# Patient Record
Sex: FEMALE | Race: WHITE | NOT HISPANIC OR LATINO | Employment: OTHER | RURAL
[De-identification: names, ages, dates, MRNs, and addresses within clinical notes are randomized per-mention and may not be internally consistent; named-entity substitution may affect disease eponyms.]

---

## 2017-01-11 ENCOUNTER — HISTORICAL (OUTPATIENT)
Dept: ADMINISTRATIVE | Facility: HOSPITAL | Age: 67
End: 2017-01-11

## 2017-01-12 LAB
LAB AP CLINICAL INFORMATION: NORMAL
LAB AP DIAGNOSIS - HISTORICAL: NORMAL
LAB AP GROSS PATHOLOGY - HISTORICAL: NORMAL
LAB AP SPECIMEN SUBMITTED - HISTORICAL: NORMAL

## 2019-06-06 LAB — HEP C VIRUS AB: NORMAL

## 2019-06-13 ENCOUNTER — HISTORICAL (OUTPATIENT)
Dept: ADMINISTRATIVE | Facility: HOSPITAL | Age: 69
End: 2019-06-13

## 2019-06-13 LAB — PAP SMEAR: NORMAL

## 2019-06-17 LAB
LAB AP CLINICAL INFORMATION: NORMAL
LAB AP GENERAL CAT - HISTORICAL: NORMAL
LAB AP INTERPRETATION/RESULT - HISTORICAL: NEGATIVE
LAB AP SPECIMEN ADEQUACY - HISTORICAL: NORMAL
LAB AP SPECIMEN SUBMITTED - HISTORICAL: NORMAL

## 2020-06-08 ENCOUNTER — HISTORICAL (OUTPATIENT)
Dept: ADMINISTRATIVE | Facility: HOSPITAL | Age: 70
End: 2020-06-08

## 2020-07-09 ENCOUNTER — HISTORICAL (OUTPATIENT)
Dept: ADMINISTRATIVE | Facility: HOSPITAL | Age: 70
End: 2020-07-09

## 2020-07-29 ENCOUNTER — HISTORICAL (OUTPATIENT)
Dept: ADMINISTRATIVE | Facility: HOSPITAL | Age: 70
End: 2020-07-29

## 2020-07-31 LAB
INSULIN SERPL-ACNC: 0.4 U[IU]/ML
INSULIN SERPL-ACNC: NO U[IU]/ML
INSULIN SERPL-ACNC: NORMAL U[IU]/ML
LAB AP CLINICAL INFORMATION: NORMAL
LAB AP COMMENTS: NORMAL
LAB AP DIAGNOSIS - HISTORICAL: NORMAL
LAB AP GROSS PATHOLOGY - HISTORICAL: NORMAL
LAB AP SPECIMEN SUBMITTED - HISTORICAL: NORMAL

## 2020-10-20 ENCOUNTER — HISTORICAL (OUTPATIENT)
Dept: ADMINISTRATIVE | Facility: HOSPITAL | Age: 70
End: 2020-10-20

## 2020-12-28 ENCOUNTER — HISTORICAL (OUTPATIENT)
Dept: ADMINISTRATIVE | Facility: HOSPITAL | Age: 70
End: 2020-12-28

## 2021-01-07 ENCOUNTER — HISTORICAL (OUTPATIENT)
Dept: ADMINISTRATIVE | Facility: HOSPITAL | Age: 71
End: 2021-01-07

## 2021-01-15 ENCOUNTER — HISTORICAL (OUTPATIENT)
Dept: ADMINISTRATIVE | Facility: HOSPITAL | Age: 71
End: 2021-01-15

## 2021-03-02 ENCOUNTER — HISTORICAL (OUTPATIENT)
Dept: ADMINISTRATIVE | Facility: HOSPITAL | Age: 71
End: 2021-03-02

## 2021-04-26 VITALS — WEIGHT: 198 LBS | HEIGHT: 64 IN | BODY MASS INDEX: 33.8 KG/M2

## 2021-04-26 RX ORDER — MECLIZINE HCL 12.5 MG 12.5 MG/1
12.5 TABLET ORAL 3 TIMES DAILY PRN
COMMUNITY
End: 2021-10-27

## 2021-04-26 RX ORDER — MONTELUKAST SODIUM 10 MG/1
10 TABLET ORAL NIGHTLY
COMMUNITY
End: 2021-10-27

## 2021-04-26 RX ORDER — ASPIRIN 81 MG/1
81 TABLET ORAL DAILY
COMMUNITY
End: 2021-10-20 | Stop reason: ALTCHOICE

## 2021-04-26 RX ORDER — GABAPENTIN 300 MG/1
300 CAPSULE ORAL 3 TIMES DAILY
COMMUNITY
End: 2021-10-27

## 2021-04-26 RX ORDER — ISOSORBIDE MONONITRATE 30 MG/1
30 TABLET, EXTENDED RELEASE ORAL DAILY
COMMUNITY
End: 2021-10-20 | Stop reason: ALTCHOICE

## 2021-04-26 RX ORDER — POTASSIUM CHLORIDE 600 MG/1
8 TABLET, FILM COATED, EXTENDED RELEASE ORAL 2 TIMES DAILY
COMMUNITY
End: 2021-10-21

## 2021-04-26 RX ORDER — PHENYTOIN SODIUM 100 MG/1
CAPSULE, EXTENDED RELEASE ORAL 3 TIMES DAILY
COMMUNITY
End: 2021-08-09

## 2021-04-26 RX ORDER — FLUTICASONE PROPIONATE 50 MCG
1 SPRAY, SUSPENSION (ML) NASAL DAILY
COMMUNITY
End: 2021-12-30 | Stop reason: SDUPTHER

## 2021-04-26 RX ORDER — PAROXETINE HYDROCHLORIDE 40 MG/1
40 TABLET, FILM COATED ORAL EVERY MORNING
COMMUNITY
End: 2021-11-03

## 2021-04-26 RX ORDER — BUDESONIDE AND FORMOTEROL FUMARATE DIHYDRATE 160; 4.5 UG/1; UG/1
2 AEROSOL RESPIRATORY (INHALATION) EVERY 12 HOURS
COMMUNITY
End: 2021-09-08 | Stop reason: ALTCHOICE

## 2021-04-26 RX ORDER — LORATADINE 10 MG/1
10 TABLET ORAL DAILY
COMMUNITY
End: 2022-08-23

## 2021-04-26 RX ORDER — IPRATROPIUM BROMIDE AND ALBUTEROL SULFATE 2.5; .5 MG/3ML; MG/3ML
3 SOLUTION RESPIRATORY (INHALATION) EVERY 6 HOURS PRN
COMMUNITY
End: 2021-10-18 | Stop reason: SDUPTHER

## 2021-04-26 RX ORDER — FUROSEMIDE 20 MG/1
20 TABLET ORAL DAILY
COMMUNITY
End: 2021-10-21

## 2021-04-26 RX ORDER — PRAVASTATIN SODIUM 40 MG/1
40 TABLET ORAL DAILY
COMMUNITY
End: 2021-10-27

## 2021-04-26 RX ORDER — HYDROCHLOROTHIAZIDE 25 MG/1
25 TABLET ORAL DAILY
COMMUNITY
End: 2021-12-02 | Stop reason: SDUPTHER

## 2021-04-26 RX ORDER — ALBUTEROL SULFATE 90 UG/1
2 AEROSOL, METERED RESPIRATORY (INHALATION) EVERY 6 HOURS PRN
COMMUNITY
End: 2021-06-16 | Stop reason: SDUPTHER

## 2021-04-27 ENCOUNTER — HISTORICAL (OUTPATIENT)
Dept: ADMINISTRATIVE | Facility: HOSPITAL | Age: 71
End: 2021-04-27

## 2021-05-21 ENCOUNTER — LAB REQUISITION (OUTPATIENT)
Dept: LAB | Facility: HOSPITAL | Age: 71
End: 2021-05-21
Payer: MEDICARE

## 2021-05-21 DIAGNOSIS — N39.0 URINARY TRACT INFECTION, SITE NOT SPECIFIED: ICD-10-CM

## 2021-05-21 LAB
BACTERIA #/AREA URNS HPF: ABNORMAL /HPF
BILIRUB UR QL STRIP: NEGATIVE
CLARITY UR: CLEAR
COLOR UR: YELLOW
GLUCOSE UR STRIP-MCNC: NEGATIVE MG/DL
HYALINE CASTS #/AREA URNS LPF: ABNORMAL /LPF
KETONES UR STRIP-SCNC: NEGATIVE MG/DL
LEUKOCYTE ESTERASE UR QL STRIP: ABNORMAL
NITRITE UR QL STRIP: NEGATIVE
PH UR STRIP: 7 PH UNITS
PROT UR QL STRIP: NEGATIVE
RBC # UR STRIP: NEGATIVE /UL
RBC #/AREA URNS HPF: ABNORMAL /HPF
SP GR UR STRIP: 1.01
SQUAMOUS #/AREA URNS LPF: ABNORMAL /LPF
UROBILINOGEN UR STRIP-ACNC: 0.2 MG/DL
WBC #/AREA URNS HPF: ABNORMAL /HPF

## 2021-05-21 PROCEDURE — 81001 URINALYSIS AUTO W/SCOPE: CPT | Performed by: NURSE PRACTITIONER

## 2021-05-21 PROCEDURE — 87086 URINE CULTURE/COLONY COUNT: CPT | Performed by: NURSE PRACTITIONER

## 2021-05-24 LAB — UA COMPLETE W REFLEX CULTURE PNL UR: ABNORMAL

## 2021-05-25 DIAGNOSIS — B95.2 UTI (URINARY TRACT INFECTION) DUE TO ENTEROCOCCUS: Primary | ICD-10-CM

## 2021-05-25 DIAGNOSIS — N39.0 UTI (URINARY TRACT INFECTION) DUE TO ENTEROCOCCUS: Primary | ICD-10-CM

## 2021-05-25 RX ORDER — CIPROFLOXACIN 500 MG/1
500 TABLET ORAL 2 TIMES DAILY
Qty: 20 TABLET | Refills: 0 | Status: SHIPPED | OUTPATIENT
Start: 2021-05-25 | End: 2021-07-20

## 2021-06-16 ENCOUNTER — OFFICE VISIT (OUTPATIENT)
Dept: FAMILY MEDICINE | Facility: CLINIC | Age: 71
End: 2021-06-16
Payer: MEDICARE

## 2021-06-16 ENCOUNTER — APPOINTMENT (OUTPATIENT)
Dept: RADIOLOGY | Facility: CLINIC | Age: 71
End: 2021-06-16
Attending: NURSE PRACTITIONER
Payer: MEDICARE

## 2021-06-16 VITALS
OXYGEN SATURATION: 98 % | HEIGHT: 64 IN | TEMPERATURE: 98 F | DIASTOLIC BLOOD PRESSURE: 60 MMHG | HEART RATE: 68 BPM | WEIGHT: 200 LBS | SYSTOLIC BLOOD PRESSURE: 102 MMHG | RESPIRATION RATE: 18 BRPM | BODY MASS INDEX: 34.15 KG/M2

## 2021-06-16 DIAGNOSIS — M54.41 CHRONIC LOW BACK PAIN WITH BILATERAL SCIATICA, UNSPECIFIED BACK PAIN LATERALITY: ICD-10-CM

## 2021-06-16 DIAGNOSIS — G89.29 CHRONIC LOW BACK PAIN WITH BILATERAL SCIATICA, UNSPECIFIED BACK PAIN LATERALITY: ICD-10-CM

## 2021-06-16 DIAGNOSIS — M54.9 DORSALGIA, UNSPECIFIED: Primary | ICD-10-CM

## 2021-06-16 DIAGNOSIS — M81.0 OSTEOPOROSIS, UNSPECIFIED OSTEOPOROSIS TYPE, UNSPECIFIED PATHOLOGICAL FRACTURE PRESENCE: ICD-10-CM

## 2021-06-16 DIAGNOSIS — J45.40 MODERATE PERSISTENT ASTHMA, UNSPECIFIED WHETHER COMPLICATED: ICD-10-CM

## 2021-06-16 DIAGNOSIS — M54.42 CHRONIC LOW BACK PAIN WITH BILATERAL SCIATICA, UNSPECIFIED BACK PAIN LATERALITY: ICD-10-CM

## 2021-06-16 DIAGNOSIS — K21.9 GASTROESOPHAGEAL REFLUX DISEASE WITHOUT ESOPHAGITIS: ICD-10-CM

## 2021-06-16 DIAGNOSIS — M54.9 DORSALGIA, UNSPECIFIED: ICD-10-CM

## 2021-06-16 LAB
BILIRUB UR QL STRIP: NEGATIVE
GLUCOSE UR QL STRIP: NEGATIVE
KETONES UR QL STRIP: NEGATIVE
LEUKOCYTE ESTERASE UR QL STRIP: NEGATIVE
PH, POC UA: 7
POC BLOOD, URINE: POSITIVE
POC NITRATES, URINE: NEGATIVE
PROT UR QL STRIP: NEGATIVE
SP GR UR STRIP: 1.02 (ref 1–1.03)
UROBILINOGEN UR STRIP-ACNC: 0.2 (ref 0.1–1.1)

## 2021-06-16 PROCEDURE — 72100 X-RAY EXAM L-S SPINE 2/3 VWS: CPT | Mod: TC,RHCUB | Performed by: NURSE PRACTITIONER

## 2021-06-16 PROCEDURE — 81003 URINALYSIS AUTO W/O SCOPE: CPT | Mod: RHCUB | Performed by: NURSE PRACTITIONER

## 2021-06-16 PROCEDURE — 1125F AMNT PAIN NOTED PAIN PRSNT: CPT | Mod: ,,, | Performed by: NURSE PRACTITIONER

## 2021-06-16 PROCEDURE — 72100 XR LUMBAR SPINE AP AND LATERAL: ICD-10-PCS | Mod: 26,,, | Performed by: RADIOLOGY

## 2021-06-16 PROCEDURE — 99213 OFFICE O/P EST LOW 20 MIN: CPT | Mod: ,,, | Performed by: NURSE PRACTITIONER

## 2021-06-16 PROCEDURE — 72100 X-RAY EXAM L-S SPINE 2/3 VWS: CPT | Mod: 26,,, | Performed by: RADIOLOGY

## 2021-06-16 PROCEDURE — 3008F BODY MASS INDEX DOCD: CPT | Mod: ,,, | Performed by: NURSE PRACTITIONER

## 2021-06-16 PROCEDURE — 1125F PR PAIN SEVERITY QUANTIFIED, PAIN PRESENT: ICD-10-PCS | Mod: ,,, | Performed by: NURSE PRACTITIONER

## 2021-06-16 PROCEDURE — 3008F PR BODY MASS INDEX (BMI) DOCUMENTED: ICD-10-PCS | Mod: ,,, | Performed by: NURSE PRACTITIONER

## 2021-06-16 PROCEDURE — 99213 PR OFFICE/OUTPT VISIT, EST, LEVL III, 20-29 MIN: ICD-10-PCS | Mod: ,,, | Performed by: NURSE PRACTITIONER

## 2021-06-16 RX ORDER — ASPIRIN 81 MG/1
1 TABLET ORAL DAILY
COMMUNITY
End: 2021-07-20 | Stop reason: SDUPTHER

## 2021-06-16 RX ORDER — ALBUTEROL SULFATE 1.25 MG/3ML
1 SOLUTION RESPIRATORY (INHALATION) EVERY 6 HOURS PRN
COMMUNITY
End: 2021-10-20

## 2021-06-16 RX ORDER — PREDNISONE 5 MG/1
TABLET ORAL
Qty: 30 TABLET | Refills: 0 | Status: SHIPPED | OUTPATIENT
Start: 2021-06-16 | End: 2021-07-20 | Stop reason: DRUGHIGH

## 2021-07-20 ENCOUNTER — LAB REQUISITION (OUTPATIENT)
Dept: LAB | Facility: HOSPITAL | Age: 71
End: 2021-07-20
Attending: NURSE PRACTITIONER
Payer: MEDICARE

## 2021-07-20 ENCOUNTER — OFFICE VISIT (OUTPATIENT)
Dept: FAMILY MEDICINE | Facility: CLINIC | Age: 71
End: 2021-07-20
Payer: MEDICARE

## 2021-07-20 VITALS
DIASTOLIC BLOOD PRESSURE: 62 MMHG | BODY MASS INDEX: 34.66 KG/M2 | HEART RATE: 83 BPM | WEIGHT: 203 LBS | RESPIRATION RATE: 18 BRPM | SYSTOLIC BLOOD PRESSURE: 128 MMHG | HEIGHT: 64 IN | OXYGEN SATURATION: 96 % | TEMPERATURE: 97 F

## 2021-07-20 DIAGNOSIS — M54.50 LOW BACK PAIN: ICD-10-CM

## 2021-07-20 DIAGNOSIS — J45.41 MODERATE PERSISTENT ASTHMA WITH ACUTE EXACERBATION: Primary | ICD-10-CM

## 2021-07-20 DIAGNOSIS — R32 UNSPECIFIED URINARY INCONTINENCE: ICD-10-CM

## 2021-07-20 LAB
BILIRUB UR QL STRIP: NEGATIVE
CLARITY UR: CLEAR
COLOR UR: YELLOW
GLUCOSE UR STRIP-MCNC: NEGATIVE MG/DL
KETONES UR STRIP-SCNC: NEGATIVE MG/DL
LEUKOCYTE ESTERASE UR QL STRIP: NEGATIVE
NITRITE UR QL STRIP: NEGATIVE
PH UR STRIP: 8 PH UNITS
PROT UR QL STRIP: NEGATIVE
RBC # UR STRIP: NEGATIVE /UL
SP GR UR STRIP: 1.01
UROBILINOGEN UR STRIP-ACNC: 0.2 MG/DL

## 2021-07-20 PROCEDURE — 96372 THER/PROPH/DIAG INJ SC/IM: CPT | Mod: ,,, | Performed by: NURSE PRACTITIONER

## 2021-07-20 PROCEDURE — 1126F AMNT PAIN NOTED NONE PRSNT: CPT | Mod: ,,, | Performed by: NURSE PRACTITIONER

## 2021-07-20 PROCEDURE — 81003 URINALYSIS AUTO W/O SCOPE: CPT | Performed by: NURSE PRACTITIONER

## 2021-07-20 PROCEDURE — 3008F BODY MASS INDEX DOCD: CPT | Mod: ,,, | Performed by: NURSE PRACTITIONER

## 2021-07-20 PROCEDURE — 1126F PR PAIN SEVERITY QUANTIFIED, NO PAIN PRESENT: ICD-10-PCS | Mod: ,,, | Performed by: NURSE PRACTITIONER

## 2021-07-20 PROCEDURE — 96372 PR INJECTION,THERAP/PROPH/DIAG2ST, IM OR SUBCUT: ICD-10-PCS | Mod: ,,, | Performed by: NURSE PRACTITIONER

## 2021-07-20 PROCEDURE — 99213 PR OFFICE/OUTPT VISIT, EST, LEVL III, 20-29 MIN: ICD-10-PCS | Mod: 25,,, | Performed by: NURSE PRACTITIONER

## 2021-07-20 PROCEDURE — 99213 OFFICE O/P EST LOW 20 MIN: CPT | Mod: 25,,, | Performed by: NURSE PRACTITIONER

## 2021-07-20 PROCEDURE — 3008F PR BODY MASS INDEX (BMI) DOCUMENTED: ICD-10-PCS | Mod: ,,, | Performed by: NURSE PRACTITIONER

## 2021-07-20 RX ORDER — PREDNISONE 5 MG/1
10 TABLET ORAL DAILY
Qty: 30 TABLET | Refills: 2 | Status: SHIPPED | OUTPATIENT
Start: 2021-07-20 | End: 2021-10-14

## 2021-07-20 RX ORDER — DEXAMETHASONE SODIUM PHOSPHATE 4 MG/ML
4 INJECTION, SOLUTION INTRA-ARTICULAR; INTRALESIONAL; INTRAMUSCULAR; INTRAVENOUS; SOFT TISSUE
Status: COMPLETED | OUTPATIENT
Start: 2021-07-20 | End: 2021-07-20

## 2021-07-20 RX ORDER — METHYLPREDNISOLONE ACETATE 40 MG/ML
40 INJECTION, SUSPENSION INTRA-ARTICULAR; INTRALESIONAL; INTRAMUSCULAR; SOFT TISSUE
Status: COMPLETED | OUTPATIENT
Start: 2021-07-20 | End: 2021-07-20

## 2021-07-20 RX ADMIN — METHYLPREDNISOLONE ACETATE 40 MG: 40 INJECTION, SUSPENSION INTRA-ARTICULAR; INTRALESIONAL; INTRAMUSCULAR; SOFT TISSUE at 10:07

## 2021-07-20 RX ADMIN — DEXAMETHASONE SODIUM PHOSPHATE 4 MG: 4 INJECTION, SOLUTION INTRA-ARTICULAR; INTRALESIONAL; INTRAMUSCULAR; INTRAVENOUS; SOFT TISSUE at 10:07

## 2021-09-08 ENCOUNTER — OFFICE VISIT (OUTPATIENT)
Dept: PULMONOLOGY | Facility: CLINIC | Age: 71
End: 2021-09-08
Payer: MEDICARE

## 2021-09-08 ENCOUNTER — OFFICE VISIT (OUTPATIENT)
Dept: CARDIOLOGY | Facility: CLINIC | Age: 71
End: 2021-09-08
Payer: MEDICARE

## 2021-09-08 VITALS
HEIGHT: 64 IN | DIASTOLIC BLOOD PRESSURE: 60 MMHG | OXYGEN SATURATION: 96 % | HEART RATE: 73 BPM | SYSTOLIC BLOOD PRESSURE: 108 MMHG | RESPIRATION RATE: 16 BRPM | BODY MASS INDEX: 34.66 KG/M2 | WEIGHT: 203 LBS

## 2021-09-08 VITALS
OXYGEN SATURATION: 97 % | SYSTOLIC BLOOD PRESSURE: 122 MMHG | DIASTOLIC BLOOD PRESSURE: 72 MMHG | BODY MASS INDEX: 33.97 KG/M2 | HEART RATE: 66 BPM | WEIGHT: 199 LBS | HEIGHT: 64 IN

## 2021-09-08 DIAGNOSIS — I50.9 CONGESTIVE HEART FAILURE, UNSPECIFIED HF CHRONICITY, UNSPECIFIED HEART FAILURE TYPE: Primary | ICD-10-CM

## 2021-09-08 DIAGNOSIS — J45.41 MODERATE PERSISTENT ASTHMA WITH ACUTE EXACERBATION: Primary | ICD-10-CM

## 2021-09-08 DIAGNOSIS — I50.32 CHRONIC DIASTOLIC HEART FAILURE: Chronic | ICD-10-CM

## 2021-09-08 PROCEDURE — 99215 OFFICE O/P EST HI 40 MIN: CPT | Mod: PBBFAC,25 | Performed by: INTERNAL MEDICINE

## 2021-09-08 PROCEDURE — 99215 OFFICE O/P EST HI 40 MIN: CPT | Mod: PBBFAC | Performed by: INTERNAL MEDICINE

## 2021-09-08 PROCEDURE — 93005 ELECTROCARDIOGRAM TRACING: CPT | Mod: PBBFAC | Performed by: INTERNAL MEDICINE

## 2021-09-08 PROCEDURE — 3288F PR FALLS RISK ASSESSMENT DOCUMENTED: ICD-10-PCS | Mod: CPTII,,, | Performed by: INTERNAL MEDICINE

## 2021-09-08 PROCEDURE — 1100F PTFALLS ASSESS-DOCD GE2>/YR: CPT | Mod: CPTII,,, | Performed by: INTERNAL MEDICINE

## 2021-09-08 PROCEDURE — 3008F BODY MASS INDEX DOCD: CPT | Mod: CPTII,,, | Performed by: INTERNAL MEDICINE

## 2021-09-08 PROCEDURE — 1159F MED LIST DOCD IN RCRD: CPT | Mod: CPTII,,, | Performed by: INTERNAL MEDICINE

## 2021-09-08 PROCEDURE — 3078F PR MOST RECENT DIASTOLIC BLOOD PRESSURE < 80 MM HG: ICD-10-PCS | Mod: CPTII,,, | Performed by: INTERNAL MEDICINE

## 2021-09-08 PROCEDURE — 3078F DIAST BP <80 MM HG: CPT | Mod: CPTII,,, | Performed by: INTERNAL MEDICINE

## 2021-09-08 PROCEDURE — 1159F PR MEDICATION LIST DOCUMENTED IN MEDICAL RECORD: ICD-10-PCS | Mod: CPTII,,, | Performed by: INTERNAL MEDICINE

## 2021-09-08 PROCEDURE — 1126F PR PAIN SEVERITY QUANTIFIED, NO PAIN PRESENT: ICD-10-PCS | Mod: CPTII,,, | Performed by: INTERNAL MEDICINE

## 2021-09-08 PROCEDURE — 99213 PR OFFICE/OUTPT VISIT, EST, LEVL III, 20-29 MIN: ICD-10-PCS | Mod: S$PBB,,, | Performed by: INTERNAL MEDICINE

## 2021-09-08 PROCEDURE — 3074F PR MOST RECENT SYSTOLIC BLOOD PRESSURE < 130 MM HG: ICD-10-PCS | Mod: CPTII,,, | Performed by: INTERNAL MEDICINE

## 2021-09-08 PROCEDURE — 1160F PR REVIEW ALL MEDS BY PRESCRIBER/CLIN PHARMACIST DOCUMENTED: ICD-10-PCS | Mod: CPTII,,, | Performed by: INTERNAL MEDICINE

## 2021-09-08 PROCEDURE — 99213 OFFICE O/P EST LOW 20 MIN: CPT | Mod: S$PBB,,, | Performed by: INTERNAL MEDICINE

## 2021-09-08 PROCEDURE — 1160F RVW MEDS BY RX/DR IN RCRD: CPT | Mod: CPTII,,, | Performed by: INTERNAL MEDICINE

## 2021-09-08 PROCEDURE — 3008F PR BODY MASS INDEX (BMI) DOCUMENTED: ICD-10-PCS | Mod: CPTII,,, | Performed by: INTERNAL MEDICINE

## 2021-09-08 PROCEDURE — 3074F SYST BP LT 130 MM HG: CPT | Mod: CPTII,,, | Performed by: INTERNAL MEDICINE

## 2021-09-08 PROCEDURE — 99214 OFFICE O/P EST MOD 30 MIN: CPT | Mod: S$PBB,,, | Performed by: INTERNAL MEDICINE

## 2021-09-08 PROCEDURE — 93010 ELECTROCARDIOGRAM REPORT: CPT | Mod: S$PBB,,, | Performed by: INTERNAL MEDICINE

## 2021-09-08 PROCEDURE — 93010 EKG 12-LEAD: ICD-10-PCS | Mod: S$PBB,,, | Performed by: INTERNAL MEDICINE

## 2021-09-08 PROCEDURE — 1126F AMNT PAIN NOTED NONE PRSNT: CPT | Mod: CPTII,,, | Performed by: INTERNAL MEDICINE

## 2021-09-08 PROCEDURE — 99214 PR OFFICE/OUTPT VISIT, EST, LEVL IV, 30-39 MIN: ICD-10-PCS | Mod: S$PBB,,, | Performed by: INTERNAL MEDICINE

## 2021-09-08 PROCEDURE — 1100F PR PT FALLS ASSESS DOC 2+ FALLS/FALL W/INJURY/YR: ICD-10-PCS | Mod: CPTII,,, | Performed by: INTERNAL MEDICINE

## 2021-09-08 PROCEDURE — 3288F FALL RISK ASSESSMENT DOCD: CPT | Mod: CPTII,,, | Performed by: INTERNAL MEDICINE

## 2021-09-08 RX ORDER — FLUTICASONE FUROATE, UMECLIDINIUM BROMIDE AND VILANTEROL TRIFENATATE 200; 62.5; 25 UG/1; UG/1; UG/1
1 POWDER RESPIRATORY (INHALATION) DAILY
Qty: 60 EACH | Refills: 4 | Status: SHIPPED | OUTPATIENT
Start: 2021-09-08 | End: 2021-12-19 | Stop reason: RX

## 2021-09-09 ENCOUNTER — OFFICE VISIT (OUTPATIENT)
Dept: FAMILY MEDICINE | Facility: CLINIC | Age: 71
End: 2021-09-09
Payer: MEDICARE

## 2021-09-09 VITALS
RESPIRATION RATE: 18 BRPM | OXYGEN SATURATION: 96 % | DIASTOLIC BLOOD PRESSURE: 70 MMHG | HEIGHT: 64 IN | HEART RATE: 69 BPM | BODY MASS INDEX: 36.02 KG/M2 | SYSTOLIC BLOOD PRESSURE: 138 MMHG | WEIGHT: 211 LBS | TEMPERATURE: 98 F

## 2021-09-09 DIAGNOSIS — R06.09 DOE (DYSPNEA ON EXERTION): Primary | ICD-10-CM

## 2021-09-09 DIAGNOSIS — J45.41 MODERATE PERSISTENT ASTHMA WITH ACUTE EXACERBATION: ICD-10-CM

## 2021-09-09 DIAGNOSIS — R29.6 RECURRENT FALLS WHILE WALKING: ICD-10-CM

## 2021-09-09 DIAGNOSIS — K21.9 GASTROESOPHAGEAL REFLUX DISEASE WITHOUT ESOPHAGITIS: ICD-10-CM

## 2021-09-09 DIAGNOSIS — I50.32 CHRONIC DIASTOLIC HEART FAILURE: ICD-10-CM

## 2021-09-09 LAB
BASOPHILS # BLD AUTO: 0.05 K/UL (ref 0–0.2)
BASOPHILS NFR BLD AUTO: 0.9 % (ref 0–1)
DIFFERENTIAL METHOD BLD: ABNORMAL
EOSINOPHIL # BLD AUTO: 0.18 K/UL (ref 0–0.5)
EOSINOPHIL NFR BLD AUTO: 3.2 % (ref 1–4)
ERYTHROCYTE [DISTWIDTH] IN BLOOD BY AUTOMATED COUNT: 12.5 % (ref 11.5–14.5)
HCT VFR BLD AUTO: 42.9 % (ref 38–47)
HGB BLD-MCNC: 14.3 G/DL (ref 12–16)
IMM GRANULOCYTES # BLD AUTO: 0.01 K/UL (ref 0–0.04)
IMM GRANULOCYTES NFR BLD: 0.2 % (ref 0–0.4)
LYMPHOCYTES # BLD AUTO: 1.3 K/UL (ref 1–4.8)
LYMPHOCYTES NFR BLD AUTO: 22.9 % (ref 27–41)
MCH RBC QN AUTO: 30.1 PG (ref 27–31)
MCHC RBC AUTO-ENTMCNC: 33.3 G/DL (ref 32–36)
MCV RBC AUTO: 90.3 FL (ref 80–96)
MONOCYTES # BLD AUTO: 0.5 K/UL (ref 0–0.8)
MONOCYTES NFR BLD AUTO: 8.8 % (ref 2–6)
MPC BLD CALC-MCNC: 11.3 FL (ref 9.4–12.4)
NEUTROPHILS # BLD AUTO: 3.63 K/UL (ref 1.8–7.7)
NEUTROPHILS NFR BLD AUTO: 64 % (ref 53–65)
NRBC # BLD AUTO: 0 X10E3/UL
NRBC, AUTO (.00): 0 %
PLATELET # BLD AUTO: 230 K/UL (ref 150–400)
RBC # BLD AUTO: 4.75 M/UL (ref 4.2–5.4)
WBC # BLD AUTO: 5.67 K/UL (ref 4.5–11)

## 2021-09-09 PROCEDURE — 1159F PR MEDICATION LIST DOCUMENTED IN MEDICAL RECORD: ICD-10-PCS | Mod: ,,, | Performed by: NURSE PRACTITIONER

## 2021-09-09 PROCEDURE — 99213 OFFICE O/P EST LOW 20 MIN: CPT | Mod: ,,, | Performed by: NURSE PRACTITIONER

## 2021-09-09 PROCEDURE — 85025 CBC WITH DIFFERENTIAL: ICD-10-PCS | Mod: ,,, | Performed by: CLINICAL MEDICAL LABORATORY

## 2021-09-09 PROCEDURE — 83880 NT-PRO NATRIURETIC PEPTIDE: ICD-10-PCS | Mod: ,,, | Performed by: CLINICAL MEDICAL LABORATORY

## 2021-09-09 PROCEDURE — 83880 ASSAY OF NATRIURETIC PEPTIDE: CPT | Mod: ,,, | Performed by: CLINICAL MEDICAL LABORATORY

## 2021-09-09 PROCEDURE — 3075F PR MOST RECENT SYSTOLIC BLOOD PRESS GE 130-139MM HG: ICD-10-PCS | Mod: ,,, | Performed by: NURSE PRACTITIONER

## 2021-09-09 PROCEDURE — 3008F PR BODY MASS INDEX (BMI) DOCUMENTED: ICD-10-PCS | Mod: ,,, | Performed by: NURSE PRACTITIONER

## 2021-09-09 PROCEDURE — 3008F BODY MASS INDEX DOCD: CPT | Mod: ,,, | Performed by: NURSE PRACTITIONER

## 2021-09-09 PROCEDURE — 3078F DIAST BP <80 MM HG: CPT | Mod: ,,, | Performed by: NURSE PRACTITIONER

## 2021-09-09 PROCEDURE — 1159F MED LIST DOCD IN RCRD: CPT | Mod: ,,, | Performed by: NURSE PRACTITIONER

## 2021-09-09 PROCEDURE — 80048 BASIC METABOLIC PANEL: ICD-10-PCS | Mod: ,,, | Performed by: CLINICAL MEDICAL LABORATORY

## 2021-09-09 PROCEDURE — 99213 PR OFFICE/OUTPT VISIT, EST, LEVL III, 20-29 MIN: ICD-10-PCS | Mod: ,,, | Performed by: NURSE PRACTITIONER

## 2021-09-09 PROCEDURE — 1126F PR PAIN SEVERITY QUANTIFIED, NO PAIN PRESENT: ICD-10-PCS | Mod: ,,, | Performed by: NURSE PRACTITIONER

## 2021-09-09 PROCEDURE — 80048 BASIC METABOLIC PNL TOTAL CA: CPT | Mod: ,,, | Performed by: CLINICAL MEDICAL LABORATORY

## 2021-09-09 PROCEDURE — 3078F PR MOST RECENT DIASTOLIC BLOOD PRESSURE < 80 MM HG: ICD-10-PCS | Mod: ,,, | Performed by: NURSE PRACTITIONER

## 2021-09-09 PROCEDURE — 1126F AMNT PAIN NOTED NONE PRSNT: CPT | Mod: ,,, | Performed by: NURSE PRACTITIONER

## 2021-09-09 PROCEDURE — 85025 COMPLETE CBC W/AUTO DIFF WBC: CPT | Mod: ,,, | Performed by: CLINICAL MEDICAL LABORATORY

## 2021-09-09 PROCEDURE — 82785 IGE: ICD-10-PCS | Mod: 90,,, | Performed by: CLINICAL MEDICAL LABORATORY

## 2021-09-09 PROCEDURE — 82785 ASSAY OF IGE: CPT | Mod: 90,,, | Performed by: CLINICAL MEDICAL LABORATORY

## 2021-09-09 PROCEDURE — 3075F SYST BP GE 130 - 139MM HG: CPT | Mod: ,,, | Performed by: NURSE PRACTITIONER

## 2021-09-10 LAB
ANION GAP SERPL CALCULATED.3IONS-SCNC: 11 MMOL/L (ref 7–16)
BUN SERPL-MCNC: 16 MG/DL (ref 7–18)
BUN/CREAT SERPL: 13 (ref 6–20)
CALCIUM SERPL-MCNC: 9.6 MG/DL (ref 8.5–10.1)
CHLORIDE SERPL-SCNC: 103 MMOL/L (ref 98–107)
CO2 SERPL-SCNC: 32 MMOL/L (ref 21–32)
CREAT SERPL-MCNC: 1.19 MG/DL (ref 0.55–1.02)
GLUCOSE SERPL-MCNC: 117 MG/DL (ref 74–106)
NT-PROBNP SERPL-MCNC: 380 PG/ML (ref 1–125)
POTASSIUM SERPL-SCNC: 4.7 MMOL/L (ref 3.5–5.1)
SODIUM SERPL-SCNC: 141 MMOL/L (ref 136–145)

## 2021-09-14 LAB — IGE SERPL-ACNC: 223 KU/L

## 2021-10-08 ENCOUNTER — CLINICAL SUPPORT (OUTPATIENT)
Dept: FAMILY MEDICINE | Facility: CLINIC | Age: 71
End: 2021-10-08
Payer: MEDICARE

## 2021-10-08 DIAGNOSIS — Z23 ENCOUNTER FOR IMMUNIZATION: Primary | ICD-10-CM

## 2021-10-08 PROCEDURE — G0008 ADMIN INFLUENZA VIRUS VAC: HCPCS | Mod: ,,, | Performed by: FAMILY MEDICINE

## 2021-10-08 PROCEDURE — 90662 IIV NO PRSV INCREASED AG IM: CPT | Mod: ,,, | Performed by: FAMILY MEDICINE

## 2021-10-08 PROCEDURE — 90662 FLU VACCINE - QUADRIVALENT - HIGH DOSE (65+) PRESERVATIVE FREE IM: ICD-10-PCS | Mod: ,,, | Performed by: FAMILY MEDICINE

## 2021-10-08 PROCEDURE — G0008 FLU VACCINE - QUADRIVALENT - HIGH DOSE (65+) PRESERVATIVE FREE IM: ICD-10-PCS | Mod: ,,, | Performed by: FAMILY MEDICINE

## 2021-10-14 ENCOUNTER — OFFICE VISIT (OUTPATIENT)
Dept: FAMILY MEDICINE | Facility: CLINIC | Age: 71
End: 2021-10-14
Payer: MEDICARE

## 2021-10-14 ENCOUNTER — APPOINTMENT (OUTPATIENT)
Dept: RADIOLOGY | Facility: CLINIC | Age: 71
End: 2021-10-14
Attending: NURSE PRACTITIONER
Payer: MEDICARE

## 2021-10-14 VITALS
OXYGEN SATURATION: 96 % | BODY MASS INDEX: 36.23 KG/M2 | HEART RATE: 93 BPM | SYSTOLIC BLOOD PRESSURE: 118 MMHG | WEIGHT: 212.19 LBS | TEMPERATURE: 99 F | DIASTOLIC BLOOD PRESSURE: 70 MMHG | RESPIRATION RATE: 24 BRPM | HEIGHT: 64 IN

## 2021-10-14 DIAGNOSIS — R06.2 WHEEZING: ICD-10-CM

## 2021-10-14 DIAGNOSIS — I50.32 CHRONIC DIASTOLIC HEART FAILURE: ICD-10-CM

## 2021-10-14 DIAGNOSIS — J45.41 MODERATE PERSISTENT ASTHMA WITH ACUTE EXACERBATION: Primary | ICD-10-CM

## 2021-10-14 PROCEDURE — 3078F PR MOST RECENT DIASTOLIC BLOOD PRESSURE < 80 MM HG: ICD-10-PCS | Mod: ,,, | Performed by: NURSE PRACTITIONER

## 2021-10-14 PROCEDURE — 3008F BODY MASS INDEX DOCD: CPT | Mod: ,,, | Performed by: NURSE PRACTITIONER

## 2021-10-14 PROCEDURE — 1125F AMNT PAIN NOTED PAIN PRSNT: CPT | Mod: ,,, | Performed by: NURSE PRACTITIONER

## 2021-10-14 PROCEDURE — 3074F PR MOST RECENT SYSTOLIC BLOOD PRESSURE < 130 MM HG: ICD-10-PCS | Mod: ,,, | Performed by: NURSE PRACTITIONER

## 2021-10-14 PROCEDURE — 1159F PR MEDICATION LIST DOCUMENTED IN MEDICAL RECORD: ICD-10-PCS | Mod: ,,, | Performed by: NURSE PRACTITIONER

## 2021-10-14 PROCEDURE — 1160F RVW MEDS BY RX/DR IN RCRD: CPT | Mod: ,,, | Performed by: NURSE PRACTITIONER

## 2021-10-14 PROCEDURE — 96372 THER/PROPH/DIAG INJ SC/IM: CPT | Mod: ,,, | Performed by: NURSE PRACTITIONER

## 2021-10-14 PROCEDURE — 1159F MED LIST DOCD IN RCRD: CPT | Mod: ,,, | Performed by: NURSE PRACTITIONER

## 2021-10-14 PROCEDURE — 3288F FALL RISK ASSESSMENT DOCD: CPT | Mod: ,,, | Performed by: NURSE PRACTITIONER

## 2021-10-14 PROCEDURE — 1125F PR PAIN SEVERITY QUANTIFIED, PAIN PRESENT: ICD-10-PCS | Mod: ,,, | Performed by: NURSE PRACTITIONER

## 2021-10-14 PROCEDURE — 3074F SYST BP LT 130 MM HG: CPT | Mod: ,,, | Performed by: NURSE PRACTITIONER

## 2021-10-14 PROCEDURE — 99213 PR OFFICE/OUTPT VISIT, EST, LEVL III, 20-29 MIN: ICD-10-PCS | Mod: 25,,, | Performed by: NURSE PRACTITIONER

## 2021-10-14 PROCEDURE — 71046 XR CHEST PA AND LATERAL: ICD-10-PCS | Mod: 26,,, | Performed by: RADIOLOGY

## 2021-10-14 PROCEDURE — 99213 OFFICE O/P EST LOW 20 MIN: CPT | Mod: 25,,, | Performed by: NURSE PRACTITIONER

## 2021-10-14 PROCEDURE — 71046 X-RAY EXAM CHEST 2 VIEWS: CPT | Mod: 26,,, | Performed by: RADIOLOGY

## 2021-10-14 PROCEDURE — 1101F PR PT FALLS ASSESS DOC 0-1 FALLS W/OUT INJ PAST YR: ICD-10-PCS | Mod: ,,, | Performed by: NURSE PRACTITIONER

## 2021-10-14 PROCEDURE — 71046 X-RAY EXAM CHEST 2 VIEWS: CPT | Mod: TC,RHCUB | Performed by: NURSE PRACTITIONER

## 2021-10-14 PROCEDURE — 3078F DIAST BP <80 MM HG: CPT | Mod: ,,, | Performed by: NURSE PRACTITIONER

## 2021-10-14 PROCEDURE — 1160F PR REVIEW ALL MEDS BY PRESCRIBER/CLIN PHARMACIST DOCUMENTED: ICD-10-PCS | Mod: ,,, | Performed by: NURSE PRACTITIONER

## 2021-10-14 PROCEDURE — 1101F PT FALLS ASSESS-DOCD LE1/YR: CPT | Mod: ,,, | Performed by: NURSE PRACTITIONER

## 2021-10-14 PROCEDURE — 3288F PR FALLS RISK ASSESSMENT DOCUMENTED: ICD-10-PCS | Mod: ,,, | Performed by: NURSE PRACTITIONER

## 2021-10-14 PROCEDURE — 3008F PR BODY MASS INDEX (BMI) DOCUMENTED: ICD-10-PCS | Mod: ,,, | Performed by: NURSE PRACTITIONER

## 2021-10-14 PROCEDURE — 96372 PR INJECTION,THERAP/PROPH/DIAG2ST, IM OR SUBCUT: ICD-10-PCS | Mod: ,,, | Performed by: NURSE PRACTITIONER

## 2021-10-14 RX ORDER — DEXAMETHASONE SODIUM PHOSPHATE 4 MG/ML
4 INJECTION, SOLUTION INTRA-ARTICULAR; INTRALESIONAL; INTRAMUSCULAR; INTRAVENOUS; SOFT TISSUE
Status: DISCONTINUED | OUTPATIENT
Start: 2021-10-14 | End: 2021-10-14

## 2021-10-14 RX ORDER — DEXAMETHASONE SODIUM PHOSPHATE 4 MG/ML
8 INJECTION, SOLUTION INTRA-ARTICULAR; INTRALESIONAL; INTRAMUSCULAR; INTRAVENOUS; SOFT TISSUE
Status: COMPLETED | OUTPATIENT
Start: 2021-10-14 | End: 2021-10-14

## 2021-10-14 RX ADMIN — DEXAMETHASONE SODIUM PHOSPHATE 8 MG: 4 INJECTION, SOLUTION INTRA-ARTICULAR; INTRALESIONAL; INTRAMUSCULAR; INTRAVENOUS; SOFT TISSUE at 10:10

## 2021-10-18 ENCOUNTER — OFFICE VISIT (OUTPATIENT)
Dept: FAMILY MEDICINE | Facility: CLINIC | Age: 71
End: 2021-10-18
Payer: MEDICARE

## 2021-10-18 ENCOUNTER — APPOINTMENT (OUTPATIENT)
Dept: RADIOLOGY | Facility: CLINIC | Age: 71
End: 2021-10-18
Attending: NURSE PRACTITIONER
Payer: MEDICARE

## 2021-10-18 VITALS
HEIGHT: 64 IN | BODY MASS INDEX: 36.19 KG/M2 | DIASTOLIC BLOOD PRESSURE: 54 MMHG | TEMPERATURE: 98 F | SYSTOLIC BLOOD PRESSURE: 104 MMHG | HEART RATE: 76 BPM | OXYGEN SATURATION: 94 % | WEIGHT: 212 LBS

## 2021-10-18 DIAGNOSIS — R06.02 SOB (SHORTNESS OF BREATH): ICD-10-CM

## 2021-10-18 DIAGNOSIS — J45.901 ASTHMA WITH ACUTE EXACERBATION, UNSPECIFIED ASTHMA SEVERITY, UNSPECIFIED WHETHER PERSISTENT: ICD-10-CM

## 2021-10-18 DIAGNOSIS — J40 BRONCHITIS: ICD-10-CM

## 2021-10-18 DIAGNOSIS — Z11.52 ENCOUNTER FOR SCREENING LABORATORY TESTING FOR COVID-19 VIRUS: Primary | ICD-10-CM

## 2021-10-18 LAB
ALBUMIN SERPL BCP-MCNC: 4.3 G/DL (ref 3.5–5)
ALBUMIN/GLOB SERPL: 0.9 {RATIO}
ALP SERPL-CCNC: 175 U/L (ref 55–142)
ALT SERPL W P-5'-P-CCNC: 34 U/L (ref 13–56)
ANION GAP SERPL CALCULATED.3IONS-SCNC: 8 MMOL/L (ref 7–16)
AST SERPL W P-5'-P-CCNC: 29 U/L (ref 15–37)
BASOPHILS # BLD AUTO: 0.04 K/UL (ref 0–0.2)
BASOPHILS NFR BLD AUTO: 0.5 % (ref 0–1)
BILIRUB SERPL-MCNC: 0.4 MG/DL (ref 0–1.2)
BUN SERPL-MCNC: 18 MG/DL (ref 7–18)
BUN/CREAT SERPL: 14 (ref 6–20)
CALCIUM SERPL-MCNC: 10.4 MG/DL (ref 8.5–10.1)
CHLORIDE SERPL-SCNC: 99 MMOL/L (ref 98–107)
CO2 SERPL-SCNC: 32 MMOL/L (ref 21–32)
CREAT SERPL-MCNC: 1.33 MG/DL (ref 0.55–1.02)
CTP QC/QA: YES
DIFFERENTIAL METHOD BLD: ABNORMAL
EOSINOPHIL # BLD AUTO: 0.23 K/UL (ref 0–0.5)
EOSINOPHIL NFR BLD AUTO: 3.1 % (ref 1–4)
ERYTHROCYTE [DISTWIDTH] IN BLOOD BY AUTOMATED COUNT: 12.2 % (ref 11.5–14.5)
FLUAV AG NPH QL: NEGATIVE
FLUBV AG NPH QL: NEGATIVE
GLOBULIN SER-MCNC: 4.6 G/DL (ref 2–4)
GLUCOSE SERPL-MCNC: 165 MG/DL (ref 74–106)
HCT VFR BLD AUTO: 46.3 % (ref 38–47)
HGB BLD-MCNC: 15.8 G/DL (ref 12–16)
IMM GRANULOCYTES # BLD AUTO: 0.02 K/UL (ref 0–0.04)
IMM GRANULOCYTES NFR BLD: 0.3 % (ref 0–0.4)
LYMPHOCYTES # BLD AUTO: 1.45 K/UL (ref 1–4.8)
LYMPHOCYTES NFR BLD AUTO: 19.6 % (ref 27–41)
MCH RBC QN AUTO: 30.5 PG (ref 27–31)
MCHC RBC AUTO-ENTMCNC: 34.1 G/DL (ref 32–36)
MCV RBC AUTO: 89.4 FL (ref 80–96)
MONOCYTES # BLD AUTO: 0.35 K/UL (ref 0–0.8)
MONOCYTES NFR BLD AUTO: 4.7 % (ref 2–6)
MPC BLD CALC-MCNC: 10.3 FL (ref 9.4–12.4)
NEUTROPHILS # BLD AUTO: 5.29 K/UL (ref 1.8–7.7)
NEUTROPHILS NFR BLD AUTO: 71.8 % (ref 53–65)
NRBC # BLD AUTO: 0 X10E3/UL
NRBC, AUTO (.00): 0 %
NT-PROBNP SERPL-MCNC: 87 PG/ML (ref 1–125)
PLATELET # BLD AUTO: 305 K/UL (ref 150–400)
POTASSIUM SERPL-SCNC: 4 MMOL/L (ref 3.5–5.1)
PROT SERPL-MCNC: 8.9 G/DL (ref 6.4–8.2)
RBC # BLD AUTO: 5.18 M/UL (ref 4.2–5.4)
SARS-COV-2 AG RESP QL IA.RAPID: NEGATIVE
SODIUM SERPL-SCNC: 135 MMOL/L (ref 136–145)
WBC # BLD AUTO: 7.38 K/UL (ref 4.5–11)

## 2021-10-18 PROCEDURE — 1159F MED LIST DOCD IN RCRD: CPT | Mod: ,,, | Performed by: NURSE PRACTITIONER

## 2021-10-18 PROCEDURE — 99214 PR OFFICE/OUTPT VISIT, EST, LEVL IV, 30-39 MIN: ICD-10-PCS | Mod: 25,,, | Performed by: NURSE PRACTITIONER

## 2021-10-18 PROCEDURE — 85025 CBC WITH DIFFERENTIAL: ICD-10-PCS | Mod: ,,, | Performed by: CLINICAL MEDICAL LABORATORY

## 2021-10-18 PROCEDURE — 71046 X-RAY EXAM CHEST 2 VIEWS: CPT | Mod: 26,,, | Performed by: RADIOLOGY

## 2021-10-18 PROCEDURE — 3008F BODY MASS INDEX DOCD: CPT | Mod: ,,, | Performed by: NURSE PRACTITIONER

## 2021-10-18 PROCEDURE — 3078F DIAST BP <80 MM HG: CPT | Mod: ,,, | Performed by: NURSE PRACTITIONER

## 2021-10-18 PROCEDURE — 80053 COMPREHENSIVE METABOLIC PANEL: ICD-10-PCS | Mod: ,,, | Performed by: CLINICAL MEDICAL LABORATORY

## 2021-10-18 PROCEDURE — 3074F PR MOST RECENT SYSTOLIC BLOOD PRESSURE < 130 MM HG: ICD-10-PCS | Mod: ,,, | Performed by: NURSE PRACTITIONER

## 2021-10-18 PROCEDURE — 99214 OFFICE O/P EST MOD 30 MIN: CPT | Mod: 25,,, | Performed by: NURSE PRACTITIONER

## 2021-10-18 PROCEDURE — 71046 X-RAY EXAM CHEST 2 VIEWS: CPT | Mod: TC,RHCUB | Performed by: NURSE PRACTITIONER

## 2021-10-18 PROCEDURE — 96372 PR INJECTION,THERAP/PROPH/DIAG2ST, IM OR SUBCUT: ICD-10-PCS | Mod: ,,, | Performed by: NURSE PRACTITIONER

## 2021-10-18 PROCEDURE — 83880 ASSAY OF NATRIURETIC PEPTIDE: CPT | Mod: ,,, | Performed by: CLINICAL MEDICAL LABORATORY

## 2021-10-18 PROCEDURE — 83880 NT-PRO NATRIURETIC PEPTIDE: ICD-10-PCS | Mod: ,,, | Performed by: CLINICAL MEDICAL LABORATORY

## 2021-10-18 PROCEDURE — 3078F PR MOST RECENT DIASTOLIC BLOOD PRESSURE < 80 MM HG: ICD-10-PCS | Mod: ,,, | Performed by: NURSE PRACTITIONER

## 2021-10-18 PROCEDURE — 85025 COMPLETE CBC W/AUTO DIFF WBC: CPT | Mod: ,,, | Performed by: CLINICAL MEDICAL LABORATORY

## 2021-10-18 PROCEDURE — 94640 AIRWAY INHALATION TREATMENT: CPT | Mod: 59,,, | Performed by: NURSE PRACTITIONER

## 2021-10-18 PROCEDURE — 3074F SYST BP LT 130 MM HG: CPT | Mod: ,,, | Performed by: NURSE PRACTITIONER

## 2021-10-18 PROCEDURE — 87428 SARSCOV & INF VIR A&B AG IA: CPT | Mod: RHCUB | Performed by: NURSE PRACTITIONER

## 2021-10-18 PROCEDURE — 1160F RVW MEDS BY RX/DR IN RCRD: CPT | Mod: ,,, | Performed by: NURSE PRACTITIONER

## 2021-10-18 PROCEDURE — 1159F PR MEDICATION LIST DOCUMENTED IN MEDICAL RECORD: ICD-10-PCS | Mod: ,,, | Performed by: NURSE PRACTITIONER

## 2021-10-18 PROCEDURE — 1160F PR REVIEW ALL MEDS BY PRESCRIBER/CLIN PHARMACIST DOCUMENTED: ICD-10-PCS | Mod: ,,, | Performed by: NURSE PRACTITIONER

## 2021-10-18 PROCEDURE — 96372 THER/PROPH/DIAG INJ SC/IM: CPT | Mod: ,,, | Performed by: NURSE PRACTITIONER

## 2021-10-18 PROCEDURE — 3008F PR BODY MASS INDEX (BMI) DOCUMENTED: ICD-10-PCS | Mod: ,,, | Performed by: NURSE PRACTITIONER

## 2021-10-18 PROCEDURE — 80053 COMPREHEN METABOLIC PANEL: CPT | Mod: ,,, | Performed by: CLINICAL MEDICAL LABORATORY

## 2021-10-18 PROCEDURE — 71046 XR CHEST PA AND LATERAL: ICD-10-PCS | Mod: 26,,, | Performed by: RADIOLOGY

## 2021-10-18 PROCEDURE — 94640 PR INHAL RX, AIRWAY OBST/DX SPUTUM INDUCT: ICD-10-PCS | Mod: 59,,, | Performed by: NURSE PRACTITIONER

## 2021-10-18 RX ORDER — PREDNISONE 10 MG/1
TABLET ORAL
Qty: 9 TABLET | Refills: 0 | Status: SHIPPED | OUTPATIENT
Start: 2021-10-18 | End: 2021-12-06

## 2021-10-18 RX ORDER — IPRATROPIUM BROMIDE AND ALBUTEROL SULFATE 2.5; .5 MG/3ML; MG/3ML
3 SOLUTION RESPIRATORY (INHALATION)
Status: COMPLETED | OUTPATIENT
Start: 2021-10-18 | End: 2021-10-18

## 2021-10-18 RX ORDER — IPRATROPIUM BROMIDE AND ALBUTEROL SULFATE 2.5; .5 MG/3ML; MG/3ML
3 SOLUTION RESPIRATORY (INHALATION) EVERY 6 HOURS PRN
Qty: 1 BOX | Refills: 11 | Status: SHIPPED | OUTPATIENT
Start: 2021-10-18 | End: 2021-12-15 | Stop reason: SDUPTHER

## 2021-10-18 RX ORDER — DEXAMETHASONE SODIUM PHOSPHATE 4 MG/ML
6 INJECTION, SOLUTION INTRA-ARTICULAR; INTRALESIONAL; INTRAMUSCULAR; INTRAVENOUS; SOFT TISSUE
Status: COMPLETED | OUTPATIENT
Start: 2021-10-18 | End: 2021-10-18

## 2021-10-18 RX ORDER — IPRATROPIUM BROMIDE AND ALBUTEROL SULFATE 2.5; .5 MG/3ML; MG/3ML
3 SOLUTION RESPIRATORY (INHALATION) EVERY 6 HOURS PRN
Qty: 1 BOX | Refills: 0 | Status: SHIPPED | OUTPATIENT
Start: 2021-10-18 | End: 2021-10-20

## 2021-10-18 RX ORDER — CEFDINIR 300 MG/1
300 CAPSULE ORAL 2 TIMES DAILY
Qty: 20 CAPSULE | Refills: 0 | Status: SHIPPED | OUTPATIENT
Start: 2021-10-18 | End: 2021-12-06

## 2021-10-18 RX ADMIN — IPRATROPIUM BROMIDE AND ALBUTEROL SULFATE 3 ML: 2.5; .5 SOLUTION RESPIRATORY (INHALATION) at 03:10

## 2021-10-18 RX ADMIN — DEXAMETHASONE SODIUM PHOSPHATE 6 MG: 4 INJECTION, SOLUTION INTRA-ARTICULAR; INTRALESIONAL; INTRAMUSCULAR; INTRAVENOUS; SOFT TISSUE at 03:10

## 2021-10-20 ENCOUNTER — OFFICE VISIT (OUTPATIENT)
Dept: FAMILY MEDICINE | Facility: CLINIC | Age: 71
End: 2021-10-20
Payer: MEDICARE

## 2021-10-20 VITALS
BODY MASS INDEX: 35.34 KG/M2 | DIASTOLIC BLOOD PRESSURE: 60 MMHG | RESPIRATION RATE: 26 BRPM | OXYGEN SATURATION: 93 % | HEIGHT: 64 IN | HEART RATE: 102 BPM | SYSTOLIC BLOOD PRESSURE: 110 MMHG | WEIGHT: 207 LBS | TEMPERATURE: 98 F

## 2021-10-20 DIAGNOSIS — I50.32 CHRONIC DIASTOLIC HEART FAILURE: ICD-10-CM

## 2021-10-20 DIAGNOSIS — R06.02 SOB (SHORTNESS OF BREATH): Primary | ICD-10-CM

## 2021-10-20 DIAGNOSIS — J45.901 ASTHMA WITH ACUTE EXACERBATION, UNSPECIFIED ASTHMA SEVERITY, UNSPECIFIED WHETHER PERSISTENT: ICD-10-CM

## 2021-10-20 LAB — NT-PROBNP SERPL-MCNC: 240 PG/ML (ref 1–125)

## 2021-10-20 PROCEDURE — A4620 VARIABLE CONCENTRATION MASK: HCPCS | Mod: ,,, | Performed by: NURSE PRACTITIONER

## 2021-10-20 PROCEDURE — 3008F PR BODY MASS INDEX (BMI) DOCUMENTED: ICD-10-PCS | Mod: ,,, | Performed by: NURSE PRACTITIONER

## 2021-10-20 PROCEDURE — 83880 NT-PRO NATRIURETIC PEPTIDE: ICD-10-PCS | Mod: ,,, | Performed by: CLINICAL MEDICAL LABORATORY

## 2021-10-20 PROCEDURE — A4620 VARIABLE CONCENTRATION MASK: ICD-10-PCS | Mod: ,,, | Performed by: NURSE PRACTITIONER

## 2021-10-20 PROCEDURE — 94640 PR INHAL RX, AIRWAY OBST/DX SPUTUM INDUCT: ICD-10-PCS | Mod: ,,, | Performed by: NURSE PRACTITIONER

## 2021-10-20 PROCEDURE — 3074F PR MOST RECENT SYSTOLIC BLOOD PRESSURE < 130 MM HG: ICD-10-PCS | Mod: ,,, | Performed by: NURSE PRACTITIONER

## 2021-10-20 PROCEDURE — 3008F BODY MASS INDEX DOCD: CPT | Mod: ,,, | Performed by: NURSE PRACTITIONER

## 2021-10-20 PROCEDURE — 3078F DIAST BP <80 MM HG: CPT | Mod: ,,, | Performed by: NURSE PRACTITIONER

## 2021-10-20 PROCEDURE — 1159F PR MEDICATION LIST DOCUMENTED IN MEDICAL RECORD: ICD-10-PCS | Mod: ,,, | Performed by: NURSE PRACTITIONER

## 2021-10-20 PROCEDURE — 3074F SYST BP LT 130 MM HG: CPT | Mod: ,,, | Performed by: NURSE PRACTITIONER

## 2021-10-20 PROCEDURE — 96372 PR INJECTION,THERAP/PROPH/DIAG2ST, IM OR SUBCUT: ICD-10-PCS | Mod: 59,,, | Performed by: NURSE PRACTITIONER

## 2021-10-20 PROCEDURE — 1159F MED LIST DOCD IN RCRD: CPT | Mod: ,,, | Performed by: NURSE PRACTITIONER

## 2021-10-20 PROCEDURE — 94640 AIRWAY INHALATION TREATMENT: CPT | Mod: ,,, | Performed by: NURSE PRACTITIONER

## 2021-10-20 PROCEDURE — 99213 OFFICE O/P EST LOW 20 MIN: CPT | Mod: 25,,, | Performed by: NURSE PRACTITIONER

## 2021-10-20 PROCEDURE — 83880 ASSAY OF NATRIURETIC PEPTIDE: CPT | Mod: ,,, | Performed by: CLINICAL MEDICAL LABORATORY

## 2021-10-20 PROCEDURE — 96372 THER/PROPH/DIAG INJ SC/IM: CPT | Mod: 59,,, | Performed by: NURSE PRACTITIONER

## 2021-10-20 PROCEDURE — 3078F PR MOST RECENT DIASTOLIC BLOOD PRESSURE < 80 MM HG: ICD-10-PCS | Mod: ,,, | Performed by: NURSE PRACTITIONER

## 2021-10-20 PROCEDURE — 99213 PR OFFICE/OUTPT VISIT, EST, LEVL III, 20-29 MIN: ICD-10-PCS | Mod: 25,,, | Performed by: NURSE PRACTITIONER

## 2021-10-20 RX ORDER — FUROSEMIDE 10 MG/ML
20 INJECTION INTRAMUSCULAR; INTRAVENOUS
Status: COMPLETED | OUTPATIENT
Start: 2021-10-20 | End: 2021-10-20

## 2021-10-20 RX ORDER — ALBUTEROL SULFATE 0.83 MG/ML
2.5 SOLUTION RESPIRATORY (INHALATION)
Status: COMPLETED | OUTPATIENT
Start: 2021-10-20 | End: 2021-10-20

## 2021-10-20 RX ADMIN — ALBUTEROL SULFATE 2.5 MG: 0.83 SOLUTION RESPIRATORY (INHALATION) at 02:10

## 2021-10-20 RX ADMIN — FUROSEMIDE 20 MG: 10 INJECTION INTRAMUSCULAR; INTRAVENOUS at 02:10

## 2021-10-25 ENCOUNTER — LAB REQUISITION (OUTPATIENT)
Dept: LAB | Facility: HOSPITAL | Age: 71
End: 2021-10-25
Attending: NURSE PRACTITIONER
Payer: MEDICARE

## 2021-10-25 DIAGNOSIS — I50.32 CHRONIC DIASTOLIC (CONGESTIVE) HEART FAILURE: ICD-10-CM

## 2021-10-25 LAB — NT-PROBNP SERPL-MCNC: 331 PG/ML (ref 1–125)

## 2021-10-25 PROCEDURE — 83880 ASSAY OF NATRIURETIC PEPTIDE: CPT | Performed by: NURSE PRACTITIONER

## 2021-10-29 ENCOUNTER — HOSPITAL ENCOUNTER (EMERGENCY)
Facility: HOSPITAL | Age: 71
Discharge: HOME | End: 2021-10-29
Payer: MEDICARE

## 2021-10-29 VITALS
HEIGHT: 64 IN | BODY MASS INDEX: 36.02 KG/M2 | TEMPERATURE: 98 F | HEART RATE: 66 BPM | OXYGEN SATURATION: 100 % | DIASTOLIC BLOOD PRESSURE: 53 MMHG | RESPIRATION RATE: 11 BRPM | WEIGHT: 211 LBS | SYSTOLIC BLOOD PRESSURE: 106 MMHG

## 2021-10-29 DIAGNOSIS — R06.02 SHORTNESS OF BREATH: ICD-10-CM

## 2021-10-29 DIAGNOSIS — K21.9 GERD (GASTROESOPHAGEAL REFLUX DISEASE): ICD-10-CM

## 2021-10-29 DIAGNOSIS — I50.32 CHRONIC DIASTOLIC HEART FAILURE: Chronic | ICD-10-CM

## 2021-10-29 DIAGNOSIS — J45.909 ASTHMA: ICD-10-CM

## 2021-10-29 DIAGNOSIS — M81.0 OSTEOPOROSIS: ICD-10-CM

## 2021-10-29 LAB
ANION GAP SERPL CALCULATED.3IONS-SCNC: 10 MMOL/L (ref 7–16)
APTT PPP: 20.1 SECONDS (ref 25.2–37.3)
BASOPHILS # BLD AUTO: 0.04 K/UL (ref 0–0.2)
BASOPHILS NFR BLD AUTO: 0.5 % (ref 0–1)
BUN SERPL-MCNC: 8 MG/DL (ref 7–18)
BUN/CREAT SERPL: 8 (ref 6–20)
CALCIUM SERPL-MCNC: 8.9 MG/DL (ref 8.5–10.1)
CHLORIDE SERPL-SCNC: 104 MMOL/L (ref 98–107)
CK MB SERPL-MCNC: 1.1 NG/ML (ref 1–3.6)
CO2 SERPL-SCNC: 31 MMOL/L (ref 21–32)
CREAT SERPL-MCNC: 1.01 MG/DL (ref 0.55–1.02)
DIFFERENTIAL METHOD BLD: ABNORMAL
EOSINOPHIL # BLD AUTO: 0.2 K/UL (ref 0–0.5)
EOSINOPHIL NFR BLD AUTO: 2.7 % (ref 1–4)
ERYTHROCYTE [DISTWIDTH] IN BLOOD BY AUTOMATED COUNT: 12.2 % (ref 11.5–14.5)
FLUAV AG UPPER RESP QL IA.RAPID: NEGATIVE
FLUBV AG UPPER RESP QL IA.RAPID: NEGATIVE
GLUCOSE SERPL-MCNC: 134 MG/DL (ref 74–106)
HCT VFR BLD AUTO: 44.2 % (ref 38–47)
HGB BLD-MCNC: 14.4 G/DL (ref 12–16)
IMM GRANULOCYTES # BLD AUTO: 0.02 K/UL (ref 0–0.04)
IMM GRANULOCYTES NFR BLD: 0.3 % (ref 0–0.4)
INR BLD: 0.88 (ref 0.9–1.1)
LYMPHOCYTES # BLD AUTO: 1.41 K/UL (ref 1–4.8)
LYMPHOCYTES NFR BLD AUTO: 19 % (ref 27–41)
MAGNESIUM SERPL-MCNC: 2.1 MG/DL (ref 1.7–2.3)
MCH RBC QN AUTO: 29.9 PG (ref 27–31)
MCHC RBC AUTO-ENTMCNC: 32.6 G/DL (ref 32–36)
MCV RBC AUTO: 91.7 FL (ref 80–96)
MONOCYTES # BLD AUTO: 0.43 K/UL (ref 0–0.8)
MONOCYTES NFR BLD AUTO: 5.8 % (ref 2–6)
MPC BLD CALC-MCNC: 10 FL (ref 9.4–12.4)
MYOGLOBIN SERPL-MCNC: 58 NG/ML (ref 13–71)
NEUTROPHILS # BLD AUTO: 5.32 K/UL (ref 1.8–7.7)
NEUTROPHILS NFR BLD AUTO: 71.7 % (ref 53–65)
NRBC # BLD AUTO: 0 X10E3/UL
NRBC, AUTO (.00): 0 %
NT-PROBNP SERPL-MCNC: 360 PG/ML (ref 1–125)
PLATELET # BLD AUTO: 217 K/UL (ref 150–400)
POTASSIUM SERPL-SCNC: 4.2 MMOL/L (ref 3.5–5.1)
PROTHROMBIN TIME: 12 SECONDS (ref 11.7–14.7)
RBC # BLD AUTO: 4.82 M/UL (ref 4.2–5.4)
SARS-COV+SARS-COV-2 AG RESP QL IA.RAPID: NEGATIVE
SODIUM SERPL-SCNC: 141 MMOL/L (ref 136–145)
TROPONIN I SERPL-MCNC: <0.017 NG/ML
WBC # BLD AUTO: 7.42 K/UL (ref 4.5–11)

## 2021-10-29 PROCEDURE — 93010 ELECTROCARDIOGRAM REPORT: CPT | Mod: ,,, | Performed by: INTERNAL MEDICINE

## 2021-10-29 PROCEDURE — 85610 PROTHROMBIN TIME: CPT | Performed by: NURSE PRACTITIONER

## 2021-10-29 PROCEDURE — 85025 COMPLETE CBC W/AUTO DIFF WBC: CPT | Performed by: NURSE PRACTITIONER

## 2021-10-29 PROCEDURE — 84484 ASSAY OF TROPONIN QUANT: CPT | Performed by: NURSE PRACTITIONER

## 2021-10-29 PROCEDURE — 83735 ASSAY OF MAGNESIUM: CPT | Performed by: NURSE PRACTITIONER

## 2021-10-29 PROCEDURE — 82553 CREATINE MB FRACTION: CPT | Performed by: NURSE PRACTITIONER

## 2021-10-29 PROCEDURE — 99285 EMERGENCY DEPT VISIT HI MDM: CPT | Mod: 25

## 2021-10-29 PROCEDURE — 36415 COLL VENOUS BLD VENIPUNCTURE: CPT | Performed by: NURSE PRACTITIONER

## 2021-10-29 PROCEDURE — 93010 EKG 12-LEAD: ICD-10-PCS | Mod: ,,, | Performed by: INTERNAL MEDICINE

## 2021-10-29 PROCEDURE — 96374 THER/PROPH/DIAG INJ IV PUSH: CPT

## 2021-10-29 PROCEDURE — 99284 PR EMERGENCY DEPT VISIT,LEVEL IV: ICD-10-PCS | Mod: ,,, | Performed by: NURSE PRACTITIONER

## 2021-10-29 PROCEDURE — 80048 BASIC METABOLIC PNL TOTAL CA: CPT | Performed by: NURSE PRACTITIONER

## 2021-10-29 PROCEDURE — 85730 THROMBOPLASTIN TIME PARTIAL: CPT | Performed by: NURSE PRACTITIONER

## 2021-10-29 PROCEDURE — 83874 ASSAY OF MYOGLOBIN: CPT | Performed by: NURSE PRACTITIONER

## 2021-10-29 PROCEDURE — 99284 EMERGENCY DEPT VISIT MOD MDM: CPT | Mod: ,,, | Performed by: NURSE PRACTITIONER

## 2021-10-29 PROCEDURE — 25500020 PHARM REV CODE 255: Performed by: NURSE PRACTITIONER

## 2021-10-29 PROCEDURE — 63600175 PHARM REV CODE 636 W HCPCS: Performed by: NURSE PRACTITIONER

## 2021-10-29 PROCEDURE — 87428 SARSCOV & INF VIR A&B AG IA: CPT | Performed by: NURSE PRACTITIONER

## 2021-10-29 PROCEDURE — 83880 ASSAY OF NATRIURETIC PEPTIDE: CPT | Performed by: NURSE PRACTITIONER

## 2021-10-29 PROCEDURE — 93005 ELECTROCARDIOGRAM TRACING: CPT

## 2021-10-29 RX ORDER — FUROSEMIDE 10 MG/ML
40 INJECTION INTRAMUSCULAR; INTRAVENOUS
Status: COMPLETED | OUTPATIENT
Start: 2021-10-29 | End: 2021-10-29

## 2021-10-29 RX ADMIN — FUROSEMIDE 40 MG: 10 INJECTION, SOLUTION INTRAVENOUS at 02:10

## 2021-10-29 RX ADMIN — IOPAMIDOL 100 ML: 755 INJECTION, SOLUTION INTRAVENOUS at 02:10

## 2021-11-01 ENCOUNTER — LAB REQUISITION (OUTPATIENT)
Dept: LAB | Facility: HOSPITAL | Age: 71
End: 2021-11-01
Attending: NURSE PRACTITIONER
Payer: MEDICARE

## 2021-11-01 DIAGNOSIS — I11.0 HYPERTENSIVE HEART DISEASE WITH HEART FAILURE: ICD-10-CM

## 2021-11-01 DIAGNOSIS — I50.32 CHRONIC DIASTOLIC (CONGESTIVE) HEART FAILURE: ICD-10-CM

## 2021-11-01 LAB — NT-PROBNP SERPL-MCNC: 513 PG/ML (ref 1–125)

## 2021-11-01 PROCEDURE — 83880 ASSAY OF NATRIURETIC PEPTIDE: CPT | Performed by: NURSE PRACTITIONER

## 2021-11-02 ENCOUNTER — TELEPHONE (OUTPATIENT)
Dept: FAMILY MEDICINE | Facility: CLINIC | Age: 71
End: 2021-11-02
Payer: MEDICARE

## 2021-11-03 RX ORDER — PAROXETINE HYDROCHLORIDE 40 MG/1
TABLET, FILM COATED ORAL
Qty: 90 TABLET | Refills: 1 | Status: SHIPPED | OUTPATIENT
Start: 2021-11-03 | End: 2021-12-30 | Stop reason: SDUPTHER

## 2021-11-12 ENCOUNTER — LAB REQUISITION (OUTPATIENT)
Dept: LAB | Facility: HOSPITAL | Age: 71
End: 2021-11-12
Attending: NURSE PRACTITIONER
Payer: MEDICARE

## 2021-11-12 DIAGNOSIS — I50.32 CHRONIC DIASTOLIC (CONGESTIVE) HEART FAILURE: ICD-10-CM

## 2021-11-12 LAB
ANION GAP SERPL CALCULATED.3IONS-SCNC: 13 MMOL/L (ref 7–16)
BUN SERPL-MCNC: 15 MG/DL (ref 7–18)
BUN/CREAT SERPL: 12 (ref 6–20)
CALCIUM SERPL-MCNC: 9.3 MG/DL (ref 8.5–10.1)
CHLORIDE SERPL-SCNC: 98 MMOL/L (ref 98–107)
CO2 SERPL-SCNC: 34 MMOL/L (ref 21–32)
CREAT SERPL-MCNC: 1.23 MG/DL (ref 0.55–1.02)
GLUCOSE SERPL-MCNC: 138 MG/DL (ref 74–106)
NT-PROBNP SERPL-MCNC: 330 PG/ML (ref 1–125)
POTASSIUM SERPL-SCNC: 3.7 MMOL/L (ref 3.5–5.1)
SODIUM SERPL-SCNC: 141 MMOL/L (ref 136–145)

## 2021-11-12 PROCEDURE — 80048 BASIC METABOLIC PNL TOTAL CA: CPT | Performed by: NURSE PRACTITIONER

## 2021-11-12 PROCEDURE — 83880 ASSAY OF NATRIURETIC PEPTIDE: CPT | Performed by: NURSE PRACTITIONER

## 2021-11-18 ENCOUNTER — TELEPHONE (OUTPATIENT)
Dept: FAMILY MEDICINE | Facility: CLINIC | Age: 71
End: 2021-11-18
Payer: MEDICARE

## 2021-11-18 DIAGNOSIS — I50.32 CHRONIC DIASTOLIC HEART FAILURE: Primary | ICD-10-CM

## 2021-11-18 RX ORDER — FUROSEMIDE 20 MG/1
TABLET ORAL
Qty: 120 TABLET | Refills: 1 | Status: SHIPPED | OUTPATIENT
Start: 2021-11-18 | End: 2021-12-02

## 2021-12-02 DIAGNOSIS — I50.32 CHRONIC DIASTOLIC HEART FAILURE: ICD-10-CM

## 2021-12-02 DIAGNOSIS — J45.41 MODERATE PERSISTENT ASTHMA WITH ACUTE EXACERBATION: Primary | ICD-10-CM

## 2021-12-02 RX ORDER — FUROSEMIDE 20 MG/1
TABLET ORAL
Qty: 40 TABLET | Refills: 0 | Status: SHIPPED | OUTPATIENT
Start: 2021-12-02 | End: 2021-12-06 | Stop reason: DRUGHIGH

## 2021-12-02 RX ORDER — HYDROCHLOROTHIAZIDE 25 MG/1
25 TABLET ORAL DAILY
Qty: 30 TABLET | Refills: 0 | Status: ON HOLD | OUTPATIENT
Start: 2021-12-02 | End: 2021-12-22 | Stop reason: HOSPADM

## 2021-12-02 RX ORDER — IPRATROPIUM BROMIDE 42 UG/1
2 SPRAY, METERED NASAL 4 TIMES DAILY
Qty: 15 ML | Refills: 0 | Status: SHIPPED | OUTPATIENT
Start: 2021-12-02 | End: 2021-12-30 | Stop reason: SDUPTHER

## 2021-12-06 ENCOUNTER — OFFICE VISIT (OUTPATIENT)
Dept: FAMILY MEDICINE | Facility: CLINIC | Age: 71
End: 2021-12-06
Payer: MEDICARE

## 2021-12-06 VITALS
HEART RATE: 77 BPM | RESPIRATION RATE: 22 BRPM | OXYGEN SATURATION: 99 % | DIASTOLIC BLOOD PRESSURE: 68 MMHG | TEMPERATURE: 99 F | SYSTOLIC BLOOD PRESSURE: 120 MMHG

## 2021-12-06 DIAGNOSIS — R06.02 SOB (SHORTNESS OF BREATH): ICD-10-CM

## 2021-12-06 DIAGNOSIS — J45.41 MODERATE PERSISTENT ASTHMA WITH ACUTE EXACERBATION: Primary | ICD-10-CM

## 2021-12-06 DIAGNOSIS — J02.9 SORE THROAT: ICD-10-CM

## 2021-12-06 DIAGNOSIS — I50.32 CHRONIC DIASTOLIC HEART FAILURE: ICD-10-CM

## 2021-12-06 DIAGNOSIS — R06.2 WHEEZING: ICD-10-CM

## 2021-12-06 LAB
CTP QC/QA: YES
CTP QC/QA: YES
FLUAV AG NPH QL: NEGATIVE
FLUBV AG NPH QL: NEGATIVE
S PYO RRNA THROAT QL PROBE: NEGATIVE
SARS-COV-2 AG RESP QL IA.RAPID: NEGATIVE

## 2021-12-06 PROCEDURE — 99214 PR OFFICE/OUTPT VISIT, EST, LEVL IV, 30-39 MIN: ICD-10-PCS | Mod: 25,,, | Performed by: NURSE PRACTITIONER

## 2021-12-06 PROCEDURE — 96372 THER/PROPH/DIAG INJ SC/IM: CPT | Mod: ,,, | Performed by: NURSE PRACTITIONER

## 2021-12-06 PROCEDURE — 87880 STREP A ASSAY W/OPTIC: CPT | Mod: RHCUB | Performed by: NURSE PRACTITIONER

## 2021-12-06 PROCEDURE — 99214 OFFICE O/P EST MOD 30 MIN: CPT | Mod: 25,,, | Performed by: NURSE PRACTITIONER

## 2021-12-06 PROCEDURE — 87428 SARSCOV & INF VIR A&B AG IA: CPT | Mod: RHCUB | Performed by: NURSE PRACTITIONER

## 2021-12-06 PROCEDURE — 96372 PR INJECTION,THERAP/PROPH/DIAG2ST, IM OR SUBCUT: ICD-10-PCS | Mod: ,,, | Performed by: NURSE PRACTITIONER

## 2021-12-06 RX ORDER — METHYLPREDNISOLONE ACETATE 40 MG/ML
40 INJECTION, SUSPENSION INTRA-ARTICULAR; INTRALESIONAL; INTRAMUSCULAR; SOFT TISSUE
Status: COMPLETED | OUTPATIENT
Start: 2021-12-06 | End: 2021-12-06

## 2021-12-06 RX ORDER — FUROSEMIDE 40 MG/1
TABLET ORAL
Qty: 40 TABLET | Refills: 0 | Status: ON HOLD | OUTPATIENT
Start: 2021-12-06 | End: 2021-12-22 | Stop reason: SDUPTHER

## 2021-12-06 RX ORDER — DEXAMETHASONE SODIUM PHOSPHATE 4 MG/ML
4 INJECTION, SOLUTION INTRA-ARTICULAR; INTRALESIONAL; INTRAMUSCULAR; INTRAVENOUS; SOFT TISSUE
Status: COMPLETED | OUTPATIENT
Start: 2021-12-06 | End: 2021-12-06

## 2021-12-06 RX ADMIN — METHYLPREDNISOLONE ACETATE 40 MG: 40 INJECTION, SUSPENSION INTRA-ARTICULAR; INTRALESIONAL; INTRAMUSCULAR; SOFT TISSUE at 03:12

## 2021-12-06 RX ADMIN — DEXAMETHASONE SODIUM PHOSPHATE 4 MG: 4 INJECTION, SOLUTION INTRA-ARTICULAR; INTRALESIONAL; INTRAMUSCULAR; INTRAVENOUS; SOFT TISSUE at 03:12

## 2021-12-08 ENCOUNTER — LAB REQUISITION (OUTPATIENT)
Dept: LAB | Facility: HOSPITAL | Age: 71
End: 2021-12-08
Attending: NURSE PRACTITIONER
Payer: MEDICARE

## 2021-12-08 DIAGNOSIS — I11.0 HYPERTENSIVE HEART DISEASE WITH HEART FAILURE: ICD-10-CM

## 2021-12-08 LAB
ANION GAP SERPL CALCULATED.3IONS-SCNC: 13 MMOL/L (ref 7–16)
BUN SERPL-MCNC: 16 MG/DL (ref 7–18)
BUN/CREAT SERPL: 13 (ref 6–20)
CALCIUM SERPL-MCNC: 9.3 MG/DL (ref 8.5–10.1)
CHLORIDE SERPL-SCNC: 98 MMOL/L (ref 98–107)
CO2 SERPL-SCNC: 35 MMOL/L (ref 21–32)
CREAT SERPL-MCNC: 1.21 MG/DL (ref 0.55–1.02)
GLUCOSE SERPL-MCNC: 125 MG/DL (ref 74–106)
NT-PROBNP SERPL-MCNC: 416 PG/ML (ref 1–125)
POTASSIUM SERPL-SCNC: 4 MMOL/L (ref 3.5–5.1)
SODIUM SERPL-SCNC: 142 MMOL/L (ref 136–145)

## 2021-12-08 PROCEDURE — 80048 BASIC METABOLIC PNL TOTAL CA: CPT | Performed by: NURSE PRACTITIONER

## 2021-12-08 PROCEDURE — 83880 ASSAY OF NATRIURETIC PEPTIDE: CPT | Performed by: NURSE PRACTITIONER

## 2021-12-09 ENCOUNTER — DOCUMENTATION ONLY (OUTPATIENT)
Dept: FAMILY MEDICINE | Facility: CLINIC | Age: 71
End: 2021-12-09
Payer: MEDICARE

## 2021-12-15 ENCOUNTER — APPOINTMENT (OUTPATIENT)
Dept: RADIOLOGY | Facility: CLINIC | Age: 71
End: 2021-12-15
Attending: NURSE PRACTITIONER
Payer: MEDICARE

## 2021-12-15 ENCOUNTER — OFFICE VISIT (OUTPATIENT)
Dept: FAMILY MEDICINE | Facility: CLINIC | Age: 71
End: 2021-12-15
Payer: MEDICARE

## 2021-12-15 VITALS
HEART RATE: 88 BPM | RESPIRATION RATE: 22 BRPM | OXYGEN SATURATION: 99 % | DIASTOLIC BLOOD PRESSURE: 70 MMHG | TEMPERATURE: 98 F | BODY MASS INDEX: 36.19 KG/M2 | WEIGHT: 212 LBS | HEIGHT: 64 IN | SYSTOLIC BLOOD PRESSURE: 110 MMHG

## 2021-12-15 DIAGNOSIS — I50.32 CHRONIC DIASTOLIC HEART FAILURE: Primary | ICD-10-CM

## 2021-12-15 DIAGNOSIS — R06.02 SOB (SHORTNESS OF BREATH): ICD-10-CM

## 2021-12-15 DIAGNOSIS — J40 BRONCHITIS: ICD-10-CM

## 2021-12-15 DIAGNOSIS — J45.901 ASTHMA WITH ACUTE EXACERBATION, UNSPECIFIED ASTHMA SEVERITY, UNSPECIFIED WHETHER PERSISTENT: ICD-10-CM

## 2021-12-15 DIAGNOSIS — E87.6 HYPOKALEMIA: ICD-10-CM

## 2021-12-15 PROCEDURE — 99213 OFFICE O/P EST LOW 20 MIN: CPT | Mod: 25,,, | Performed by: NURSE PRACTITIONER

## 2021-12-15 PROCEDURE — 99213 PR OFFICE/OUTPT VISIT, EST, LEVL III, 20-29 MIN: ICD-10-PCS | Mod: 25,,, | Performed by: NURSE PRACTITIONER

## 2021-12-15 PROCEDURE — 71046 X-RAY EXAM CHEST 2 VIEWS: CPT | Mod: 26,,, | Performed by: RADIOLOGY

## 2021-12-15 PROCEDURE — 71046 X-RAY EXAM CHEST 2 VIEWS: CPT | Mod: TC,RHCUB | Performed by: NURSE PRACTITIONER

## 2021-12-15 PROCEDURE — 71046 XR CHEST PA AND LATERAL: ICD-10-PCS | Mod: 26,,, | Performed by: RADIOLOGY

## 2021-12-15 PROCEDURE — 96372 THER/PROPH/DIAG INJ SC/IM: CPT | Mod: ,,, | Performed by: NURSE PRACTITIONER

## 2021-12-15 PROCEDURE — 96372 PR INJECTION,THERAP/PROPH/DIAG2ST, IM OR SUBCUT: ICD-10-PCS | Mod: ,,, | Performed by: NURSE PRACTITIONER

## 2021-12-15 RX ORDER — POTASSIUM CHLORIDE 750 MG/1
10 TABLET, EXTENDED RELEASE ORAL 2 TIMES DAILY
Qty: 60 TABLET | Refills: 0 | Status: ON HOLD | OUTPATIENT
Start: 2021-12-15 | End: 2021-12-22 | Stop reason: SDUPTHER

## 2021-12-15 RX ORDER — FUROSEMIDE 10 MG/ML
20 INJECTION INTRAMUSCULAR; INTRAVENOUS
Status: COMPLETED | OUTPATIENT
Start: 2021-12-15 | End: 2021-12-15

## 2021-12-15 RX ORDER — SPIRONOLACTONE 25 MG/1
25 TABLET ORAL DAILY
Qty: 30 TABLET | Refills: 2 | Status: ON HOLD | OUTPATIENT
Start: 2021-12-15 | End: 2021-12-22 | Stop reason: HOSPADM

## 2021-12-15 RX ORDER — IPRATROPIUM BROMIDE AND ALBUTEROL SULFATE 2.5; .5 MG/3ML; MG/3ML
3 SOLUTION RESPIRATORY (INHALATION) EVERY 6 HOURS PRN
Qty: 100 ML | Refills: 5 | Status: SHIPPED | OUTPATIENT
Start: 2021-12-15 | End: 2021-12-30 | Stop reason: SDUPTHER

## 2021-12-15 RX ADMIN — FUROSEMIDE 20 MG: 10 INJECTION INTRAMUSCULAR; INTRAVENOUS at 04:12

## 2021-12-17 ENCOUNTER — DOCUMENTATION ONLY (OUTPATIENT)
Dept: FAMILY MEDICINE | Facility: CLINIC | Age: 71
End: 2021-12-17
Payer: MEDICARE

## 2021-12-17 ENCOUNTER — LAB REQUISITION (OUTPATIENT)
Dept: LAB | Facility: HOSPITAL | Age: 71
End: 2021-12-17
Attending: NURSE PRACTITIONER
Payer: MEDICARE

## 2021-12-17 DIAGNOSIS — I50.32 CHRONIC DIASTOLIC (CONGESTIVE) HEART FAILURE: ICD-10-CM

## 2021-12-17 LAB
ANION GAP SERPL CALCULATED.3IONS-SCNC: 13 MMOL/L (ref 7–16)
BUN SERPL-MCNC: 22 MG/DL (ref 7–18)
BUN/CREAT SERPL: 18 (ref 6–20)
CALCIUM SERPL-MCNC: 9.6 MG/DL (ref 8.5–10.1)
CHLORIDE SERPL-SCNC: 95 MMOL/L (ref 98–107)
CO2 SERPL-SCNC: 38 MMOL/L (ref 21–32)
CREAT SERPL-MCNC: 1.21 MG/DL (ref 0.55–1.02)
GLUCOSE SERPL-MCNC: 96 MG/DL (ref 74–106)
NT-PROBNP SERPL-MCNC: 601 PG/ML (ref 1–125)
POTASSIUM SERPL-SCNC: 3.4 MMOL/L (ref 3.5–5.1)
SODIUM SERPL-SCNC: 143 MMOL/L (ref 136–145)

## 2021-12-17 PROCEDURE — 83880 ASSAY OF NATRIURETIC PEPTIDE: CPT | Performed by: NURSE PRACTITIONER

## 2021-12-17 PROCEDURE — 80048 BASIC METABOLIC PNL TOTAL CA: CPT | Performed by: NURSE PRACTITIONER

## 2021-12-19 ENCOUNTER — HOSPITAL ENCOUNTER (INPATIENT)
Facility: HOSPITAL | Age: 71
LOS: 3 days | Discharge: HOME HEALTH | DRG: 190 | End: 2021-12-22
Attending: FAMILY MEDICINE | Admitting: FAMILY MEDICINE
Payer: MEDICARE

## 2021-12-19 DIAGNOSIS — I50.32 CHRONIC DIASTOLIC HEART FAILURE: Chronic | ICD-10-CM

## 2021-12-19 DIAGNOSIS — J40 BRONCHITIS: ICD-10-CM

## 2021-12-19 DIAGNOSIS — J45.41 MODERATE PERSISTENT ASTHMA WITH ACUTE EXACERBATION: ICD-10-CM

## 2021-12-19 DIAGNOSIS — R05.9 COUGH: ICD-10-CM

## 2021-12-19 DIAGNOSIS — J44.1 COPD EXACERBATION: ICD-10-CM

## 2021-12-19 DIAGNOSIS — I50.9 CONGESTIVE HEART FAILURE, UNSPECIFIED HF CHRONICITY, UNSPECIFIED HEART FAILURE TYPE: Primary | ICD-10-CM

## 2021-12-19 DIAGNOSIS — E87.6 HYPOKALEMIA: ICD-10-CM

## 2021-12-19 DIAGNOSIS — J18.9 PNEUMONIA DUE TO INFECTIOUS ORGANISM: ICD-10-CM

## 2021-12-19 DIAGNOSIS — K21.9 GERD (GASTROESOPHAGEAL REFLUX DISEASE): ICD-10-CM

## 2021-12-19 DIAGNOSIS — J45.909 ASTHMA: ICD-10-CM

## 2021-12-19 DIAGNOSIS — M81.0 OSTEOPOROSIS: ICD-10-CM

## 2021-12-19 DIAGNOSIS — J18.9 COMMUNITY ACQUIRED PNEUMONIA, UNSPECIFIED LATERALITY: ICD-10-CM

## 2021-12-19 DIAGNOSIS — R07.9 CHEST PAIN: ICD-10-CM

## 2021-12-19 DIAGNOSIS — I50.9 CHF (CONGESTIVE HEART FAILURE): ICD-10-CM

## 2021-12-19 LAB
ALBUMIN SERPL BCP-MCNC: 3.4 G/DL (ref 3.5–5)
ALBUMIN/GLOB SERPL: 0.8 {RATIO}
ALP SERPL-CCNC: 153 U/L (ref 55–142)
ALT SERPL W P-5'-P-CCNC: 28 U/L (ref 13–56)
ANION GAP SERPL CALCULATED.3IONS-SCNC: 9 MMOL/L (ref 7–16)
AST SERPL W P-5'-P-CCNC: 21 U/L (ref 15–37)
BASOPHILS # BLD AUTO: 0.06 K/UL (ref 0–0.2)
BASOPHILS NFR BLD AUTO: 0.8 % (ref 0–1)
BILIRUB SERPL-MCNC: 0.3 MG/DL (ref 0–1.2)
BUN SERPL-MCNC: 21 MG/DL (ref 7–18)
BUN/CREAT SERPL: 19 (ref 6–20)
CALCIUM SERPL-MCNC: 9 MG/DL (ref 8.5–10.1)
CHLORIDE SERPL-SCNC: 101 MMOL/L (ref 98–107)
CO2 SERPL-SCNC: 33 MMOL/L (ref 21–32)
CREAT SERPL-MCNC: 1.11 MG/DL (ref 0.55–1.02)
DIFFERENTIAL METHOD BLD: ABNORMAL
EOSINOPHIL # BLD AUTO: 0.92 K/UL (ref 0–0.5)
EOSINOPHIL NFR BLD AUTO: 11.7 % (ref 1–4)
ERYTHROCYTE [DISTWIDTH] IN BLOOD BY AUTOMATED COUNT: 12.6 % (ref 11.5–14.5)
FLUAV AG UPPER RESP QL IA.RAPID: NEGATIVE
FLUBV AG UPPER RESP QL IA.RAPID: NEGATIVE
GLOBULIN SER-MCNC: 4.5 G/DL (ref 2–4)
GLUCOSE SERPL-MCNC: 121 MG/DL (ref 74–106)
HCT VFR BLD AUTO: 42.1 % (ref 38–47)
HGB BLD-MCNC: 14.1 G/DL (ref 12–16)
IMM GRANULOCYTES # BLD AUTO: 0.01 K/UL (ref 0–0.04)
IMM GRANULOCYTES NFR BLD: 0.1 % (ref 0–0.4)
LYMPHOCYTES # BLD AUTO: 1.97 K/UL (ref 1–4.8)
LYMPHOCYTES NFR BLD AUTO: 25.2 % (ref 27–41)
MCH RBC QN AUTO: 30.2 PG (ref 27–31)
MCHC RBC AUTO-ENTMCNC: 33.5 G/DL (ref 32–36)
MCV RBC AUTO: 90.1 FL (ref 80–96)
MONOCYTES # BLD AUTO: 0.55 K/UL (ref 0–0.8)
MONOCYTES NFR BLD AUTO: 7 % (ref 2–6)
MPC BLD CALC-MCNC: 10.2 FL (ref 9.4–12.4)
NEUTROPHILS # BLD AUTO: 4.32 K/UL (ref 1.8–7.7)
NEUTROPHILS NFR BLD AUTO: 55.2 % (ref 53–65)
NRBC # BLD AUTO: 0 X10E3/UL
NRBC, AUTO (.00): 0 %
NT-PROBNP SERPL-MCNC: 328 PG/ML (ref 1–125)
PLATELET # BLD AUTO: 269 K/UL (ref 150–400)
POTASSIUM SERPL-SCNC: 3.6 MMOL/L (ref 3.5–5.1)
PROT SERPL-MCNC: 7.9 G/DL (ref 6.4–8.2)
RBC # BLD AUTO: 4.67 M/UL (ref 4.2–5.4)
SARS-COV+SARS-COV-2 AG RESP QL IA.RAPID: NEGATIVE
SODIUM SERPL-SCNC: 139 MMOL/L (ref 136–145)
WBC # BLD AUTO: 7.83 K/UL (ref 4.5–11)

## 2021-12-19 PROCEDURE — 25000003 PHARM REV CODE 250: Performed by: FAMILY MEDICINE

## 2021-12-19 PROCEDURE — 83880 ASSAY OF NATRIURETIC PEPTIDE: CPT | Performed by: FAMILY MEDICINE

## 2021-12-19 PROCEDURE — 63600175 PHARM REV CODE 636 W HCPCS: Performed by: FAMILY MEDICINE

## 2021-12-19 PROCEDURE — 96372 THER/PROPH/DIAG INJ SC/IM: CPT | Mod: 59

## 2021-12-19 PROCEDURE — 36415 COLL VENOUS BLD VENIPUNCTURE: CPT | Performed by: FAMILY MEDICINE

## 2021-12-19 PROCEDURE — 99223 PR INITIAL HOSPITAL CARE,LEVL III: ICD-10-PCS | Mod: AI,,, | Performed by: FAMILY MEDICINE

## 2021-12-19 PROCEDURE — 99284 EMERGENCY DEPT VISIT MOD MDM: CPT | Mod: ,,, | Performed by: FAMILY MEDICINE

## 2021-12-19 PROCEDURE — 99284 PR EMERGENCY DEPT VISIT,LEVEL IV: ICD-10-PCS | Mod: ,,, | Performed by: FAMILY MEDICINE

## 2021-12-19 PROCEDURE — 99285 EMERGENCY DEPT VISIT HI MDM: CPT | Mod: 25

## 2021-12-19 PROCEDURE — 11000001 HC ACUTE MED/SURG PRIVATE ROOM

## 2021-12-19 PROCEDURE — 96375 TX/PRO/DX INJ NEW DRUG ADDON: CPT

## 2021-12-19 PROCEDURE — 85025 COMPLETE CBC W/AUTO DIFF WBC: CPT | Performed by: FAMILY MEDICINE

## 2021-12-19 PROCEDURE — 25000242 PHARM REV CODE 250 ALT 637 W/ HCPCS: Performed by: FAMILY MEDICINE

## 2021-12-19 PROCEDURE — 80053 COMPREHEN METABOLIC PANEL: CPT | Performed by: FAMILY MEDICINE

## 2021-12-19 PROCEDURE — 87428 SARSCOV & INF VIR A&B AG IA: CPT | Performed by: FAMILY MEDICINE

## 2021-12-19 PROCEDURE — 99223 1ST HOSP IP/OBS HIGH 75: CPT | Mod: AI,,, | Performed by: FAMILY MEDICINE

## 2021-12-19 PROCEDURE — 96365 THER/PROPH/DIAG IV INF INIT: CPT

## 2021-12-19 RX ORDER — POTASSIUM CHLORIDE 750 MG/1
10 TABLET, EXTENDED RELEASE ORAL 2 TIMES DAILY
Status: DISCONTINUED | OUTPATIENT
Start: 2021-12-19 | End: 2021-12-20

## 2021-12-19 RX ORDER — PROMETHAZINE HYDROCHLORIDE 25 MG/1
25 TABLET ORAL EVERY 6 HOURS PRN
Status: DISCONTINUED | OUTPATIENT
Start: 2021-12-19 | End: 2021-12-22 | Stop reason: HOSPADM

## 2021-12-19 RX ORDER — ALBUTEROL SULFATE 0.83 MG/ML
2.5 SOLUTION RESPIRATORY (INHALATION)
Status: COMPLETED | OUTPATIENT
Start: 2021-12-19 | End: 2021-12-19

## 2021-12-19 RX ORDER — ONDANSETRON 2 MG/ML
4 INJECTION INTRAMUSCULAR; INTRAVENOUS EVERY 8 HOURS PRN
Status: DISCONTINUED | OUTPATIENT
Start: 2021-12-19 | End: 2021-12-22 | Stop reason: HOSPADM

## 2021-12-19 RX ORDER — HYDROCODONE BITARTRATE AND ACETAMINOPHEN 5; 325 MG/1; MG/1
1 TABLET ORAL EVERY 6 HOURS PRN
Status: DISCONTINUED | OUTPATIENT
Start: 2021-12-19 | End: 2021-12-22 | Stop reason: HOSPADM

## 2021-12-19 RX ORDER — TALC
6 POWDER (GRAM) TOPICAL NIGHTLY PRN
Status: DISCONTINUED | OUTPATIENT
Start: 2021-12-19 | End: 2021-12-22 | Stop reason: HOSPADM

## 2021-12-19 RX ORDER — ALBUTEROL SULFATE 90 UG/1
2 AEROSOL, METERED RESPIRATORY (INHALATION) EVERY 6 HOURS PRN
Status: DISCONTINUED | OUTPATIENT
Start: 2021-12-19 | End: 2021-12-19 | Stop reason: SDUPTHER

## 2021-12-19 RX ORDER — PRAVASTATIN SODIUM 40 MG/1
40 TABLET ORAL NIGHTLY
Status: DISCONTINUED | OUTPATIENT
Start: 2021-12-19 | End: 2021-12-22 | Stop reason: HOSPADM

## 2021-12-19 RX ORDER — SIMETHICONE 80 MG
1 TABLET,CHEWABLE ORAL 4 TIMES DAILY PRN
Status: DISCONTINUED | OUTPATIENT
Start: 2021-12-19 | End: 2021-12-22 | Stop reason: HOSPADM

## 2021-12-19 RX ORDER — FUROSEMIDE 10 MG/ML
40 INJECTION INTRAMUSCULAR; INTRAVENOUS
Status: DISCONTINUED | OUTPATIENT
Start: 2021-12-20 | End: 2021-12-20

## 2021-12-19 RX ORDER — MECLIZINE HCL 12.5 MG 12.5 MG/1
12.5 TABLET ORAL 2 TIMES DAILY PRN
Status: DISCONTINUED | OUTPATIENT
Start: 2021-12-19 | End: 2021-12-22 | Stop reason: HOSPADM

## 2021-12-19 RX ORDER — PAROXETINE HYDROCHLORIDE 20 MG/1
40 TABLET, FILM COATED ORAL DAILY
Status: DISCONTINUED | OUTPATIENT
Start: 2021-12-20 | End: 2021-12-22 | Stop reason: HOSPADM

## 2021-12-19 RX ORDER — FLUTICASONE PROPIONATE 50 MCG
1 SPRAY, SUSPENSION (ML) NASAL DAILY
Status: DISCONTINUED | OUTPATIENT
Start: 2021-12-20 | End: 2021-12-22 | Stop reason: HOSPADM

## 2021-12-19 RX ORDER — NALOXONE HCL 0.4 MG/ML
0.02 VIAL (ML) INJECTION
Status: DISCONTINUED | OUTPATIENT
Start: 2021-12-19 | End: 2021-12-22 | Stop reason: HOSPADM

## 2021-12-19 RX ORDER — AMOXICILLIN 250 MG
1 CAPSULE ORAL 2 TIMES DAILY
Status: DISCONTINUED | OUTPATIENT
Start: 2021-12-19 | End: 2021-12-22 | Stop reason: HOSPADM

## 2021-12-19 RX ORDER — ACETAMINOPHEN 325 MG/1
650 TABLET ORAL EVERY 4 HOURS PRN
Status: DISCONTINUED | OUTPATIENT
Start: 2021-12-19 | End: 2021-12-22 | Stop reason: HOSPADM

## 2021-12-19 RX ORDER — BISACODYL 10 MG
10 SUPPOSITORY, RECTAL RECTAL DAILY PRN
Status: DISCONTINUED | OUTPATIENT
Start: 2021-12-19 | End: 2021-12-22 | Stop reason: HOSPADM

## 2021-12-19 RX ORDER — SPIRONOLACTONE 25 MG/1
25 TABLET ORAL DAILY
Status: DISCONTINUED | OUTPATIENT
Start: 2021-12-20 | End: 2021-12-19

## 2021-12-19 RX ORDER — ISOSORBIDE MONONITRATE 30 MG/1
30 TABLET, EXTENDED RELEASE ORAL DAILY
Status: DISCONTINUED | OUTPATIENT
Start: 2021-12-20 | End: 2021-12-22 | Stop reason: HOSPADM

## 2021-12-19 RX ORDER — IPRATROPIUM BROMIDE AND ALBUTEROL SULFATE 2.5; .5 MG/3ML; MG/3ML
9 SOLUTION RESPIRATORY (INHALATION)
Status: COMPLETED | OUTPATIENT
Start: 2021-12-19 | End: 2021-12-19

## 2021-12-19 RX ORDER — METHYLPREDNISOLONE SOD SUCC 125 MG
125 VIAL (EA) INJECTION
Status: COMPLETED | OUTPATIENT
Start: 2021-12-19 | End: 2021-12-19

## 2021-12-19 RX ORDER — ENOXAPARIN SODIUM 100 MG/ML
40 INJECTION SUBCUTANEOUS EVERY 24 HOURS
Status: DISCONTINUED | OUTPATIENT
Start: 2021-12-19 | End: 2021-12-22 | Stop reason: HOSPADM

## 2021-12-19 RX ORDER — FUROSEMIDE 10 MG/ML
80 INJECTION INTRAMUSCULAR; INTRAVENOUS
Status: COMPLETED | OUTPATIENT
Start: 2021-12-19 | End: 2021-12-19

## 2021-12-19 RX ORDER — PHENYTOIN SODIUM 100 MG/1
100 CAPSULE, EXTENDED RELEASE ORAL 2 TIMES DAILY
Status: DISCONTINUED | OUTPATIENT
Start: 2021-12-19 | End: 2021-12-22 | Stop reason: HOSPADM

## 2021-12-19 RX ORDER — POLYETHYLENE GLYCOL 3350 17 G/17G
17 POWDER, FOR SOLUTION ORAL DAILY
Status: DISCONTINUED | OUTPATIENT
Start: 2021-12-20 | End: 2021-12-22 | Stop reason: HOSPADM

## 2021-12-19 RX ORDER — HYDROCHLOROTHIAZIDE 25 MG/1
25 TABLET ORAL DAILY
Status: DISCONTINUED | OUTPATIENT
Start: 2021-12-20 | End: 2021-12-19

## 2021-12-19 RX ORDER — MAG HYDROX/ALUMINUM HYD/SIMETH 200-200-20
30 SUSPENSION, ORAL (FINAL DOSE FORM) ORAL 4 TIMES DAILY PRN
Status: DISCONTINUED | OUTPATIENT
Start: 2021-12-19 | End: 2021-12-22 | Stop reason: HOSPADM

## 2021-12-19 RX ORDER — GABAPENTIN 100 MG/1
100 CAPSULE ORAL 3 TIMES DAILY
Status: DISCONTINUED | OUTPATIENT
Start: 2021-12-19 | End: 2021-12-22 | Stop reason: HOSPADM

## 2021-12-19 RX ORDER — MONTELUKAST SODIUM 10 MG/1
10 TABLET ORAL DAILY
Status: DISCONTINUED | OUTPATIENT
Start: 2021-12-20 | End: 2021-12-22 | Stop reason: HOSPADM

## 2021-12-19 RX ORDER — ACETAMINOPHEN 325 MG/1
650 TABLET ORAL EVERY 8 HOURS PRN
Status: DISCONTINUED | OUTPATIENT
Start: 2021-12-19 | End: 2021-12-22 | Stop reason: HOSPADM

## 2021-12-19 RX ORDER — IPRATROPIUM BROMIDE AND ALBUTEROL SULFATE 2.5; .5 MG/3ML; MG/3ML
3 SOLUTION RESPIRATORY (INHALATION) EVERY 6 HOURS PRN
Status: DISCONTINUED | OUTPATIENT
Start: 2021-12-19 | End: 2021-12-20

## 2021-12-19 RX ORDER — IPRATROPIUM BROMIDE 42 UG/1
2 SPRAY, METERED NASAL 4 TIMES DAILY
Status: DISCONTINUED | OUTPATIENT
Start: 2021-12-19 | End: 2021-12-22 | Stop reason: HOSPADM

## 2021-12-19 RX ADMIN — ALBUTEROL SULFATE 2.5 MG: 2.5 SOLUTION RESPIRATORY (INHALATION) at 01:12

## 2021-12-19 RX ADMIN — SENNOSIDES AND DOCUSATE SODIUM 1 TABLET: 50; 8.6 TABLET ORAL at 08:12

## 2021-12-19 RX ADMIN — PRAVASTATIN SODIUM 40 MG: 40 TABLET ORAL at 08:12

## 2021-12-19 RX ADMIN — IPRATROPIUM BROMIDE 2 SPRAY: 42 SPRAY, METERED NASAL at 08:12

## 2021-12-19 RX ADMIN — PHENYTOIN SODIUM 100 MG: 100 CAPSULE, EXTENDED RELEASE ORAL at 08:12

## 2021-12-19 RX ADMIN — GABAPENTIN 100 MG: 100 CAPSULE ORAL at 08:12

## 2021-12-19 RX ADMIN — POTASSIUM CHLORIDE 10 MEQ: 750 TABLET, FILM COATED, EXTENDED RELEASE ORAL at 08:12

## 2021-12-19 RX ADMIN — FUROSEMIDE 80 MG: 10 INJECTION INTRAMUSCULAR; INTRAVENOUS at 01:12

## 2021-12-19 RX ADMIN — VANCOMYCIN HYDROCHLORIDE 2000 MG: 1 INJECTION, POWDER, LYOPHILIZED, FOR SOLUTION INTRAVENOUS at 08:12

## 2021-12-19 RX ADMIN — CEFTRIAXONE SODIUM 1 G: 1 INJECTION, POWDER, FOR SOLUTION INTRAMUSCULAR; INTRAVENOUS at 01:12

## 2021-12-19 RX ADMIN — ENOXAPARIN SODIUM 40 MG: 40 INJECTION SUBCUTANEOUS at 05:12

## 2021-12-19 RX ADMIN — METHYLPREDNISOLONE SODIUM SUCCINATE 125 MG: 125 INJECTION, POWDER, FOR SOLUTION INTRAMUSCULAR; INTRAVENOUS at 11:12

## 2021-12-19 RX ADMIN — IPRATROPIUM BROMIDE AND ALBUTEROL SULFATE 9 ML: .5; 3 SOLUTION RESPIRATORY (INHALATION) at 11:12

## 2021-12-19 RX ADMIN — PIPERACILLIN AND TAZOBACTAM 4.5 G: 4; .5 INJECTION, POWDER, LYOPHILIZED, FOR SOLUTION INTRAVENOUS at 08:12

## 2021-12-19 RX ADMIN — METHYLPREDNISOLONE SODIUM SUCCINATE 80 MG: 40 INJECTION, POWDER, FOR SOLUTION INTRAMUSCULAR; INTRAVENOUS at 08:12

## 2021-12-19 NOTE — SUBJECTIVE & OBJECTIVE
Past Medical History:   Diagnosis Date    Asthma     CHF (congestive heart failure)     COPD (chronic obstructive pulmonary disease)     WILLSON (dyspnea on exertion)     GERD (gastroesophageal reflux disease)     HTN (hypertension)     Hyperlipidemia     Osteoporosis     Oxygen dependent     02 2L    Seizure disorder        Past Surgical History:   Procedure Laterality Date    COLONOSCOPY      REVISION OF TOTAL KNEE REPLACEMENT       TUBAL LIGATION         Review of patient's allergies indicates:  No Known Allergies    No current facility-administered medications on file prior to encounter.     Current Outpatient Medications on File Prior to Encounter   Medication Sig    albuterol-ipratropium (DUO-NEB) 2.5 mg-0.5 mg/3 mL nebulizer solution Take 3 mLs by nebulization every 6 (six) hours as needed for Wheezing. Rescue    calcium carbonate/vitamin D3 (CALCARB 600 WITH VITAMIN D ORAL) Take by mouth.    fluticasone propionate (FLONASE) 50 mcg/actuation nasal spray 1 spray by Each Nostril route once daily.    fluticasone-umeclidin-vilanter (TRELEGY ELLIPTA) 200-62.5-25 mcg inhaler Inhale 1 puff into the lungs once daily.    furosemide (LASIX) 40 MG tablet Take 2 tablets on  Mon Wed Fri , take 1 tablet  Sun, Tues, Thurs, Sat (Patient taking differently: 40 mg. Take 2 tablets on  Mon Wed Fri Sat, take 1 tablet  Sun, Tues, Thurs,)    gabapentin (NEURONTIN) 300 MG capsule TAKE 1 CAPSULE THREE TIMES DAILY    hydroCHLOROthiazide (HYDRODIURIL) 25 MG tablet Take 1 tablet (25 mg total) by mouth once daily.    ipratropium (ATROVENT) 42 mcg (0.06 %) nasal spray 2 sprays by Nasal route 4 (four) times daily.    isosorbide mononitrate (IMDUR) 30 MG 24 hr tablet TAKE 1 TABLET EVERY DAY    loratadine (CLARITIN) 10 mg tablet Take 10 mg by mouth once daily.    meclizine (ANTIVERT) 12.5 mg tablet TAKE 1 TABLET TWICE DAILY AS NEEDED    montelukast (SINGULAIR) 10 mg tablet TAKE 1 TABLET EVERY DAY    paroxetine (PAXIL)  40 MG tablet TAKE 1 TABLET EVERY DAY    phenytoin (DILANTIN) 100 MG ER capsule TAKE 1 CAPSULE TWICE DAILY    potassium chloride SA (K-DUR,KLOR-CON) 10 MEQ tablet Take 1 tablet (10 mEq total) by mouth 2 (two) times daily.    pravastatin (PRAVACHOL) 40 MG tablet TAKE 1 TABLET AT BEDTIME    spironolactone (ALDACTONE) 25 MG tablet Take 1 tablet (25 mg total) by mouth once daily.     Family History     Problem Relation (Age of Onset)    Cancer Father    Diabetes Mother, Sister, Brother    Heart disease Mother, Sister    Hypertension Mother, Sister        Tobacco Use    Smoking status: Never Smoker    Smokeless tobacco: Never Used   Substance and Sexual Activity    Alcohol use: Never    Drug use: Never    Sexual activity: Not Currently     Review of Systems   Constitutional: Negative for fatigue and fever.   Respiratory: Positive for cough, shortness of breath and wheezing.    Cardiovascular: Negative for chest pain and leg swelling.   Gastrointestinal: Negative for abdominal distention and abdominal pain.     Objective:     Vital Signs (Most Recent):  Temp: 98.1 °F (36.7 °C) (12/19/21 1126)  Pulse: 97 (12/19/21 1507)  Resp: (!) 23 (12/19/21 1507)  BP: 122/69 (12/19/21 1507)  SpO2: (!) 92 % (12/19/21 1507) Vital Signs (24h Range):  Temp:  [98.1 °F (36.7 °C)] 98.1 °F (36.7 °C)  Pulse:  [] 97  Resp:  [11-24] 23  SpO2:  [91 %-98 %] 92 %  BP: (103-142)/(61-87) 122/69     Weight: 94.3 kg (208 lb)  Body mass index is 35.7 kg/m².    Physical Exam  Vitals and nursing note reviewed.   Constitutional:       General: She is not in acute distress.     Appearance: Normal appearance. She is obese. She is ill-appearing.   Cardiovascular:      Rate and Rhythm: Regular rhythm.      Heart sounds: Normal heart sounds.   Pulmonary:      Breath sounds: Wheezing and rhonchi present.   Abdominal:      General: There is distension.      Palpations: Abdomen is soft.      Tenderness: There is no abdominal tenderness.   Neurological:       Mental Status: She is alert and oriented to person, place, and time.             Significant Labs:   All pertinent labs within the past 24 hours have been reviewed.  BMP:   Recent Labs   Lab 12/19/21  1140   *      K 3.6      CO2 33*   BUN 21*   CREATININE 1.11*   CALCIUM 9.0     CBC:   Recent Labs   Lab 12/19/21  1140   WBC 7.83   HGB 14.1   HCT 42.1          Significant Imaging: I have reviewed all pertinent imaging results/findings within the past 24 hours.  CXRay with opacities

## 2021-12-19 NOTE — HPI
71 year old  female presents with shortness of breath. Patient has home oxygen. She said she went to the bathroom this morning without her oxygen and became acutely short of breath. Patient has a history of asthma and COPD. History of CHF. Family member at bedside.

## 2021-12-19 NOTE — ED PROVIDER NOTES
Encounter Date: 12/19/2021    SCRIBE #1 NOTE: I, Emilie Flores, am scribing for, and in the presence of,  Stephanie Adams. I have scribed the entire note.       History     Chief Complaint   Patient presents with    Shortness of Breath    Cough     Patient is a 71 year old female reporting shortness of breath and coughing that started this morning at 5. Patient denies fever, chest pain, fatigue, and chest tightness. Past medical history includes asthma, hypertension, and seizure disorder.  Patient has been using nebulizer more than normal recently.  She has also been using her oxygen continuously, which she previously was only using it at night.  Patient also reports excessive coughing with sputum production.      The history is provided by the patient and medical records. No  was used.     Review of patient's allergies indicates:  No Known Allergies  Past Medical History:   Diagnosis Date    Asthma     WILLSON (dyspnea on exertion)     GERD (gastroesophageal reflux disease)     HTN (hypertension)     Hyperlipidemia     Osteoporosis     Oxygen dependent     02 2L    Seizure disorder      Past Surgical History:   Procedure Laterality Date    COLONOSCOPY      REVISION OF TOTAL KNEE REPLACEMENT       TUBAL LIGATION       Family History   Problem Relation Age of Onset    Diabetes Mother     Heart disease Mother     Hypertension Mother     Cancer Father     Diabetes Sister     Heart disease Sister     Hypertension Sister     Diabetes Brother      Social History     Tobacco Use    Smoking status: Never Smoker    Smokeless tobacco: Never Used   Substance Use Topics    Alcohol use: Never    Drug use: Never     Review of Systems   Constitutional: Negative.  Negative for fatigue and fever.   HENT: Negative.    Eyes: Negative.    Respiratory: Positive for cough (Since 5 this morning ) and shortness of breath (Since 5 this morning ). Negative for chest tightness.    Cardiovascular:  Negative.  Negative for chest pain.   Gastrointestinal: Negative for diarrhea, nausea and vomiting.   Endocrine: Negative.    Genitourinary: Negative.    Musculoskeletal: Negative.  Negative for back pain.   Skin: Negative.    Allergic/Immunologic: Negative.    Neurological: Negative.  Negative for weakness and light-headedness.   Hematological: Negative.    Psychiatric/Behavioral: Negative.    All other systems reviewed and are negative.      Physical Exam     Initial Vitals [12/19/21 1126]   BP Pulse Resp Temp SpO2   125/87 82 (!) 24 98.1 °F (36.7 °C) 98 %      MAP       --         Physical Exam    Nursing note and vitals reviewed.  Constitutional: She appears well-developed and well-nourished. She is not diaphoretic. No distress.   HENT:   Head: Normocephalic and atraumatic.   Right Ear: External ear normal.   Left Ear: External ear normal.   Nose: Nose normal.   Eyes: Conjunctivae and EOM are normal. Pupils are equal, round, and reactive to light. Right eye exhibits no discharge. Left eye exhibits no discharge. No scleral icterus.   Neck:   Normal range of motion.  Cardiovascular: Normal rate.   Peripheral perfusion appropriate   Pulmonary/Chest: No stridor. No respiratory distress. She has wheezes.   Musculoskeletal:         General: Normal range of motion.      Cervical back: Normal range of motion.      Comments: Gait normal     Neurological: She is alert. She has normal strength. No cranial nerve deficit.   Skin: Skin is warm and dry. No rash noted. No pallor.   Psychiatric: She has a normal mood and affect.         ED Course   Procedures  Labs Reviewed   COMPREHENSIVE METABOLIC PANEL - Abnormal; Notable for the following components:       Result Value    CO2 33 (*)     Glucose 121 (*)     BUN 21 (*)     Creatinine 1.11 (*)     Albumin 3.4 (*)     Globulin 4.5 (*)     Alk Phos 153 (*)     eGFR 52 (*)     All other components within normal limits   NT-PRO NATRIURETIC PEPTIDE - Abnormal; Notable for the  following components:    ProBNP 328 (*)     All other components within normal limits   CBC WITH DIFFERENTIAL - Abnormal; Notable for the following components:    Lymphocytes % 25.2 (*)     Monocytes % 7.0 (*)     Eosinophils % 11.7 (*)     Eosinophils, Absolute 0.92 (*)     All other components within normal limits   CBC W/ AUTO DIFFERENTIAL    Narrative:     The following orders were created for panel order CBC auto differential.  Procedure                               Abnormality         Status                     ---------                               -----------         ------                     CBC with Differential[[id number]]        Abnormal            Final result                 Please view results for these tests on the individual orders.   SARS-COV2 (COVID) W/ FLU ANTIGEN          Imaging Results          X-Ray Chest AP Portable (Final result)  Result time 12/19/21 11:49:58    Final result by Ramiro ePrez DO (12/19/21 11:49:58)                 Impression:      As above.      Electronically signed by: Ramiro Perez  Date:    12/19/2021  Time:    11:49             Narrative:    EXAMINATION:  XR CHEST AP PORTABLE    CLINICAL HISTORY:  Cough, unspecified    TECHNIQUE:  XR CHEST AP PORTABLE    COMPARISON:  Comparisons were reviewed, if available.    FINDINGS:  No lines or tubes.    Interstitial opacities with more focal opacities right lower lobe, infection versus pulmonary edema    Normal pleura.    Cardiac silhouette is similar to comparison exam.    No new acute bone findings.                                 Medications   albuterol-ipratropium 2.5 mg-0.5 mg/3 mL nebulizer solution 9 mL (9 mLs Nebulization Given 12/19/21 1148)   methylPREDNISolone sodium succinate injection 125 mg (125 mg Intravenous Given 12/19/21 1148)   albuterol nebulizer solution 2.5 mg (2.5 mg Nebulization Given 12/19/21 1348)   furosemide injection 80 mg (80 mg Intravenous Given 12/19/21 1349)   cefTRIAXone (ROCEPHIN) 1 g  in dextrose 5 % in water (D5W) 5 % 50 mL IVPB (MB+) (1 g Intravenous New Bag 12/19/21 1342)     Medical Decision Making:   ED Management:  Despite multiple nebulizer treatments, patient continues to be severely short of breath with wheezing.  She is able to ambulate to a bedside commode but became very dyspneic with exertion.  Issue to has remained in the 92-93% range.  Patient requests admission.  She states if she goes home she will be coming back shortly because she is so short of breath.     1448:  spoke to               Attending Attestation:           Physician Attestation for Scribe:  Physician Attestation Statement for Scribe #1: I, Stephanie Arredondo M.D., reviewed documentation, as scribed by Emilie Flores in my presence, and it is both accurate and complete.                      Clinical Impression:   Final diagnoses:  [R05.9] Cough  [I50.9] Congestive heart failure, unspecified HF chronicity, unspecified heart failure type (Primary)  [J40] Bronchitis  [J18.9] Community acquired pneumonia, unspecified laterality          ED Disposition Condition    Admit               Stephanie Adams MD  12/19/21 3812

## 2021-12-19 NOTE — LETTER
December 22, 2021         [address] [place] [address]  [place] MIRANDA [address]  Phone: [phone]  Fax: [phone]       Date of Visit: 12/22/2021    To Whom It May Concern:    Please be advised that under state and federal laws as it relates to patient privacy and Health Insurance Accountability Act (HIPAA), we can not release our patient(s) name without authorization. Although, we can confirm that the individual listed below did accompany a person to our facility for healthcare services to be provided.    This document confirms that  Jigna Martínez accompanied a patient in our facility From 12/19-12/22/21    Sincerely,     Min Acuna LPN

## 2021-12-19 NOTE — H&P
[facility] Emergency Department  Hospital Medicine  History & Physical    Patient Name: Lawanda Martínez  MRN: [id number]  Patient Class: IP- Inpatient  Admission Date: 12/19/2021  Attending Physician: Joselito Alvarez M.D.  Primary Care Provider: HEAVENLY Pulido         Patient information was obtained from patient and ER records.     Subjective:     Principal Problem:Asthma    Chief Complaint:   Chief Complaint   Patient presents with    Shortness of Breath    Cough        HPI: 71 year old  female presents with shortness of breath. Patient has home oxygen. She said she went to the bathroom this morning without her oxygen and became acutely short of breath. Patient has a history of asthma and COPD. History of CHF. Family member at bedside.      Past Medical History:   Diagnosis Date    Asthma     CHF (congestive heart failure)     COPD (chronic obstructive pulmonary disease)     WILLSON (dyspnea on exertion)     GERD (gastroesophageal reflux disease)     HTN (hypertension)     Hyperlipidemia     Osteoporosis     Oxygen dependent     02 2L    Seizure disorder        Past Surgical History:   Procedure Laterality Date    COLONOSCOPY      REVISION OF TOTAL KNEE REPLACEMENT       TUBAL LIGATION         Review of patient's allergies indicates:  No Known Allergies    No current facility-administered medications on file prior to encounter.     Current Outpatient Medications on File Prior to Encounter   Medication Sig    albuterol-ipratropium (DUO-NEB) 2.5 mg-0.5 mg/3 mL nebulizer solution Take 3 mLs by nebulization every 6 (six) hours as needed for Wheezing. Rescue    calcium carbonate/vitamin D3 (CALCARB 600 WITH VITAMIN D ORAL) Take by mouth.    fluticasone propionate (FLONASE) 50 mcg/actuation nasal spray 1 spray by Each Nostril route once daily.    fluticasone-umeclidin-vilanter (TRELEGY ELLIPTA) 200-62.5-25 mcg inhaler Inhale 1 puff into the lungs once daily.    furosemide (LASIX) 40  MG tablet Take 2 tablets on  Mon Wed Fri , take 1 tablet  Sun, Tues, Thurs, Sat (Patient taking differently: 40 mg. Take 2 tablets on  Mon Wed Fri Sat, take 1 tablet  Sun, Tues, Thurs,)    gabapentin (NEURONTIN) 300 MG capsule TAKE 1 CAPSULE THREE TIMES DAILY    hydroCHLOROthiazide (HYDRODIURIL) 25 MG tablet Take 1 tablet (25 mg total) by mouth once daily.    ipratropium (ATROVENT) 42 mcg (0.06 %) nasal spray 2 sprays by Nasal route 4 (four) times daily.    isosorbide mononitrate (IMDUR) 30 MG 24 hr tablet TAKE 1 TABLET EVERY DAY    loratadine (CLARITIN) 10 mg tablet Take 10 mg by mouth once daily.    meclizine (ANTIVERT) 12.5 mg tablet TAKE 1 TABLET TWICE DAILY AS NEEDED    montelukast (SINGULAIR) 10 mg tablet TAKE 1 TABLET EVERY DAY    paroxetine (PAXIL) 40 MG tablet TAKE 1 TABLET EVERY DAY    phenytoin (DILANTIN) 100 MG ER capsule TAKE 1 CAPSULE TWICE DAILY    potassium chloride SA (K-DUR,KLOR-CON) 10 MEQ tablet Take 1 tablet (10 mEq total) by mouth 2 (two) times daily.    pravastatin (PRAVACHOL) 40 MG tablet TAKE 1 TABLET AT BEDTIME    spironolactone (ALDACTONE) 25 MG tablet Take 1 tablet (25 mg total) by mouth once daily.     Family History     Problem Relation (Age of Onset)    Cancer Father    Diabetes Mother, Sister, Brother    Heart disease Mother, Sister    Hypertension Mother, Sister        Tobacco Use    Smoking status: Never Smoker    Smokeless tobacco: Never Used   Substance and Sexual Activity    Alcohol use: Never    Drug use: Never    Sexual activity: Not Currently     Review of Systems   Constitutional: Negative for fatigue and fever.   Respiratory: Positive for cough, shortness of breath and wheezing.    Cardiovascular: Negative for chest pain and leg swelling.   Gastrointestinal: Negative for abdominal distention and abdominal pain.     Objective:     Vital Signs (Most Recent):  Temp: 98.1 °F (36.7 °C) (12/19/21 1126)  Pulse: 97 (12/19/21 1507)  Resp: (!) 23 (12/19/21 1507)  BP:  122/69 (12/19/21 1507)  SpO2: (!) 92 % (12/19/21 1507) Vital Signs (24h Range):  Temp:  [98.1 °F (36.7 °C)] 98.1 °F (36.7 °C)  Pulse:  [] 97  Resp:  [11-24] 23  SpO2:  [91 %-98 %] 92 %  BP: (103-142)/(61-87) 122/69     Weight: 94.3 kg (208 lb)  Body mass index is 35.7 kg/m².    Physical Exam  Vitals and nursing note reviewed.   Constitutional:       General: She is not in acute distress.     Appearance: Normal appearance. She is obese. She is ill-appearing.   Cardiovascular:      Rate and Rhythm: Regular rhythm.      Heart sounds: Normal heart sounds.   Pulmonary:      Breath sounds: Wheezing and rhonchi present.   Abdominal:      General: There is distension.      Palpations: Abdomen is soft.      Tenderness: There is no abdominal tenderness.   Neurological:      Mental Status: She is alert and oriented to person, place, and time.             Significant Labs:   All pertinent labs within the past 24 hours have been reviewed.  BMP:   Recent Labs   Lab 12/19/21  1140   *      K 3.6      CO2 33*   BUN 21*   CREATININE 1.11*   CALCIUM 9.0     CBC:   Recent Labs   Lab 12/19/21  1140   WBC 7.83   HGB 14.1   HCT 42.1          Significant Imaging: I have reviewed all pertinent imaging results/findings within the past 24 hours.  CXRay with opacities    Assessment/Plan:     * Asthma  IV Solumedrol  Respiratory treatment      Pneumonia due to infectious organism  IV Zosyn  IV Vancomycin    COPD exacerbation  Solumedrol  Respiratory treatment    Chronic diastolic heart failure  IV Lasix  ECHO      VTE Risk Mitigation (From admission, onward)    None             Joselito Alvarez MD  Department of Hospital Medicine   [facility] - Emergency Department

## 2021-12-19 NOTE — ED TRIAGE NOTES
Presents to ED c/o SOB and chest pressure that worsened this morning, hx of CHF and wears 2L home O2.

## 2021-12-20 PROBLEM — J44.1 COPD EXACERBATION: Status: RESOLVED | Noted: 2021-12-19 | Resolved: 2021-12-20

## 2021-12-20 LAB
ANION GAP SERPL CALCULATED.3IONS-SCNC: 16 MMOL/L (ref 7–16)
AORTIC ROOT ANNULUS: 2.1 CM
AORTIC VALVE CUSP SEPERATION: 16.9 CM
AV INDEX (PROSTH): 0.7
AV MEAN GRADIENT: 8 MMHG
AV PEAK GRADIENT: 5 MMHG
AV VALVE AREA: 1.98 CM2
AV VELOCITY RATIO: 1.45
BASOPHILS # BLD AUTO: 0.01 K/UL (ref 0–0.2)
BASOPHILS NFR BLD AUTO: 0.1 % (ref 0–1)
BILIRUB UR QL STRIP: NEGATIVE
BSA FOR ECHO PROCEDURE: 2.08 M2
BUN SERPL-MCNC: 27 MG/DL (ref 7–18)
BUN/CREAT SERPL: 19 (ref 6–20)
CALCIUM SERPL-MCNC: 9.6 MG/DL (ref 8.5–10.1)
CHLORIDE SERPL-SCNC: 94 MMOL/L (ref 98–107)
CLARITY UR: CLEAR
CO2 SERPL-SCNC: 31 MMOL/L (ref 21–32)
COLOR UR: NORMAL
CREAT SERPL-MCNC: 1.4 MG/DL (ref 0.55–1.02)
CV ECHO LV RWT: 0.39 CM
DIFFERENTIAL METHOD BLD: ABNORMAL
DOP CALC AO PEAK VEL: 1.1 M/S
DOP CALC AO VTI: 40 CM
DOP CALC LVOT AREA: 2.8 CM2
DOP CALC LVOT DIAMETER: 1.9 CM
DOP CALC LVOT PEAK VEL: 1.6 M/S
DOP CALC LVOT STROKE VOLUME: 79.35 CM3
DOP CALCLVOT PEAK VEL VTI: 28 CM
E WAVE DECELERATION TIME: 183 MSEC
ECHO EF ESTIMATED: 70 %
ECHO LV POSTERIOR WALL: 0.93 CM (ref 0.6–1.1)
EJECTION FRACTION: 70 %
EOSINOPHIL # BLD AUTO: 0 K/UL (ref 0–0.5)
EOSINOPHIL NFR BLD AUTO: 0 % (ref 1–4)
ERYTHROCYTE [DISTWIDTH] IN BLOOD BY AUTOMATED COUNT: 12.4 % (ref 11.5–14.5)
FRACTIONAL SHORTENING: 35 % (ref 28–44)
GLUCOSE SERPL-MCNC: 194 MG/DL (ref 74–106)
GLUCOSE UR STRIP-MCNC: NEGATIVE MG/DL
HCT VFR BLD AUTO: 42.5 % (ref 38–47)
HGB BLD-MCNC: 14.3 G/DL (ref 12–16)
IMM GRANULOCYTES # BLD AUTO: 0.02 K/UL (ref 0–0.04)
IMM GRANULOCYTES NFR BLD: 0.2 % (ref 0–0.4)
INTERVENTRICULAR SEPTUM: 0.98 CM (ref 0.6–1.1)
IVC OSTIUM: 0.9 CM
KETONES UR STRIP-SCNC: NEGATIVE MG/DL
LEFT ATRIUM SIZE: 2.7 CM
LEFT INTERNAL DIMENSION IN SYSTOLE: 3.08 CM (ref 2.1–4)
LEFT VENTRICLE DIASTOLIC VOLUME INDEX: 51.69 ML/M2
LEFT VENTRICLE DIASTOLIC VOLUME: 103.9 ML
LEFT VENTRICLE MASS INDEX: 78 G/M2
LEFT VENTRICLE SYSTOLIC VOLUME INDEX: 18.6 ML/M2
LEFT VENTRICLE SYSTOLIC VOLUME: 37.3 ML
LEFT VENTRICULAR INTERNAL DIMENSION IN DIASTOLE: 4.73 CM (ref 3.5–6)
LEFT VENTRICULAR MASS: 156.13 G
LEUKOCYTE ESTERASE UR QL STRIP: NEGATIVE
LVOT MG: 5 MMHG
LYMPHOCYTES # BLD AUTO: 0.7 K/UL (ref 1–4.8)
LYMPHOCYTES NFR BLD AUTO: 8.4 % (ref 27–41)
LYMPHOCYTES NFR BLD MANUAL: 4 % (ref 27–41)
MCH RBC QN AUTO: 29.8 PG (ref 27–31)
MCHC RBC AUTO-ENTMCNC: 33.6 G/DL (ref 32–36)
MCV RBC AUTO: 88.5 FL (ref 80–96)
MONOCYTES # BLD AUTO: 0.09 K/UL (ref 0–0.8)
MONOCYTES NFR BLD AUTO: 1.1 % (ref 2–6)
MONOCYTES NFR BLD MANUAL: 3 % (ref 2–6)
MPC BLD CALC-MCNC: 11.3 FL (ref 9.4–12.4)
MV PEAK E VEL: 0.82 M/S
NEUTROPHILS # BLD AUTO: 7.53 K/UL (ref 1.8–7.7)
NEUTROPHILS NFR BLD AUTO: 90.2 % (ref 53–65)
NEUTS SEG NFR BLD MANUAL: 93 % (ref 50–62)
NITRITE UR QL STRIP: NEGATIVE
NRBC # BLD AUTO: 0 X10E3/UL
NRBC, AUTO (.00): 0 %
PH UR STRIP: 5.5 PH UNITS
PLATELET # BLD AUTO: 265 K/UL (ref 150–400)
PLATELET MORPHOLOGY: ABNORMAL
POTASSIUM SERPL-SCNC: 3.1 MMOL/L (ref 3.5–5.1)
PROT UR QL STRIP: NEGATIVE
RA MAJOR: 2.9 CM
RA PRESSURE: 3 MMHG
RBC # BLD AUTO: 4.8 M/UL (ref 4.2–5.4)
RBC # UR STRIP: NEGATIVE /UL
RBC MORPH BLD: NORMAL
RIGHT VENTRICULAR END-DIASTOLIC DIMENSION: 2.9 CM
SODIUM SERPL-SCNC: 138 MMOL/L (ref 136–145)
SP GR UR STRIP: 1.01
TRICUSPID ANNULAR PLANE SYSTOLIC EXCURSION: 1.8 CM
UROBILINOGEN UR STRIP-ACNC: 0.2 MG/DL
WBC # BLD AUTO: 8.35 K/UL (ref 4.5–11)

## 2021-12-20 PROCEDURE — 25000242 PHARM REV CODE 250 ALT 637 W/ HCPCS: Performed by: FAMILY MEDICINE

## 2021-12-20 PROCEDURE — 99232 SBSQ HOSP IP/OBS MODERATE 35: CPT | Mod: ,,, | Performed by: INTERNAL MEDICINE

## 2021-12-20 PROCEDURE — 63600175 PHARM REV CODE 636 W HCPCS: Performed by: FAMILY MEDICINE

## 2021-12-20 PROCEDURE — 85025 COMPLETE CBC W/AUTO DIFF WBC: CPT | Performed by: FAMILY MEDICINE

## 2021-12-20 PROCEDURE — 25000242 PHARM REV CODE 250 ALT 637 W/ HCPCS: Performed by: INTERNAL MEDICINE

## 2021-12-20 PROCEDURE — 27000221 HC OXYGEN, UP TO 24 HOURS

## 2021-12-20 PROCEDURE — 25000003 PHARM REV CODE 250: Performed by: FAMILY MEDICINE

## 2021-12-20 PROCEDURE — 25000003 PHARM REV CODE 250: Performed by: INTERNAL MEDICINE

## 2021-12-20 PROCEDURE — 80048 BASIC METABOLIC PNL TOTAL CA: CPT | Performed by: FAMILY MEDICINE

## 2021-12-20 PROCEDURE — 94640 AIRWAY INHALATION TREATMENT: CPT

## 2021-12-20 PROCEDURE — 99232 PR SUBSEQUENT HOSPITAL CARE,LEVL II: ICD-10-PCS | Mod: ,,, | Performed by: INTERNAL MEDICINE

## 2021-12-20 PROCEDURE — 94761 N-INVAS EAR/PLS OXIMETRY MLT: CPT

## 2021-12-20 PROCEDURE — 81003 URINALYSIS AUTO W/O SCOPE: CPT | Performed by: FAMILY MEDICINE

## 2021-12-20 PROCEDURE — 36415 COLL VENOUS BLD VENIPUNCTURE: CPT | Performed by: FAMILY MEDICINE

## 2021-12-20 PROCEDURE — 25500020 PHARM REV CODE 255: Performed by: INTERNAL MEDICINE

## 2021-12-20 PROCEDURE — 11000001 HC ACUTE MED/SURG PRIVATE ROOM

## 2021-12-20 PROCEDURE — 99900035 HC TECH TIME PER 15 MIN (STAT)

## 2021-12-20 RX ORDER — BUDESONIDE 0.5 MG/2ML
0.5 INHALANT ORAL EVERY 12 HOURS
Status: DISCONTINUED | OUTPATIENT
Start: 2021-12-20 | End: 2021-12-22 | Stop reason: HOSPADM

## 2021-12-20 RX ORDER — IPRATROPIUM BROMIDE AND ALBUTEROL SULFATE 2.5; .5 MG/3ML; MG/3ML
3 SOLUTION RESPIRATORY (INHALATION) EVERY 4 HOURS
Status: DISCONTINUED | OUTPATIENT
Start: 2021-12-20 | End: 2021-12-22 | Stop reason: HOSPADM

## 2021-12-20 RX ORDER — POTASSIUM CHLORIDE 20 MEQ/1
40 TABLET, EXTENDED RELEASE ORAL ONCE
Status: COMPLETED | OUTPATIENT
Start: 2021-12-20 | End: 2021-12-20

## 2021-12-20 RX ORDER — IPRATROPIUM BROMIDE AND ALBUTEROL SULFATE 2.5; .5 MG/3ML; MG/3ML
SOLUTION RESPIRATORY (INHALATION)
Status: DISPENSED
Start: 2021-12-20 | End: 2021-12-20

## 2021-12-20 RX ADMIN — METHYLPREDNISOLONE SODIUM SUCCINATE 80 MG: 40 INJECTION, POWDER, FOR SOLUTION INTRAMUSCULAR; INTRAVENOUS at 05:12

## 2021-12-20 RX ADMIN — ACETAMINOPHEN 650 MG: 325 TABLET ORAL at 09:12

## 2021-12-20 RX ADMIN — PRAVASTATIN SODIUM 40 MG: 40 TABLET ORAL at 09:12

## 2021-12-20 RX ADMIN — GABAPENTIN 100 MG: 100 CAPSULE ORAL at 09:12

## 2021-12-20 RX ADMIN — IPRATROPIUM BROMIDE 2 SPRAY: 42 SPRAY, METERED NASAL at 05:12

## 2021-12-20 RX ADMIN — POLYETHYLENE GLYCOL 3350 17 G: 17 POWDER, FOR SOLUTION ORAL at 09:12

## 2021-12-20 RX ADMIN — IPRATROPIUM BROMIDE AND ALBUTEROL SULFATE 3 ML: .5; 3 SOLUTION RESPIRATORY (INHALATION) at 03:12

## 2021-12-20 RX ADMIN — MONTELUKAST 10 MG: 10 TABLET, FILM COATED ORAL at 09:12

## 2021-12-20 RX ADMIN — POTASSIUM CHLORIDE 40 MEQ: 1500 TABLET, EXTENDED RELEASE ORAL at 09:12

## 2021-12-20 RX ADMIN — FLUTICASONE PROPIONATE 50 MCG: 50 SPRAY, METERED NASAL at 09:12

## 2021-12-20 RX ADMIN — IPRATROPIUM BROMIDE 2 SPRAY: 42 SPRAY, METERED NASAL at 09:12

## 2021-12-20 RX ADMIN — PIPERACILLIN AND TAZOBACTAM 4.5 G: 4; .5 INJECTION, POWDER, LYOPHILIZED, FOR SOLUTION INTRAVENOUS at 04:12

## 2021-12-20 RX ADMIN — PIPERACILLIN AND TAZOBACTAM 4.5 G: 4; .5 INJECTION, POWDER, LYOPHILIZED, FOR SOLUTION INTRAVENOUS at 02:12

## 2021-12-20 RX ADMIN — ISOSORBIDE MONONITRATE 30 MG: 30 TABLET, EXTENDED RELEASE ORAL at 09:12

## 2021-12-20 RX ADMIN — PHENYTOIN SODIUM 100 MG: 100 CAPSULE, EXTENDED RELEASE ORAL at 09:12

## 2021-12-20 RX ADMIN — METHYLPREDNISOLONE SODIUM SUCCINATE 80 MG: 40 INJECTION, POWDER, FOR SOLUTION INTRAMUSCULAR; INTRAVENOUS at 11:12

## 2021-12-20 RX ADMIN — IPRATROPIUM BROMIDE 2 SPRAY: 42 SPRAY, METERED NASAL at 02:12

## 2021-12-20 RX ADMIN — SENNOSIDES AND DOCUSATE SODIUM 1 TABLET: 50; 8.6 TABLET ORAL at 09:12

## 2021-12-20 RX ADMIN — VANCOMYCIN HYDROCHLORIDE 2000 MG: 1 INJECTION, POWDER, LYOPHILIZED, FOR SOLUTION INTRAVENOUS at 05:12

## 2021-12-20 RX ADMIN — PERFLUTREN 1.5 ML: 6.52 INJECTION, SUSPENSION INTRAVENOUS at 09:12

## 2021-12-20 RX ADMIN — PAROXETINE HYDROCHLORIDE 40 MG: 20 TABLET, FILM COATED ORAL at 09:12

## 2021-12-20 RX ADMIN — GABAPENTIN 100 MG: 100 CAPSULE ORAL at 02:12

## 2021-12-20 RX ADMIN — IPRATROPIUM BROMIDE AND ALBUTEROL SULFATE 3 ML: .5; 3 SOLUTION RESPIRATORY (INHALATION) at 12:12

## 2021-12-20 RX ADMIN — BUDESONIDE 0.5 MG: 0.5 INHALANT RESPIRATORY (INHALATION) at 08:12

## 2021-12-20 RX ADMIN — ENOXAPARIN SODIUM 40 MG: 40 INJECTION SUBCUTANEOUS at 05:12

## 2021-12-20 RX ADMIN — FUROSEMIDE 40 MG: 10 INJECTION INTRAMUSCULAR; INTRAVENOUS at 12:12

## 2021-12-20 RX ADMIN — METHYLPREDNISOLONE SODIUM SUCCINATE 80 MG: 40 INJECTION, POWDER, FOR SOLUTION INTRAMUSCULAR; INTRAVENOUS at 12:12

## 2021-12-20 RX ADMIN — PIPERACILLIN AND TAZOBACTAM 4.5 G: 4; .5 INJECTION, POWDER, LYOPHILIZED, FOR SOLUTION INTRAVENOUS at 09:12

## 2021-12-20 RX ADMIN — IPRATROPIUM BROMIDE AND ALBUTEROL SULFATE 3 ML: .5; 3 SOLUTION RESPIRATORY (INHALATION) at 08:12

## 2021-12-20 RX ADMIN — IPRATROPIUM BROMIDE AND ALBUTEROL SULFATE 3 ML: .5; 3 SOLUTION RESPIRATORY (INHALATION) at 09:12

## 2021-12-20 RX ADMIN — ACETAMINOPHEN 650 MG: 325 TABLET ORAL at 05:12

## 2021-12-20 NOTE — NURSING
Asleep in the bed. Vs stable. Afebrile. Denies any CP Or sob. Grand daaughter at bedside to assist.

## 2021-12-20 NOTE — NURSING
Dr. patel is currently at bedside talking to the patient. Patient had coughing spell and dr. patel gave me a verbal order to change her breathing treatments from prn to every four hours. resp therapist has been notified and is on his way to give her a breathing treatment.

## 2021-12-20 NOTE — PLAN OF CARE
[place] [facility] - [facility] Telemetry  Initial Discharge Assessment       Primary Care Provider: HEAVENLY Pulido    Admission Diagnosis: Cough [R05.9]  CHF (congestive heart failure) [I50.9]  Bronchitis [J40]  Chest pain [R07.9]  Moderate persistent asthma with acute exacerbation [J45.41]  Community acquired pneumonia, unspecified laterality [J18.9]  Congestive heart failure, unspecified HF chronicity, unspecified heart failure type [I50.9]    Admission Date: 12/19/2021  Expected Discharge Date:     Discharge Barriers Identified: None    Payor: [employer] MANAGED MEDICARE / Plan: [employer] MEDICARE HMO / Product Type: Capitation /     Extended Emergency Contact Information  Primary Emergency Contact: Shubham Martínez  Address: [address] [place] [address] [facility]  Home Phone: [phone]  Work Phone: [phone]  Mobile Phone: [phone]  Relation: Spouse  Preferred language: English   needed? No    Discharge Plan A: Home Health  Discharge Plan B: Home Health      [facility] [address] [address]  Phone: [phone] Fax: [phone]    [facility] - [place] [id number] [facility]  [address] [facility] [address]  Phone: [phone] Fax: [phone]      Initial Assessment (most recent)     Adult Discharge Assessment - 12/20/21 1041        Discharge Assessment    Assessment Type Discharge Planning Assessment     Source of Information patient     Lives With spouse     Current cognitive status: Alert/Oriented     Equipment Currently Used at Home walker, standard;oxygen     Do you currently have service(s) that help you manage your care at home? Yes     Name and Contact number of agency [facility]-home HH     Discharge Plan A Home Health     Discharge Plan B Home Health     DME Needed Upon Discharge  none     Discharge Plan discussed with: Patient     Discharge Barriers  Identified None        Relationship/Environment    Name(s) of Who Lives With Patient  Joao, [phone]                    SS spoke with pt, states she lives at home with  Joao. Current with [facility]-home HH, choice obtained to stay with them. Uses walker and oxygen. Plan upon d/c is to return home with HH. IM obtained. SS following.

## 2021-12-20 NOTE — PROGRESS NOTES
[facility], [place] - [address] [place] [address] Medicine  Progress Note    Patient Name: Lawanda Martínez  MRN: [id number]  Patient Class: IP- Inpatient   Admission Date: 12/19/2021  Length of Stay: 1 days  Attending Physician: Woo Morley MD  Primary Care Provider: HEAVENLY Pulido        Subjective:     Principal Problem:Asthma        HPI:  71 year old  female presents with shortness of breath. Patient has home oxygen. She said she went to the bathroom this morning without her oxygen and became acutely short of breath. Patient has a history of asthma and COPD. History of CHF. Family member at bedside.      Overview/Hospital Course:  No notes on file    Interval History:     Pt very wheezy this am, having lots of cough, says she didn't receive any neb treatment since last night, will therefore change her nebs to scheduled now    Dc lasix d/t bump in cr.       Review of Systems   Constitutional: Negative for fatigue and fever.   Respiratory: Positive for cough, shortness of breath and wheezing.    Cardiovascular: Negative for chest pain and leg swelling.   Gastrointestinal: Negative for abdominal distention and abdominal pain.     Objective:     Vital Signs (Most Recent):  Temp: 98.3 °F (36.8 °C) (12/20/21 1005)  Pulse: 94 (12/20/21 1005)  Resp: (!) 22 (12/20/21 1005)  BP: (!) 106/50 (12/20/21 1005)  SpO2: (!) 92 % (12/20/21 1005) Vital Signs (24h Range):  Temp:  [97.2 °F (36.2 °C)-98.3 °F (36.8 °C)] 98.3 °F (36.8 °C)  Pulse:  [] 94  Resp:  [11-24] 22  SpO2:  [88 %-98 %] 92 %  BP: ()/(50-87) 106/50     Weight: 96.2 kg (212 lb 1.3 oz)  Body mass index is 36.4 kg/m².    Intake/Output Summary (Last 24 hours) at 12/20/2021 1115  Last data filed at 12/19/2021 2300  Gross per 24 hour   Intake 530 ml   Output 200 ml   Net 330 ml      Physical Exam  Vitals and nursing note reviewed.   Constitutional:       General: She is not in acute distress.     Appearance: Normal appearance. She is obese. She  is ill-appearing.   Cardiovascular:      Rate and Rhythm: Regular rhythm.      Heart sounds: Normal heart sounds.   Pulmonary:      Breath sounds: Wheezing and rhonchi present.   Abdominal:      General: There is distension.      Palpations: Abdomen is soft.      Tenderness: There is no abdominal tenderness.   Musculoskeletal:      Right lower leg: No edema.      Left lower leg: No edema.   Skin:     General: Skin is warm.   Neurological:      Mental Status: She is alert and oriented to person, place, and time.         Significant Labs: All pertinent labs within the past 24 hours have been reviewed.    Significant Imaging: I have reviewed all pertinent imaging results/findings within the past 24 hours.      Assessment/Plan:      * Asthma  IV Solumedrol  Respiratory treatment  abx      Pneumonia due to infectious organism  Cont IV vanc zosyn       Chronic diastolic heart failure  Hold lasix for now, d/t bump in cr  ECHO      VTE Risk Mitigation (From admission, onward)         Ordered     enoxaparin injection 40 mg  Daily         12/19/21 1529     IP VTE HIGH RISK PATIENT  Once         12/19/21 1529     Place sequential compression device  Until discontinued         12/19/21 1529                Discharge Planning   ALEX:      Code Status: Full Code   Is the patient medically ready for discharge?:     Reason for patient still in hospital (select all that apply): Treatment  Discharge Plan A: Home Health                  Rehmat ERIKA Morley MD  Department of Hospital Medicine   [address]

## 2021-12-20 NOTE — SUBJECTIVE & OBJECTIVE
Interval History:     Pt very wheezy this am, having lots of cough, says she didn't receive any neb treatment since last night, will therefore change her nebs to scheduled now    Dc lasix d/t bump in cr.       Review of Systems   Constitutional: Negative for fatigue and fever.   Respiratory: Positive for cough, shortness of breath and wheezing.    Cardiovascular: Negative for chest pain and leg swelling.   Gastrointestinal: Negative for abdominal distention and abdominal pain.     Objective:     Vital Signs (Most Recent):  Temp: 98.3 °F (36.8 °C) (12/20/21 1005)  Pulse: 94 (12/20/21 1005)  Resp: (!) 22 (12/20/21 1005)  BP: (!) 106/50 (12/20/21 1005)  SpO2: (!) 92 % (12/20/21 1005) Vital Signs (24h Range):  Temp:  [97.2 °F (36.2 °C)-98.3 °F (36.8 °C)] 98.3 °F (36.8 °C)  Pulse:  [] 94  Resp:  [11-24] 22  SpO2:  [88 %-98 %] 92 %  BP: ()/(50-87) 106/50     Weight: 96.2 kg (212 lb 1.3 oz)  Body mass index is 36.4 kg/m².    Intake/Output Summary (Last 24 hours) at 12/20/2021 1115  Last data filed at 12/19/2021 2300  Gross per 24 hour   Intake 530 ml   Output 200 ml   Net 330 ml      Physical Exam  Vitals and nursing note reviewed.   Constitutional:       General: She is not in acute distress.     Appearance: Normal appearance. She is obese. She is ill-appearing.   Cardiovascular:      Rate and Rhythm: Regular rhythm.      Heart sounds: Normal heart sounds.   Pulmonary:      Breath sounds: Wheezing and rhonchi present.   Abdominal:      General: There is distension.      Palpations: Abdomen is soft.      Tenderness: There is no abdominal tenderness.   Musculoskeletal:      Right lower leg: No edema.      Left lower leg: No edema.   Skin:     General: Skin is warm.   Neurological:      Mental Status: She is alert and oriented to person, place, and time.         Significant Labs: All pertinent labs within the past 24 hours have been reviewed.    Significant Imaging: I have reviewed all pertinent imaging  results/findings within the past 24 hours.

## 2021-12-20 NOTE — NURSING
Rec'd awake in the bed. O2 in use. Resp even nonlabored. Denies any CP or SOB. VS stable. Afebrile. BSC and walker at bedside. Granddaughter to stay the night. No requests at this time.

## 2021-12-21 LAB
ANION GAP SERPL CALCULATED.3IONS-SCNC: 13 MMOL/L (ref 7–16)
BASOPHILS # BLD AUTO: 0.01 K/UL (ref 0–0.2)
BASOPHILS NFR BLD AUTO: 0.1 % (ref 0–1)
BUN SERPL-MCNC: 32 MG/DL (ref 7–18)
BUN/CREAT SERPL: 24 (ref 6–20)
CALCIUM SERPL-MCNC: 9.2 MG/DL (ref 8.5–10.1)
CHLORIDE SERPL-SCNC: 97 MMOL/L (ref 98–107)
CO2 SERPL-SCNC: 30 MMOL/L (ref 21–32)
CREAT SERPL-MCNC: 1.32 MG/DL (ref 0.55–1.02)
DIFFERENTIAL METHOD BLD: ABNORMAL
EOSINOPHIL # BLD AUTO: 0 K/UL (ref 0–0.5)
EOSINOPHIL NFR BLD AUTO: 0 % (ref 1–4)
ERYTHROCYTE [DISTWIDTH] IN BLOOD BY AUTOMATED COUNT: 12.4 % (ref 11.5–14.5)
GLUCOSE SERPL-MCNC: 195 MG/DL (ref 74–106)
HCT VFR BLD AUTO: 36 % (ref 38–47)
HGB BLD-MCNC: 12 G/DL (ref 12–16)
IMM GRANULOCYTES # BLD AUTO: 0.04 K/UL (ref 0–0.04)
IMM GRANULOCYTES NFR BLD: 0.4 % (ref 0–0.4)
LYMPHOCYTES # BLD AUTO: 0.62 K/UL (ref 1–4.8)
LYMPHOCYTES NFR BLD AUTO: 6.7 % (ref 27–41)
LYMPHOCYTES NFR BLD MANUAL: 7 % (ref 27–41)
MCH RBC QN AUTO: 30.2 PG (ref 27–31)
MCHC RBC AUTO-ENTMCNC: 33.3 G/DL (ref 32–36)
MCV RBC AUTO: 90.5 FL (ref 80–96)
MONOCYTES # BLD AUTO: 0.38 K/UL (ref 0–0.8)
MONOCYTES NFR BLD AUTO: 4.1 % (ref 2–6)
MONOCYTES NFR BLD MANUAL: 2 % (ref 2–6)
MPC BLD CALC-MCNC: 11.3 FL (ref 9.4–12.4)
NEUTROPHILS # BLD AUTO: 8.21 K/UL (ref 1.8–7.7)
NEUTROPHILS NFR BLD AUTO: 88.7 % (ref 53–65)
NEUTS BAND NFR BLD MANUAL: 1 % (ref 1–5)
NEUTS SEG NFR BLD MANUAL: 90 % (ref 50–62)
NRBC # BLD AUTO: 0 X10E3/UL
NRBC, AUTO (.00): 0 %
PLATELET # BLD AUTO: 213 K/UL (ref 150–400)
PLATELET MORPHOLOGY: ABNORMAL
POTASSIUM SERPL-SCNC: 3.9 MMOL/L (ref 3.5–5.1)
RBC # BLD AUTO: 3.98 M/UL (ref 4.2–5.4)
RBC MORPH BLD: NORMAL
SODIUM SERPL-SCNC: 136 MMOL/L (ref 136–145)
VANCOMYCIN TROUGH SERPL-MCNC: 17 ΜG/ML (ref 10–20)
WBC # BLD AUTO: 9.26 K/UL (ref 4.5–11)

## 2021-12-21 PROCEDURE — 27000221 HC OXYGEN, UP TO 24 HOURS

## 2021-12-21 PROCEDURE — 25000003 PHARM REV CODE 250: Performed by: INTERNAL MEDICINE

## 2021-12-21 PROCEDURE — 85025 COMPLETE CBC W/AUTO DIFF WBC: CPT | Performed by: FAMILY MEDICINE

## 2021-12-21 PROCEDURE — 99232 PR SUBSEQUENT HOSPITAL CARE,LEVL II: ICD-10-PCS | Mod: ,,, | Performed by: INTERNAL MEDICINE

## 2021-12-21 PROCEDURE — 25000242 PHARM REV CODE 250 ALT 637 W/ HCPCS: Performed by: INTERNAL MEDICINE

## 2021-12-21 PROCEDURE — 25000003 PHARM REV CODE 250: Performed by: FAMILY MEDICINE

## 2021-12-21 PROCEDURE — 99232 SBSQ HOSP IP/OBS MODERATE 35: CPT | Mod: ,,, | Performed by: INTERNAL MEDICINE

## 2021-12-21 PROCEDURE — 94640 AIRWAY INHALATION TREATMENT: CPT

## 2021-12-21 PROCEDURE — 80202 ASSAY OF VANCOMYCIN: CPT | Performed by: FAMILY MEDICINE

## 2021-12-21 PROCEDURE — 63600175 PHARM REV CODE 636 W HCPCS: Performed by: FAMILY MEDICINE

## 2021-12-21 PROCEDURE — 80048 BASIC METABOLIC PNL TOTAL CA: CPT | Performed by: FAMILY MEDICINE

## 2021-12-21 PROCEDURE — 11000001 HC ACUTE MED/SURG PRIVATE ROOM

## 2021-12-21 PROCEDURE — 94761 N-INVAS EAR/PLS OXIMETRY MLT: CPT

## 2021-12-21 PROCEDURE — 25000242 PHARM REV CODE 250 ALT 637 W/ HCPCS: Performed by: FAMILY MEDICINE

## 2021-12-21 PROCEDURE — 36415 COLL VENOUS BLD VENIPUNCTURE: CPT | Performed by: FAMILY MEDICINE

## 2021-12-21 RX ADMIN — IPRATROPIUM BROMIDE 2 SPRAY: 42 SPRAY, METERED NASAL at 01:12

## 2021-12-21 RX ADMIN — PAROXETINE HYDROCHLORIDE 40 MG: 20 TABLET, FILM COATED ORAL at 08:12

## 2021-12-21 RX ADMIN — PIPERACILLIN AND TAZOBACTAM 4.5 G: 4; .5 INJECTION, POWDER, LYOPHILIZED, FOR SOLUTION INTRAVENOUS at 09:12

## 2021-12-21 RX ADMIN — PRAVASTATIN SODIUM 40 MG: 40 TABLET ORAL at 09:12

## 2021-12-21 RX ADMIN — GABAPENTIN 100 MG: 100 CAPSULE ORAL at 04:12

## 2021-12-21 RX ADMIN — BUDESONIDE 0.5 MG: 0.5 INHALANT RESPIRATORY (INHALATION) at 07:12

## 2021-12-21 RX ADMIN — FLUTICASONE PROPIONATE 50 MCG: 50 SPRAY, METERED NASAL at 08:12

## 2021-12-21 RX ADMIN — POLYETHYLENE GLYCOL 3350 17 G: 17 POWDER, FOR SOLUTION ORAL at 08:12

## 2021-12-21 RX ADMIN — GABAPENTIN 100 MG: 100 CAPSULE ORAL at 08:12

## 2021-12-21 RX ADMIN — IPRATROPIUM BROMIDE 2 SPRAY: 42 SPRAY, METERED NASAL at 04:12

## 2021-12-21 RX ADMIN — SENNOSIDES AND DOCUSATE SODIUM 1 TABLET: 50; 8.6 TABLET ORAL at 09:12

## 2021-12-21 RX ADMIN — GABAPENTIN 100 MG: 100 CAPSULE ORAL at 09:12

## 2021-12-21 RX ADMIN — VANCOMYCIN HYDROCHLORIDE 2000 MG: 1 INJECTION, POWDER, LYOPHILIZED, FOR SOLUTION INTRAVENOUS at 05:12

## 2021-12-21 RX ADMIN — ISOSORBIDE MONONITRATE 30 MG: 30 TABLET, EXTENDED RELEASE ORAL at 08:12

## 2021-12-21 RX ADMIN — ACETAMINOPHEN 650 MG: 325 TABLET ORAL at 12:12

## 2021-12-21 RX ADMIN — MONTELUKAST 10 MG: 10 TABLET, FILM COATED ORAL at 08:12

## 2021-12-21 RX ADMIN — IPRATROPIUM BROMIDE AND ALBUTEROL SULFATE 3 ML: .5; 3 SOLUTION RESPIRATORY (INHALATION) at 04:12

## 2021-12-21 RX ADMIN — ENOXAPARIN SODIUM 40 MG: 40 INJECTION SUBCUTANEOUS at 04:12

## 2021-12-21 RX ADMIN — IPRATROPIUM BROMIDE AND ALBUTEROL SULFATE 3 ML: .5; 3 SOLUTION RESPIRATORY (INHALATION) at 07:12

## 2021-12-21 RX ADMIN — METHYLPREDNISOLONE SODIUM SUCCINATE 80 MG: 40 INJECTION, POWDER, FOR SOLUTION INTRAMUSCULAR; INTRAVENOUS at 12:12

## 2021-12-21 RX ADMIN — IPRATROPIUM BROMIDE AND ALBUTEROL SULFATE 3 ML: .5; 3 SOLUTION RESPIRATORY (INHALATION) at 03:12

## 2021-12-21 RX ADMIN — PIPERACILLIN AND TAZOBACTAM 4.5 G: 4; .5 INJECTION, POWDER, LYOPHILIZED, FOR SOLUTION INTRAVENOUS at 01:12

## 2021-12-21 RX ADMIN — METHYLPREDNISOLONE SODIUM SUCCINATE 80 MG: 40 INJECTION, POWDER, FOR SOLUTION INTRAMUSCULAR; INTRAVENOUS at 05:12

## 2021-12-21 RX ADMIN — IPRATROPIUM BROMIDE 2 SPRAY: 42 SPRAY, METERED NASAL at 08:12

## 2021-12-21 RX ADMIN — PHENYTOIN SODIUM 100 MG: 100 CAPSULE, EXTENDED RELEASE ORAL at 08:12

## 2021-12-21 RX ADMIN — DEXTROMETHORPHAN HYDROBROMIDE, GUAIFENESIN 1 TABLET: 30; 600 TABLET, EXTENDED RELEASE ORAL at 09:12

## 2021-12-21 RX ADMIN — IPRATROPIUM BROMIDE AND ALBUTEROL SULFATE 3 ML: .5; 3 SOLUTION RESPIRATORY (INHALATION) at 12:12

## 2021-12-21 RX ADMIN — PHENYTOIN SODIUM 100 MG: 100 CAPSULE, EXTENDED RELEASE ORAL at 09:12

## 2021-12-21 RX ADMIN — IPRATROPIUM BROMIDE AND ALBUTEROL SULFATE 3 ML: .5; 3 SOLUTION RESPIRATORY (INHALATION) at 11:12

## 2021-12-21 RX ADMIN — METHYLPREDNISOLONE SODIUM SUCCINATE 80 MG: 40 INJECTION, POWDER, FOR SOLUTION INTRAMUSCULAR; INTRAVENOUS at 01:12

## 2021-12-21 RX ADMIN — SENNOSIDES AND DOCUSATE SODIUM 1 TABLET: 50; 8.6 TABLET ORAL at 08:12

## 2021-12-21 RX ADMIN — PIPERACILLIN AND TAZOBACTAM 4.5 G: 4; .5 INJECTION, POWDER, LYOPHILIZED, FOR SOLUTION INTRAVENOUS at 05:12

## 2021-12-21 NOTE — PLAN OF CARE
Problem: Adult Inpatient Plan of Care  Goal: Plan of Care Review  Outcome: Ongoing, Progressing  Goal: Patient-Specific Goal (Individualized)  Outcome: Ongoing, Progressing  Goal: Absence of Hospital-Acquired Illness or Injury  Outcome: Ongoing, Progressing  Goal: Optimal Comfort and Wellbeing  Outcome: Ongoing, Progressing  Goal: Readiness for Transition of Care  Outcome: Ongoing, Progressing     Problem: Fluid Imbalance (Pneumonia)  Goal: Fluid Balance  Outcome: Ongoing, Progressing     Problem: Infection (Pneumonia)  Goal: Resolution of Infection Signs and Symptoms  Outcome: Ongoing, Progressing     Problem: Respiratory Compromise (Pneumonia)  Goal: Effective Oxygenation and Ventilation  Outcome: Ongoing, Progressing     Problem: Fall Injury Risk  Goal: Absence of Fall and Fall-Related Injury  Outcome: Ongoing, Progressing       Care Plan Reviewed and Patient Progress Ongoing.

## 2021-12-21 NOTE — PROGRESS NOTES
[address] Medicine  Progress Note    Patient Name: Lawanda Martínez  MRN: [id number]  Patient Class: IP- Inpatient   Admission Date: 12/19/2021  Length of Stay: 2 days  Attending Physician: Woo Morley MD  Primary Care Provider: HEAVENLY Pulido        Subjective:     Principal Problem:Asthma        HPI:  71 year old  female presents with shortness of breath. Patient has home oxygen. She said she went to the bathroom this morning without her oxygen and became acutely short of breath. Patient has a history of asthma and COPD. History of CHF. Family member at bedside.      Overview/Hospital Course:  No notes on file    Interval History:     Pt continues to have coughing fits, however states she is breathing a lot better today.       Review of Systems   Constitutional: Negative for fatigue and fever.   Respiratory: Positive for cough, shortness of breath and wheezing.    Cardiovascular: Negative for chest pain and leg swelling.   Gastrointestinal: Negative for abdominal distention and abdominal pain.     Objective:     Vital Signs (Most Recent):  Temp: 98.2 °F (36.8 °C) (12/21/21 0400)  Pulse: 84 (12/21/21 0749)  Resp: 20 (12/21/21 0749)  BP: (!) 100/49 (12/21/21 0400)  SpO2: (!) 93 % (12/21/21 0749) Vital Signs (24h Range):  Temp:  [98 °F (36.7 °C)-98.7 °F (37.1 °C)] 98.2 °F (36.8 °C)  Pulse:  [76-94] 84  Resp:  [18-22] 20  SpO2:  [91 %-96 %] 93 %  BP: ()/(49-61) 100/49     Weight: 97.5 kg (214 lb 15.2 oz)  Body mass index is 36.9 kg/m².    Intake/Output Summary (Last 24 hours) at 12/21/2021 0753  Last data filed at 12/21/2021 0600  Gross per 24 hour   Intake 240 ml   Output --   Net 240 ml      Physical Exam  Vitals and nursing note reviewed.   Constitutional:       General: She is not in acute distress.     Appearance: Normal appearance. She is obese. She is ill-appearing.   Cardiovascular:      Rate and Rhythm: Regular rhythm.      Heart sounds: Normal  heart sounds.   Pulmonary:      Breath sounds: Wheezing and rhonchi present.   Abdominal:      General: There is distension.      Palpations: Abdomen is soft.      Tenderness: There is no abdominal tenderness.   Musculoskeletal:      Right lower leg: No edema.      Left lower leg: No edema.   Skin:     General: Skin is warm.   Neurological:      Mental Status: She is alert and oriented to person, place, and time.         Significant Labs: All pertinent labs within the past 24 hours have been reviewed.    Significant Imaging: I have reviewed all pertinent imaging results/findings within the past 24 hours.      Assessment/Plan:      * Asthma  IV Solumedrol  Respiratory treatment  abx      Pneumonia due to infectious organism  Cont IV vanc zosyn       Chronic diastolic heart failure  Hold lasix for now, d/t bump in cr  ECHO-normal EF      VTE Risk Mitigation (From admission, onward)         Ordered     enoxaparin injection 40 mg  Daily         12/19/21 1529     IP VTE HIGH RISK PATIENT  Once         12/19/21 1529     Place sequential compression device  Until discontinued         12/19/21 1529                Discharge Planning   ALEX:      Code Status: Full Code   Is the patient medically ready for discharge?:     Reason for patient still in hospital (select all that apply): Treatment  Discharge Plan A: Home Health                  Rehmat ERIKA Morley MD  Department of Hospital Medicine   [address]

## 2021-12-21 NOTE — PLAN OF CARE
Problem: Infection (Pneumonia)  Goal: Resolution of Infection Signs and Symptoms  Outcome: Ongoing, Progressing     Problem: Respiratory Compromise (Pneumonia)  Goal: Effective Oxygenation and Ventilation  Outcome: Ongoing, Progressing

## 2021-12-21 NOTE — SUBJECTIVE & OBJECTIVE
Interval History:     Pt continues to have coughing fits, however states she is breathing a lot better today.       Review of Systems   Constitutional: Negative for fatigue and fever.   Respiratory: Positive for cough, shortness of breath and wheezing.    Cardiovascular: Negative for chest pain and leg swelling.   Gastrointestinal: Negative for abdominal distention and abdominal pain.     Objective:     Vital Signs (Most Recent):  Temp: 98.2 °F (36.8 °C) (12/21/21 0400)  Pulse: 84 (12/21/21 0749)  Resp: 20 (12/21/21 0749)  BP: (!) 100/49 (12/21/21 0400)  SpO2: (!) 93 % (12/21/21 0749) Vital Signs (24h Range):  Temp:  [98 °F (36.7 °C)-98.7 °F (37.1 °C)] 98.2 °F (36.8 °C)  Pulse:  [76-94] 84  Resp:  [18-22] 20  SpO2:  [91 %-96 %] 93 %  BP: ()/(49-61) 100/49     Weight: 97.5 kg (214 lb 15.2 oz)  Body mass index is 36.9 kg/m².    Intake/Output Summary (Last 24 hours) at 12/21/2021 0753  Last data filed at 12/21/2021 0600  Gross per 24 hour   Intake 240 ml   Output --   Net 240 ml      Physical Exam  Vitals and nursing note reviewed.   Constitutional:       General: She is not in acute distress.     Appearance: Normal appearance. She is obese. She is ill-appearing.   Cardiovascular:      Rate and Rhythm: Regular rhythm.      Heart sounds: Normal heart sounds.   Pulmonary:      Breath sounds: Wheezing and rhonchi present.   Abdominal:      General: There is distension.      Palpations: Abdomen is soft.      Tenderness: There is no abdominal tenderness.   Musculoskeletal:      Right lower leg: No edema.      Left lower leg: No edema.   Skin:     General: Skin is warm.   Neurological:      Mental Status: She is alert and oriented to person, place, and time.         Significant Labs: All pertinent labs within the past 24 hours have been reviewed.    Significant Imaging: I have reviewed all pertinent imaging results/findings within the past 24 hours.

## 2021-12-22 VITALS
HEIGHT: 64 IN | HEART RATE: 85 BPM | WEIGHT: 215.19 LBS | RESPIRATION RATE: 20 BRPM | BODY MASS INDEX: 36.74 KG/M2 | SYSTOLIC BLOOD PRESSURE: 134 MMHG | DIASTOLIC BLOOD PRESSURE: 68 MMHG | TEMPERATURE: 98 F | OXYGEN SATURATION: 97 %

## 2021-12-22 LAB
ANION GAP SERPL CALCULATED.3IONS-SCNC: 13 MMOL/L (ref 7–16)
BASOPHILS # BLD AUTO: 0.01 K/UL (ref 0–0.2)
BASOPHILS NFR BLD AUTO: 0.1 % (ref 0–1)
BUN SERPL-MCNC: 23 MG/DL (ref 7–18)
BUN/CREAT SERPL: 19 (ref 6–20)
CALCIUM SERPL-MCNC: 9.4 MG/DL (ref 8.5–10.1)
CHLORIDE SERPL-SCNC: 99 MMOL/L (ref 98–107)
CO2 SERPL-SCNC: 31 MMOL/L (ref 21–32)
CREAT SERPL-MCNC: 1.18 MG/DL (ref 0.55–1.02)
DIFFERENTIAL METHOD BLD: ABNORMAL
EOSINOPHIL # BLD AUTO: 0 K/UL (ref 0–0.5)
EOSINOPHIL NFR BLD AUTO: 0 % (ref 1–4)
ERYTHROCYTE [DISTWIDTH] IN BLOOD BY AUTOMATED COUNT: 12.4 % (ref 11.5–14.5)
GLUCOSE SERPL-MCNC: 190 MG/DL (ref 74–106)
HCT VFR BLD AUTO: 37 % (ref 38–47)
HGB BLD-MCNC: 12.8 G/DL (ref 12–16)
IMM GRANULOCYTES # BLD AUTO: 0.03 K/UL (ref 0–0.04)
IMM GRANULOCYTES NFR BLD: 0.4 % (ref 0–0.4)
LYMPHOCYTES # BLD AUTO: 0.87 K/UL (ref 1–4.8)
LYMPHOCYTES NFR BLD AUTO: 12.3 % (ref 27–41)
MCH RBC QN AUTO: 30.3 PG (ref 27–31)
MCHC RBC AUTO-ENTMCNC: 34.6 G/DL (ref 32–36)
MCV RBC AUTO: 87.5 FL (ref 80–96)
MONOCYTES # BLD AUTO: 0.47 K/UL (ref 0–0.8)
MONOCYTES NFR BLD AUTO: 6.6 % (ref 2–6)
MPC BLD CALC-MCNC: 10.2 FL (ref 9.4–12.4)
NEUTROPHILS # BLD AUTO: 5.71 K/UL (ref 1.8–7.7)
NEUTROPHILS NFR BLD AUTO: 80.6 % (ref 53–65)
NRBC # BLD AUTO: 0 X10E3/UL
NRBC, AUTO (.00): 0 %
PLATELET # BLD AUTO: 225 K/UL (ref 150–400)
POTASSIUM SERPL-SCNC: 3.6 MMOL/L (ref 3.5–5.1)
RBC # BLD AUTO: 4.23 M/UL (ref 4.2–5.4)
SODIUM SERPL-SCNC: 139 MMOL/L (ref 136–145)
WBC # BLD AUTO: 7.09 K/UL (ref 4.5–11)

## 2021-12-22 PROCEDURE — 94640 AIRWAY INHALATION TREATMENT: CPT

## 2021-12-22 PROCEDURE — 25000003 PHARM REV CODE 250: Performed by: FAMILY MEDICINE

## 2021-12-22 PROCEDURE — 63600175 PHARM REV CODE 636 W HCPCS: Performed by: FAMILY MEDICINE

## 2021-12-22 PROCEDURE — 99238 PR HOSPITAL DISCHARGE DAY,<30 MIN: ICD-10-PCS | Mod: ,,, | Performed by: INTERNAL MEDICINE

## 2021-12-22 PROCEDURE — 63700000 PHARM REV CODE 250 ALT 637 W/O HCPCS: Performed by: STUDENT IN AN ORGANIZED HEALTH CARE EDUCATION/TRAINING PROGRAM

## 2021-12-22 PROCEDURE — 94761 N-INVAS EAR/PLS OXIMETRY MLT: CPT

## 2021-12-22 PROCEDURE — 25000242 PHARM REV CODE 250 ALT 637 W/ HCPCS: Performed by: INTERNAL MEDICINE

## 2021-12-22 PROCEDURE — 99238 HOSP IP/OBS DSCHRG MGMT 30/<: CPT | Mod: ,,, | Performed by: INTERNAL MEDICINE

## 2021-12-22 PROCEDURE — 27000221 HC OXYGEN, UP TO 24 HOURS

## 2021-12-22 PROCEDURE — 85025 COMPLETE CBC W/AUTO DIFF WBC: CPT | Performed by: FAMILY MEDICINE

## 2021-12-22 PROCEDURE — 80048 BASIC METABOLIC PNL TOTAL CA: CPT | Performed by: FAMILY MEDICINE

## 2021-12-22 PROCEDURE — 36415 COLL VENOUS BLD VENIPUNCTURE: CPT | Performed by: FAMILY MEDICINE

## 2021-12-22 PROCEDURE — 25000003 PHARM REV CODE 250: Performed by: INTERNAL MEDICINE

## 2021-12-22 RX ORDER — PREDNISONE 20 MG/1
TABLET ORAL
Qty: 12 TABLET | Refills: 0 | Status: SHIPPED | OUTPATIENT
Start: 2021-12-22 | End: 2021-12-28

## 2021-12-22 RX ORDER — AZITHROMYCIN 250 MG/1
500 TABLET, FILM COATED ORAL DAILY
Status: DISCONTINUED | OUTPATIENT
Start: 2021-12-22 | End: 2021-12-22 | Stop reason: HOSPADM

## 2021-12-22 RX ORDER — FUROSEMIDE 40 MG/1
TABLET ORAL
Qty: 14 TABLET | Refills: 0 | Status: SHIPPED | OUTPATIENT
Start: 2021-12-22 | End: 2022-03-29 | Stop reason: SDUPTHER

## 2021-12-22 RX ORDER — AZITHROMYCIN 500 MG/1
500 TABLET, FILM COATED ORAL DAILY
Qty: 3 TABLET | Refills: 0 | Status: SHIPPED | OUTPATIENT
Start: 2021-12-22 | End: 2021-12-25

## 2021-12-22 RX ORDER — ALBUTEROL SULFATE 90 UG/1
2 AEROSOL, METERED RESPIRATORY (INHALATION) EVERY 6 HOURS PRN
Qty: 6.7 G | Refills: 0 | Status: SHIPPED | OUTPATIENT
Start: 2021-12-22 | End: 2021-12-30 | Stop reason: SDUPTHER

## 2021-12-22 RX ORDER — POTASSIUM CHLORIDE 750 MG/1
10 TABLET, EXTENDED RELEASE ORAL DAILY
Qty: 7 TABLET | Refills: 0 | Status: SHIPPED | OUTPATIENT
Start: 2021-12-22 | End: 2021-12-30

## 2021-12-22 RX ORDER — BUDESONIDE AND FORMOTEROL FUMARATE DIHYDRATE 160; 4.5 UG/1; UG/1
2 AEROSOL RESPIRATORY (INHALATION) EVERY 12 HOURS
Qty: 10.2 G | Refills: 0 | Status: SHIPPED | OUTPATIENT
Start: 2021-12-22 | End: 2021-12-30 | Stop reason: SDUPTHER

## 2021-12-22 RX ADMIN — AZITHROMYCIN MONOHYDRATE 500 MG: 250 TABLET ORAL at 12:12

## 2021-12-22 RX ADMIN — MONTELUKAST 10 MG: 10 TABLET, FILM COATED ORAL at 08:12

## 2021-12-22 RX ADMIN — IPRATROPIUM BROMIDE AND ALBUTEROL SULFATE 3 ML: .5; 3 SOLUTION RESPIRATORY (INHALATION) at 12:12

## 2021-12-22 RX ADMIN — FLUTICASONE PROPIONATE 50 MCG: 50 SPRAY, METERED NASAL at 08:12

## 2021-12-22 RX ADMIN — PHENYTOIN SODIUM 100 MG: 100 CAPSULE, EXTENDED RELEASE ORAL at 08:12

## 2021-12-22 RX ADMIN — IPRATROPIUM BROMIDE AND ALBUTEROL SULFATE 3 ML: .5; 3 SOLUTION RESPIRATORY (INHALATION) at 08:12

## 2021-12-22 RX ADMIN — METHYLPREDNISOLONE SODIUM SUCCINATE 80 MG: 40 INJECTION, POWDER, FOR SOLUTION INTRAMUSCULAR; INTRAVENOUS at 05:12

## 2021-12-22 RX ADMIN — IPRATROPIUM BROMIDE 2 SPRAY: 42 SPRAY, METERED NASAL at 12:12

## 2021-12-22 RX ADMIN — DEXTROMETHORPHAN HYDROBROMIDE, GUAIFENESIN 1 TABLET: 30; 600 TABLET, EXTENDED RELEASE ORAL at 08:12

## 2021-12-22 RX ADMIN — BUDESONIDE 0.5 MG: 0.5 INHALANT RESPIRATORY (INHALATION) at 08:12

## 2021-12-22 RX ADMIN — GABAPENTIN 100 MG: 100 CAPSULE ORAL at 08:12

## 2021-12-22 RX ADMIN — IPRATROPIUM BROMIDE AND ALBUTEROL SULFATE 3 ML: .5; 3 SOLUTION RESPIRATORY (INHALATION) at 04:12

## 2021-12-22 RX ADMIN — SENNOSIDES AND DOCUSATE SODIUM 1 TABLET: 50; 8.6 TABLET ORAL at 08:12

## 2021-12-22 RX ADMIN — ISOSORBIDE MONONITRATE 30 MG: 30 TABLET, EXTENDED RELEASE ORAL at 08:12

## 2021-12-22 RX ADMIN — PIPERACILLIN AND TAZOBACTAM 4.5 G: 4; .5 INJECTION, POWDER, LYOPHILIZED, FOR SOLUTION INTRAVENOUS at 12:12

## 2021-12-22 RX ADMIN — PIPERACILLIN AND TAZOBACTAM 4.5 G: 4; .5 INJECTION, POWDER, LYOPHILIZED, FOR SOLUTION INTRAVENOUS at 05:12

## 2021-12-22 RX ADMIN — PAROXETINE HYDROCHLORIDE 40 MG: 20 TABLET, FILM COATED ORAL at 08:12

## 2021-12-22 RX ADMIN — METHYLPREDNISOLONE SODIUM SUCCINATE 80 MG: 40 INJECTION, POWDER, FOR SOLUTION INTRAMUSCULAR; INTRAVENOUS at 01:12

## 2021-12-22 RX ADMIN — IPRATROPIUM BROMIDE 2 SPRAY: 42 SPRAY, METERED NASAL at 08:12

## 2021-12-22 NOTE — ASSESSMENT & PLAN NOTE
Hold lasix for now, d/t bump in cr  ECHO-normal EF    12/22--Creat 1.18, will restart Lasix at 40mg daily, follow up with pcp in one week with labs.

## 2021-12-22 NOTE — ASSESSMENT & PLAN NOTE
Cont IV vanc zosyn     12/22--Received 3.5 days of IV ABT and IV steroids.  Discharge on Azithromycin x's 3 days and Tapered dose of steroids, mucinex, Symbicort, and Ventolin.

## 2021-12-22 NOTE — PLAN OF CARE
Spoke with FELICIA Morris who states pt already gets her home oxygen through [facility]. Called [facility] to confirm and get a tank brought to room for pt to d/c home. Also rec'd consult for BSC, order faxed to [facility] as well. NP informed.

## 2021-12-22 NOTE — HOSPITAL COURSE
12/22--Pt originally admitted for increased SOB, history of Asthma and COPD, treated with IV ABT and Steroids during this hospitalization; Pt with no new complaints or concerns, Is requesting to be discharged home today.  Still has wheezing and cough, o2 sats are 93% on 2L/min via nasal cannula, has home O2 and uses [facility], [facility] will bring portable o2 for pt to be discharged home with.  Pt is requesting BSC, consult to SW.  Pt will be discharged on a Tapered dose of Prednisone and 3 days of Azithromycin.  Will follow-up with Pulm-Dr. Torres in 2 weeks and pcp in one week.  Pt has met maximum benefit from this hospitalization and is stable for discharge.

## 2021-12-22 NOTE — ASSESSMENT & PLAN NOTE
IV Solumedrol  Respiratory treatment  abx    12/22--Stable, discharge on tapered dose of prednisone, 3 days of Azithromycin, Ventolin and Symbicort, follow-up with pcp in one week and Pulm in 2 weeks.  Pt has home O2 with [facility].

## 2021-12-22 NOTE — PLAN OF CARE
Rec'd consult for home oxygen. Spoke with pt and [facility]. Referral faxed. SS following.

## 2021-12-22 NOTE — DISCHARGE SUMMARY
"[address] Medicine  Discharge Summary      Patient Name: Lawanda Martínez  MRN: [id number]  Patient Class: IP- Inpatient  Admission Date: 12/19/2021  Hospital Length of Stay: 3 days  Discharge Date and Time:  12/22/2021 12:17 PM  Attending Physician: Maurice Escalona MD   Discharging Provider: HEAVENLY Mehta  Primary Care Provider: HEAVENLY Pulido      HPI:   71 year old  female presents with shortness of breath. Patient has home oxygen. She said she went to the bathroom this morning without her oxygen and became acutely short of breath. Patient has a history of asthma and COPD. History of CHF. Family member at bedside.      * No surgery found *      Hospital Course:   12/22--Pt originally admitted for increased SOB, history of Asthma and COPD, treated with IV ABT and Steroids during this hospitalization; Pt with no new complaints or concerns, Is requesting to be discharged home today.  Still has wheezing and cough, o2 sats are 93% on 2L/min via nasal cannula, has home O2 and uses [facility], [facility] will bring portable o2 for pt to be discharged home with.  Pt is requesting BSC, consult to SW.  Pt will be discharged on a Tapered dose of Prednisone and 3 days of Azithromycin.  Will follow-up with Pulm-Dr. Torres in 2 weeks and pcp in one week.  Pt has met maximum benefit from this hospitalization and is stable for discharge.         Goals of Care Treatment Preferences:  Code Status: Full Code      Consults:   Consults (From admission, onward)        Status Ordering Provider     Inpatient consult to Social Work  Once        Provider:  (Not yet assigned)    Completed ZENAIDA CARR     Inpatient consult to Social Work  Once        Provider:  (Not yet assigned)    Completed MAURICE ESCALONA     Pharmacy to dose Vancomycin consult  Once        Provider:  (Not yet assigned)   "And" Linked Group Details    Acknowledged MARLEN TREJO          * Asthma  IV " Solumedrol  Respiratory treatment  abx    12/22--Stable, discharge on tapered dose of prednisone, 3 days of Azithromycin, Ventolin and Symbicort, follow-up with pcp in one week and Pulm in 2 weeks.  Pt has home O2 with [facility].      Pneumonia due to infectious organism  Cont IV vanc zosyn     12/22--Received 3.5 days of IV ABT and IV steroids.  Discharge on Azithromycin x's 3 days and Tapered dose of steroids, mucinex, Symbicort, and Ventolin.      Chronic diastolic heart failure  Hold lasix for now, d/t bump in cr  ECHO-normal EF    12/22--Creat 1.18, will restart Lasix at 40mg daily, follow up with pcp in one week with labs.      Final Active Diagnoses:    Diagnosis Date Noted POA    PRINCIPAL PROBLEM:  Asthma [J45.909]  Yes    Pneumonia due to infectious organism [J18.9] 12/19/2021 Yes    Chronic diastolic heart failure [I50.32] 09/08/2021 Yes     Chronic      Problems Resolved During this Admission:    Diagnosis Date Noted Date Resolved POA    COPD exacerbation [J44.1] 12/19/2021 12/20/2021 Yes       Discharged Condition: stable    Disposition: Home or Self Care    Follow Up:   Follow-up Information     HEAVENLY Pulido In 1 week.    Specialties: Family Medicine, Emergency Medicine  Why: Hospital follow-up, [facility]  Contact information:  [address] [place] [address] [place] [address]  [phone]             Romie Torres MD In 2 weeks.    Specialty: Pulmonary Disease  Why: Hospital follow-up  Contact information:  [address] [place] [address]  [phone]                       Patient Instructions:      Notify your health care provider if you experience any of the following:  temperature >100.4     Notify your health care provider if you experience any of the following:  persistent nausea and vomiting or diarrhea     Notify your health care provider if you experience any of the following:  severe uncontrolled pain     Notify your  health care provider if you experience any of the following:  redness, tenderness, or signs of infection (pain, swelling, redness, odor or green/yellow discharge around incision site)     Notify your health care provider if you experience any of the following:  difficulty breathing or increased cough     Notify your health care provider if you experience any of the following:  severe persistent headache     Notify your health care provider if you experience any of the following:  worsening rash     Notify your health care provider if you experience any of the following:  persistent dizziness, light-headedness, or visual disturbances     Notify your health care provider if you experience any of the following:  increased confusion or weakness     Activity as tolerated       Significant Diagnostic Studies: Labs:   BMP:   Recent Labs   Lab 12/21/21 0304 12/22/21  0258   * 190*    139   K 3.9 3.6   CL 97* 99   CO2 30 31   BUN 32* 23*   CREATININE 1.32* 1.18*   CALCIUM 9.2 9.4    and CBC   Recent Labs   Lab 12/21/21 0304 12/21/21 0304 12/22/21 0258   WBC 9.26  --  7.09   HGB 12.0  --  12.8   HCT 36.0*   < > 37.0*     --  225    < > = values in this interval not displayed.       Pending Diagnostic Studies:     None         Medications:  Reconciled Home Medications:      Medication List      START taking these medications    albuterol 90 mcg/actuation inhaler  Commonly known as: VENTOLIN HFA  Inhale 2 puffs into the lungs every 6 (six) hours as needed for Wheezing. Rescue     azithromycin 500 MG tablet  Commonly known as: ZITHROMAX  Take 1 tablet (500 mg total) by mouth once daily. for 3 days     budesonide-formoterol 160-4.5 mcg 160-4.5 mcg/actuation Hfaa  Commonly known as: SYMBICORT  Inhale 2 puffs into the lungs every 12 (twelve) hours. Controller     dextromethorphan-guaiFENesin  mg  mg per 12 hr tablet  Commonly known as: MUCINEX DM  Take 1 tablet by mouth 2 (two) times daily. for  10 days     predniSONE 20 MG tablet  Commonly known as: DELTASONE  Take 3 tablets (60 mg total) by mouth once daily for 2 days, THEN 2 tablets (40 mg total) once daily for 2 days, THEN 1 tablet (20 mg total) once daily for 1 day, THEN 0.5 tablets (10 mg total) once daily for 1 day.  Start taking on: December 22, 2021        CHANGE how you take these medications    furosemide 40 MG tablet  Commonly known as: LASIX  Take one tablet by mouth daily  What changed: additional instructions     potassium chloride SA 10 MEQ tablet  Commonly known as: K-DUR,KLOR-CON  Take 1 tablet (10 mEq total) by mouth once daily.  What changed: when to take this        CONTINUE taking these medications    albuterol-ipratropium 2.5 mg-0.5 mg/3 mL nebulizer solution  Commonly known as: DUO-NEB  Take 3 mLs by nebulization every 6 (six) hours as needed for Wheezing. Rescue     CALCARB 600 WITH VITAMIN D ORAL  Take by mouth.     fluticasone propionate 50 mcg/actuation nasal spray  Commonly known as: FLONASE  1 spray by Each Nostril route once daily.     gabapentin 300 MG capsule  Commonly known as: NEURONTIN  TAKE 1 CAPSULE THREE TIMES DAILY     ipratropium 42 mcg (0.06 %) nasal spray  Commonly known as: ATROVENT  2 sprays by Nasal route 4 (four) times daily.     isosorbide mononitrate 30 MG 24 hr tablet  Commonly known as: IMDUR  TAKE 1 TABLET EVERY DAY     loratadine 10 mg tablet  Commonly known as: CLARITIN  Take 10 mg by mouth once daily.     meclizine 12.5 mg tablet  Commonly known as: ANTIVERT  TAKE 1 TABLET TWICE DAILY AS NEEDED     montelukast 10 mg tablet  Commonly known as: SINGULAIR  TAKE 1 TABLET EVERY DAY     paroxetine 40 MG tablet  Commonly known as: PAXIL  TAKE 1 TABLET EVERY DAY     phenytoin 100 MG ER capsule  Commonly known as: DILANTIN  TAKE 1 CAPSULE TWICE DAILY     pravastatin 40 MG tablet  Commonly known as: PRAVACHOL  TAKE 1 TABLET AT BEDTIME        STOP taking these medications    hydroCHLOROthiazide 25 MG  tablet  Commonly known as: HYDRODIURIL     spironolactone 25 MG tablet  Commonly known as: ALDACTONE            Indwelling Lines/Drains at time of discharge:   Lines/Drains/Airways     None                 Time spent on the discharge of patient: >30 minutes         HEAVENLY Mehta  Department of Hospital Medicine  [address]

## 2021-12-22 NOTE — PLAN OF CARE
[facility]'d [facility] stating pt does not qualify for home oxygen because her o2 sats do not drop below 88%. Dr. Morley informed and order canceled. SS following.

## 2021-12-22 NOTE — PLAN OF CARE
Problem: Adult Inpatient Plan of Care  Goal: Plan of Care Review  Outcome: Ongoing, Progressing  Goal: Patient-Specific Goal (Individualized)  Outcome: Ongoing, Progressing  Goal: Absence of Hospital-Acquired Illness or Injury  Outcome: Ongoing, Progressing  Goal: Optimal Comfort and Wellbeing  Outcome: Ongoing, Progressing  Goal: Readiness for Transition of Care  Outcome: Ongoing, Progressing     Problem: Fluid Imbalance (Pneumonia)  Goal: Fluid Balance  Outcome: Ongoing, Progressing     Problem: Infection (Pneumonia)  Goal: Resolution of Infection Signs and Symptoms  Outcome: Ongoing, Progressing     Problem: Respiratory Compromise (Pneumonia)  Goal: Effective Oxygenation and Ventilation  Outcome: Ongoing, Progressing     Problem: Fall Injury Risk  Goal: Absence of Fall and Fall-Related Injury  Outcome: Ongoing, Progressing

## 2021-12-23 ENCOUNTER — TELEPHONE (OUTPATIENT)
Dept: FAMILY MEDICINE | Facility: CLINIC | Age: 71
End: 2021-12-23
Payer: MEDICARE

## 2021-12-23 ENCOUNTER — TELEPHONE (OUTPATIENT)
Dept: CARDIOLOGY | Facility: HOSPITAL | Age: 71
End: 2021-12-23
Payer: MEDICARE

## 2021-12-23 NOTE — PLAN OF CARE
[facility] - [facility]  Discharge Final Note    Primary Care Provider: HEAVENLY Pulido    Expected Discharge Date: 12/22/2021    Final Discharge Note (most recent)     Final Note - 12/23/21 0821        Final Note    Assessment Type Final Discharge Note     Anticipated Discharge Disposition Home or Self Care        Post-Acute Status    Post-Acute Authorization HME     HME Status Set-up Complete/Auth obtained     Patient choice form signed by patient/caregiver List with quality metrics by geographic area provided;List from CMS Compare;List from System Post-Acute Care     Discharge Delays None known at this time                 Important Message from Medicare  Important Message from Medicare regarding Discharge Appeal Rights: Given to patient/caregiver,Explained to patient/caregiver,Signed/date by patient/caregiver     Date IMM was signed: 12/20/21  Time IMM was signed: 1045    Contact Info     HEAVENLY Pulido   Specialty: Family Medicine, Emergency Medicine   Relationship: PCP - General    [address]  [facility] [place] [address]   Phone: [phone]       Next Steps: Follow up in 1 week(s)    Instructions: Hospital follow-up, cmp    Romie Torres MD   Specialty: Pulmonary Disease    [address]  [facility]  [place] [address]   Phone: [phone]       Next Steps: Follow up in 2 week(s)    Instructions: Hospital follow-up      pt d/c home 12/22/21 with home o2 from [facility].

## 2021-12-23 NOTE — TELEPHONE ENCOUNTER
12/23/21@845-spoke with pt spouse, Shubham. He reports she slept good last night. Reports she still has a prod cough.states she wears her O2 @2l as needed. Reports she has shortness of breath with activity but resolves with rest. Denies she complains of chest pain. States he will get her go call me back to go over her medications. Informed him of her f/u appt. Instructed bring all meds to follow up visit and to call office for questons/concerns. Also to seek medical attention for any new or worsening sx. Emeli Rose    918-pt returned call. She reports she is doing good. States she is wearing her O2 @2 all the time right now til she is not so short of breath with activity. Denies chest pain. Discussed daily weights and blood pressures and to bring log to follow up appt. Josh. States last bp was 115/70 and has not weighed herself since coming home. Discussed s/s fluid overload. States she has home health but has not heard from them this morning. Will call to check.she uses [facility] and walker. Reviewed and discussed all discharge medications. States her  is about to go  her new rx. Discussed the med changes and discontinued meds. Vu. Informed of f/u appt. Instructed bring all meds to follow up visit and to call office for questons/concerns. Also to seek medical attention for any new or worsening sx. Emeli Rose

## 2021-12-30 ENCOUNTER — OFFICE VISIT (OUTPATIENT)
Dept: FAMILY MEDICINE | Facility: CLINIC | Age: 71
End: 2021-12-30
Payer: MEDICARE

## 2021-12-30 VITALS
TEMPERATURE: 99 F | HEART RATE: 84 BPM | BODY MASS INDEX: 36.54 KG/M2 | HEIGHT: 64 IN | OXYGEN SATURATION: 96 % | RESPIRATION RATE: 18 BRPM | WEIGHT: 214 LBS | SYSTOLIC BLOOD PRESSURE: 120 MMHG | DIASTOLIC BLOOD PRESSURE: 64 MMHG

## 2021-12-30 DIAGNOSIS — M54.42 CHRONIC LOW BACK PAIN WITH BILATERAL SCIATICA, UNSPECIFIED BACK PAIN LATERALITY: ICD-10-CM

## 2021-12-30 DIAGNOSIS — J40 BRONCHITIS: ICD-10-CM

## 2021-12-30 DIAGNOSIS — I50.32 CHRONIC DIASTOLIC HEART FAILURE: Primary | ICD-10-CM

## 2021-12-30 DIAGNOSIS — E78.5 HYPERLIPIDEMIA, UNSPECIFIED HYPERLIPIDEMIA TYPE: ICD-10-CM

## 2021-12-30 DIAGNOSIS — T78.40XS ALLERGY, SEQUELA: ICD-10-CM

## 2021-12-30 DIAGNOSIS — G89.29 CHRONIC LOW BACK PAIN WITH BILATERAL SCIATICA, UNSPECIFIED BACK PAIN LATERALITY: ICD-10-CM

## 2021-12-30 DIAGNOSIS — R56.9 SEIZURES: ICD-10-CM

## 2021-12-30 DIAGNOSIS — J18.9 COMMUNITY ACQUIRED PNEUMONIA, UNSPECIFIED LATERALITY: ICD-10-CM

## 2021-12-30 DIAGNOSIS — J45.41 MODERATE PERSISTENT ASTHMA WITH ACUTE EXACERBATION: ICD-10-CM

## 2021-12-30 DIAGNOSIS — E87.6 HYPOKALEMIA: ICD-10-CM

## 2021-12-30 DIAGNOSIS — J45.901 ASTHMA WITH ACUTE EXACERBATION, UNSPECIFIED ASTHMA SEVERITY, UNSPECIFIED WHETHER PERSISTENT: ICD-10-CM

## 2021-12-30 DIAGNOSIS — F32.A DEPRESSION, UNSPECIFIED DEPRESSION TYPE: ICD-10-CM

## 2021-12-30 DIAGNOSIS — I20.9 ANGINA PECTORIS: ICD-10-CM

## 2021-12-30 DIAGNOSIS — M54.41 CHRONIC LOW BACK PAIN WITH BILATERAL SCIATICA, UNSPECIFIED BACK PAIN LATERALITY: ICD-10-CM

## 2021-12-30 LAB
ALBUMIN SERPL BCP-MCNC: 3.2 G/DL (ref 3.5–5)
ALBUMIN/GLOB SERPL: 0.8 {RATIO}
ALP SERPL-CCNC: 134 U/L (ref 55–142)
ALT SERPL W P-5'-P-CCNC: 68 U/L (ref 13–56)
ANION GAP SERPL CALCULATED.3IONS-SCNC: 10 MMOL/L (ref 7–16)
AST SERPL W P-5'-P-CCNC: 34 U/L (ref 15–37)
BASOPHILS # BLD AUTO: 0.04 K/UL (ref 0–0.2)
BASOPHILS NFR BLD AUTO: 0.5 % (ref 0–1)
BILIRUB SERPL-MCNC: 0.2 MG/DL (ref 0–1.2)
BUN SERPL-MCNC: 18 MG/DL (ref 7–18)
BUN/CREAT SERPL: 14 (ref 6–20)
CALCIUM SERPL-MCNC: 8.7 MG/DL (ref 8.5–10.1)
CHLORIDE SERPL-SCNC: 104 MMOL/L (ref 98–107)
CHOLEST SERPL-MCNC: 233 MG/DL (ref 0–200)
CHOLEST/HDLC SERPL: 2.3 {RATIO}
CK SERPL-CCNC: 26 U/L (ref 26–192)
CO2 SERPL-SCNC: 28 MMOL/L (ref 21–32)
CREAT SERPL-MCNC: 1.31 MG/DL (ref 0.55–1.02)
DIFFERENTIAL METHOD BLD: ABNORMAL
EOSINOPHIL # BLD AUTO: 0.05 K/UL (ref 0–0.5)
EOSINOPHIL NFR BLD AUTO: 0.7 % (ref 1–4)
ERYTHROCYTE [DISTWIDTH] IN BLOOD BY AUTOMATED COUNT: 13.1 % (ref 11.5–14.5)
GLOBULIN SER-MCNC: 4.2 G/DL (ref 2–4)
GLUCOSE SERPL-MCNC: 148 MG/DL (ref 74–106)
HCT VFR BLD AUTO: 42.8 % (ref 38–47)
HDLC SERPL-MCNC: 103 MG/DL (ref 40–60)
HGB BLD-MCNC: 13.9 G/DL (ref 12–16)
IMM GRANULOCYTES # BLD AUTO: 0.07 K/UL (ref 0–0.04)
IMM GRANULOCYTES NFR BLD: 0.9 % (ref 0–0.4)
LDLC SERPL CALC-MCNC: 101 MG/DL
LDLC/HDLC SERPL: 1 {RATIO}
LYMPHOCYTES # BLD AUTO: 1.4 K/UL (ref 1–4.8)
LYMPHOCYTES NFR BLD AUTO: 18.5 % (ref 27–41)
MCH RBC QN AUTO: 30.1 PG (ref 27–31)
MCHC RBC AUTO-ENTMCNC: 32.5 G/DL (ref 32–36)
MCV RBC AUTO: 92.6 FL (ref 80–96)
MONOCYTES # BLD AUTO: 0.46 K/UL (ref 0–0.8)
MONOCYTES NFR BLD AUTO: 6.1 % (ref 2–6)
MPC BLD CALC-MCNC: 10.5 FL (ref 9.4–12.4)
NEUTROPHILS # BLD AUTO: 5.55 K/UL (ref 1.8–7.7)
NEUTROPHILS NFR BLD AUTO: 73.3 % (ref 53–65)
NONHDLC SERPL-MCNC: 130 MG/DL
NRBC # BLD AUTO: 0 X10E3/UL
NRBC, AUTO (.00): 0 %
NT-PROBNP SERPL-MCNC: 295 PG/ML (ref 1–125)
PLATELET # BLD AUTO: 265 K/UL (ref 150–400)
POTASSIUM SERPL-SCNC: 4.2 MMOL/L (ref 3.5–5.1)
PROT SERPL-MCNC: 7.4 G/DL (ref 6.4–8.2)
RBC # BLD AUTO: 4.62 M/UL (ref 4.2–5.4)
SODIUM SERPL-SCNC: 138 MMOL/L (ref 136–145)
TRIGL SERPL-MCNC: 147 MG/DL (ref 35–150)
VLDLC SERPL-MCNC: 29 MG/DL
WBC # BLD AUTO: 7.57 K/UL (ref 4.5–11)

## 2021-12-30 PROCEDURE — 1159F PR MEDICATION LIST DOCUMENTED IN MEDICAL RECORD: ICD-10-PCS | Mod: ,,, | Performed by: NURSE PRACTITIONER

## 2021-12-30 PROCEDURE — 85025 CBC WITH DIFFERENTIAL: ICD-10-PCS | Mod: ,,, | Performed by: CLINICAL MEDICAL LABORATORY

## 2021-12-30 PROCEDURE — 1160F PR REVIEW ALL MEDS BY PRESCRIBER/CLIN PHARMACIST DOCUMENTED: ICD-10-PCS | Mod: ,,, | Performed by: NURSE PRACTITIONER

## 2021-12-30 PROCEDURE — 99495 TCM SERVICES (MODERATE COMPLEXITY): ICD-10-PCS | Mod: ,,, | Performed by: NURSE PRACTITIONER

## 2021-12-30 PROCEDURE — 80061 LIPID PANEL: ICD-10-PCS | Mod: ,,, | Performed by: CLINICAL MEDICAL LABORATORY

## 2021-12-30 PROCEDURE — 3078F DIAST BP <80 MM HG: CPT | Mod: ,,, | Performed by: NURSE PRACTITIONER

## 2021-12-30 PROCEDURE — 80061 LIPID PANEL: CPT | Mod: ,,, | Performed by: CLINICAL MEDICAL LABORATORY

## 2021-12-30 PROCEDURE — 3078F PR MOST RECENT DIASTOLIC BLOOD PRESSURE < 80 MM HG: ICD-10-PCS | Mod: ,,, | Performed by: NURSE PRACTITIONER

## 2021-12-30 PROCEDURE — 3008F PR BODY MASS INDEX (BMI) DOCUMENTED: ICD-10-PCS | Mod: ,,, | Performed by: NURSE PRACTITIONER

## 2021-12-30 PROCEDURE — 3074F SYST BP LT 130 MM HG: CPT | Mod: ,,, | Performed by: NURSE PRACTITIONER

## 2021-12-30 PROCEDURE — 85025 COMPLETE CBC W/AUTO DIFF WBC: CPT | Mod: ,,, | Performed by: CLINICAL MEDICAL LABORATORY

## 2021-12-30 PROCEDURE — 83880 ASSAY OF NATRIURETIC PEPTIDE: CPT | Mod: ,,, | Performed by: CLINICAL MEDICAL LABORATORY

## 2021-12-30 PROCEDURE — 80053 COMPREHEN METABOLIC PANEL: CPT | Mod: ,,, | Performed by: CLINICAL MEDICAL LABORATORY

## 2021-12-30 PROCEDURE — 80053 COMPREHENSIVE METABOLIC PANEL: ICD-10-PCS | Mod: ,,, | Performed by: CLINICAL MEDICAL LABORATORY

## 2021-12-30 PROCEDURE — 3074F PR MOST RECENT SYSTOLIC BLOOD PRESSURE < 130 MM HG: ICD-10-PCS | Mod: ,,, | Performed by: NURSE PRACTITIONER

## 2021-12-30 PROCEDURE — 1160F RVW MEDS BY RX/DR IN RCRD: CPT | Mod: ,,, | Performed by: NURSE PRACTITIONER

## 2021-12-30 PROCEDURE — 99495 TRANSJ CARE MGMT MOD F2F 14D: CPT | Mod: ,,, | Performed by: NURSE PRACTITIONER

## 2021-12-30 PROCEDURE — 3008F BODY MASS INDEX DOCD: CPT | Mod: ,,, | Performed by: NURSE PRACTITIONER

## 2021-12-30 PROCEDURE — 82550 CK: ICD-10-PCS | Mod: ,,, | Performed by: CLINICAL MEDICAL LABORATORY

## 2021-12-30 PROCEDURE — 82550 ASSAY OF CK (CPK): CPT | Mod: ,,, | Performed by: CLINICAL MEDICAL LABORATORY

## 2021-12-30 PROCEDURE — 1159F MED LIST DOCD IN RCRD: CPT | Mod: ,,, | Performed by: NURSE PRACTITIONER

## 2021-12-30 PROCEDURE — 83880 NT-PRO NATRIURETIC PEPTIDE: ICD-10-PCS | Mod: ,,, | Performed by: CLINICAL MEDICAL LABORATORY

## 2021-12-30 RX ORDER — IPRATROPIUM BROMIDE 42 UG/1
2 SPRAY, METERED NASAL 4 TIMES DAILY
Qty: 15 ML | Refills: 0 | Status: SHIPPED | OUTPATIENT
Start: 2021-12-30 | End: 2022-09-15 | Stop reason: SDUPTHER

## 2021-12-30 RX ORDER — GABAPENTIN 300 MG/1
300 CAPSULE ORAL 3 TIMES DAILY
Qty: 270 CAPSULE | Refills: 1 | Status: SHIPPED | OUTPATIENT
Start: 2021-12-30 | End: 2022-06-07 | Stop reason: SDUPTHER

## 2021-12-30 RX ORDER — POTASSIUM CHLORIDE 750 MG/1
10 TABLET, EXTENDED RELEASE ORAL 2 TIMES DAILY
Qty: 180 TABLET | Refills: 1 | Status: ON HOLD | OUTPATIENT
Start: 2021-12-30 | End: 2022-04-08 | Stop reason: SDUPTHER

## 2021-12-30 RX ORDER — IPRATROPIUM BROMIDE AND ALBUTEROL SULFATE 2.5; .5 MG/3ML; MG/3ML
3 SOLUTION RESPIRATORY (INHALATION) EVERY 6 HOURS PRN
Qty: 100 ML | Refills: 5 | Status: SHIPPED | OUTPATIENT
Start: 2021-12-30 | End: 2022-04-14 | Stop reason: SDUPTHER

## 2021-12-30 RX ORDER — ALBUTEROL SULFATE 90 UG/1
2 AEROSOL, METERED RESPIRATORY (INHALATION) EVERY 6 HOURS PRN
Qty: 6.7 G | Refills: 0 | Status: SHIPPED | OUTPATIENT
Start: 2021-12-30 | End: 2022-03-29 | Stop reason: SDUPTHER

## 2021-12-30 RX ORDER — ISOSORBIDE MONONITRATE 30 MG/1
30 TABLET, EXTENDED RELEASE ORAL DAILY
Qty: 90 TABLET | Refills: 1 | Status: SHIPPED | OUTPATIENT
Start: 2021-12-30 | End: 2022-05-19 | Stop reason: ALTCHOICE

## 2021-12-30 RX ORDER — BUDESONIDE AND FORMOTEROL FUMARATE DIHYDRATE 160; 4.5 UG/1; UG/1
2 AEROSOL RESPIRATORY (INHALATION) EVERY 12 HOURS
Qty: 10.2 G | Refills: 0 | Status: SHIPPED | OUTPATIENT
Start: 2021-12-30 | End: 2022-01-18 | Stop reason: ALTCHOICE

## 2021-12-30 RX ORDER — FLUTICASONE PROPIONATE 50 MCG
1 SPRAY, SUSPENSION (ML) NASAL DAILY
Qty: 16 G | Refills: 1 | Status: SHIPPED | OUTPATIENT
Start: 2021-12-30 | End: 2023-04-11

## 2021-12-30 RX ORDER — MONTELUKAST SODIUM 10 MG/1
10 TABLET ORAL DAILY
Qty: 90 TABLET | Refills: 1 | Status: SHIPPED | OUTPATIENT
Start: 2021-12-30 | End: 2022-06-07 | Stop reason: SDUPTHER

## 2021-12-30 RX ORDER — PHENYTOIN SODIUM 100 MG/1
100 CAPSULE, EXTENDED RELEASE ORAL 2 TIMES DAILY
Qty: 180 CAPSULE | Refills: 2 | Status: SHIPPED | OUTPATIENT
Start: 2021-12-30 | End: 2022-08-04 | Stop reason: SDUPTHER

## 2021-12-30 RX ORDER — PAROXETINE HYDROCHLORIDE 40 MG/1
40 TABLET, FILM COATED ORAL DAILY
Qty: 90 TABLET | Refills: 1 | Status: SHIPPED | OUTPATIENT
Start: 2021-12-30 | End: 2022-06-07 | Stop reason: SDUPTHER

## 2021-12-30 RX ORDER — POTASSIUM CHLORIDE 750 MG/1
10 TABLET, EXTENDED RELEASE ORAL 2 TIMES DAILY
COMMUNITY
Start: 2021-12-15 | End: 2021-12-30 | Stop reason: SDUPTHER

## 2021-12-30 RX ORDER — PRAVASTATIN SODIUM 40 MG/1
40 TABLET ORAL NIGHTLY
Qty: 90 TABLET | Refills: 1 | Status: SHIPPED | OUTPATIENT
Start: 2021-12-30 | End: 2022-08-04 | Stop reason: SDUPTHER

## 2022-01-12 ENCOUNTER — PATIENT MESSAGE (OUTPATIENT)
Dept: FAMILY MEDICINE | Facility: CLINIC | Age: 72
End: 2022-01-12
Payer: MEDICARE

## 2022-01-18 ENCOUNTER — OFFICE VISIT (OUTPATIENT)
Dept: PULMONOLOGY | Facility: CLINIC | Age: 72
End: 2022-01-18
Payer: MEDICARE

## 2022-01-18 VITALS
SYSTOLIC BLOOD PRESSURE: 120 MMHG | WEIGHT: 214 LBS | HEART RATE: 72 BPM | DIASTOLIC BLOOD PRESSURE: 68 MMHG | OXYGEN SATURATION: 99 % | BODY MASS INDEX: 36.54 KG/M2 | HEIGHT: 64 IN | RESPIRATION RATE: 16 BRPM

## 2022-01-18 DIAGNOSIS — J45.40 MODERATE PERSISTENT ASTHMA WITHOUT COMPLICATION: Primary | ICD-10-CM

## 2022-01-18 DIAGNOSIS — I50.32 CHRONIC DIASTOLIC HEART FAILURE: Chronic | ICD-10-CM

## 2022-01-18 PROCEDURE — 3008F BODY MASS INDEX DOCD: CPT | Mod: CPTII,,, | Performed by: INTERNAL MEDICINE

## 2022-01-18 PROCEDURE — 3008F PR BODY MASS INDEX (BMI) DOCUMENTED: ICD-10-PCS | Mod: CPTII,,, | Performed by: INTERNAL MEDICINE

## 2022-01-18 PROCEDURE — 3288F PR FALLS RISK ASSESSMENT DOCUMENTED: ICD-10-PCS | Mod: CPTII,,, | Performed by: INTERNAL MEDICINE

## 2022-01-18 PROCEDURE — 3074F SYST BP LT 130 MM HG: CPT | Mod: CPTII,,, | Performed by: INTERNAL MEDICINE

## 2022-01-18 PROCEDURE — 1111F PR DISCHARGE MEDS RECONCILED W/ CURRENT OUTPATIENT MED LIST: ICD-10-PCS | Mod: CPTII,,, | Performed by: INTERNAL MEDICINE

## 2022-01-18 PROCEDURE — 99214 PR OFFICE/OUTPT VISIT, EST, LEVL IV, 30-39 MIN: ICD-10-PCS | Mod: S$PBB,,, | Performed by: INTERNAL MEDICINE

## 2022-01-18 PROCEDURE — 1100F PR PT FALLS ASSESS DOC 2+ FALLS/FALL W/INJURY/YR: ICD-10-PCS | Mod: CPTII,,, | Performed by: INTERNAL MEDICINE

## 2022-01-18 PROCEDURE — 3074F PR MOST RECENT SYSTOLIC BLOOD PRESSURE < 130 MM HG: ICD-10-PCS | Mod: CPTII,,, | Performed by: INTERNAL MEDICINE

## 2022-01-18 PROCEDURE — 1159F PR MEDICATION LIST DOCUMENTED IN MEDICAL RECORD: ICD-10-PCS | Mod: CPTII,,, | Performed by: INTERNAL MEDICINE

## 2022-01-18 PROCEDURE — 3078F DIAST BP <80 MM HG: CPT | Mod: CPTII,,, | Performed by: INTERNAL MEDICINE

## 2022-01-18 PROCEDURE — 99215 OFFICE O/P EST HI 40 MIN: CPT | Mod: PBBFAC | Performed by: INTERNAL MEDICINE

## 2022-01-18 PROCEDURE — 3288F FALL RISK ASSESSMENT DOCD: CPT | Mod: CPTII,,, | Performed by: INTERNAL MEDICINE

## 2022-01-18 PROCEDURE — 1111F DSCHRG MED/CURRENT MED MERGE: CPT | Mod: CPTII,,, | Performed by: INTERNAL MEDICINE

## 2022-01-18 PROCEDURE — 99214 OFFICE O/P EST MOD 30 MIN: CPT | Mod: S$PBB,,, | Performed by: INTERNAL MEDICINE

## 2022-01-18 PROCEDURE — 3078F PR MOST RECENT DIASTOLIC BLOOD PRESSURE < 80 MM HG: ICD-10-PCS | Mod: CPTII,,, | Performed by: INTERNAL MEDICINE

## 2022-01-18 PROCEDURE — 1100F PTFALLS ASSESS-DOCD GE2>/YR: CPT | Mod: CPTII,,, | Performed by: INTERNAL MEDICINE

## 2022-01-18 PROCEDURE — 1126F PR PAIN SEVERITY QUANTIFIED, NO PAIN PRESENT: ICD-10-PCS | Mod: CPTII,,, | Performed by: INTERNAL MEDICINE

## 2022-01-18 PROCEDURE — 1159F MED LIST DOCD IN RCRD: CPT | Mod: CPTII,,, | Performed by: INTERNAL MEDICINE

## 2022-01-18 PROCEDURE — 1126F AMNT PAIN NOTED NONE PRSNT: CPT | Mod: CPTII,,, | Performed by: INTERNAL MEDICINE

## 2022-01-18 RX ORDER — FLUTICASONE FUROATE, UMECLIDINIUM BROMIDE AND VILANTEROL TRIFENATATE 200; 62.5; 25 UG/1; UG/1; UG/1
1 POWDER RESPIRATORY (INHALATION) DAILY
Qty: 60 EACH | Refills: 6 | Status: SHIPPED | OUTPATIENT
Start: 2022-01-18 | End: 2022-04-26 | Stop reason: SDUPTHER

## 2022-01-18 NOTE — PROGRESS NOTES
Subjective:       Patient ID: Lawanda Martínez is a 71 y.o. female.    Chief Complaint: Asthma (HD 2 weeks) and Shortness of Breath    71-year-old female initially referred for shortness of breath and cough.  She missed multiple follow-up visits with me.  She continues to get periodic antibiotics and steroids.  She had been initiated on prednisone 5 mg daily indefinitely, we were able to wean this off.  Short of breath with small chores around the house unable to do much without dyspnea.  She has been initiated on nocturnal oxygen.  She has significant lower extremity edema requiring the use of compression stockings daily.  She has also missed her follow-up visit with Cardiology.  Underwent left heart catheterization 02/22/2019 that revealed diastolic dysfunction.  She was hospitalized 12/2021, after discharge her medications were changed back to symbicort BID and PRN nebulizer. She was doing well on Trelegy 200.      Asthma  She complains of shortness of breath. There is no cough, hemoptysis, sputum production or wheezing. Pertinent negatives include no appetite change, chest pain, fever, headaches, myalgias, postnasal drip, rhinorrhea or sore throat. Her past medical history is significant for asthma.   Shortness of Breath  Associated symptoms include leg swelling. Pertinent negatives include no abdominal pain, chest pain, fever, headaches, hemoptysis, rash, rhinorrhea, sore throat, sputum production, vomiting or wheezing. Her past medical history is significant for asthma.     Social History     Tobacco Use    Smoking status: Never Smoker    Smokeless tobacco: Never Used   Substance Use Topics    Alcohol use: Never    Drug use: Never      Review of Systems   Constitutional: Positive for weight gain. Negative for fever, chills, activity change and appetite change.   HENT: Negative for postnasal drip, rhinorrhea, sore throat and voice change.    Respiratory: Positive for shortness of breath, asthma nighttime  symptoms, dyspnea on extertion and use of rescue inhaler. Negative for cough, hemoptysis, sputum production, chest tightness and wheezing.    Cardiovascular: Positive for leg swelling. Negative for chest pain and palpitations.   Musculoskeletal: Negative for arthralgias, back pain, joint swelling and myalgias.   Skin: Negative for rash.   Gastrointestinal: Negative for nausea, vomiting, abdominal pain and acid reflux.   Neurological: Negative for syncope, weakness, light-headedness and headaches.   Hematological: Negative for adenopathy. Does not bruise/bleed easily and no excessive bruising.   Psychiatric/Behavioral: Negative for confusion and sleep disturbance. The patient is not nervous/anxious.        Objective:      Physical Exam   Constitutional: She is oriented to person, place, and time. She appears well-developed and well-nourished. No distress. She is obese.   HENT:   Head: Normocephalic.   Nose: Nose normal.   Neck: No tracheal deviation present. No thyromegaly present.   Difficult to assess JVD   Cardiovascular: Normal rate, regular rhythm and normal heart sounds.   Pulmonary/Chest: Effort normal and breath sounds normal. No respiratory distress. She has no wheezes. She has no rhonchi. She has no rales.   Abdominal: Soft. Bowel sounds are normal. There is no abdominal tenderness.   Musculoskeletal:         General: Edema (compression stockings in place) present. No deformity.      Cervical back: Neck supple.   Lymphadenopathy: No supraclavicular adenopathy is present.     She has no cervical adenopathy.   Neurological: She is alert and oriented to person, place, and time. No cranial nerve deficit.   Skin: Skin is warm and dry. No rash noted. No cyanosis. Nails show no clubbing.   Psychiatric: She has a normal mood and affect. Her behavior is normal.   Vitals reviewed.    Personal Diagnostic Review  Pulmonary function tests: obesity related changes. Significant bronchodilator response    No flowsheet data  found.      Assessment:       1. Moderate persistent asthma without complication    2. Chronic diastolic heart failure        Outpatient Encounter Medications as of 1/18/2022   Medication Sig Dispense Refill    albuterol (VENTOLIN HFA) 90 mcg/actuation inhaler Inhale 2 puffs into the lungs every 6 (six) hours as needed for Wheezing. Rescue 6.7 g 0    albuterol-ipratropium (DUO-NEB) 2.5 mg-0.5 mg/3 mL nebulizer solution Take 3 mLs by nebulization every 6 (six) hours as needed for Wheezing. Rescue 100 mL 5    calcium carbonate/vitamin D3 (CALCARB 600 WITH VITAMIN D ORAL) Take by mouth.      fluticasone propionate (FLONASE) 50 mcg/actuation nasal spray 1 spray (50 mcg total) by Each Nostril route once daily. 16 g 1    furosemide (LASIX) 40 MG tablet Take one tablet by mouth daily 14 tablet 0    gabapentin (NEURONTIN) 300 MG capsule Take 1 capsule (300 mg total) by mouth 3 (three) times daily. 270 capsule 1    ipratropium (ATROVENT) 42 mcg (0.06 %) nasal spray 2 sprays by Nasal route 4 (four) times daily. 15 mL 0    isosorbide mononitrate (IMDUR) 30 MG 24 hr tablet Take 1 tablet (30 mg total) by mouth once daily. 90 tablet 1    loratadine (CLARITIN) 10 mg tablet Take 10 mg by mouth once daily.      meclizine (ANTIVERT) 12.5 mg tablet TAKE 1 TABLET TWICE DAILY AS NEEDED 90 tablet 1    montelukast (SINGULAIR) 10 mg tablet Take 1 tablet (10 mg total) by mouth once daily. 90 tablet 1    paroxetine (PAXIL) 40 MG tablet Take 1 tablet (40 mg total) by mouth once daily. 90 tablet 1    phenytoin (DILANTIN) 100 MG ER capsule Take 1 capsule (100 mg total) by mouth 2 (two) times daily. 180 capsule 2    potassium chloride (KLOR-CON) 10 MEQ TbSR Take 1 tablet (10 mEq total) by mouth 2 (two) times daily. 180 tablet 1    pravastatin (PRAVACHOL) 40 MG tablet Take 1 tablet (40 mg total) by mouth nightly. 90 tablet 1    [DISCONTINUED] budesonide-formoterol 160-4.5 mcg (SYMBICORT) 160-4.5 mcg/actuation HFAA Inhale 2 puffs  into the lungs every 12 (twelve) hours. Controller 10.2 g 0    fluticasone-umeclidin-vilanter (TRELEGY ELLIPTA) 200-62.5-25 mcg inhaler Inhale 1 puff into the lungs once daily. 60 each 6     No facility-administered encounter medications on file as of 1/18/2022.     No orders of the defined types were placed in this encounter.      Plan:       Shortness of breath  - does have a possible asthma component  - now off of chronic prednisone  - change symbicort back to Trelegy 200, continue other medication  - IgE- mildly elevated  - 6MWT- this was ordered but she missed the visit at which it was scheduled to be completed  - HFpEF continues to be an issue for her, continue cardiology follow-up    RTC in 4 months

## 2022-02-21 ENCOUNTER — DOCUMENTATION ONLY (OUTPATIENT)
Dept: FAMILY MEDICINE | Facility: CLINIC | Age: 72
End: 2022-02-21
Payer: MEDICARE

## 2022-02-21 NOTE — PROGRESS NOTES
Per Home Health: Reports mid chest pain that radiates into left arm x2 weeks, nearly daily. Reports assuming it was indigestion due to seems to be worse when eating and has started taking Omeprazole BID x1 week ago with little improvement. Describes as brief sharp pain but pain is severe enough she has to stop current activity. Occasional N/V, denies any increase of change in shortness of breath, SN encouraged patient to go to ER if pain developed again. Has follow up with cardiology 3/8/22. VS today /91, Resp 20, O2Sats 99% on room air, Temp 98.4, Weight 217.8 lbs.    Nathalia noted, recommended eval by cardiology or ER if symptoms continued. Home health notified.

## 2022-03-21 RX ORDER — HYDROCHLOROTHIAZIDE 25 MG/1
TABLET ORAL
Qty: 90 TABLET | Refills: 0 | OUTPATIENT
Start: 2022-03-21

## 2022-03-29 ENCOUNTER — APPOINTMENT (OUTPATIENT)
Dept: RADIOLOGY | Facility: CLINIC | Age: 72
End: 2022-03-29
Attending: NURSE PRACTITIONER
Payer: MEDICARE

## 2022-03-29 ENCOUNTER — OFFICE VISIT (OUTPATIENT)
Dept: FAMILY MEDICINE | Facility: CLINIC | Age: 72
End: 2022-03-29
Payer: MEDICARE

## 2022-03-29 VITALS
HEIGHT: 64 IN | HEART RATE: 74 BPM | SYSTOLIC BLOOD PRESSURE: 130 MMHG | WEIGHT: 215 LBS | DIASTOLIC BLOOD PRESSURE: 70 MMHG | RESPIRATION RATE: 20 BRPM | TEMPERATURE: 99 F | OXYGEN SATURATION: 97 % | BODY MASS INDEX: 36.7 KG/M2

## 2022-03-29 DIAGNOSIS — R06.02 INCREASING SHORTNESS OF BREATH: Primary | ICD-10-CM

## 2022-03-29 DIAGNOSIS — I50.32 CHRONIC DIASTOLIC HEART FAILURE: ICD-10-CM

## 2022-03-29 DIAGNOSIS — J45.901 ASTHMA WITH ACUTE EXACERBATION, UNSPECIFIED ASTHMA SEVERITY, UNSPECIFIED WHETHER PERSISTENT: ICD-10-CM

## 2022-03-29 DIAGNOSIS — R07.9 CHEST PAIN, UNSPECIFIED TYPE: ICD-10-CM

## 2022-03-29 LAB
ANION GAP SERPL CALCULATED.3IONS-SCNC: 7 MMOL/L (ref 7–16)
BASOPHILS # BLD AUTO: 0.05 K/UL (ref 0–0.2)
BASOPHILS NFR BLD AUTO: 0.7 % (ref 0–1)
BUN SERPL-MCNC: 9 MG/DL (ref 7–18)
BUN/CREAT SERPL: 8 (ref 6–20)
CALCIUM SERPL-MCNC: 9.6 MG/DL (ref 8.5–10.1)
CHLORIDE SERPL-SCNC: 103 MMOL/L (ref 98–107)
CO2 SERPL-SCNC: 31 MMOL/L (ref 21–32)
CREAT SERPL-MCNC: 1.1 MG/DL (ref 0.55–1.02)
DIFFERENTIAL METHOD BLD: ABNORMAL
EOSINOPHIL # BLD AUTO: 0.78 K/UL (ref 0–0.5)
EOSINOPHIL NFR BLD AUTO: 10.6 % (ref 1–4)
ERYTHROCYTE [DISTWIDTH] IN BLOOD BY AUTOMATED COUNT: 12.1 % (ref 11.5–14.5)
GLUCOSE SERPL-MCNC: 112 MG/DL (ref 74–106)
HCT VFR BLD AUTO: 43 % (ref 38–47)
HGB BLD-MCNC: 13.6 G/DL (ref 12–16)
IMM GRANULOCYTES # BLD AUTO: 0.01 K/UL (ref 0–0.04)
IMM GRANULOCYTES NFR BLD: 0.1 % (ref 0–0.4)
LYMPHOCYTES # BLD AUTO: 1.89 K/UL (ref 1–4.8)
LYMPHOCYTES NFR BLD AUTO: 25.6 % (ref 27–41)
MCH RBC QN AUTO: 30 PG (ref 27–31)
MCHC RBC AUTO-ENTMCNC: 31.6 G/DL (ref 32–36)
MCV RBC AUTO: 94.7 FL (ref 80–96)
MONOCYTES # BLD AUTO: 0.6 K/UL (ref 0–0.8)
MONOCYTES NFR BLD AUTO: 8.1 % (ref 2–6)
MPC BLD CALC-MCNC: 12.4 FL (ref 9.4–12.4)
NEUTROPHILS # BLD AUTO: 4.04 K/UL (ref 1.8–7.7)
NEUTROPHILS NFR BLD AUTO: 54.9 % (ref 53–65)
NRBC # BLD AUTO: 0 X10E3/UL
NRBC, AUTO (.00): 0 %
NT-PROBNP SERPL-MCNC: 313 PG/ML (ref 1–125)
PLATELET # BLD AUTO: 231 K/UL (ref 150–400)
POTASSIUM SERPL-SCNC: 4.2 MMOL/L (ref 3.5–5.1)
RBC # BLD AUTO: 4.54 M/UL (ref 4.2–5.4)
SODIUM SERPL-SCNC: 137 MMOL/L (ref 136–145)
WBC # BLD AUTO: 7.37 K/UL (ref 4.5–11)

## 2022-03-29 PROCEDURE — 3288F FALL RISK ASSESSMENT DOCD: CPT | Mod: ,,, | Performed by: NURSE PRACTITIONER

## 2022-03-29 PROCEDURE — 99214 OFFICE O/P EST MOD 30 MIN: CPT | Mod: 25,,, | Performed by: NURSE PRACTITIONER

## 2022-03-29 PROCEDURE — 3008F PR BODY MASS INDEX (BMI) DOCUMENTED: ICD-10-PCS | Mod: ,,, | Performed by: NURSE PRACTITIONER

## 2022-03-29 PROCEDURE — 99214 PR OFFICE/OUTPT VISIT, EST, LEVL IV, 30-39 MIN: ICD-10-PCS | Mod: 25,,, | Performed by: NURSE PRACTITIONER

## 2022-03-29 PROCEDURE — 3075F PR MOST RECENT SYSTOLIC BLOOD PRESS GE 130-139MM HG: ICD-10-PCS | Mod: ,,, | Performed by: NURSE PRACTITIONER

## 2022-03-29 PROCEDURE — 1101F PR PT FALLS ASSESS DOC 0-1 FALLS W/OUT INJ PAST YR: ICD-10-PCS | Mod: ,,, | Performed by: NURSE PRACTITIONER

## 2022-03-29 PROCEDURE — 1126F AMNT PAIN NOTED NONE PRSNT: CPT | Mod: ,,, | Performed by: NURSE PRACTITIONER

## 2022-03-29 PROCEDURE — 3075F SYST BP GE 130 - 139MM HG: CPT | Mod: ,,, | Performed by: NURSE PRACTITIONER

## 2022-03-29 PROCEDURE — 85025 CBC WITH DIFFERENTIAL: ICD-10-PCS | Mod: ,,, | Performed by: CLINICAL MEDICAL LABORATORY

## 2022-03-29 PROCEDURE — 3008F BODY MASS INDEX DOCD: CPT | Mod: ,,, | Performed by: NURSE PRACTITIONER

## 2022-03-29 PROCEDURE — 93000 PR ELECTROCARDIOGRAM, COMPLETE: ICD-10-PCS | Mod: ,,, | Performed by: NURSE PRACTITIONER

## 2022-03-29 PROCEDURE — 3078F PR MOST RECENT DIASTOLIC BLOOD PRESSURE < 80 MM HG: ICD-10-PCS | Mod: ,,, | Performed by: NURSE PRACTITIONER

## 2022-03-29 PROCEDURE — 71046 X-RAY EXAM CHEST 2 VIEWS: CPT | Mod: 26,,, | Performed by: RADIOLOGY

## 2022-03-29 PROCEDURE — 80048 BASIC METABOLIC PNL TOTAL CA: CPT | Mod: ,,, | Performed by: CLINICAL MEDICAL LABORATORY

## 2022-03-29 PROCEDURE — 83880 NT-PRO NATRIURETIC PEPTIDE: ICD-10-PCS | Mod: ,,, | Performed by: CLINICAL MEDICAL LABORATORY

## 2022-03-29 PROCEDURE — 71046 XR CHEST PA AND LATERAL: ICD-10-PCS | Mod: 26,,, | Performed by: RADIOLOGY

## 2022-03-29 PROCEDURE — 1160F RVW MEDS BY RX/DR IN RCRD: CPT | Mod: ,,, | Performed by: NURSE PRACTITIONER

## 2022-03-29 PROCEDURE — 80048 BASIC METABOLIC PANEL: ICD-10-PCS | Mod: ,,, | Performed by: CLINICAL MEDICAL LABORATORY

## 2022-03-29 PROCEDURE — 1159F MED LIST DOCD IN RCRD: CPT | Mod: ,,, | Performed by: NURSE PRACTITIONER

## 2022-03-29 PROCEDURE — 93000 ELECTROCARDIOGRAM COMPLETE: CPT | Mod: ,,, | Performed by: NURSE PRACTITIONER

## 2022-03-29 PROCEDURE — 83880 ASSAY OF NATRIURETIC PEPTIDE: CPT | Mod: ,,, | Performed by: CLINICAL MEDICAL LABORATORY

## 2022-03-29 PROCEDURE — 3078F DIAST BP <80 MM HG: CPT | Mod: ,,, | Performed by: NURSE PRACTITIONER

## 2022-03-29 PROCEDURE — 3288F PR FALLS RISK ASSESSMENT DOCUMENTED: ICD-10-PCS | Mod: ,,, | Performed by: NURSE PRACTITIONER

## 2022-03-29 PROCEDURE — 96372 PR INJECTION,THERAP/PROPH/DIAG2ST, IM OR SUBCUT: ICD-10-PCS | Mod: ,,, | Performed by: NURSE PRACTITIONER

## 2022-03-29 PROCEDURE — 1126F PR PAIN SEVERITY QUANTIFIED, NO PAIN PRESENT: ICD-10-PCS | Mod: ,,, | Performed by: NURSE PRACTITIONER

## 2022-03-29 PROCEDURE — 1159F PR MEDICATION LIST DOCUMENTED IN MEDICAL RECORD: ICD-10-PCS | Mod: ,,, | Performed by: NURSE PRACTITIONER

## 2022-03-29 PROCEDURE — 85025 COMPLETE CBC W/AUTO DIFF WBC: CPT | Mod: ,,, | Performed by: CLINICAL MEDICAL LABORATORY

## 2022-03-29 PROCEDURE — 1101F PT FALLS ASSESS-DOCD LE1/YR: CPT | Mod: ,,, | Performed by: NURSE PRACTITIONER

## 2022-03-29 PROCEDURE — 1160F PR REVIEW ALL MEDS BY PRESCRIBER/CLIN PHARMACIST DOCUMENTED: ICD-10-PCS | Mod: ,,, | Performed by: NURSE PRACTITIONER

## 2022-03-29 PROCEDURE — 96372 THER/PROPH/DIAG INJ SC/IM: CPT | Mod: ,,, | Performed by: NURSE PRACTITIONER

## 2022-03-29 PROCEDURE — 71046 X-RAY EXAM CHEST 2 VIEWS: CPT | Mod: TC,RHCUB | Performed by: NURSE PRACTITIONER

## 2022-03-29 RX ORDER — FUROSEMIDE 40 MG/1
40 TABLET ORAL DAILY
Qty: 90 TABLET | Refills: 1 | Status: ON HOLD | OUTPATIENT
Start: 2022-03-29 | End: 2022-04-08 | Stop reason: SDUPTHER

## 2022-03-29 RX ORDER — ALBUTEROL SULFATE 90 UG/1
2 AEROSOL, METERED RESPIRATORY (INHALATION) EVERY 6 HOURS PRN
Qty: 18 G | Refills: 0 | Status: SHIPPED | OUTPATIENT
Start: 2022-03-29 | End: 2022-04-26 | Stop reason: SDUPTHER

## 2022-03-29 RX ORDER — DEXAMETHASONE SODIUM PHOSPHATE 4 MG/ML
8 INJECTION, SOLUTION INTRA-ARTICULAR; INTRALESIONAL; INTRAMUSCULAR; INTRAVENOUS; SOFT TISSUE
Status: COMPLETED | OUTPATIENT
Start: 2022-03-29 | End: 2022-03-29

## 2022-03-29 RX ADMIN — DEXAMETHASONE SODIUM PHOSPHATE 8 MG: 4 INJECTION, SOLUTION INTRA-ARTICULAR; INTRALESIONAL; INTRAMUSCULAR; INTRAVENOUS; SOFT TISSUE at 10:03

## 2022-03-29 NOTE — PROGRESS NOTES
3/29/2022     HEAVENLY Pulido   [facility]  [address]  [place] [address]     PATIENT NAME: Lawanda Martínez  : 1950  DATE: 3/29/22  MRN: [id number]      Billing Provider: HEAVENLY Pulido  Level of Service:   Patient PCP Information     Provider PCP Type    HEAVENLY Pulido General          Reason for Visit / Chief Complaint: Shortness of Breath, Cough, and Chest Congestion       Update PCP  Update Chief Complaint         History of Present Illness / Problem Focused Workflow     Lawanda Martínez presents to the clinic presents today with increased cough shortness of breath and some episodes of chest pain with radiation into neck about 4-5 time in pat few weeks. She denies any increased edema but is unable to lay down and sleep again. Her son with her today and reports she worked in garden with her spouse last weekend and breathing symptoms have worsened this week. Discussed she has missed appointments with cardiology- rescheduled today.         Review of Systems     Review of Systems   Constitutional: Positive for activity change and fatigue.   HENT: Positive for nasal congestion, rhinorrhea and sore throat. Negative for trouble swallowing.    Eyes: Negative for visual disturbance.   Respiratory: Positive for cough, chest tightness, shortness of breath and wheezing.    Cardiovascular: Positive for chest pain. Negative for leg swelling.   Gastrointestinal: Negative for abdominal pain, change in bowel habit and change in bowel habit.   Genitourinary: Negative for difficulty urinating.   Integumentary:  Negative for pallor and rash.   Neurological: Positive for light-headedness. Negative for headaches.        Medical / Social / Family History     Past Medical History:   Diagnosis Date    Asthma     CHF (congestive heart failure)     COPD (chronic obstructive pulmonary disease)     WILLSON (dyspnea on exertion)     GERD (gastroesophageal reflux disease)     HTN  (hypertension)     Hyperlipidemia     Osteoporosis     Oxygen dependent     02 2L    Seizure disorder        Past Surgical History:   Procedure Laterality Date    COLONOSCOPY      REVISION OF TOTAL KNEE REPLACEMENT       TUBAL LIGATION         Social History  Ms.  reports that she has never smoked. She has never used smokeless tobacco. She reports that she does not drink alcohol and does not use drugs.    Family History  Ms.'s family history includes Cancer in her father; Diabetes in her brother, mother, and sister; Heart disease in her mother and sister; Hypertension in her mother and sister.    Medications and Allergies     Medications  Outpatient Medications Marked as Taking for the 3/29/22 encounter (Office Visit) with HEAVENLY Pulido   Medication Sig Dispense Refill    albuterol-ipratropium (DUO-NEB) 2.5 mg-0.5 mg/3 mL nebulizer solution Take 3 mLs by nebulization every 6 (six) hours as needed for Wheezing. Rescue 100 mL 5    calcium carbonate/vitamin D3 (CALCARB 600 WITH VITAMIN D ORAL) Take by mouth.      fluticasone propionate (FLONASE) 50 mcg/actuation nasal spray 1 spray (50 mcg total) by Each Nostril route once daily. 16 g 1    fluticasone-umeclidin-vilanter (TRELEGY ELLIPTA) 200-62.5-25 mcg inhaler Inhale 1 puff into the lungs once daily. 60 each 6    gabapentin (NEURONTIN) 300 MG capsule Take 1 capsule (300 mg total) by mouth 3 (three) times daily. 270 capsule 1    ipratropium (ATROVENT) 42 mcg (0.06 %) nasal spray 2 sprays by Nasal route 4 (four) times daily. 15 mL 0    isosorbide mononitrate (IMDUR) 30 MG 24 hr tablet Take 1 tablet (30 mg total) by mouth once daily. 90 tablet 1    loratadine (CLARITIN) 10 mg tablet Take 10 mg by mouth once daily.      meclizine (ANTIVERT) 12.5 mg tablet TAKE 1 TABLET TWICE DAILY AS NEEDED 90 tablet 1    montelukast (SINGULAIR) 10 mg tablet Take 1 tablet (10 mg total) by mouth once daily. 90 tablet 1    paroxetine (PAXIL) 40 MG tablet  "Take 1 tablet (40 mg total) by mouth once daily. 90 tablet 1    phenytoin (DILANTIN) 100 MG ER capsule Take 1 capsule (100 mg total) by mouth 2 (two) times daily. 180 capsule 2    potassium chloride (KLOR-CON) 10 MEQ TbSR Take 1 tablet (10 mEq total) by mouth 2 (two) times daily. 180 tablet 1    pravastatin (PRAVACHOL) 40 MG tablet Take 1 tablet (40 mg total) by mouth nightly. 90 tablet 1    [DISCONTINUED] albuterol (VENTOLIN HFA) 90 mcg/actuation inhaler Inhale 2 puffs into the lungs every 6 (six) hours as needed for Wheezing. Rescue 6.7 g 0    [DISCONTINUED] furosemide (LASIX) 40 MG tablet Take one tablet by mouth daily 14 tablet 0     Current Facility-Administered Medications for the 3/29/22 encounter (Office Visit) with HEAVENLY Pulido   Medication Dose Route Frequency Provider Last Rate Last Admin    [COMPLETED] dexamethasone injection 8 mg  8 mg Intramuscular 1 time in Clinic/HOD HEAVENLY Pulido   8 mg at 03/29/22 1004       Allergies  Review of patient's allergies indicates:  No Known Allergies    Physical Examination     Vitals:    03/29/22 0905   BP: 130/70   BP Location: Left arm   Patient Position: Sitting   Pulse: 74   Resp: 20   Temp: 98.8 °F (37.1 °C)   TempSrc: Oral   SpO2: 97%   Weight: 97.5 kg (215 lb)   Height: 5' 4" (1.626 m)      Physical Exam  Vitals and nursing note reviewed.   Constitutional:       General: She is not in acute distress.     Appearance: She is obese. She is not ill-appearing, toxic-appearing or diaphoretic.   HENT:      Right Ear: External ear normal.      Left Ear: External ear normal.      Nose: Nose normal.      Mouth/Throat:      Mouth: Mucous membranes are moist.   Eyes:      Conjunctiva/sclera: Conjunctivae normal.      Pupils: Pupils are equal, round, and reactive to light.   Cardiovascular:      Rate and Rhythm: Normal rate and regular rhythm.   Pulmonary:      Breath sounds: Wheezing present.   Musculoskeletal:      Cervical back: " Normal range of motion and neck supple. No rigidity or tenderness.   Lymphadenopathy:      Cervical: No cervical adenopathy.   Skin:     General: Skin is warm.      Capillary Refill: Capillary refill takes less than 2 seconds.   Neurological:      Mental Status: She is alert.      X-Ray Chest PA And Lateral  Narrative: EXAMINATION:  XR CHEST PA AND LATERAL    CLINICAL HISTORY:  Chronic diastolic (congestive) heart failure    COMPARISON:  02/02/2016 through 12/19/2021    FINDINGS:  The cardiac size is upper limits of normal but there is no suggestion of failure.  There is hyperinflation but no infiltrates or effusions are seen at this time.  There are degenerative changes in the thoracic spine.  Old rib fractures are seen on the right.  Impression: No evidence of acute disease.    [facility]    Electronically signed by: Helene Brown  Date:    03/29/2022  Time:    12:19      Assessment and Plan (including Health Maintenance)      Problem List  Smart Sets  Document Outside HM   :    Plan:   Discussed cardiology appointment made, however if any more chest pain with radiation she is to call 911 or go directly to ER- he son present and will make sure she does this. Also reminded her appointment with pulmonology in April and cardiology next week  Will give 8 mg decadron today pending all labs and radiology report  Avoid allergic triggers  Daily weights low sodium diet      Health Maintenance Due   Topic Date Due    TETANUS VACCINE  Never done    Shingles Vaccine (1 of 2) Never done    Mammogram  10/07/2020    Colorectal Cancer Screening  01/11/2022       Problem List Items Addressed This Visit        Pulmonary    Asthma    Relevant Medications    albuterol (VENTOLIN HFA) 90 mcg/actuation inhaler    dexamethasone injection 8 mg (Completed)       Cardiac/Vascular    Chronic diastolic heart failure (Chronic)    Relevant Medications    furosemide (LASIX) 40 MG tablet    Other Relevant Orders     CBC Auto Differential    NT-Pro Natriuretic Peptide    Basic Metabolic Panel    X-Ray Chest PA And Lateral (Completed)      Other Visit Diagnoses     Increasing shortness of breath    -  Primary    Chest pain, unspecified type        Relevant Orders    POCT EKG 12-LEAD (NOT FOR [facility] USE)        Increasing shortness of breath    Asthma with acute exacerbation, unspecified asthma severity, unspecified whether persistent  -     albuterol (VENTOLIN HFA) 90 mcg/actuation inhaler; Inhale 2 puffs into the lungs every 6 (six) hours as needed for Wheezing. Rescue  Dispense: 18 g; Refill: 0  -     dexamethasone injection 8 mg    Chronic diastolic heart failure  -     furosemide (LASIX) 40 MG tablet; Take 1 tablet (40 mg total) by mouth once daily. Take one tablet by mouth daily  Dispense: 90 tablet; Refill: 1  -     CBC Auto Differential; Future; Expected date: 03/29/2022  -     NT-Pro Natriuretic Peptide; Future; Expected date: 03/29/2022  -     Basic Metabolic Panel; Future; Expected date: 03/29/2022  -     X-Ray Chest PA And Lateral; Future; Expected date: 03/29/2022    Chest pain, unspecified type  -     POCT EKG 12-LEAD (NOT FOR [facility] USE)       Health Maintenance Topics with due status: Not Due       Topic Last Completion Date    DEXA Scan 03/05/2020    Lipid Panel 12/30/2021       Procedures     Future Appointments   Date Time Provider Department Center   4/8/2022 10:15 AM David Nieves MD [facility]   4/26/2022  9:00 AM [facility]   4/28/2022 10:00 AM HEAVENLY Pulido [facility]        Follow up in about 4 weeks (around 4/26/2022) for sooner if results indicate, continue home health.     Signature:  HEAVENLY Pulido    Date of encounter: 3/29/22

## 2022-03-29 NOTE — PATIENT INSTRUCTIONS
Appointment with Cardiology next week if any more chest pain with radiation to call 911 or go to ER    Will give 8 mg decadron injection today pending labs, will notify of results tomorrow    Avoid allergic triggers like pollen    Daily weights low sodium diet

## 2022-04-04 ENCOUNTER — LAB REQUISITION (OUTPATIENT)
Dept: LAB | Facility: HOSPITAL | Age: 72
End: 2022-04-04
Attending: NURSE PRACTITIONER
Payer: MEDICARE

## 2022-04-04 ENCOUNTER — OFFICE VISIT (OUTPATIENT)
Dept: FAMILY MEDICINE | Facility: CLINIC | Age: 72
End: 2022-04-04
Payer: MEDICARE

## 2022-04-04 VITALS
HEART RATE: 81 BPM | DIASTOLIC BLOOD PRESSURE: 60 MMHG | BODY MASS INDEX: 36.02 KG/M2 | SYSTOLIC BLOOD PRESSURE: 120 MMHG | HEIGHT: 64 IN | OXYGEN SATURATION: 94 % | RESPIRATION RATE: 24 BRPM | WEIGHT: 211 LBS

## 2022-04-04 DIAGNOSIS — I50.32 CHRONIC DIASTOLIC HEART FAILURE: Primary | ICD-10-CM

## 2022-04-04 DIAGNOSIS — J45.40 MODERATE PERSISTENT ASTHMA, UNSPECIFIED WHETHER COMPLICATED: ICD-10-CM

## 2022-04-04 DIAGNOSIS — R06.02 INCREASING SHORTNESS OF BREATH: ICD-10-CM

## 2022-04-04 DIAGNOSIS — I11.0 HYPERTENSIVE HEART DISEASE WITH HEART FAILURE: ICD-10-CM

## 2022-04-04 LAB
ANION GAP SERPL CALCULATED.3IONS-SCNC: 12 MMOL/L (ref 7–16)
BUN SERPL-MCNC: 13 MG/DL (ref 7–18)
BUN/CREAT SERPL: 12 (ref 6–20)
CALCIUM SERPL-MCNC: 9.1 MG/DL (ref 8.5–10.1)
CHLORIDE SERPL-SCNC: 102 MMOL/L (ref 98–107)
CO2 SERPL-SCNC: 30 MMOL/L (ref 21–32)
CREAT SERPL-MCNC: 1.08 MG/DL (ref 0.55–1.02)
GLUCOSE SERPL-MCNC: 157 MG/DL (ref 74–106)
NT-PROBNP SERPL-MCNC: 826 PG/ML (ref 1–125)
POTASSIUM SERPL-SCNC: 4.4 MMOL/L (ref 3.5–5.1)
SODIUM SERPL-SCNC: 140 MMOL/L (ref 136–145)

## 2022-04-04 PROCEDURE — 3078F DIAST BP <80 MM HG: CPT | Mod: ,,, | Performed by: NURSE PRACTITIONER

## 2022-04-04 PROCEDURE — 99213 PR OFFICE/OUTPT VISIT, EST, LEVL III, 20-29 MIN: ICD-10-PCS | Mod: 25,,, | Performed by: NURSE PRACTITIONER

## 2022-04-04 PROCEDURE — 1159F MED LIST DOCD IN RCRD: CPT | Mod: ,,, | Performed by: NURSE PRACTITIONER

## 2022-04-04 PROCEDURE — 96372 PR INJECTION,THERAP/PROPH/DIAG2ST, IM OR SUBCUT: ICD-10-PCS | Mod: ,,, | Performed by: NURSE PRACTITIONER

## 2022-04-04 PROCEDURE — 3074F PR MOST RECENT SYSTOLIC BLOOD PRESSURE < 130 MM HG: ICD-10-PCS | Mod: ,,, | Performed by: NURSE PRACTITIONER

## 2022-04-04 PROCEDURE — 99213 OFFICE O/P EST LOW 20 MIN: CPT | Mod: 25,,, | Performed by: NURSE PRACTITIONER

## 2022-04-04 PROCEDURE — 3074F SYST BP LT 130 MM HG: CPT | Mod: ,,, | Performed by: NURSE PRACTITIONER

## 2022-04-04 PROCEDURE — 1160F RVW MEDS BY RX/DR IN RCRD: CPT | Mod: ,,, | Performed by: NURSE PRACTITIONER

## 2022-04-04 PROCEDURE — 3008F PR BODY MASS INDEX (BMI) DOCUMENTED: ICD-10-PCS | Mod: ,,, | Performed by: NURSE PRACTITIONER

## 2022-04-04 PROCEDURE — 3078F PR MOST RECENT DIASTOLIC BLOOD PRESSURE < 80 MM HG: ICD-10-PCS | Mod: ,,, | Performed by: NURSE PRACTITIONER

## 2022-04-04 PROCEDURE — 80048 BASIC METABOLIC PNL TOTAL CA: CPT | Performed by: NURSE PRACTITIONER

## 2022-04-04 PROCEDURE — 83880 ASSAY OF NATRIURETIC PEPTIDE: CPT | Performed by: NURSE PRACTITIONER

## 2022-04-04 PROCEDURE — 3008F BODY MASS INDEX DOCD: CPT | Mod: ,,, | Performed by: NURSE PRACTITIONER

## 2022-04-04 PROCEDURE — 1159F PR MEDICATION LIST DOCUMENTED IN MEDICAL RECORD: ICD-10-PCS | Mod: ,,, | Performed by: NURSE PRACTITIONER

## 2022-04-04 PROCEDURE — 96372 THER/PROPH/DIAG INJ SC/IM: CPT | Mod: ,,, | Performed by: NURSE PRACTITIONER

## 2022-04-04 PROCEDURE — 1160F PR REVIEW ALL MEDS BY PRESCRIBER/CLIN PHARMACIST DOCUMENTED: ICD-10-PCS | Mod: ,,, | Performed by: NURSE PRACTITIONER

## 2022-04-04 RX ORDER — FUROSEMIDE 10 MG/ML
40 INJECTION INTRAMUSCULAR; INTRAVENOUS
Status: COMPLETED | OUTPATIENT
Start: 2022-04-04 | End: 2022-04-04

## 2022-04-04 RX ADMIN — FUROSEMIDE 40 MG: 10 INJECTION INTRAMUSCULAR; INTRAVENOUS at 03:04

## 2022-04-04 NOTE — PROGRESS NOTES
2022     HEAVENLY Pulido   [facility]  [address]  [place] [address]     PATIENT NAME: Lawanda Martínez  : 1950  DATE: 22  MRN: [id number]      Billing Provider: HEAVENLY Pulido  Level of Service:   Patient PCP Information     Provider PCP Type    HEAVENLY Pulido General          Reason for Visit / Chief Complaint: Follow-up (Labs drawn by  today), Shortness of Breath, and Wheezing       Update PCP  Update Chief Complaint         History of Present Illness / Problem Focused Workflow     Lawanda Martínez presents to the clinic follow up dyspnea cough from last week labs from  today BNP higher, she states she took additional lasix as directed, still without fever but has a wet cough and short of breath, she reports she has been staying indoors.       Review of Systems     Review of Systems     Medical / Social / Family History     Past Medical History:   Diagnosis Date    Asthma     CHF (congestive heart failure)     COPD (chronic obstructive pulmonary disease)     WILLSON (dyspnea on exertion)     GERD (gastroesophageal reflux disease)     HTN (hypertension)     Hyperlipidemia     Osteoporosis     Oxygen dependent     02 2L    Seizure disorder        Past Surgical History:   Procedure Laterality Date    COLONOSCOPY      REVISION OF TOTAL KNEE REPLACEMENT       TUBAL LIGATION         Social History  Ms.  reports that she has never smoked. She has never used smokeless tobacco. She reports that she does not drink alcohol and does not use drugs.    Family History  Ms.'s family history includes Cancer in her father; Diabetes in her brother, mother, and sister; Heart disease in her mother and sister; Hypertension in her mother and sister.    Medications and Allergies     Medications  Outpatient Medications Marked as Taking for the 22 encounter (Office Visit) with HEAVENLY Pulido   Medication Sig Dispense Refill    albuterol  (VENTOLIN HFA) 90 mcg/actuation inhaler Inhale 2 puffs into the lungs every 6 (six) hours as needed for Wheezing. Rescue 18 g 0    calcium carbonate/vitamin D3 (CALCARB 600 WITH VITAMIN D ORAL) Take by mouth.      fluticasone propionate (FLONASE) 50 mcg/actuation nasal spray 1 spray (50 mcg total) by Each Nostril route once daily. 16 g 1    fluticasone-umeclidin-vilanter (TRELEGY ELLIPTA) 200-62.5-25 mcg inhaler Inhale 1 puff into the lungs once daily. 60 each 6    furosemide (LASIX) 40 MG tablet Take 1 tablet (40 mg total) by mouth once daily. Take one tablet by mouth daily 90 tablet 1    gabapentin (NEURONTIN) 300 MG capsule Take 1 capsule (300 mg total) by mouth 3 (three) times daily. 270 capsule 1    ipratropium (ATROVENT) 42 mcg (0.06 %) nasal spray 2 sprays by Nasal route 4 (four) times daily. 15 mL 0    isosorbide mononitrate (IMDUR) 30 MG 24 hr tablet Take 1 tablet (30 mg total) by mouth once daily. 90 tablet 1    loratadine (CLARITIN) 10 mg tablet Take 10 mg by mouth once daily.      meclizine (ANTIVERT) 12.5 mg tablet TAKE 1 TABLET TWICE DAILY AS NEEDED 90 tablet 1    montelukast (SINGULAIR) 10 mg tablet Take 1 tablet (10 mg total) by mouth once daily. 90 tablet 1    paroxetine (PAXIL) 40 MG tablet Take 1 tablet (40 mg total) by mouth once daily. 90 tablet 1    phenytoin (DILANTIN) 100 MG ER capsule Take 1 capsule (100 mg total) by mouth 2 (two) times daily. 180 capsule 2    potassium chloride (KLOR-CON) 10 MEQ TbSR Take 1 tablet (10 mEq total) by mouth 2 (two) times daily. 180 tablet 1    pravastatin (PRAVACHOL) 40 MG tablet Take 1 tablet (40 mg total) by mouth nightly. 90 tablet 1     Current Facility-Administered Medications for the 4/4/22 encounter (Office Visit) with HEAVENLY Pulido   Medication Dose Route Frequency Provider Last Rate Last Admin    [COMPLETED] furosemide injection 40 mg  40 mg Intramuscular 1 time in Clinic/HOD Donna Spreafico, FNP   40 mg at  "04/04/22 1512       Allergies  Review of patient's allergies indicates:  No Known Allergies    Physical Examination     Vitals:    04/04/22 1416   BP: 120/60   BP Location: Right arm   Patient Position: Sitting   Pulse: 81   Resp: (!) 24   SpO2: (!) 94%  Comment: O2 @2L via NC   Weight: 95.7 kg (211 lb)   Height: 5' 4" (1.626 m)      Physical Exam  Vitals and nursing note reviewed.   Constitutional:       General: She is not in acute distress.     Appearance: She is obese. She is not ill-appearing, toxic-appearing or diaphoretic.   HENT:      Right Ear: External ear normal.      Left Ear: External ear normal.      Nose: Nose normal.      Mouth/Throat:      Mouth: Mucous membranes are moist.   Eyes:      Conjunctiva/sclera: Conjunctivae normal.      Pupils: Pupils are equal, round, and reactive to light.   Cardiovascular:      Rate and Rhythm: Normal rate and regular rhythm.   Pulmonary:      Breath sounds: No wheezing.   Musculoskeletal:      Cervical back: Normal range of motion and neck supple. No rigidity or tenderness.   Lymphadenopathy:      Cervical: No cervical adenopathy.   Skin:     General: Skin is warm.      Capillary Refill: Capillary refill takes less than 2 seconds.   Neurological:      Mental Status: She is alert.      BNP 4/4/22   826        BMP  Lab Results   Component Value Date     04/04/2022    K 4.4 04/04/2022     04/04/2022    CO2 30 04/04/2022    BUN 13 04/04/2022    CREATININE 1.08 (H) 04/04/2022    CALCIUM 9.1 04/04/2022    ANIONGAP 12 04/04/2022    EGFRNONAA 53 (L) 04/04/2022       Assessment and Plan (including Health Maintenance)      Problem List  Smart Sets  Document Outside HM   :    Plan:   BNP elevated today will give lasix 40 mg IM today, continue lasix oral daily, daily weights low sodium diet      Health Maintenance Due   Topic Date Due    TETANUS VACCINE  Never done    Shingles Vaccine (1 of 2) Never done    Mammogram  10/07/2020    Colorectal Cancer Screening  " 01/11/2022       Problem List Items Addressed This Visit        Pulmonary    Asthma       Cardiac/Vascular    Chronic diastolic heart failure - Primary (Chronic)    Relevant Medications    furosemide injection 40 mg (Completed)      Other Visit Diagnoses     Increasing shortness of breath            Chronic diastolic heart failure  -     furosemide injection 40 mg    Increasing shortness of breath    Moderate persistent asthma, unspecified whether complicated       Health Maintenance Topics with due status: Not Due       Topic Last Completion Date    DEXA Scan 03/05/2020    Lipid Panel 12/30/2021       Procedures     Future Appointments   Date Time Provider Department Center   4/8/2022 10:15 AM David Nieves MD [facility]   4/26/2022  9:00 AM [facility] Torres [facility]   4/28/2022 10:00 AM [facility] [place]        Follow up if symptoms worsen or fail to improve.     Signature:  HEAVENLY Pulido    Date of encounter: 4/4/22

## 2022-04-06 ENCOUNTER — DOCUMENTATION ONLY (OUTPATIENT)
Dept: FAMILY MEDICINE | Facility: CLINIC | Age: 72
End: 2022-04-06
Payer: MEDICARE

## 2022-04-06 NOTE — PROGRESS NOTES
Rec'd call from Alejandra Sears RN with [facility]. Currently at patient's house and states patient is short of breath, wheezing, constantly coughing up secreations. O2 Sats on 2L O2 via NC 97%, rapid respirations, /70. Patient's weight on Monday was 217lbs at home, today 214lbs. Patient is scheduled to see cardiology on Friday at 1015. New orders per FREEMAN. Rahul FNP to give another dose of Lasix 40mg PO now and Lasix 40mg BID tomorrow. Requested a prn visit be done tomorrow to assess patient. Advised patient to go to ER if shortness of breath does not get better.    Paula called back later after nurse had left home and stated when she went to give patient another dose of Lasix 40mg she noted it was only 20mg in the bottle. Patient stated she started those 20mg this week and did not realize they were only 20mg. New orders to take Lasix 20mg 2 tabs daily Thursday and Friday. Nurse to go out tomorrow and assess patient.

## 2022-04-07 ENCOUNTER — HOSPITAL ENCOUNTER (INPATIENT)
Facility: HOSPITAL | Age: 72
LOS: 1 days | Discharge: HOME HEALTH | DRG: 308 | End: 2022-04-08
Attending: FAMILY MEDICINE | Admitting: FAMILY MEDICINE
Payer: MEDICARE

## 2022-04-07 DIAGNOSIS — I48.91 ATRIAL FIBRILLATION, UNSPECIFIED TYPE: ICD-10-CM

## 2022-04-07 DIAGNOSIS — R56.9 SEIZURE: ICD-10-CM

## 2022-04-07 DIAGNOSIS — K21.9 GERD (GASTROESOPHAGEAL REFLUX DISEASE): ICD-10-CM

## 2022-04-07 DIAGNOSIS — R06.02 SHORTNESS OF BREATH: ICD-10-CM

## 2022-04-07 DIAGNOSIS — I48.0 PAROXYSMAL ATRIAL FIBRILLATION: ICD-10-CM

## 2022-04-07 DIAGNOSIS — R07.9 CHEST PAIN: ICD-10-CM

## 2022-04-07 DIAGNOSIS — I48.91 AFIB: ICD-10-CM

## 2022-04-07 DIAGNOSIS — J18.9 PNEUMONIA DUE TO INFECTIOUS ORGANISM: ICD-10-CM

## 2022-04-07 DIAGNOSIS — E87.6 HYPOKALEMIA: ICD-10-CM

## 2022-04-07 DIAGNOSIS — J45.909 ASTHMA: ICD-10-CM

## 2022-04-07 DIAGNOSIS — E78.5 HLD (HYPERLIPIDEMIA): ICD-10-CM

## 2022-04-07 DIAGNOSIS — J44.1 COPD EXACERBATION: Primary | ICD-10-CM

## 2022-04-07 DIAGNOSIS — M81.0 OSTEOPOROSIS: ICD-10-CM

## 2022-04-07 DIAGNOSIS — I48.91 ATRIAL FIBRILLATION WITH RAPID VENTRICULAR RESPONSE: ICD-10-CM

## 2022-04-07 DIAGNOSIS — I50.32 CHRONIC DIASTOLIC HEART FAILURE: Chronic | ICD-10-CM

## 2022-04-07 LAB
ALBUMIN SERPL BCP-MCNC: 3.9 G/DL (ref 3.5–5)
ALBUMIN/GLOB SERPL: 1 {RATIO}
ALP SERPL-CCNC: 222 U/L (ref 55–142)
ALT SERPL W P-5'-P-CCNC: 43 U/L (ref 13–56)
ANION GAP SERPL CALCULATED.3IONS-SCNC: 14 MMOL/L (ref 7–16)
APTT PPP: 26.6 SECONDS (ref 25.2–37.3)
AST SERPL W P-5'-P-CCNC: 37 U/L (ref 15–37)
BASOPHILS # BLD AUTO: 0.04 K/UL (ref 0–0.2)
BASOPHILS NFR BLD AUTO: 0.6 % (ref 0–1)
BILIRUB SERPL-MCNC: 0.3 MG/DL (ref 0–1.2)
BUN SERPL-MCNC: 18 MG/DL (ref 7–18)
BUN/CREAT SERPL: 16 (ref 6–20)
CALCIUM SERPL-MCNC: 9.3 MG/DL (ref 8.5–10.1)
CHLORIDE SERPL-SCNC: 100 MMOL/L (ref 98–107)
CO2 SERPL-SCNC: 28 MMOL/L (ref 21–32)
CREAT SERPL-MCNC: 1.14 MG/DL (ref 0.55–1.02)
DIFFERENTIAL METHOD BLD: ABNORMAL
EOSINOPHIL # BLD AUTO: 0.73 K/UL (ref 0–0.5)
EOSINOPHIL NFR BLD AUTO: 10.9 % (ref 1–4)
ERYTHROCYTE [DISTWIDTH] IN BLOOD BY AUTOMATED COUNT: 11.9 % (ref 11.5–14.5)
GLOBULIN SER-MCNC: 3.8 G/DL (ref 2–4)
GLUCOSE SERPL-MCNC: 130 MG/DL (ref 74–106)
HCT VFR BLD AUTO: 44 % (ref 38–47)
HGB BLD-MCNC: 14.4 G/DL (ref 12–16)
IMM GRANULOCYTES # BLD AUTO: 0.02 K/UL (ref 0–0.04)
IMM GRANULOCYTES NFR BLD: 0.3 % (ref 0–0.4)
INR BLD: 0.93 (ref 0.9–1.1)
LYMPHOCYTES # BLD AUTO: 2.14 K/UL (ref 1–4.8)
LYMPHOCYTES NFR BLD AUTO: 32 % (ref 27–41)
MCH RBC QN AUTO: 29.8 PG (ref 27–31)
MCHC RBC AUTO-ENTMCNC: 32.7 G/DL (ref 32–36)
MCV RBC AUTO: 90.9 FL (ref 80–96)
MONOCYTES # BLD AUTO: 0.61 K/UL (ref 0–0.8)
MONOCYTES NFR BLD AUTO: 9.1 % (ref 2–6)
MPC BLD CALC-MCNC: 10.9 FL (ref 9.4–12.4)
NEUTROPHILS # BLD AUTO: 3.14 K/UL (ref 1.8–7.7)
NEUTROPHILS NFR BLD AUTO: 47.1 % (ref 53–65)
NRBC # BLD AUTO: 0 X10E3/UL
NRBC, AUTO (.00): 0 %
NT-PROBNP SERPL-MCNC: 389 PG/ML (ref 1–125)
PLATELET # BLD AUTO: 274 K/UL (ref 150–400)
POTASSIUM SERPL-SCNC: 3.9 MMOL/L (ref 3.5–5.1)
PROT SERPL-MCNC: 7.7 G/DL (ref 6.4–8.2)
PROTHROMBIN TIME: 12.5 SECONDS (ref 11.7–14.7)
RBC # BLD AUTO: 4.84 M/UL (ref 4.2–5.4)
SARS-COV-2 RDRP RESP QL NAA+PROBE: NEGATIVE
SODIUM SERPL-SCNC: 138 MMOL/L (ref 136–145)
TROPONIN I SERPL HS-MCNC: 7.2 PG/ML
TSH SERPL DL<=0.005 MIU/L-ACNC: 2.12 UIU/ML (ref 0.36–3.74)
WBC # BLD AUTO: 6.68 K/UL (ref 4.5–11)

## 2022-04-07 PROCEDURE — 94640 AIRWAY INHALATION TREATMENT: CPT

## 2022-04-07 PROCEDURE — 25000003 PHARM REV CODE 250: Performed by: NURSE PRACTITIONER

## 2022-04-07 PROCEDURE — 84484 ASSAY OF TROPONIN QUANT: CPT | Performed by: FAMILY MEDICINE

## 2022-04-07 PROCEDURE — 99223 PR INITIAL HOSPITAL CARE,LEVL III: ICD-10-PCS | Mod: ,,, | Performed by: INTERNAL MEDICINE

## 2022-04-07 PROCEDURE — 85610 PROTHROMBIN TIME: CPT | Performed by: FAMILY MEDICINE

## 2022-04-07 PROCEDURE — 93005 ELECTROCARDIOGRAM TRACING: CPT

## 2022-04-07 PROCEDURE — 36415 COLL VENOUS BLD VENIPUNCTURE: CPT | Performed by: FAMILY MEDICINE

## 2022-04-07 PROCEDURE — 80053 COMPREHEN METABOLIC PANEL: CPT | Performed by: FAMILY MEDICINE

## 2022-04-07 PROCEDURE — 93010 ELECTROCARDIOGRAM REPORT: CPT | Mod: ,,, | Performed by: INTERNAL MEDICINE

## 2022-04-07 PROCEDURE — 83880 ASSAY OF NATRIURETIC PEPTIDE: CPT | Performed by: FAMILY MEDICINE

## 2022-04-07 PROCEDURE — 96365 THER/PROPH/DIAG IV INF INIT: CPT

## 2022-04-07 PROCEDURE — 63600175 PHARM REV CODE 636 W HCPCS: Performed by: FAMILY MEDICINE

## 2022-04-07 PROCEDURE — 11000001 HC ACUTE MED/SURG PRIVATE ROOM

## 2022-04-07 PROCEDURE — 25000003 PHARM REV CODE 250: Performed by: FAMILY MEDICINE

## 2022-04-07 PROCEDURE — 99223 PR INITIAL HOSPITAL CARE,LEVL III: ICD-10-PCS | Mod: ,,, | Performed by: STUDENT IN AN ORGANIZED HEALTH CARE EDUCATION/TRAINING PROGRAM

## 2022-04-07 PROCEDURE — 93010 EKG 12-LEAD: ICD-10-PCS | Mod: ,,, | Performed by: INTERNAL MEDICINE

## 2022-04-07 PROCEDURE — 87635 SARS-COV-2 COVID-19 AMP PRB: CPT | Performed by: FAMILY MEDICINE

## 2022-04-07 PROCEDURE — 82310 ASSAY OF CALCIUM: CPT | Performed by: FAMILY MEDICINE

## 2022-04-07 PROCEDURE — 99285 PR EMERGENCY DEPT VISIT,LEVEL V: ICD-10-PCS | Mod: ,,, | Performed by: FAMILY MEDICINE

## 2022-04-07 PROCEDURE — 25000242 PHARM REV CODE 250 ALT 637 W/ HCPCS: Performed by: FAMILY MEDICINE

## 2022-04-07 PROCEDURE — 84443 ASSAY THYROID STIM HORMONE: CPT | Performed by: NURSE PRACTITIONER

## 2022-04-07 PROCEDURE — 96375 TX/PRO/DX INJ NEW DRUG ADDON: CPT

## 2022-04-07 PROCEDURE — 63600175 PHARM REV CODE 636 W HCPCS: Performed by: NURSE PRACTITIONER

## 2022-04-07 PROCEDURE — 27000221 HC OXYGEN, UP TO 24 HOURS

## 2022-04-07 PROCEDURE — 85730 THROMBOPLASTIN TIME PARTIAL: CPT | Performed by: FAMILY MEDICINE

## 2022-04-07 PROCEDURE — 25000003 PHARM REV CODE 250: Performed by: INTERNAL MEDICINE

## 2022-04-07 PROCEDURE — 99285 EMERGENCY DEPT VISIT HI MDM: CPT | Mod: ,,, | Performed by: FAMILY MEDICINE

## 2022-04-07 PROCEDURE — 99285 EMERGENCY DEPT VISIT HI MDM: CPT | Mod: 25

## 2022-04-07 PROCEDURE — 85025 COMPLETE CBC W/AUTO DIFF WBC: CPT | Performed by: FAMILY MEDICINE

## 2022-04-07 PROCEDURE — 94761 N-INVAS EAR/PLS OXIMETRY MLT: CPT

## 2022-04-07 PROCEDURE — 99223 1ST HOSP IP/OBS HIGH 75: CPT | Mod: ,,, | Performed by: INTERNAL MEDICINE

## 2022-04-07 PROCEDURE — 99223 1ST HOSP IP/OBS HIGH 75: CPT | Mod: ,,, | Performed by: STUDENT IN AN ORGANIZED HEALTH CARE EDUCATION/TRAINING PROGRAM

## 2022-04-07 PROCEDURE — 25000242 PHARM REV CODE 250 ALT 637 W/ HCPCS: Performed by: NURSE PRACTITIONER

## 2022-04-07 RX ORDER — HEPARIN SODIUM,PORCINE/D5W 25000/250
0-40 INTRAVENOUS SOLUTION INTRAVENOUS CONTINUOUS
Status: DISCONTINUED | OUTPATIENT
Start: 2022-04-07 | End: 2022-04-07

## 2022-04-07 RX ORDER — ISOSORBIDE MONONITRATE 30 MG/1
30 TABLET, EXTENDED RELEASE ORAL DAILY
Status: DISCONTINUED | OUTPATIENT
Start: 2022-04-07 | End: 2022-04-07

## 2022-04-07 RX ORDER — IPRATROPIUM BROMIDE AND ALBUTEROL SULFATE 2.5; .5 MG/3ML; MG/3ML
3 SOLUTION RESPIRATORY (INHALATION) EVERY 8 HOURS
Status: DISCONTINUED | OUTPATIENT
Start: 2022-04-07 | End: 2022-04-08 | Stop reason: HOSPADM

## 2022-04-07 RX ORDER — IPRATROPIUM BROMIDE AND ALBUTEROL SULFATE 2.5; .5 MG/3ML; MG/3ML
6 SOLUTION RESPIRATORY (INHALATION)
Status: COMPLETED | OUTPATIENT
Start: 2022-04-07 | End: 2022-04-07

## 2022-04-07 RX ORDER — LEVOFLOXACIN 5 MG/ML
750 INJECTION, SOLUTION INTRAVENOUS
Status: DISCONTINUED | OUTPATIENT
Start: 2022-04-07 | End: 2022-04-07

## 2022-04-07 RX ORDER — METOPROLOL TARTRATE 25 MG/1
25 TABLET, FILM COATED ORAL 4 TIMES DAILY
Status: DISCONTINUED | OUTPATIENT
Start: 2022-04-07 | End: 2022-04-08

## 2022-04-07 RX ORDER — SODIUM CHLORIDE 0.9 % (FLUSH) 0.9 %
10 SYRINGE (ML) INJECTION EVERY 12 HOURS PRN
Status: DISCONTINUED | OUTPATIENT
Start: 2022-04-07 | End: 2022-04-08 | Stop reason: HOSPADM

## 2022-04-07 RX ORDER — DILTIAZEM HYDROCHLORIDE 30 MG/1
30 TABLET, FILM COATED ORAL EVERY 6 HOURS
Status: DISCONTINUED | OUTPATIENT
Start: 2022-04-07 | End: 2022-04-07

## 2022-04-07 RX ORDER — METHYLPREDNISOLONE SOD SUCC 125 MG
125 VIAL (EA) INJECTION
Status: COMPLETED | OUTPATIENT
Start: 2022-04-07 | End: 2022-04-07

## 2022-04-07 RX ORDER — FUROSEMIDE 10 MG/ML
40 INJECTION INTRAMUSCULAR; INTRAVENOUS DAILY
Status: DISCONTINUED | OUTPATIENT
Start: 2022-04-07 | End: 2022-04-08

## 2022-04-07 RX ORDER — PRAVASTATIN SODIUM 40 MG/1
40 TABLET ORAL NIGHTLY
Status: DISCONTINUED | OUTPATIENT
Start: 2022-04-07 | End: 2022-04-08 | Stop reason: HOSPADM

## 2022-04-07 RX ORDER — NALOXONE HCL 0.4 MG/ML
0.02 VIAL (ML) INJECTION
Status: DISCONTINUED | OUTPATIENT
Start: 2022-04-07 | End: 2022-04-08 | Stop reason: HOSPADM

## 2022-04-07 RX ORDER — GABAPENTIN 300 MG/1
300 CAPSULE ORAL 3 TIMES DAILY
Status: DISCONTINUED | OUTPATIENT
Start: 2022-04-07 | End: 2022-04-08 | Stop reason: HOSPADM

## 2022-04-07 RX ORDER — PREDNISONE 20 MG/1
40 TABLET ORAL DAILY
Status: DISCONTINUED | OUTPATIENT
Start: 2022-04-08 | End: 2022-04-08 | Stop reason: HOSPADM

## 2022-04-07 RX ORDER — DILTIAZEM HYDROCHLORIDE 5 MG/ML
20 INJECTION INTRAVENOUS
Status: COMPLETED | OUTPATIENT
Start: 2022-04-07 | End: 2022-04-07

## 2022-04-07 RX ORDER — BUDESONIDE 0.25 MG/2ML
0.25 INHALANT ORAL DAILY
Status: DISCONTINUED | OUTPATIENT
Start: 2022-04-08 | End: 2022-04-08 | Stop reason: HOSPADM

## 2022-04-07 RX ORDER — MONTELUKAST SODIUM 10 MG/1
10 TABLET ORAL DAILY
Status: DISCONTINUED | OUTPATIENT
Start: 2022-04-07 | End: 2022-04-08 | Stop reason: HOSPADM

## 2022-04-07 RX ORDER — PAROXETINE HYDROCHLORIDE 20 MG/1
40 TABLET, FILM COATED ORAL DAILY
Status: DISCONTINUED | OUTPATIENT
Start: 2022-04-07 | End: 2022-04-08 | Stop reason: HOSPADM

## 2022-04-07 RX ORDER — DILTIAZEM HYDROCHLORIDE 5 MG/ML
10 INJECTION INTRAVENOUS
Status: DISCONTINUED | OUTPATIENT
Start: 2022-04-07 | End: 2022-04-07

## 2022-04-07 RX ORDER — GLUCAGON 1 MG
1 KIT INJECTION
Status: DISCONTINUED | OUTPATIENT
Start: 2022-04-07 | End: 2022-04-08 | Stop reason: HOSPADM

## 2022-04-07 RX ORDER — DEXTROSE MONOHYDRATE 100 MG/ML
INJECTION, SOLUTION INTRAVENOUS CONTINUOUS
Status: DISCONTINUED | OUTPATIENT
Start: 2022-04-07 | End: 2022-04-07

## 2022-04-07 RX ORDER — AZITHROMYCIN 250 MG/1
250 TABLET, FILM COATED ORAL DAILY
Status: DISCONTINUED | OUTPATIENT
Start: 2022-04-08 | End: 2022-04-08 | Stop reason: HOSPADM

## 2022-04-07 RX ORDER — DIGOXIN 0.25 MG/ML
500 INJECTION INTRAMUSCULAR; INTRAVENOUS ONCE
Status: COMPLETED | OUTPATIENT
Start: 2022-04-07 | End: 2022-04-07

## 2022-04-07 RX ORDER — PHENYTOIN SODIUM 100 MG/1
100 CAPSULE, EXTENDED RELEASE ORAL 2 TIMES DAILY
Status: DISCONTINUED | OUTPATIENT
Start: 2022-04-07 | End: 2022-04-08 | Stop reason: HOSPADM

## 2022-04-07 RX ADMIN — PHENYTOIN SODIUM 100 MG: 100 CAPSULE, EXTENDED RELEASE ORAL at 08:04

## 2022-04-07 RX ADMIN — METOPROLOL TARTRATE 25 MG: 25 TABLET, FILM COATED ORAL at 05:04

## 2022-04-07 RX ADMIN — DILTIAZEM HYDROCHLORIDE 10 MG: 5 INJECTION INTRAVENOUS at 02:04

## 2022-04-07 RX ADMIN — PAROXETINE HYDROCHLORIDE 40 MG: 20 TABLET, FILM COATED ORAL at 04:04

## 2022-04-07 RX ADMIN — IPRATROPIUM BROMIDE AND ALBUTEROL SULFATE 3 ML: .5; 3 SOLUTION RESPIRATORY (INHALATION) at 03:04

## 2022-04-07 RX ADMIN — METHYLPREDNISOLONE SODIUM SUCCINATE 125 MG: 125 INJECTION, POWDER, FOR SOLUTION INTRAMUSCULAR; INTRAVENOUS at 11:04

## 2022-04-07 RX ADMIN — FUROSEMIDE 40 MG: 10 INJECTION INTRAMUSCULAR; INTRAVENOUS at 02:04

## 2022-04-07 RX ADMIN — IPRATROPIUM BROMIDE AND ALBUTEROL SULFATE 6 ML: .5; 3 SOLUTION RESPIRATORY (INHALATION) at 11:04

## 2022-04-07 RX ADMIN — GABAPENTIN 300 MG: 300 CAPSULE ORAL at 04:04

## 2022-04-07 RX ADMIN — APIXABAN 5 MG: 5 TABLET, FILM COATED ORAL at 08:04

## 2022-04-07 RX ADMIN — LEVOFLOXACIN 750 MG: 5 INJECTION, SOLUTION INTRAVENOUS at 11:04

## 2022-04-07 RX ADMIN — MONTELUKAST 10 MG: 10 TABLET, FILM COATED ORAL at 04:04

## 2022-04-07 RX ADMIN — METOPROLOL TARTRATE 25 MG: 25 TABLET, FILM COATED ORAL at 04:04

## 2022-04-07 RX ADMIN — METOPROLOL TARTRATE 25 MG: 25 TABLET, FILM COATED ORAL at 08:04

## 2022-04-07 RX ADMIN — DIGOXIN 500 MCG: 0.25 INJECTION INTRAMUSCULAR; INTRAVENOUS at 04:04

## 2022-04-07 RX ADMIN — GABAPENTIN 300 MG: 300 CAPSULE ORAL at 08:04

## 2022-04-07 RX ADMIN — PRAVASTATIN SODIUM 40 MG: 40 TABLET ORAL at 08:04

## 2022-04-07 NOTE — SUBJECTIVE & OBJECTIVE
Past Medical History:   Diagnosis Date    Asthma     CHF (congestive heart failure)     COPD (chronic obstructive pulmonary disease)     WILLSON (dyspnea on exertion)     GERD (gastroesophageal reflux disease)     HTN (hypertension)     Hyperlipidemia     Osteoporosis     Oxygen dependent     02 2L    Seizure disorder        Past Surgical History:   Procedure Laterality Date    COLONOSCOPY      REVISION OF TOTAL KNEE REPLACEMENT       TUBAL LIGATION         Review of patient's allergies indicates:  No Known Allergies    No current facility-administered medications on file prior to encounter.     Current Outpatient Medications on File Prior to Encounter   Medication Sig    albuterol (VENTOLIN HFA) 90 mcg/actuation inhaler Inhale 2 puffs into the lungs every 6 (six) hours as needed for Wheezing. Rescue    albuterol-ipratropium (DUO-NEB) 2.5 mg-0.5 mg/3 mL nebulizer solution Take 3 mLs by nebulization every 6 (six) hours as needed for Wheezing. Rescue    calcium carbonate/vitamin D3 (CALCARB 600 WITH VITAMIN D ORAL) Take by mouth.    fluticasone propionate (FLONASE) 50 mcg/actuation nasal spray 1 spray (50 mcg total) by Each Nostril route once daily.    fluticasone-umeclidin-vilanter (TRELEGY ELLIPTA) 200-62.5-25 mcg inhaler Inhale 1 puff into the lungs once daily.    furosemide (LASIX) 40 MG tablet Take 1 tablet (40 mg total) by mouth once daily. Take one tablet by mouth daily    gabapentin (NEURONTIN) 300 MG capsule Take 1 capsule (300 mg total) by mouth 3 (three) times daily.    ipratropium (ATROVENT) 42 mcg (0.06 %) nasal spray 2 sprays by Nasal route 4 (four) times daily.    isosorbide mononitrate (IMDUR) 30 MG 24 hr tablet Take 1 tablet (30 mg total) by mouth once daily.    loratadine (CLARITIN) 10 mg tablet Take 10 mg by mouth once daily.    meclizine (ANTIVERT) 12.5 mg tablet TAKE 1 TABLET TWICE DAILY AS NEEDED    montelukast (SINGULAIR) 10 mg tablet Take 1 tablet (10 mg total) by mouth once daily.    paroxetine  (PAXIL) 40 MG tablet Take 1 tablet (40 mg total) by mouth once daily.    phenytoin (DILANTIN) 100 MG ER capsule Take 1 capsule (100 mg total) by mouth 2 (two) times daily.    potassium chloride (KLOR-CON) 10 MEQ TbSR Take 1 tablet (10 mEq total) by mouth 2 (two) times daily.    pravastatin (PRAVACHOL) 40 MG tablet Take 1 tablet (40 mg total) by mouth nightly.     Family History       Problem Relation (Age of Onset)    Cancer Father    Diabetes Mother, Sister, Brother    Heart disease Mother, Sister    Hypertension Mother, Sister          Tobacco Use    Smoking status: Never Smoker    Smokeless tobacco: Never Used   Substance and Sexual Activity    Alcohol use: Never    Drug use: Never    Sexual activity: Not Currently     Review of Systems   Constitutional:  Positive for fatigue.   HENT: Negative.     Eyes: Negative.    Respiratory:  Positive for cough, shortness of breath and wheezing.    Cardiovascular: Negative.    Gastrointestinal: Negative.    Endocrine: Negative.    Genitourinary: Negative.    Musculoskeletal: Negative.    Skin: Negative.    Allergic/Immunologic: Negative.    Neurological:  Positive for weakness.   Hematological: Negative.    Psychiatric/Behavioral: Negative.     Objective:     Vital Signs (Most Recent):  Temp: 97.9 °F (36.6 °C) (04/07/22 1028)  Pulse: 72 (04/07/22 1114)  Resp: 16 (04/07/22 1114)  BP: 131/77 (04/07/22 1028)  SpO2: (!) 91 % (04/07/22 1028)   Vital Signs (24h Range):  Temp:  [97.9 °F (36.6 °C)] 97.9 °F (36.6 °C)  Pulse:  [] 72  Resp:  [16-24] 16  SpO2:  [91 %] 91 %  BP: (131)/(77) 131/77     Weight: 95.7 kg (211 lb)  Body mass index is 36.22 kg/m².    Physical Exam  Vitals and nursing note reviewed.   Constitutional:       General: She is in acute distress (mild).      Appearance: She is obese. She is ill-appearing. She is not toxic-appearing or diaphoretic.      Interventions: Nasal cannula in place.   HENT:      Head: Normocephalic and atraumatic.      Right Ear:  External ear normal.      Left Ear: External ear normal.      Nose: Nose normal.      Mouth/Throat:      Mouth: Mucous membranes are moist.   Eyes:      Pupils: Pupils are equal, round, and reactive to light.   Cardiovascular:      Rate and Rhythm: Normal rate and regular rhythm.      Pulses: Normal pulses.      Heart sounds: Normal heart sounds.   Abdominal:      General: Abdomen is protuberant. Bowel sounds are normal.      Palpations: Abdomen is soft.   Musculoskeletal:         General: Deformity present. Normal range of motion.      Cervical back: Normal range of motion and neck supple.      Comments: Left great toe amputation, right 3rd partial toe amputation   Skin:     Capillary Refill: Capillary refill takes less than 2 seconds.   Neurological:      Mental Status: She is alert and oriented to person, place, and time.   Psychiatric:         Mood and Affect: Mood is anxious (mild).         Behavior: Behavior normal.         CRANIAL NERVES     CN III, IV, VI   Pupils are equal, round, and reactive to light.     Significant Labs: All pertinent labs within the past 24 hours have been reviewed.  Recent Lab Results         04/07/22  1402   04/07/22  1344   04/07/22  1040        Albumin/Globulin Ratio     1.0       Albumin     3.9       Alkaline Phosphatase     222       ALT     43       Anion Gap     14       aPTT   26.6         AST     37       Baso #     0.04       Basophil %     0.6       BILIRUBIN TOTAL     0.3       BUN     18       BUN/CREAT RATIO     16       Calcium     9.3       Chloride     100       CO2     28       ID NOW COVID-19, (JOSIE) Negative           Creatinine     1.14       Differential Type     Auto       eGFR if non      50       Eos #     0.73       Eosinophil %     10.9       Globulin, Total     3.8       Glucose     130       Hematocrit     44.0       Hemoglobin     14.4       Immature Grans (Abs)     0.02       Immature Granulocytes     0.3       INR   0.93         Lymph #      2.14       Lymph %     32.0       MCH     29.8       MCHC     32.7       MCV     90.9       Mono #     0.61       Mono %     9.1       MPV     10.9       Neutrophils, Abs     3.14       Neutrophils Relative     47.1       nRBC     0.0       NT-proBNP     389       NUCLEATED RBC ABSOLUTE     0.00       Platelets     274       Potassium     3.9       PROTEIN TOTAL     7.7       Protime   12.5         RBC     4.84       RDW     11.9       Sodium     138       Troponin I High Sensitivity     7.2       TSH     2.120       WBC     6.68               Significant Imaging: I have reviewed all pertinent imaging results/findings within the past 24 hours.

## 2022-04-07 NOTE — H&P
[address] Medicine  History & Physical    Patient Name: Lawanda Martínez  MRN: [id number]  Patient Class: IP- Inpatient  Admission Date: 4/7/2022  Attending Physician: Woo Morley MD   Primary Care Provider: HEAVENLY Pulido         Patient information was obtained from patient and ER records.     Subjective:     Principal Problem:Atrial fibrillation with rapid ventricular response    Chief Complaint:   Chief Complaint   Patient presents with    Shortness of Breath        HPI: Patient is 71 WF with a history of astma, copd, diastolic chf, gerd, htn, osteoporosis, seizure disorder and oxygen dependent o2 dependent 2L. The patient states that she has been short of breath for 14 days. She has HH that came and saw her Monday and sent her to her pcp. She states that her pcp did a cxr and ekg and told her it was her heart and gave her some lasix and steroid shot. Pt states that she became really sob yesterday after walking 10 steps. HH came out today and noticed that she was cough and short of breath more and recommended her to come to the ED for treatment. Patient states that she has an appointment with her cardiologist tomorrow at 10:15. She denies cp,nausea or vomiting. Echo in 12/20/21 showed a ef of 70%.       Past Medical History:   Diagnosis Date    Asthma     CHF (congestive heart failure)     COPD (chronic obstructive pulmonary disease)     WILLSON (dyspnea on exertion)     GERD (gastroesophageal reflux disease)     HTN (hypertension)     Hyperlipidemia     Osteoporosis     Oxygen dependent     02 2L    Seizure disorder        Past Surgical History:   Procedure Laterality Date    COLONOSCOPY      REVISION OF TOTAL KNEE REPLACEMENT       TUBAL LIGATION         Review of patient's allergies indicates:  No Known Allergies    No current facility-administered medications on file prior to encounter.     Current Outpatient Medications on File Prior to  Encounter   Medication Sig    albuterol (VENTOLIN HFA) 90 mcg/actuation inhaler Inhale 2 puffs into the lungs every 6 (six) hours as needed for Wheezing. Rescue    albuterol-ipratropium (DUO-NEB) 2.5 mg-0.5 mg/3 mL nebulizer solution Take 3 mLs by nebulization every 6 (six) hours as needed for Wheezing. Rescue    calcium carbonate/vitamin D3 (CALCARB 600 WITH VITAMIN D ORAL) Take by mouth.    fluticasone propionate (FLONASE) 50 mcg/actuation nasal spray 1 spray (50 mcg total) by Each Nostril route once daily.    fluticasone-umeclidin-vilanter (TRELEGY ELLIPTA) 200-62.5-25 mcg inhaler Inhale 1 puff into the lungs once daily.    furosemide (LASIX) 40 MG tablet Take 1 tablet (40 mg total) by mouth once daily. Take one tablet by mouth daily    gabapentin (NEURONTIN) 300 MG capsule Take 1 capsule (300 mg total) by mouth 3 (three) times daily.    ipratropium (ATROVENT) 42 mcg (0.06 %) nasal spray 2 sprays by Nasal route 4 (four) times daily.    isosorbide mononitrate (IMDUR) 30 MG 24 hr tablet Take 1 tablet (30 mg total) by mouth once daily.    loratadine (CLARITIN) 10 mg tablet Take 10 mg by mouth once daily.    meclizine (ANTIVERT) 12.5 mg tablet TAKE 1 TABLET TWICE DAILY AS NEEDED    montelukast (SINGULAIR) 10 mg tablet Take 1 tablet (10 mg total) by mouth once daily.    paroxetine (PAXIL) 40 MG tablet Take 1 tablet (40 mg total) by mouth once daily.    phenytoin (DILANTIN) 100 MG ER capsule Take 1 capsule (100 mg total) by mouth 2 (two) times daily.    potassium chloride (KLOR-CON) 10 MEQ TbSR Take 1 tablet (10 mEq total) by mouth 2 (two) times daily.    pravastatin (PRAVACHOL) 40 MG tablet Take 1 tablet (40 mg total) by mouth nightly.     Family History       Problem Relation (Age of Onset)    Cancer Father    Diabetes Mother, Sister, Brother    Heart disease Mother, Sister    Hypertension Mother, Sister          Tobacco Use    Smoking status: Never Smoker    Smokeless tobacco: Never Used    Substance and Sexual Activity    Alcohol use: Never    Drug use: Never    Sexual activity: Not Currently     Review of Systems   Constitutional:  Positive for fatigue.   HENT: Negative.     Eyes: Negative.    Respiratory:  Positive for cough, shortness of breath and wheezing.    Cardiovascular: Negative.    Gastrointestinal: Negative.    Endocrine: Negative.    Genitourinary: Negative.    Musculoskeletal: Negative.    Skin: Negative.    Allergic/Immunologic: Negative.    Neurological:  Positive for weakness.   Hematological: Negative.    Psychiatric/Behavioral: Negative.     Objective:     Vital Signs (Most Recent):  Temp: 97.9 °F (36.6 °C) (04/07/22 1028)  Pulse: 72 (04/07/22 1114)  Resp: 16 (04/07/22 1114)  BP: 131/77 (04/07/22 1028)  SpO2: (!) 91 % (04/07/22 1028)   Vital Signs (24h Range):  Temp:  [97.9 °F (36.6 °C)] 97.9 °F (36.6 °C)  Pulse:  [] 72  Resp:  [16-24] 16  SpO2:  [91 %] 91 %  BP: (131)/(77) 131/77     Weight: 95.7 kg (211 lb)  Body mass index is 36.22 kg/m².    Physical Exam  Vitals and nursing note reviewed.   Constitutional:       General: She is in acute distress (mild).      Appearance: She is obese. She is ill-appearing. She is not toxic-appearing or diaphoretic.      Interventions: Nasal cannula in place.   HENT:      Head: Normocephalic and atraumatic.      Right Ear: External ear normal.      Left Ear: External ear normal.      Nose: Nose normal.      Mouth/Throat:      Mouth: Mucous membranes are moist.   Eyes:      Pupils: Pupils are equal, round, and reactive to light.   Cardiovascular:      Rate and Rhythm: Normal rate and regular rhythm.      Pulses: Normal pulses.      Heart sounds: Normal heart sounds.   Abdominal:      General: Abdomen is protuberant. Bowel sounds are normal.      Palpations: Abdomen is soft.   Musculoskeletal:         General: Deformity present. Normal range of motion.      Cervical back: Normal range of motion and neck supple.      Comments: Left great  toe amputation, right 3rd partial toe amputation   Skin:     Capillary Refill: Capillary refill takes less than 2 seconds.   Neurological:      Mental Status: She is alert and oriented to person, place, and time.   Psychiatric:         Mood and Affect: Mood is anxious (mild).         Behavior: Behavior normal.         CRANIAL NERVES     CN III, IV, VI   Pupils are equal, round, and reactive to light.     Significant Labs: All pertinent labs within the past 24 hours have been reviewed.  Recent Lab Results         04/07/22  1402   04/07/22  1344   04/07/22  1040        Albumin/Globulin Ratio     1.0       Albumin     3.9       Alkaline Phosphatase     222       ALT     43       Anion Gap     14       aPTT   26.6         AST     37       Baso #     0.04       Basophil %     0.6       BILIRUBIN TOTAL     0.3       BUN     18       BUN/CREAT RATIO     16       Calcium     9.3       Chloride     100       CO2     28       ID NOW COVID-19, (JOSIE) Negative           Creatinine     1.14       Differential Type     Auto       eGFR if non      50       Eos #     0.73       Eosinophil %     10.9       Globulin, Total     3.8       Glucose     130       Hematocrit     44.0       Hemoglobin     14.4       Immature Grans (Abs)     0.02       Immature Granulocytes     0.3       INR   0.93         Lymph #     2.14       Lymph %     32.0       MCH     29.8       MCHC     32.7       MCV     90.9       Mono #     0.61       Mono %     9.1       MPV     10.9       Neutrophils, Abs     3.14       Neutrophils Relative     47.1       nRBC     0.0       NT-proBNP     389       NUCLEATED RBC ABSOLUTE     0.00       Platelets     274       Potassium     3.9       PROTEIN TOTAL     7.7       Protime   12.5         RBC     4.84       RDW     11.9       Sodium     138       Troponin I High Sensitivity     7.2       TSH     2.120       WBC     6.68               Significant Imaging: I have reviewed all pertinent imaging  results/findings within the past 24 hours.    Assessment/Plan:     * Atrial fibrillation with rapid ventricular response  Patient with Paroxysmal (<7 days) atrial fibrillation which is controlled currently with Beta Blocker. Patient is currently in sinus rhythm .[id number] Score: 3. Anticoagulation indicated. Anticoagulation done with heparin. Echo today, dc heparin and start apixaban 5mg bid        Seizure  Continue oral dilantin 100mg daily      HLD (hyperlipidemia)    Continue oral statin    COPD exacerbation  Duo nebs tid, iv rocephin and azithromycin for pneumonia      Chronic diastolic heart failure  IV lasix 40mg daily, monitor bun/creat and bnp      VTE Risk Mitigation (From admission, onward)         Ordered     apixaban tablet 5 mg  2 times daily         04/07/22 1545     IP VTE HIGH RISK PATIENT  Once         04/07/22 1453     Place sequential compression device  Until discontinued         04/07/22 1453                   HEAVENLY Jennings  Department of Hospital Medicine   [address]

## 2022-04-07 NOTE — ASSESSMENT & PLAN NOTE
Patient with Paroxysmal (<7 days) atrial fibrillation which is controlled currently with Beta Blocker. Patient is currently in sinus rhythm .[id number] Score: 3. Anticoagulation indicated. Anticoagulation done with heparin. Echo today, dc heparin and start apixaban 5mg bid

## 2022-04-07 NOTE — ASSESSMENT & PLAN NOTE
- new onset  - has received IV cardizem bolus  - BB has been started  - remains afib RVR  - Dr. Chung has seen and evaluated this pt  - start PO cardizem  - give IV digoxin 0.5mg once  - start Eliquis 5mg po bid  - continue BB  - AKBAR with cardioversion tomorrow if she does not convert

## 2022-04-07 NOTE — CONSULTS
[address]  Cardiology  Consult Note    Patient Name: Lawanda Martínez  MRN: [id number]  Admission Date: 2022  Hospital Length of Stay: 0 days  Code Status: Full Code   Attending Provider: Woo Morley MD   Consulting Provider: HEAVENLY Tamayo  Primary Care Physician: HEAVENLY Pulido  Principal Problem:Atrial fibrillation with rapid ventricular response    Patient information was obtained from patient and ER records.     Inpatient consult to Cardiology  Consult performed by: HEAVENLY Tamayo  Consult ordered by: HEAVENLY Jennings        Subjective:     Chief Complaint:  SOB     HPI:   Lawanda Martínez is a 71 y.o. female, nonsmoker, with PMHx of HFpEF, asthma on home O2, HLD, arthritis. She follows with Dr. Nieves for cardiology and Dr. Torres for pulmonology. Pt states her SOB has worsened over the last 2 weeks. She normally uses her O2 at night PRN but has had to use it constantly over the last 2 weeks. She has a productive cough with white phlegm but denies fever or chills. Pt was seen by her PCP on 22 and was given IV lasix. She continued to have SOB and cough so she came to the ED. She states she has intermittent chest pain with coughing.     EKG reveals afib with RVR which is new onset. She has been given a IV cardizem bolus. She remains afib RVR with -120s. She has also received IV lasix in the ER.        Family history of CAD in mother who  in her 70s, sister has mechanical valve     [facility] 2020: Nonobstructive coronary artery disease. LVEDP is 19 mmHg  Echo 2021: LVEF 70%. Diastolic dysfunction.      Past Medical History:   Diagnosis Date    Asthma     CHF (congestive heart failure)     COPD (chronic obstructive pulmonary disease)     WILLSON (dyspnea on exertion)     GERD (gastroesophageal reflux disease)     HTN (hypertension)     Hyperlipidemia     Osteoporosis     Oxygen dependent     02 2L    Seizure disorder        Past  Surgical History:   Procedure Laterality Date    COLONOSCOPY      REVISION OF TOTAL KNEE REPLACEMENT       TUBAL LIGATION         Review of patient's allergies indicates:  No Known Allergies    No current facility-administered medications on file prior to encounter.     Current Outpatient Medications on File Prior to Encounter   Medication Sig    albuterol (VENTOLIN HFA) 90 mcg/actuation inhaler Inhale 2 puffs into the lungs every 6 (six) hours as needed for Wheezing. Rescue    albuterol-ipratropium (DUO-NEB) 2.5 mg-0.5 mg/3 mL nebulizer solution Take 3 mLs by nebulization every 6 (six) hours as needed for Wheezing. Rescue    calcium carbonate/vitamin D3 (CALCARB 600 WITH VITAMIN D ORAL) Take by mouth.    fluticasone propionate (FLONASE) 50 mcg/actuation nasal spray 1 spray (50 mcg total) by Each Nostril route once daily.    fluticasone-umeclidin-vilanter (TRELEGY ELLIPTA) 200-62.5-25 mcg inhaler Inhale 1 puff into the lungs once daily.    furosemide (LASIX) 40 MG tablet Take 1 tablet (40 mg total) by mouth once daily. Take one tablet by mouth daily    gabapentin (NEURONTIN) 300 MG capsule Take 1 capsule (300 mg total) by mouth 3 (three) times daily.    ipratropium (ATROVENT) 42 mcg (0.06 %) nasal spray 2 sprays by Nasal route 4 (four) times daily.    isosorbide mononitrate (IMDUR) 30 MG 24 hr tablet Take 1 tablet (30 mg total) by mouth once daily.    loratadine (CLARITIN) 10 mg tablet Take 10 mg by mouth once daily.    meclizine (ANTIVERT) 12.5 mg tablet TAKE 1 TABLET TWICE DAILY AS NEEDED    montelukast (SINGULAIR) 10 mg tablet Take 1 tablet (10 mg total) by mouth once daily.    paroxetine (PAXIL) 40 MG tablet Take 1 tablet (40 mg total) by mouth once daily.    phenytoin (DILANTIN) 100 MG ER capsule Take 1 capsule (100 mg total) by mouth 2 (two) times daily.    potassium chloride (KLOR-CON) 10 MEQ TbSR Take 1 tablet (10 mEq total) by mouth 2 (two) times daily.    pravastatin (PRAVACHOL) 40 MG  tablet Take 1 tablet (40 mg total) by mouth nightly.     Family History       Problem Relation (Age of Onset)    Cancer Father    Diabetes Mother, Sister, Brother    Heart disease Mother, Sister    Hypertension Mother, Sister          Tobacco Use    Smoking status: Never Smoker    Smokeless tobacco: Never Used   Substance and Sexual Activity    Alcohol use: Never    Drug use: Never    Sexual activity: Not Currently     Review of Systems   Constitutional: Negative for chills, diaphoresis and fever.   Cardiovascular:  Positive for chest pain and dyspnea on exertion. Negative for leg swelling, orthopnea, palpitations and syncope.   Respiratory:  Positive for cough and shortness of breath.    Gastrointestinal:  Negative for nausea and vomiting.   All other systems reviewed and are negative.  Objective:     Vital Signs (Most Recent):  Temp: 98 °F (36.7 °C) (04/07/22 1511)  Pulse: 75 (04/07/22 1529)  Resp: 19 (04/07/22 1529)  BP: 113/75 (04/07/22 1511)  SpO2: (!) 93 % (04/07/22 1529)   Vital Signs (24h Range):  Temp:  [97.9 °F (36.6 °C)-98 °F (36.7 °C)] 98 °F (36.7 °C)  Pulse:  [] 75  Resp:  [16-28] 19  SpO2:  [91 %-98 %] 93 %  BP: (108-131)/(63-77) 113/75     Weight: 95.7 kg (211 lb)  Body mass index is 36.22 kg/m².    SpO2: (!) 93 %  O2 Device (Oxygen Therapy): nasal cannula    No intake or output data in the 24 hours ending 04/07/22 1559    Lines/Drains/Airways       Peripheral Intravenous Line  Duration                  Peripheral IV - Single Lumen 04/07/22 1035 20 G Left Wrist <1 day                    Physical Exam  Vitals reviewed.   Constitutional:       General: She is not in acute distress.     Appearance: Normal appearance.   HENT:      Head: Atraumatic.      Mouth/Throat:      Mouth: Mucous membranes are moist.   Eyes:      Pupils: Pupils are equal, round, and reactive to light.   Cardiovascular:      Rate and Rhythm: Tachycardia present. Rhythm irregularly irregular.      Pulses: Normal pulses.       Heart sounds: Normal heart sounds.   Pulmonary:      Effort: Pulmonary effort is normal. No respiratory distress.      Breath sounds: Rales present.   Abdominal:      Palpations: Abdomen is soft.   Musculoskeletal:      Cervical back: Neck supple. No rigidity.      Right lower leg: No edema.      Left lower leg: No edema.   Skin:     General: Skin is warm and dry.   Neurological:      Mental Status: She is alert and oriented to person, place, and time.   Psychiatric:         Mood and Affect: Mood normal.         Behavior: Behavior normal.       Significant Labs: All pertinent lab results from the last 24 hours have been reviewed. and   Recent Lab Results         04/07/22  1402   04/07/22  1344   04/07/22  1040        Albumin/Globulin Ratio     1.0       Albumin     3.9       Alkaline Phosphatase     222       ALT     43       Anion Gap     14       aPTT   26.6         AST     37       Baso #     0.04       Basophil %     0.6       BILIRUBIN TOTAL     0.3       BUN     18       BUN/CREAT RATIO     16       Calcium     9.3       Chloride     100       CO2     28       ID NOW COVID-19, (JOSIE) Negative           Creatinine     1.14       Differential Type     Auto       eGFR if non      50       Eos #     0.73       Eosinophil %     10.9       Globulin, Total     3.8       Glucose     130       Hematocrit     44.0       Hemoglobin     14.4       Immature Grans (Abs)     0.02       Immature Granulocytes     0.3       INR   0.93         Lymph #     2.14       Lymph %     32.0       MCH     29.8       MCHC     32.7       MCV     90.9       Mono #     0.61       Mono %     9.1       MPV     10.9       Neutrophils, Abs     3.14       Neutrophils Relative     47.1       nRBC     0.0       NT-proBNP     389       NUCLEATED RBC ABSOLUTE     0.00       Platelets     274       Potassium     3.9       PROTEIN TOTAL     7.7       Protime   12.5         RBC     4.84       RDW     11.9       Sodium     138        Troponin I High Sensitivity     7.2       TSH     2.120       WBC     6.68               Significant Imaging: Echocardiogram: Transthoracic echo (TTE) complete (Cupid Only):   Results for orders placed or performed during the hospital encounter of 12/19/21   Echo   Result Value Ref Range    BSA 2.08 m2    Right Atrial Pressure (from IVC) 3 mmHg    EF 70 %    Left Ventricular Outflow Tract Mean Gradient 5.00 mmHg    AORTIC VALVE CUSP SEPERATION [id number] cm    LVIDd 4.73 3.5 - 6.0 cm    IVS 0.98 0.6 - 1.1 cm    Posterior Wall 0.93 0.6 - 1.1 cm    Ao root annulus 2.10 cm    LVIDs 3.08 2.1 - 4.0 cm    FS 35 28 - 44 %    IVC ostium 0.9 cm    LV mass 156.13 g    LA size 2.70 cm    RVDD 2.90 cm    TAPSE 1.80 cm    Left Ventricle Relative Wall Thickness 0.39 cm    AV mean gradient 8 mmHg    AV valve area 1.98 cm2    AV Velocity Ratio 1.45     AV index (prosthetic) 0.70     E wave deceleration time 183 msec    LVOT diameter 1.90 cm    LVOT area 2.8 cm2    LVOT peak nav 1.6 m/s    LVOT peak VTI 28.00 cm    Ao peak nav 1.1 m/s    Ao VTI 40.00 cm    LVOT stroke volume 79.35 cm3    AV peak gradient 5 mmHg    MV Peak E Nav 0.82 m/s    LV Systolic Volume 37.30 mL    LV Systolic Volume Index 18.6 mL/m2    LV Diastolic Volume 103.90 mL    LV Diastolic Volume Index 51.69 mL/m2    LV Mass Index 78 g/m2    Echo EF Estimated 70 %    RA Major Axis 2.90 cm    Narrative    · The left ventricle is normal in size with normal systolic function.  · The estimated ejection fraction is 70%.  · Indeterminate left ventricular diastolic function.  · Normal right ventricular size with normal right ventricular systolic   function.  · Normal central venous pressure (3 mmHg).  · Trivial pericardial effusion.        Assessment and Plan:     * Atrial fibrillation with rapid ventricular response  - new onset  - has received IV cardizem bolus  - BB has been started  - remains afib RVR  - Dr. Chung has seen and evaluated this pt  - start PO  cardizem  - give IV digoxin 0.5mg once  - start Eliquis 5mg po bid  - continue BB  - AKBAR with cardioversion tomorrow if she does not convert          VTE Risk Mitigation (From admission, onward)         Ordered     apixaban tablet 5 mg  2 times daily         04/07/22 1545     IP VTE HIGH RISK PATIENT  Once         04/07/22 1453     Place sequential compression device  Until discontinued         04/07/22 1453                Thank you for your consult. I will follow-up with patient. Please contact us if you have any additional questions.    Kathie Ngo, FNP  Cardiology   [facility] - [facility]

## 2022-04-07 NOTE — HPI
Patient is 71 WF with a history of astma, copd, diastolic chf, gerd, htn, osteoporosis, seizure disorder and oxygen dependent o2 dependent 2L. The patient states that she has been short of breath for 14 days. She has HH that came and saw her Monday and sent her to her pcp. She states that her pcp did a cxr and ekg and told her it was her heart and gave her some lasix and steroid shot. Pt states that she became really sob yesterday after walking 10 steps. HH came out today and noticed that she was cough and short of breath more and recommended her to come to the ED for treatment. Patient states that she has an appointment with her cardiologist tomorrow at 10:15. She denies cp,nausea or vomiting. Echo in 12/20/21 showed a ef of 70%.

## 2022-04-07 NOTE — HPI
Lawanda Martínez is a 71 y.o. female, nonsmoker, with PMHx of HFpEF, asthma on home O2, HLD, arthritis. She follows with Dr. Nieves for cardiology and Dr. Torres for pulmonology. Pt states her SOB has worsened over the last 2 weeks. She normally uses her O2 at night PRN but has had to use it constantly over the last 2 weeks. She has a productive cough with white phlegm but denies fever or chills. Pt was seen by her PCP on 22 and was given IV lasix. She continued to have SOB and cough so she came to the ED. She states she has intermittent chest pain with coughing.     EKG reveals afib with RVR which is new onset. She has been given a IV cardizem bolus. She remains afib RVR with -120s. She has also received IV lasix in the ER.        Family history of CAD in mother who  in her 70s, sister has mechanical valve     [facility] 2020: Nonobstructive coronary artery disease. LVEDP is 19 mmHg  Echo 2021: LVEF 70%. Diastolic dysfunction.

## 2022-04-07 NOTE — ED TRIAGE NOTES
Presents to the ED c/o worsening SOB over the past week and a half. Reports that she went and saw her PCP a few days ago and was given a shot of Lasix, had an EKG and a CXR. Patient has O2 @ 2L/min per [place] PRN, but has had it on consistently for the past week and a half.

## 2022-04-07 NOTE — NURSING
1514 patient arrived to room 671. VS stable. NADN. Denies any shortness of breath at this time.  Report given by DEBBIE Wesley. Bed in low position, call choudhury in reach. ROSMERY Ortez to resume care.

## 2022-04-07 NOTE — HOSPITAL COURSE
04/07 Admit to impatient for diuresing, HR control for new afib, iv abx and nebs   4/08 AM:BP hypotensive, asymptomatic. Pt feels better this am. Tolerating abx. Harsh cough non productive add mucinex and incentive spirometer. Slight bump in BUN and Creat 23/1.30. HR irregular no distress noted. Currently being followed by cardiology.     Patient is no s/p AKBAR with successful cardioversion to sinus rhythm today. She has been released from cardiology standpoint and will be discharged home on Toprol XL 50 mg PO daily and Eliquis 5 mg PO BID, she will follow up with Dr. Nieves in 3 weeks. She had a slight elevation in BUN/Cr so will hold Lasix for now and follow up within 1 week with PCP for recheck. She will be discharged with home health services as well.

## 2022-04-07 NOTE — SUBJECTIVE & OBJECTIVE
Past Medical History:   Diagnosis Date    Asthma     CHF (congestive heart failure)     COPD (chronic obstructive pulmonary disease)     WILLSON (dyspnea on exertion)     GERD (gastroesophageal reflux disease)     HTN (hypertension)     Hyperlipidemia     Osteoporosis     Oxygen dependent     02 2L    Seizure disorder        Past Surgical History:   Procedure Laterality Date    COLONOSCOPY      REVISION OF TOTAL KNEE REPLACEMENT       TUBAL LIGATION         Review of patient's allergies indicates:  No Known Allergies    No current facility-administered medications on file prior to encounter.     Current Outpatient Medications on File Prior to Encounter   Medication Sig    albuterol (VENTOLIN HFA) 90 mcg/actuation inhaler Inhale 2 puffs into the lungs every 6 (six) hours as needed for Wheezing. Rescue    albuterol-ipratropium (DUO-NEB) 2.5 mg-0.5 mg/3 mL nebulizer solution Take 3 mLs by nebulization every 6 (six) hours as needed for Wheezing. Rescue    calcium carbonate/vitamin D3 (CALCARB 600 WITH VITAMIN D ORAL) Take by mouth.    fluticasone propionate (FLONASE) 50 mcg/actuation nasal spray 1 spray (50 mcg total) by Each Nostril route once daily.    fluticasone-umeclidin-vilanter (TRELEGY ELLIPTA) 200-62.5-25 mcg inhaler Inhale 1 puff into the lungs once daily.    furosemide (LASIX) 40 MG tablet Take 1 tablet (40 mg total) by mouth once daily. Take one tablet by mouth daily    gabapentin (NEURONTIN) 300 MG capsule Take 1 capsule (300 mg total) by mouth 3 (three) times daily.    ipratropium (ATROVENT) 42 mcg (0.06 %) nasal spray 2 sprays by Nasal route 4 (four) times daily.    isosorbide mononitrate (IMDUR) 30 MG 24 hr tablet Take 1 tablet (30 mg total) by mouth once daily.    loratadine (CLARITIN) 10 mg tablet Take 10 mg by mouth once daily.    meclizine (ANTIVERT) 12.5 mg tablet TAKE 1 TABLET TWICE DAILY AS NEEDED    montelukast (SINGULAIR) 10 mg tablet Take 1 tablet (10 mg total) by mouth once daily.    paroxetine  (PAXIL) 40 MG tablet Take 1 tablet (40 mg total) by mouth once daily.    phenytoin (DILANTIN) 100 MG ER capsule Take 1 capsule (100 mg total) by mouth 2 (two) times daily.    potassium chloride (KLOR-CON) 10 MEQ TbSR Take 1 tablet (10 mEq total) by mouth 2 (two) times daily.    pravastatin (PRAVACHOL) 40 MG tablet Take 1 tablet (40 mg total) by mouth nightly.     Family History       Problem Relation (Age of Onset)    Cancer Father    Diabetes Mother, Sister, Brother    Heart disease Mother, Sister    Hypertension Mother, Sister          Tobacco Use    Smoking status: Never Smoker    Smokeless tobacco: Never Used   Substance and Sexual Activity    Alcohol use: Never    Drug use: Never    Sexual activity: Not Currently     Review of Systems   Constitutional: Negative for chills, diaphoresis and fever.   Cardiovascular:  Positive for chest pain and dyspnea on exertion. Negative for leg swelling, orthopnea, palpitations and syncope.   Respiratory:  Positive for cough and shortness of breath.    Gastrointestinal:  Negative for nausea and vomiting.   All other systems reviewed and are negative.  Objective:     Vital Signs (Most Recent):  Temp: 98 °F (36.7 °C) (04/07/22 1511)  Pulse: 75 (04/07/22 1529)  Resp: 19 (04/07/22 1529)  BP: 113/75 (04/07/22 1511)  SpO2: (!) 93 % (04/07/22 1529)   Vital Signs (24h Range):  Temp:  [97.9 °F (36.6 °C)-98 °F (36.7 °C)] 98 °F (36.7 °C)  Pulse:  [] 75  Resp:  [16-28] 19  SpO2:  [91 %-98 %] 93 %  BP: (108-131)/(63-77) 113/75     Weight: 95.7 kg (211 lb)  Body mass index is 36.22 kg/m².    SpO2: (!) 93 %  O2 Device (Oxygen Therapy): nasal cannula    No intake or output data in the 24 hours ending 04/07/22 1559    Lines/Drains/Airways       Peripheral Intravenous Line  Duration                  Peripheral IV - Single Lumen 04/07/22 1035 20 G Left Wrist <1 day                    Physical Exam  Vitals reviewed.   Constitutional:       General: She is not in acute distress.      Appearance: Normal appearance.   HENT:      Head: Atraumatic.      Mouth/Throat:      Mouth: Mucous membranes are moist.   Eyes:      Pupils: Pupils are equal, round, and reactive to light.   Cardiovascular:      Rate and Rhythm: Tachycardia present. Rhythm irregularly irregular.      Pulses: Normal pulses.      Heart sounds: Normal heart sounds.   Pulmonary:      Effort: Pulmonary effort is normal. No respiratory distress.      Breath sounds: Rales present.   Abdominal:      Palpations: Abdomen is soft.   Musculoskeletal:      Cervical back: Neck supple. No rigidity.      Right lower leg: No edema.      Left lower leg: No edema.   Skin:     General: Skin is warm and dry.   Neurological:      Mental Status: She is alert and oriented to person, place, and time.   Psychiatric:         Mood and Affect: Mood normal.         Behavior: Behavior normal.       Significant Labs: All pertinent lab results from the last 24 hours have been reviewed. and   Recent Lab Results         04/07/22  1402   04/07/22  1344   04/07/22  1040        Albumin/Globulin Ratio     1.0       Albumin     3.9       Alkaline Phosphatase     222       ALT     43       Anion Gap     14       aPTT   26.6         AST     37       Baso #     0.04       Basophil %     0.6       BILIRUBIN TOTAL     0.3       BUN     18       BUN/CREAT RATIO     16       Calcium     9.3       Chloride     100       CO2     28       ID NOW COVID-19, (JOSIE) Negative           Creatinine     1.14       Differential Type     Auto       eGFR if non      50       Eos #     0.73       Eosinophil %     10.9       Globulin, Total     3.8       Glucose     130       Hematocrit     44.0       Hemoglobin     14.4       Immature Grans (Abs)     0.02       Immature Granulocytes     0.3       INR   0.93         Lymph #     2.14       Lymph %     32.0       MCH     29.8       MCHC     32.7       MCV     90.9       Mono #     0.61       Mono %     9.1       MPV     10.9        Neutrophils, Abs     3.14       Neutrophils Relative     47.1       nRBC     0.0       NT-proBNP     389       NUCLEATED RBC ABSOLUTE     0.00       Platelets     274       Potassium     3.9       PROTEIN TOTAL     7.7       Protime   12.5         RBC     4.84       RDW     11.9       Sodium     138       Troponin I High Sensitivity     7.2       TSH     2.120       WBC     6.68               Significant Imaging: Echocardiogram: Transthoracic echo (TTE) complete (Cupid Only):   Results for orders placed or performed during the hospital encounter of 12/19/21   Echo   Result Value Ref Range    BSA 2.08 m2    Right Atrial Pressure (from IVC) 3 mmHg    EF 70 %    Left Ventricular Outflow Tract Mean Gradient 5.00 mmHg    AORTIC VALVE CUSP SEPERATION [id number] cm    LVIDd 4.73 3.5 - 6.0 cm    IVS 0.98 0.6 - 1.1 cm    Posterior Wall 0.93 0.6 - 1.1 cm    Ao root annulus 2.10 cm    LVIDs 3.08 2.1 - 4.0 cm    FS 35 28 - 44 %    IVC ostium 0.9 cm    LV mass 156.13 g    LA size 2.70 cm    RVDD 2.90 cm    TAPSE 1.80 cm    Left Ventricle Relative Wall Thickness 0.39 cm    AV mean gradient 8 mmHg    AV valve area 1.98 cm2    AV Velocity Ratio 1.45     AV index (prosthetic) 0.70     E wave deceleration time 183 msec    LVOT diameter 1.90 cm    LVOT area 2.8 cm2    LVOT peak nav 1.6 m/s    LVOT peak VTI 28.00 cm    Ao peak nav 1.1 m/s    Ao VTI 40.00 cm    LVOT stroke volume 79.35 cm3    AV peak gradient 5 mmHg    MV Peak E Nav 0.82 m/s    LV Systolic Volume 37.30 mL    LV Systolic Volume Index 18.6 mL/m2    LV Diastolic Volume 103.90 mL    LV Diastolic Volume Index 51.69 mL/m2    LV Mass Index 78 g/m2    Echo EF Estimated 70 %    RA Major Axis 2.90 cm    Narrative    · The left ventricle is normal in size with normal systolic function.  · The estimated ejection fraction is 70%.  · Indeterminate left ventricular diastolic function.  · Normal right ventricular size with normal right ventricular systolic   function.  · Normal  central venous pressure (3 mmHg).  · Trivial pericardial effusion.

## 2022-04-07 NOTE — ED PROVIDER NOTES
Encounter Date: 4/7/2022       History     Chief Complaint   Patient presents with    Shortness of Breath     Patient is a 71-year-old  female, with a past medical history of CHF, COPD, and is oxygen dependent, who presents emergency department for shortness of breath.  She states this been going on for the past few days and has gotten steadily worse.        Review of patient's allergies indicates:  No Known Allergies  Past Medical History:   Diagnosis Date    Asthma     CHF (congestive heart failure)     COPD (chronic obstructive pulmonary disease)     WILLSON (dyspnea on exertion)     GERD (gastroesophageal reflux disease)     HTN (hypertension)     Hyperlipidemia     Osteoporosis     Oxygen dependent     02 2L    Seizure disorder      Past Surgical History:   Procedure Laterality Date    COLONOSCOPY      REVISION OF TOTAL KNEE REPLACEMENT       TUBAL LIGATION       Family History   Problem Relation Age of Onset    Diabetes Mother     Heart disease Mother     Hypertension Mother     Cancer Father     Diabetes Sister     Heart disease Sister     Hypertension Sister     Diabetes Brother      Social History     Tobacco Use    Smoking status: Never Smoker    Smokeless tobacco: Never Used   Substance Use Topics    Alcohol use: Never    Drug use: Never     Review of Systems   Constitutional: Negative for fever.   Respiratory: Positive for cough, shortness of breath and wheezing.    Cardiovascular: Negative for chest pain.   Gastrointestinal:        Abdominal swelling   All other systems reviewed and are negative.      Physical Exam     Initial Vitals [04/07/22 1028]   BP Pulse Resp Temp SpO2   131/77 (!) 120 (!) 24 97.9 °F (36.6 °C) (!) 91 %      MAP       --         Physical Exam    Vitals reviewed.  Constitutional: She appears well-developed and well-nourished.   HENT:   Head: Normocephalic.   Eyes: Pupils are equal, round, and reactive to light.   Cardiovascular: Normal heart sounds.    Tachycardic  Irregularly irregular heart rhythm   Pulmonary/Chest: She is in respiratory distress. She has wheezes ( mild).   Abdominal: Abdomen is soft. Bowel sounds are normal. She exhibits distension ( slight distension).   Musculoskeletal:         General: No edema.     Neurological: She is alert and oriented to person, place, and time.   Psychiatric: She has a normal mood and affect.         Medical Screening Exam   See Full Note    ED Course   Procedures  Labs Reviewed   COMPREHENSIVE METABOLIC PANEL - Abnormal; Notable for the following components:       Result Value    Glucose 130 (*)     Creatinine 1.14 (*)     Alk Phos 222 (*)     eGFR 50 (*)     All other components within normal limits   CBC WITH DIFFERENTIAL - Abnormal; Notable for the following components:    Neutrophils % 47.1 (*)     Monocytes % 9.1 (*)     Eosinophils % 10.9 (*)     Eosinophils, Absolute 0.73 (*)     All other components within normal limits   NT-PRO NATRIURETIC PEPTIDE - Abnormal; Notable for the following components:    ProBNP 389 (*)     All other components within normal limits   TROPONIN I - Normal   APTT - Normal   PROTIME-INR - Normal   CBC W/ AUTO DIFFERENTIAL    Narrative:     The following orders were created for panel order CBC Auto Differential.  Procedure                               Abnormality         Status                     ---------                               -----------         ------                     CBC with Differential[[id number]]        Abnormal            Final result                 Please view results for these tests on the individual orders.   EXTRA TUBES    Narrative:     The following orders were created for panel order EXTRA TUBES.  Procedure                               Abnormality         Status                     ---------                               -----------         ------                     Light Blue Top Hold[[id number]]                              In process                    Please view results for these tests on the individual orders.   LIGHT BLUE TOP HOLD   SARS-COV-2 RNA AMPLIFICATION, QUAL        ECG Results          EKG 12-lead (In process)  Result time 04/07/22 11:06:26    In process by Interface, Lab In [facility] (04/07/22 11:06:26)                 Narrative:    Test Reason : R06.02,    Vent. Rate : 127 BPM     Atrial Rate : 000 BPM     P-R Int : 000 ms          QRS Dur : 084 ms      QT Int : 324 ms       P-R-T Axes : 000 073 074 degrees     QTc Int : 441 ms    Atrial flutter  with rapid ventricular response  Inferior and septal ST-T abnormality  is nonspecific  Abnormal ECG      Referred By: [email or web address]   SELF           Confirmed By:                             Imaging Results          X-Ray Chest 1 View (Final result)  Result time 04/07/22 11:07:01    Final result by Kailash Holman II, MD (04/07/22 11:07:01)                 Impression:      Increased right lower lung density could indicate pneumonia.  No other acute findings.      Electronically signed by: Kailash Holman  Date:    04/07/2022  Time:    11:07             Narrative:    EXAMINATION:  XR CHEST 1 VIEW    CLINICAL HISTORY:  shortness of breath;    COMPARISON:  29 March 2022    FINDINGS:  The heart and mediastinum are normal in size and configuration.  The pulmonary vascularity is normal in caliber. Lung volumes are increased with prominent bronchial markings.  There is increased right lower lung density.  No other lung infiltrates, effusions, pneumothorax or other abnormality is demonstrated.                                 Medications   levoFLOXacin 750 mg/150 mL IVPB 750 mg (750 mg Intravenous New Bag 4/7/22 1158)   heparin 25,000 units in dextrose 5% (100 units/ml) IV bolus from bag INITIAL BOLUS (has no administration in time range)   heparin 25,000 units in dextrose 5% 250 mL (100 units/mL) infusion LOW INTENSITY nomogram - RUSH (has no administration in time range)   heparin 25,000 units in dextrose 5% (100  units/ml) IV bolus from bag - ADDITIONAL PRN BOLUS - 60 units/kg (has no administration in time range)   heparin 25,000 units in dextrose 5% (100 units/ml) IV bolus from bag - ADDITIONAL PRN BOLUS - 30 units/kg (has no administration in time range)   albuterol-ipratropium 2.5 mg-0.5 mg/3 mL nebulizer solution 6 mL (6 mLs Nebulization Given 4/7/22 1114)   methylPREDNISolone sodium succinate injection 125 mg (125 mg Intravenous Given 4/7/22 1158)     Medical Decision Making:   Other:   I have discussed this case with another health care provider.       <> Summary of the Discussion: I discussed case with Dr. Rivera who agrees to admit patient to Dr. Morley.   I discussed case with Dr. hCung, who agrees to consult on patient.                   Clinical Impression:   Final diagnoses:  [R06.02] Shortness of breath  [J44.1] COPD exacerbation (Primary)  [I48.91] Atrial fibrillation, unspecified type          ED Disposition Condition    Admit               Stephanie Adams MD  04/07/22 0424

## 2022-04-08 VITALS
OXYGEN SATURATION: 97 % | HEIGHT: 64 IN | TEMPERATURE: 98 F | BODY MASS INDEX: 36.02 KG/M2 | DIASTOLIC BLOOD PRESSURE: 50 MMHG | WEIGHT: 211 LBS | SYSTOLIC BLOOD PRESSURE: 100 MMHG | RESPIRATION RATE: 18 BRPM | HEART RATE: 59 BPM

## 2022-04-08 PROBLEM — I48.91 ATRIAL FIBRILLATION WITH RAPID VENTRICULAR RESPONSE: Status: RESOLVED | Noted: 2022-04-07 | Resolved: 2022-04-08

## 2022-04-08 PROBLEM — R06.02 SHORTNESS OF BREATH: Status: RESOLVED | Noted: 2022-04-07 | Resolved: 2022-04-08

## 2022-04-08 LAB
ALBUMIN SERPL BCP-MCNC: 3.4 G/DL (ref 3.5–5)
ALBUMIN/GLOB SERPL: 1 {RATIO}
ALP SERPL-CCNC: 193 U/L (ref 55–142)
ALT SERPL W P-5'-P-CCNC: 42 U/L (ref 13–56)
ANION GAP SERPL CALCULATED.3IONS-SCNC: 11 MMOL/L (ref 7–16)
AORTIC ROOT ANNULUS: 2.5 CM
AORTIC VALVE CUSP SEPERATION: 18.6 CM
AST SERPL W P-5'-P-CCNC: 37 U/L (ref 15–37)
AV INDEX (PROSTH): 1.06
AV MEAN GRADIENT: 3 MMHG
AV PEAK GRADIENT: 5 MMHG
AV VALVE AREA: 2.12 CM2
AV VELOCITY RATIO: 1
BASOPHILS # BLD AUTO: 0.03 K/UL (ref 0–0.2)
BASOPHILS NFR BLD AUTO: 0.4 % (ref 0–1)
BILIRUB SERPL-MCNC: 0.2 MG/DL (ref 0–1.2)
BSA FOR ECHO PROCEDURE: 2.08 M2
BUN SERPL-MCNC: 23 MG/DL (ref 7–18)
BUN/CREAT SERPL: 18 (ref 6–20)
CALCIUM SERPL-MCNC: 9.2 MG/DL (ref 8.5–10.1)
CHLORIDE SERPL-SCNC: 100 MMOL/L (ref 98–107)
CO2 SERPL-SCNC: 29 MMOL/L (ref 21–32)
CREAT SERPL-MCNC: 1.3 MG/DL (ref 0.55–1.02)
CV ECHO LV RWT: 0.5 CM
DIFFERENTIAL METHOD BLD: ABNORMAL
DOP CALC AO PEAK VEL: 1.1 M/S
DOP CALC AO VTI: 18 CM
DOP CALC LVOT AREA: 2 CM2
DOP CALC LVOT DIAMETER: 1.6 CM
DOP CALC LVOT PEAK VEL: 1.1 M/S
DOP CALC LVOT STROKE VOLUME: 38.18 CM3
DOP CALCLVOT PEAK VEL VTI: 19 CM
E WAVE DECELERATION TIME: 183 MSEC
ECHO EF ESTIMATED: 55 %
ECHO LV POSTERIOR WALL: 1.04 CM (ref 0.6–1.1)
EJECTION FRACTION: 55 %
EOSINOPHIL # BLD AUTO: 0.07 K/UL (ref 0–0.5)
EOSINOPHIL NFR BLD AUTO: 1 % (ref 1–4)
ERYTHROCYTE [DISTWIDTH] IN BLOOD BY AUTOMATED COUNT: 11.9 % (ref 11.5–14.5)
FRACTIONAL SHORTENING: 28 % (ref 28–44)
GLOBULIN SER-MCNC: 3.4 G/DL (ref 2–4)
GLUCOSE SERPL-MCNC: 117 MG/DL (ref 74–106)
HCT VFR BLD AUTO: 42 % (ref 38–47)
HGB BLD-MCNC: 13.6 G/DL (ref 12–16)
IMM GRANULOCYTES # BLD AUTO: 0.02 K/UL (ref 0–0.04)
IMM GRANULOCYTES NFR BLD: 0.3 % (ref 0–0.4)
INTERVENTRICULAR SEPTUM: 1.09 CM (ref 0.6–1.1)
IVC OSTIUM: 0.7 CM
LEFT ATRIUM SIZE: 2.3 CM
LEFT INTERNAL DIMENSION IN SYSTOLE: 2.99 CM (ref 2.1–4)
LEFT VENTRICLE DIASTOLIC VOLUME INDEX: 38.2 ML/M2
LEFT VENTRICLE DIASTOLIC VOLUME: 76.4 ML
LEFT VENTRICLE MASS INDEX: 74 G/M2
LEFT VENTRICLE SYSTOLIC VOLUME INDEX: 17.4 ML/M2
LEFT VENTRICLE SYSTOLIC VOLUME: 34.7 ML
LEFT VENTRICULAR INTERNAL DIMENSION IN DIASTOLE: 4.15 CM (ref 3.5–6)
LEFT VENTRICULAR MASS: 147.2 G
LVOT MG: 2 MMHG
LYMPHOCYTES # BLD AUTO: 2.18 K/UL (ref 1–4.8)
LYMPHOCYTES NFR BLD AUTO: 30.7 % (ref 27–41)
MAGNESIUM SERPL-MCNC: 2 MG/DL (ref 1.7–2.3)
MCH RBC QN AUTO: 29.4 PG (ref 27–31)
MCHC RBC AUTO-ENTMCNC: 32.4 G/DL (ref 32–36)
MCV RBC AUTO: 90.9 FL (ref 80–96)
MONOCYTES # BLD AUTO: 0.74 K/UL (ref 0–0.8)
MONOCYTES NFR BLD AUTO: 10.4 % (ref 2–6)
MPC BLD CALC-MCNC: 10.9 FL (ref 9.4–12.4)
MV PEAK E VEL: 0.77 M/S
NEUTROPHILS # BLD AUTO: 4.07 K/UL (ref 1.8–7.7)
NEUTROPHILS NFR BLD AUTO: 57.2 % (ref 53–65)
NRBC # BLD AUTO: 0 X10E3/UL
NRBC, AUTO (.00): 0 %
PLATELET # BLD AUTO: 237 K/UL (ref 150–400)
POTASSIUM SERPL-SCNC: 3.6 MMOL/L (ref 3.5–5.1)
PROT SERPL-MCNC: 6.8 G/DL (ref 6.4–8.2)
RA MAJOR: 3.1 CM
RA PRESSURE: 3 MMHG
RBC # BLD AUTO: 4.62 M/UL (ref 4.2–5.4)
RIGHT VENTRICULAR END-DIASTOLIC DIMENSION: 3.1 CM
SODIUM SERPL-SCNC: 136 MMOL/L (ref 136–145)
TRICUSPID ANNULAR PLANE SYSTOLIC EXCURSION: 1.8 CM
WBC # BLD AUTO: 7.11 K/UL (ref 4.5–11)

## 2022-04-08 PROCEDURE — 92960 CARDIOVERSION ELECTRIC EXT: CPT | Mod: ,,, | Performed by: STUDENT IN AN ORGANIZED HEALTH CARE EDUCATION/TRAINING PROGRAM

## 2022-04-08 PROCEDURE — 63600175 PHARM REV CODE 636 W HCPCS: Performed by: INTERNAL MEDICINE

## 2022-04-08 PROCEDURE — 27000221 HC OXYGEN, UP TO 24 HOURS

## 2022-04-08 PROCEDURE — 25000003 PHARM REV CODE 250: Performed by: NURSE PRACTITIONER

## 2022-04-08 PROCEDURE — 25000003 PHARM REV CODE 250: Performed by: STUDENT IN AN ORGANIZED HEALTH CARE EDUCATION/TRAINING PROGRAM

## 2022-04-08 PROCEDURE — 99152 MOD SED SAME PHYS/QHP 5/>YRS: CPT | Performed by: STUDENT IN AN ORGANIZED HEALTH CARE EDUCATION/TRAINING PROGRAM

## 2022-04-08 PROCEDURE — 83735 ASSAY OF MAGNESIUM: CPT | Performed by: NURSE PRACTITIONER

## 2022-04-08 PROCEDURE — 25000003 PHARM REV CODE 250: Performed by: INTERNAL MEDICINE

## 2022-04-08 PROCEDURE — 25500020 PHARM REV CODE 255: Performed by: INTERNAL MEDICINE

## 2022-04-08 PROCEDURE — 94761 N-INVAS EAR/PLS OXIMETRY MLT: CPT

## 2022-04-08 PROCEDURE — 80053 COMPREHEN METABOLIC PANEL: CPT | Performed by: NURSE PRACTITIONER

## 2022-04-08 PROCEDURE — 27100168 OPTIME MED/SURG SUP & DEVICES NON-STERILE SUPPLY: Performed by: STUDENT IN AN ORGANIZED HEALTH CARE EDUCATION/TRAINING PROGRAM

## 2022-04-08 PROCEDURE — 25000242 PHARM REV CODE 250 ALT 637 W/ HCPCS: Performed by: INTERNAL MEDICINE

## 2022-04-08 PROCEDURE — 25000242 PHARM REV CODE 250 ALT 637 W/ HCPCS: Performed by: NURSE PRACTITIONER

## 2022-04-08 PROCEDURE — 92960 PR CARDIOVERSION, ELECTIVE;EXTERN: ICD-10-PCS | Mod: ,,, | Performed by: STUDENT IN AN ORGANIZED HEALTH CARE EDUCATION/TRAINING PROGRAM

## 2022-04-08 PROCEDURE — 97165 OT EVAL LOW COMPLEX 30 MIN: CPT

## 2022-04-08 PROCEDURE — 36415 COLL VENOUS BLD VENIPUNCTURE: CPT | Performed by: NURSE PRACTITIONER

## 2022-04-08 PROCEDURE — 97161 PT EVAL LOW COMPLEX 20 MIN: CPT

## 2022-04-08 PROCEDURE — 85025 COMPLETE CBC W/AUTO DIFF WBC: CPT | Performed by: NURSE PRACTITIONER

## 2022-04-08 PROCEDURE — 94640 AIRWAY INHALATION TREATMENT: CPT

## 2022-04-08 PROCEDURE — 63600175 PHARM REV CODE 636 W HCPCS: Performed by: STUDENT IN AN ORGANIZED HEALTH CARE EDUCATION/TRAINING PROGRAM

## 2022-04-08 PROCEDURE — 63700000 PHARM REV CODE 250 ALT 637 W/O HCPCS: Performed by: NURSE PRACTITIONER

## 2022-04-08 PROCEDURE — 92960 CARDIOVERSION ELECTRIC EXT: CPT | Performed by: STUDENT IN AN ORGANIZED HEALTH CARE EDUCATION/TRAINING PROGRAM

## 2022-04-08 PROCEDURE — 63600175 PHARM REV CODE 636 W HCPCS: Performed by: NURSE PRACTITIONER

## 2022-04-08 PROCEDURE — 99900035 HC TECH TIME PER 15 MIN (STAT)

## 2022-04-08 RX ORDER — METOPROLOL SUCCINATE 50 MG/1
50 TABLET, EXTENDED RELEASE ORAL DAILY
Status: DISCONTINUED | OUTPATIENT
Start: 2022-04-09 | End: 2022-04-08

## 2022-04-08 RX ORDER — METOPROLOL SUCCINATE 25 MG/1
25 TABLET, EXTENDED RELEASE ORAL DAILY
Status: DISCONTINUED | OUTPATIENT
Start: 2022-04-09 | End: 2022-04-08

## 2022-04-08 RX ORDER — PREDNISONE 20 MG/1
40 TABLET ORAL DAILY
Qty: 6 TABLET | Refills: 0 | Status: SHIPPED | OUTPATIENT
Start: 2022-04-09 | End: 2022-04-12

## 2022-04-08 RX ORDER — LIDOCAINE HYDROCHLORIDE 20 MG/ML
SOLUTION OROPHARYNGEAL
Status: DISCONTINUED | OUTPATIENT
Start: 2022-04-08 | End: 2022-04-08 | Stop reason: HOSPADM

## 2022-04-08 RX ORDER — SODIUM CHLORIDE 9 MG/ML
INJECTION, SOLUTION INTRAVENOUS
Status: DISCONTINUED | OUTPATIENT
Start: 2022-04-08 | End: 2022-04-08 | Stop reason: HOSPADM

## 2022-04-08 RX ORDER — METOPROLOL SUCCINATE 50 MG/1
50 TABLET, EXTENDED RELEASE ORAL DAILY
Qty: 30 TABLET | Refills: 0 | Status: SHIPPED | OUTPATIENT
Start: 2022-04-09 | End: 2022-05-17 | Stop reason: SDUPTHER

## 2022-04-08 RX ORDER — AZITHROMYCIN 250 MG/1
250 TABLET, FILM COATED ORAL DAILY
Qty: 7 TABLET | Refills: 0 | Status: SHIPPED | OUTPATIENT
Start: 2022-04-09 | End: 2022-04-14 | Stop reason: ALTCHOICE

## 2022-04-08 RX ORDER — FENTANYL CITRATE 50 UG/ML
INJECTION, SOLUTION INTRAMUSCULAR; INTRAVENOUS
Status: DISCONTINUED | OUTPATIENT
Start: 2022-04-08 | End: 2022-04-08 | Stop reason: HOSPADM

## 2022-04-08 RX ORDER — SODIUM CHLORIDE 450 MG/100ML
INJECTION, SOLUTION INTRAVENOUS
Status: DISCONTINUED | OUTPATIENT
Start: 2022-04-08 | End: 2022-04-08 | Stop reason: HOSPADM

## 2022-04-08 RX ORDER — DILTIAZEM HYDROCHLORIDE 120 MG/1
120 CAPSULE, COATED, EXTENDED RELEASE ORAL DAILY
Status: DISCONTINUED | OUTPATIENT
Start: 2022-04-08 | End: 2022-04-08

## 2022-04-08 RX ORDER — POTASSIUM CHLORIDE 750 MG/1
10 TABLET, EXTENDED RELEASE ORAL 2 TIMES DAILY
Qty: 180 TABLET | Refills: 1
Start: 2022-04-08 | End: 2022-08-04 | Stop reason: ALTCHOICE

## 2022-04-08 RX ORDER — MIDAZOLAM HYDROCHLORIDE 1 MG/ML
INJECTION INTRAMUSCULAR; INTRAVENOUS
Status: DISCONTINUED | OUTPATIENT
Start: 2022-04-08 | End: 2022-04-08 | Stop reason: HOSPADM

## 2022-04-08 RX ORDER — FUROSEMIDE 40 MG/1
40 TABLET ORAL DAILY
Qty: 90 TABLET | Refills: 1
Start: 2022-04-08 | End: 2022-10-27 | Stop reason: SDUPTHER

## 2022-04-08 RX ORDER — GUAIFENESIN 600 MG/1
1200 TABLET, EXTENDED RELEASE ORAL 2 TIMES DAILY
Status: DISCONTINUED | OUTPATIENT
Start: 2022-04-08 | End: 2022-04-08 | Stop reason: HOSPADM

## 2022-04-08 RX ORDER — GUAIFENESIN 600 MG/1
1200 TABLET, EXTENDED RELEASE ORAL 2 TIMES DAILY
Qty: 28 TABLET | Refills: 0 | Status: SHIPPED | OUTPATIENT
Start: 2022-04-08 | End: 2022-04-15

## 2022-04-08 RX ORDER — ACETAMINOPHEN 325 MG/1
650 TABLET ORAL EVERY 6 HOURS PRN
Status: DISCONTINUED | OUTPATIENT
Start: 2022-04-08 | End: 2022-04-08 | Stop reason: HOSPADM

## 2022-04-08 RX ORDER — METOPROLOL SUCCINATE 50 MG/1
50 TABLET, EXTENDED RELEASE ORAL DAILY
Status: DISCONTINUED | OUTPATIENT
Start: 2022-04-09 | End: 2022-04-08 | Stop reason: HOSPADM

## 2022-04-08 RX ADMIN — IPRATROPIUM BROMIDE AND ALBUTEROL SULFATE 3 ML: .5; 3 SOLUTION RESPIRATORY (INHALATION) at 12:04

## 2022-04-08 RX ADMIN — PERFLUTREN 1.5 ML: 6.52 INJECTION, SUSPENSION INTRAVENOUS at 08:04

## 2022-04-08 RX ADMIN — PREDNISONE 40 MG: 20 TABLET ORAL at 09:04

## 2022-04-08 RX ADMIN — IPRATROPIUM BROMIDE AND ALBUTEROL SULFATE 3 ML: .5; 3 SOLUTION RESPIRATORY (INHALATION) at 07:04

## 2022-04-08 RX ADMIN — BUDESONIDE 0.25 MG: 0.25 INHALANT RESPIRATORY (INHALATION) at 07:04

## 2022-04-08 RX ADMIN — METOPROLOL TARTRATE 25 MG: 25 TABLET, FILM COATED ORAL at 09:04

## 2022-04-08 RX ADMIN — PHENYTOIN SODIUM 100 MG: 100 CAPSULE, EXTENDED RELEASE ORAL at 09:04

## 2022-04-08 RX ADMIN — CEFTRIAXONE SODIUM 1 G: 1 INJECTION, POWDER, FOR SOLUTION INTRAMUSCULAR; INTRAVENOUS at 09:04

## 2022-04-08 RX ADMIN — GABAPENTIN 300 MG: 300 CAPSULE ORAL at 09:04

## 2022-04-08 RX ADMIN — AZITHROMYCIN MONOHYDRATE 250 MG: 250 TABLET ORAL at 09:04

## 2022-04-08 RX ADMIN — PAROXETINE HYDROCHLORIDE 40 MG: 20 TABLET, FILM COATED ORAL at 09:04

## 2022-04-08 RX ADMIN — MONTELUKAST 10 MG: 10 TABLET, FILM COATED ORAL at 09:04

## 2022-04-08 RX ADMIN — FUROSEMIDE 40 MG: 10 INJECTION INTRAMUSCULAR; INTRAVENOUS at 09:04

## 2022-04-08 NOTE — DISCHARGE SUMMARY
[facility] Medicine  Discharge Summary      Patient Name: Laawnda Martínez  MRN: [id number]  Patient Class: IP- Inpatient  Admission Date: 4/7/2022  Hospital Length of Stay: 1 days  Discharge Date and Time:  04/08/2022 2:27 PM  Attending Physician: Woo Morley MD   Discharging Provider: HEAVENLY Pena  Primary Care Provider: HEAVENLY Pulido      HPI:   Patient is 71 WF with a history of astma, copd, diastolic chf, gerd, htn, osteoporosis, seizure disorder and oxygen dependent o2 dependent 2L. The patient states that she has been short of breath for 14 days. She has HH that came and saw her Monday and sent her to her pcp. She states that her pcp did a cxr and ekg and told her it was her heart and gave her some lasix and steroid shot. Pt states that she became really sob yesterday after walking 10 steps. HH came out today and noticed that she was cough and short of breath more and recommended her to come to the ED for treatment. Patient states that she has an appointment with her cardiologist tomorrow at 10:15. She denies cp,nausea or vomiting. Echo in 12/20/21 showed a ef of 70%.       Procedure(s) (LRB):  Transesophageal echo (AKBAR) intra-procedure log documentation (N/A)  Cardioversion (N/A)      Hospital Course:   04/07 Admit to impatient for diuresing, HR control for new afib, iv abx and nebs   4/08 AM:BP hypotensive, asymptomatic. Pt feels better this am. Tolerating abx. Harsh cough non productive add mucinex and incentive spirometer. Slight bump in BUN and Creat 23/1.30. HR irregular no distress noted. Currently being followed by cardiology.     Patient is no s/p AKBAR with successful cardioversion to sinus rhythm today. She has been released from cardiology standpoint and will be discharged home on Toprol XL 50 mg PO daily and Eliquis 5 mg PO BID, she will follow up with Dr. Nieves in 3 weeks. She had a slight elevation in BUN/Cr so will hold Lasix for now  and follow up within 1 week with PCP for recheck. She will be discharged with home health services as well.        Goals of Care Treatment Preferences:  Code Status: Full Code      Consults:   Consults (From admission, onward)        Status Ordering Provider     Inpatient consult to Social Work  Once        Provider:  (Not yet assigned)    CAMPBELL Wade     Inpatient consult to Cardiology  Once        Provider:  Adal Chung MD    Completed HAWA KUMAR          * Atrial fibrillation with rapid ventricular response-resolved as of 4/8/2022  Patient with Paroxysmal (<7 days) atrial fibrillation which is controlled currently with Beta Blocker. Patient is currently in sinus rhythm .[id number] Score: 3. Anticoagulation indicated. Anticoagulation done with heparin. Echo today, dc heparin and start apixaban 5mg bid  4/08 recommendations per cardiology, patient underwent successful AKBAR DCCV today and will be dc'd home with Toprol XL 50 mg PO daily and Eliquis 5 mg PO BID per cardiology recommendations.         Seizure  Continue oral dilantin 100mg daily      HLD (hyperlipidemia)  Continue oral statin    Pneumonia due to infectious organism  4/08 tolerating abx       COPD exacerbation  Duo nebs tid, azithromycin for pneumonia      Chronic diastolic heart failure  IV lasix 40mg daily, monitor bun/creat and bnp  4/08 slight bump in Cr, will hold Lasix PO at d/c and patient will follow up with PCP within 1 week for recheck.       Final Active Diagnoses:    Diagnosis Date Noted POA    HLD (hyperlipidemia) [E78.5] 04/07/2022 Yes    Seizure [R56.9] 04/07/2022 Yes    COPD exacerbation [J44.1] 12/19/2021 Yes    Pneumonia due to infectious organism [J18.9] 12/19/2021 Yes    Chronic diastolic heart failure [I50.32] 09/08/2021 Yes     Chronic      Problems Resolved During this Admission:    Diagnosis Date Noted Date Resolved POA    PRINCIPAL PROBLEM:  Atrial fibrillation with rapid ventricular response  [I48.91] 04/07/2022 04/08/2022 Yes    Shortness of breath [R06.02] 04/07/2022 04/08/2022 Yes       Discharged Condition: stable    Disposition: Home-Health Care Svc    Follow Up:   Follow-up Information     David Nieves MD Follow up in 4 week(s).    Specialties: Interventional Cardiology, Cardiology  Why: Schedule follow up with her cardiologist, Dr. Nieves, in 3 weeks; Hospital discharge, new onset afib s/p cardioversion  Contact information:  [address] [place] [address] [place] [address]  [phone]             HEAVENLY Pulido Follow up in 1 week(s).    Specialties: Family Medicine, Emergency Medicine  Why: Hospital follow up s/p AKBAR DCCV success for new onset A-Fib  Contact information:  [address]  [facility] [place] [address]  [phone]                       Patient Instructions:      Diet Cardiac     Notify your health care provider if you experience any of the following:  persistent dizziness, light-headedness, or visual disturbances     Notify your health care provider if you experience any of the following:  increased confusion or weakness     Notify your health care provider if you experience any of the following:  difficulty breathing or increased cough     Notify your health care provider if you experience any of the following:  temperature >100.4     Activity as tolerated       Significant Diagnostic Studies: Labs:   BMP:   Recent Labs   Lab 04/07/22  1040 04/08/22  0548   * 117*    136   K 3.9 3.6    100   CO2 28 29   BUN 18 23*   CREATININE 1.14* 1.30*   CALCIUM 9.3 9.2   MG  --  2.0   , CBC   Recent Labs   Lab 04/07/22  1040 04/08/22  0548   WBC 6.68 7.11   HGB 14.4 13.6   HCT 44.0 42.0    237   , INR   Lab Results   Component Value Date    INR 0.93 04/07/2022    INR 0.88 (L) 10/29/2021   , Lipid Panel   Lab Results   Component Value Date    CHOL 233 (H) 12/30/2021     (H) 12/30/2021    LDLCALC 101 12/30/2021    TRIG 147 12/30/2021     CHOLHDL 2.3 12/30/2021    and All labs within the past 24 hours have been reviewed  Cardiac Graphics: Echocardiogram:   Transthoracic echo (TTE) complete (Cupid Only):   Results for orders placed or performed during the hospital encounter of 04/07/22   Echo   Result Value Ref Range    BSA 2.08 m2    Right Atrial Pressure (from IVC) 3 mmHg    EF 55 %    Left Ventricular Outflow Tract Mean Gradient 2.00 mmHg    AORTIC VALVE CUSP SEPERATION [id number] cm    LVIDd 4.15 3.5 - 6.0 cm    IVS 1.09 0.6 - 1.1 cm    Posterior Wall 1.04 0.6 - 1.1 cm    Ao root annulus 2.50 cm    LVIDs 2.99 2.1 - 4.0 cm    FS 28 28 - 44 %    IVC ostium 0.7 cm    LV mass 147.20 g    LA size 2.30 cm    RVDD 3.10 cm    TAPSE 1.80 cm    Left Ventricle Relative Wall Thickness 0.50 cm    AV mean gradient 3 mmHg    AV valve area 2.12 cm2    AV Velocity Ratio 1.00     AV index (prosthetic) 1.06     E wave deceleration time 183 msec    LVOT diameter 1.60 cm    LVOT area 2.0 cm2    LVOT peak nav 1.1 m/s    LVOT peak VTI 19.00 cm    Ao peak nav 1.1 m/s    Ao VTI 18.00 cm    LVOT stroke volume 38.18 cm3    AV peak gradient 5 mmHg    MV Peak E Nav 0.77 m/s    LV Systolic Volume 34.70 mL    LV Systolic Volume Index 17.4 mL/m2    LV Diastolic Volume 76.40 mL    LV Diastolic Volume Index 38.20 mL/m2    LV Mass Index 74 g/m2    Echo EF Estimated 55 %    RA Major Axis 3.10 cm    Narrative    · Atrial fibrillation observed.  · The left ventricle is normal in size with normal systolic function.  · The estimated ejection fraction is 55%.  · Normal right ventricular size with normal right ventricular systolic   function.  · Mild pulmonic regurgitation.  · Normal central venous pressure (3 mmHg).  · Compared to prior ECHO from 1/20/21; no significant change          Pending Diagnostic Studies:     Procedure Component Value Units Date/Time    EXTRA TUBES [[id number]] Collected: 04/07/22 1040    Order Status: Sent Lab Status: In process Updated: 04/07/22 1041     Specimen: Blood, Venous     Narrative:      The following orders were created for panel order EXTRA TUBES.  Procedure                               Abnormality         Status                     ---------                               -----------         ------                     Light Blue Top Hold[[id number]]                              In process                   Please view results for these tests on the individual orders.    Transesophageal echo (AKBAR) [[id number]]     Order Status: Sent Lab Status: No result          Medications:  Reconciled Home Medications:      Medication List      START taking these medications    apixaban 5 mg Tab  Commonly known as: ELIQUIS  Take 1 tablet (5 mg total) by mouth 2 (two) times daily.     azithromycin 250 MG tablet  Commonly known as: Z-CORTNEY  Take 1 tablet (250 mg total) by mouth once daily. for 7 days  Start taking on: April 9, 2022     guaiFENesin 600 mg 12 hr tablet  Commonly known as: MUCINEX  Take 2 tablets (1,200 mg total) by mouth 2 (two) times daily. for 7 days     metoprolol succinate 50 MG 24 hr tablet  Commonly known as: TOPROL-XL  Take 1 tablet (50 mg total) by mouth once daily.  Start taking on: April 9, 2022     predniSONE 20 MG tablet  Commonly known as: DELTASONE  Take 2 tablets (40 mg total) by mouth once daily. for 3 doses  Start taking on: April 9, 2022        CHANGE how you take these medications    furosemide 40 MG tablet  Commonly known as: LASIX  Take 1 tablet (40 mg total) by mouth once daily. HOLD THIS MEDICATION UNTIL YOU FOLLOW UP WITH PRIMARY CARE PROVIDER  What changed: additional instructions     potassium chloride 10 MEQ Tbsr  Commonly known as: KLOR-CON  Take 1 tablet (10 mEq total) by mouth 2 (two) times daily. HOLD THIS MEDICATION UNTIL YOU FOLLOW UP WITH YOUR PRIMARY CARE PROVIDER  What changed: additional instructions        CONTINUE taking these medications    albuterol 90 mcg/actuation inhaler  Commonly known as: VENTOLIN HFA  Inhale 2  puffs into the lungs every 6 (six) hours as needed for Wheezing. Rescue     albuterol-ipratropium 2.5 mg-0.5 mg/3 mL nebulizer solution  Commonly known as: DUO-NEB  Take 3 mLs by nebulization every 6 (six) hours as needed for Wheezing. Rescue     CALCARB 600 WITH VITAMIN D ORAL  Take by mouth.     fluticasone propionate 50 mcg/actuation nasal spray  Commonly known as: FLONASE  1 spray (50 mcg total) by Each Nostril route once daily.     gabapentin 300 MG capsule  Commonly known as: NEURONTIN  Take 1 capsule (300 mg total) by mouth 3 (three) times daily.     ipratropium 42 mcg (0.06 %) nasal spray  Commonly known as: ATROVENT  2 sprays by Nasal route 4 (four) times daily.     isosorbide mononitrate 30 MG 24 hr tablet  Commonly known as: IMDUR  Take 1 tablet (30 mg total) by mouth once daily.     loratadine 10 mg tablet  Commonly known as: CLARITIN  Take 10 mg by mouth once daily.     montelukast 10 mg tablet  Commonly known as: SINGULAIR  Take 1 tablet (10 mg total) by mouth once daily.     paroxetine 40 MG tablet  Commonly known as: PAXIL  Take 1 tablet (40 mg total) by mouth once daily.     phenytoin 100 MG ER capsule  Commonly known as: DILANTIN  Take 1 capsule (100 mg total) by mouth 2 (two) times daily.     pravastatin 40 MG tablet  Commonly known as: PRAVACHOL  Take 1 tablet (40 mg total) by mouth nightly.     TRELEGY ELLIPTA 200-62.5-25 mcg inhaler  Generic drug: fluticasone-umeclidin-vilanter  Inhale 1 puff into the lungs once daily.        STOP taking these medications    meclizine 12.5 mg tablet  Commonly known as: ANTIVERT            Indwelling Lines/Drains at time of discharge:   Lines/Drains/Airways     None                 Time spent on the discharge of patient: Greater than 30 minutes         HEAVENLY Pena  Department of Hospital Medicine  [address]

## 2022-04-08 NOTE — SUBJECTIVE & OBJECTIVE
Interval History:No acute distress. 2L O2 with wheezing and rhonchi BL. Harsh non productive cough. HR irregular.     Review of Systems   Constitutional: Negative.    HENT: Negative.     Eyes: Negative.    Respiratory:  Positive for cough and shortness of breath (mild).    Cardiovascular: Negative.    Gastrointestinal: Negative.    Endocrine: Negative.    Genitourinary: Negative.    Musculoskeletal: Negative.    Skin: Negative.    Allergic/Immunologic: Negative.    Neurological:  Positive for weakness.   Hematological: Negative.    Psychiatric/Behavioral: Negative.     Objective:     Vital Signs (Most Recent):  Temp: 98.6 °F (37 °C) (04/08/22 0730)  Pulse: 70 (04/08/22 0746)  Resp: 20 (04/08/22 0746)  BP: (!) 89/62 (04/08/22 0730)  SpO2: 97 % (04/08/22 0742)   Vital Signs (24h Range):  Temp:  [97.2 °F (36.2 °C)-98.6 °F (37 °C)] 98.6 °F (37 °C)  Pulse:  [] 70  Resp:  [16-28] 20  SpO2:  [93 %-98 %] 97 %  BP: ()/(60-75) 89/62     Weight: 95.7 kg (211 lb)  Body mass index is 36.22 kg/m².  No intake or output data in the 24 hours ending 04/08/22 1115   Physical Exam  Vitals and nursing note reviewed.   Constitutional:       General: She is not in acute distress.     Appearance: She is ill-appearing. She is not toxic-appearing or diaphoretic.      Interventions: Nasal cannula in place.      Comments: 2L   HENT:      Head: Normocephalic and atraumatic.      Right Ear: External ear normal.      Left Ear: External ear normal.      Nose: Nose normal.      Mouth/Throat:      Mouth: Mucous membranes are moist.   Eyes:      Pupils: Pupils are equal, round, and reactive to light.   Cardiovascular:      Rate and Rhythm: Normal rate. Rhythm irregular.      Pulses: Normal pulses.      Heart sounds: Normal heart sounds.   Pulmonary:      Breath sounds: Wheezing and rhonchi present.   Abdominal:      General: Abdomen is protuberant. Bowel sounds are normal.      Palpations: Abdomen is soft.   Musculoskeletal:          General: Normal range of motion.      Cervical back: Normal range of motion and neck supple.   Skin:     General: Skin is warm.      Capillary Refill: Capillary refill takes less than 2 seconds.   Neurological:      Mental Status: She is alert and oriented to person, place, and time.   Psychiatric:         Mood and Affect: Mood normal.         Behavior: Behavior normal.       Significant Labs: All pertinent labs within the past 24 hours have been reviewed.  Recent Lab Results         04/08/22  0548   04/07/22  1402   04/07/22  1344        Albumin/Globulin Ratio 1.0           Albumin 3.4           Alkaline Phosphatase 193           ALT 42           Anion Gap 11           aPTT     26.6       AST 37           Baso # 0.03           Basophil % 0.4           BILIRUBIN TOTAL 0.2           BUN 23           BUN/CREAT RATIO 18           Calcium 9.2           Chloride 100           CO2 29           ID NOW COVID-19, (JOSIE)   Negative         Creatinine 1.30           Differential Type Auto           eGFR if non  43           Eos # 0.07           Eosinophil % 1.0           Globulin, Total 3.4           Glucose 117           Hematocrit 42.0           Hemoglobin 13.6           Immature Grans (Abs) 0.02           Immature Granulocytes 0.3           INR     0.93       Lymph # 2.18           Lymph % 30.7           Magnesium 2.0           MCH 29.4           MCHC 32.4           MCV 90.9           Mono # 0.74           Mono % 10.4           MPV 10.9           Neutrophils, Abs 4.07           Neutrophils Relative 57.2           nRBC 0.0           NUCLEATED RBC ABSOLUTE 0.00           Platelets 237           Potassium 3.6           PROTEIN TOTAL 6.8           Protime     12.5       RBC 4.62           RDW 11.9           Sodium 136           WBC 7.11                   Significant Imaging: I have reviewed all pertinent imaging results/findings within the past 24 hours.

## 2022-04-08 NOTE — ASSESSMENT & PLAN NOTE
Patient with Paroxysmal (<7 days) atrial fibrillation which is controlled currently with Beta Blocker. Patient is currently in sinus rhythm .[id number] Score: 3. Anticoagulation indicated. Anticoagulation done with heparin. Echo today, dc heparin and start apixaban 5mg bid  4/08 recommendations per cardiology

## 2022-04-08 NOTE — PLAN OF CARE
SS rec'd consult for HH. Pt already current with [facility].

## 2022-04-08 NOTE — ASSESSMENT & PLAN NOTE
Patient with Paroxysmal (<7 days) atrial fibrillation which is controlled currently with Beta Blocker. Patient is currently in sinus rhythm .[id number] Score: 3. Anticoagulation indicated. Anticoagulation done with heparin. Echo today, dc heparin and start apixaban 5mg bid  4/08 recommendations per cardiology, patient underwent successful AKBAR DCCV today and will be dc'd home with Toprol XL 50 mg PO daily and Eliquis 5 mg PO BID per cardiology recommendations.

## 2022-04-08 NOTE — PLAN OF CARE
Problem: Respiratory Compromise (Pneumonia)  Goal: Effective Oxygenation and Ventilation  Intervention: Promote Airway Secretion Clearance  Flowsheets (Taken 4/8/2022 0257)  Cough And Deep Breathing: done with encouragement     Problem: Gas Exchange Impaired  Goal: Optimal Gas Exchange  Intervention: Optimize Oxygenation and Ventilation  Flowsheets (Taken 4/8/2022 0257)  Airway/Ventilation Management: airway patency maintained  Head of Bed (HOB) Positioning: HOB elevated     Problem: Adult Inpatient Plan of Care  Goal: Plan of Care Review  Outcome: Ongoing, Progressing  Goal: Patient-Specific Goal (Individualized)  Outcome: Ongoing, Progressing  Goal: Absence of Hospital-Acquired Illness or Injury  Outcome: Ongoing, Progressing  Goal: Optimal Comfort and Wellbeing  Outcome: Ongoing, Progressing  Goal: Readiness for Transition of Care  Outcome: Ongoing, Progressing     Problem: Fluid Imbalance (Pneumonia)  Goal: Fluid Balance  Outcome: Ongoing, Progressing     Problem: Infection (Pneumonia)  Goal: Resolution of Infection Signs and Symptoms  Outcome: Ongoing, Progressing     Problem: Respiratory Compromise (Pneumonia)  Goal: Effective Oxygenation and Ventilation  Outcome: Ongoing, Progressing  Intervention: Promote Airway Secretion Clearance  Flowsheets (Taken 4/8/2022 0257)  Cough And Deep Breathing: done with encouragement     Problem: Gas Exchange Impaired  Goal: Optimal Gas Exchange  Outcome: Ongoing, Progressing  Intervention: Optimize Oxygenation and Ventilation  Flowsheets (Taken 4/8/2022 0257)  Airway/Ventilation Management: airway patency maintained  Head of Bed (HOB) Positioning: HOB elevated

## 2022-04-08 NOTE — PT/OT/SLP EVAL
Physical Therapy Evaluation    Patient Name:  Lawanda Martínez   MRN:  [id number]    Recommendations:     Discharge Recommendations:  home with home health   Discharge Equipment Recommendations: shower chair   Barriers to discharge: None    Assessment:     Lawanda Martínez is a 71 y.o. female admitted with a medical diagnosis of Atrial fibrillation with rapid ventricular response.  She presents with the following impairments/functional limitations:  weakness, impaired endurance, impaired functional mobilty, gait instability, pain, impaired cardiopulmonary response to activity. Pt demo difficulty with breathing during ambulation.    Rehab Prognosis: Good; patient would benefit from acute skilled PT services to address these deficits and reach maximum level of function.    Recent Surgery: Procedure(s) (LRB):  Transesophageal echo (AKBAR) intra-procedure log documentation (N/A)  Cardioversion (N/A) Day of Surgery    Plan:     During this hospitalization, patient to be seen 5 x/week to address the identified rehab impairments via gait training, therapeutic activities, therapeutic exercises and progress toward the following goals:    · Plan of Care Expires:  05/08/22    Subjective     Chief Complaint: headache  Patient/Family Comments/goals: agreeable to PT  Pain/Comfort:  · Pain Rating 1: 4/10  · Location 1: hand  · Pain Addressed 1: Distraction    Patients cultural, spiritual, Confucianist conflicts given the current situation: no    Living Environment:  Home with  and son in 1 story home with ramp to enter.   Prior to admission, patients level of function was mod I with use of rollator.  Equipment used at home: oxygen, grab bar, bedside commode, rollator.  DME owned (not currently used): none.  Upon discharge, patient will have assistance from family and C.    Objective:     Communicated with Pérez Colon LPN prior to session.  Patient found HOB elevated with oxygen, peripheral IV, telemetry  upon PT entry to  room.    General Precautions: Standard, fall   Orthopedic Precautions:N/A   Braces: N/A  Respiratory Status: Nasal cannula, flow 2 L/min    Exams:  · Cognitive Exam:  Patient is oriented to Person, Place, Time and Situation  · Sensation:    · -       Impaired  neuropathy in bilat feet, feels tingling  · RLE ROM: WFL  · RLE Strength: WFL  · LLE ROM: WFL  · LLE Strength: WFL    Functional Mobility:  · Bed Mobility:     · Supine to Sit: stand by assistance  · Transfers:     · Sit to Stand:  contact guard assistance with rolling walker  · Gait: 35ft with RW, CGA with pt demo increased wheezing noted during gait    Therapeutic Activities and Exercises:  ·  Pt educated on PT role/POC.   · Importance of OOB activity with staff assistance.  · Importance of sitting up in the chair throughout the day as tolerated, especially for meals   · Safety during functional t/f and mobility with use of RW  · White board updated   · Multiple self-care tasks/functional mobility completed- assistance level noted above   · All questions/concerns answered within PT scope of practice      AM-PAC 6 CLICK MOBILITY  Total Score:17     Patient left up in chair with all lines intact, call button in reach and family present.    GOALS:   Multidisciplinary Problems     Physical Therapy Goals        Problem: Physical Therapy    Goal Priority Disciplines Outcome Goal Variances Interventions   Physical Therapy Goal     PT, PT/OT Ongoing, Progressing     Description: Short Term Goals to be met by: 22    Patient will increase functional independence with mobility by performin. Supine to sit with independently  2. Sit to stand transfer with Stand by assist Rolling walker  3. Bed to chair transfer with Stand by assist using Rolling walker  4. Gait  x 100 feet with Stand by assist using Rolling walker  5. Lower extremity exercise program x30 reps per handout, with assistance as needed    Long Term Goals to be met by: 22    Pt will regain full  independent functional mobility with Rolling walker to return to home situation and prior activities of daily living.                    History:     Past Medical History:   Diagnosis Date    Asthma     CHF (congestive heart failure)     COPD (chronic obstructive pulmonary disease)     WILLSON (dyspnea on exertion)     GERD (gastroesophageal reflux disease)     HTN (hypertension)     Hyperlipidemia     Osteoporosis     Oxygen dependent     02 2L    Seizure disorder        Past Surgical History:   Procedure Laterality Date    COLONOSCOPY      REVISION OF TOTAL KNEE REPLACEMENT       TUBAL LIGATION         Time Tracking:     PT Received On: 04/08/22  PT Start Time: 0933     PT Stop Time: 0944  PT Total Time (min): 11 min     Billable Minutes: Evaluation low complexity      04/08/2022

## 2022-04-08 NOTE — PLAN OF CARE
Problem: Adult Inpatient Plan of Care  Goal: Plan of Care Review  Outcome: Met  Goal: Patient-Specific Goal (Individualized)  Outcome: Met  Goal: Absence of Hospital-Acquired Illness or Injury  Outcome: Met  Goal: Optimal Comfort and Wellbeing  Outcome: Met  Goal: Readiness for Transition of Care  Outcome: Met     Problem: Fluid Imbalance (Pneumonia)  Goal: Fluid Balance  Outcome: Met     Problem: Infection (Pneumonia)  Goal: Resolution of Infection Signs and Symptoms  Outcome: Met     Problem: Respiratory Compromise (Pneumonia)  Goal: Effective Oxygenation and Ventilation  Outcome: Met     Problem: Gas Exchange Impaired  Goal: Optimal Gas Exchange  Outcome: Met

## 2022-04-08 NOTE — PT/OT/SLP EVAL
Occupational Therapy   Evaluation    Name: Lawanda Martínez  MRN: [id number]  Admitting Diagnosis:  Atrial fibrillation with rapid ventricular response  Recent Surgery: Procedure(s) (LRB):  Transesophageal echo (AKBAR) intra-procedure log documentation (N/A)  Cardioversion (N/A) Day of Surgery    Recommendations:     Discharge Recommendations: home with home health  Discharge Equipment Recommendations:  shower chair  Barriers to discharge:  None    Assessment:     Lawanda Martínez is a 71 y.o. female with a medical diagnosis of Atrial fibrillation with rapid ventricular response.  She presents with weakness, decreased endurance, and decline in ADLs. Performance deficits affecting function: weakness, impaired endurance, impaired self care skills, impaired functional mobilty, gait instability, impaired balance.  Pt with decreased activity tolerance with SOB with exertion. OT to follow in order to maximize independence with ADL tasks and functional mobility.     Rehab Prognosis: Good; patient would benefit from acute skilled OT services to address these deficits and reach maximum level of function.       Plan:     Patient to be seen 5 x/week to address the above listed problems via self-care/home management, therapeutic activities, therapeutic exercises  · Plan of Care Expires: 05/06/22  · Plan of Care Reviewed with: patient    Subjective     Chief Complaint: Afib with RVR  Patient/Family Comments/goals: pt agreeable to participate in OT eval    Occupational Profile:  Living Environment: pt lives with  in one story home with ramp to enter  Previous level of function: independent with all ADL tasks and utilized rollator for functional mobility  Roles and Routines: perform self care  Equipment Used at Home:  rollator, bedside commode  Assistance upon Discharge: home with home health    Pain/Comfort:  · Pain Rating 1: 4/10  · Location 1: head    Patients cultural, spiritual, Spiritism conflicts given the current situation:  no    Objective:     Communicated with: ROSMERY Ortez prior to session.  Patient found supine with oxygen, peripheral IV, telemetry upon OT entry to room.    General Precautions: Standard, fall   Orthopedic Precautions:N/A   Braces: N/A  Respiratory Status: Nasal cannula, flow 2 L/min    Occupational Performance:    Bed Mobility:    · Patient completed Supine to Sit with contact guard assistance    Functional Mobility/Transfers:  · Patient completed Sit <> Stand Transfer with contact guard assistance  with  rolling walker   · Patient completed Bed <> Chair Transfer using Step Transfer technique with contact guard assistance with rolling walker  · Functional Mobility: pt performed functional mobility with RW with CGA with increased SOB    Activities of Daily Living:  · Upper Body Dressing: minimum assistance to john gown  · Lower Body Dressing: moderate assistance to john socks    Cognitive/Visual Perceptual:  Cognitive/Psychosocial Skills:     -       Oriented to: Person, Place, Time and Situation   -       Follows Commands/attention:Follows multistep  commands  -       Mood/Affect/Coping skills/emotional control: Cooperative    Physical Exam:  Balance: -       WFL with RW  Upper Extremity Range of Motion:     -       Right Upper Extremity: WFL  -       Left Upper Extremity: WFL  Upper Extremity Strength:    -       Right Upper Extremity: 4/5  -       Left Upper Extremity: 4/5  Gross motor coordination:   WFL    AMPAC 6 Click ADL:  AMPAC Total Score:      Treatment & Education:  · Pt educated on OT role/POC.   · Importance of OOB activity with staff assistance.  · Importance of sitting up in the chair throughout the day as tolerated, especially for meals   · Safety during functional t/f and mobility with use of RW  · Importance of assisting with self-care activities   · All questions/concerns answered within OT scope of practice    Education:    Patient left up in chair with all lines intact, call button in reach and  ROSMERY Ortez notified    GOALS:   Multidisciplinary Problems     Occupational Therapy Goals        Problem: Occupational Therapy    Goal Priority Disciplines Outcome Interventions   Occupational Therapy Goal     OT, PT/OT Ongoing, Progressing    Description: STG:  Pt will perform grooming with setup  Pt will bathe with Zak with setup at EOB  Pt will perform UE dressing with Christiano  Pt will perform LE dressing with Christiano  Pt will sit EOB x 10 min with SBA   Pt will transfer bed/chair/bsc with SBA with RW  Pt will perform standing task x 3 min with SBA with RW  Pt will tolerate 15 minutes of tx without fatigue      LT.Restore to max I with self care and mobility.                       History:     Past Medical History:   Diagnosis Date    Asthma     CHF (congestive heart failure)     COPD (chronic obstructive pulmonary disease)     WILLSON (dyspnea on exertion)     GERD (gastroesophageal reflux disease)     HTN (hypertension)     Hyperlipidemia     Osteoporosis     Oxygen dependent     02 2L    Seizure disorder        Past Surgical History:   Procedure Laterality Date    COLONOSCOPY      REVISION OF TOTAL KNEE REPLACEMENT       TUBAL LIGATION         Time Tracking:     OT Date of Treatment: 22  OT Start Time: 934  OT Stop Time: 943  OT Total Time (min): 9 min    Billable Minutes:Evaluation OT min complexity eval    2022

## 2022-04-08 NOTE — PLAN OF CARE
[facility] - [facility] Telemetry  Initial Discharge Assessment       Primary Care Provider: HEAVENLY Pulido    Admission Diagnosis: Shortness of breath [R06.02]  COPD exacerbation [J44.1]  Afib [I48.91]  Chest pain [R07.9]  Atrial fibrillation, unspecified type [I48.91]    Admission Date: 4/7/2022  Expected Discharge Date:     Discharge Barriers Identified: None    Payor: HU[employer] MANAGED MEDICARE / Plan: HU[employer] MEDICARE HMO / Product Type: Capitation /     Extended Emergency Contact Information  Primary Emergency Contact: Du Martínez  Address: [address] [address] [facility]  Home Phone: [phone]  Work Phone: [phone]  Mobile Phone: [phone]  Relation: Spouse  Preferred language: English   needed? No    Discharge Plan A: Home Health  Discharge Plan B: Home Health      [facility] [address] [address]  Phone: [phone] Fax: [phone]    [facility] - [facility] [address]  [address] [facility] [address]  Phone: [phone] Fax: [phone]      Initial Assessment (most recent)     Adult Discharge Assessment - 04/08/22 1013        Discharge Assessment    Assessment Type Discharge Planning Assessment     Source of Information patient;family     Lives With spouse     Do you expect to return to your current living situation? Yes     Current cognitive status: Alert/Oriented     Walking or Climbing Stairs Difficulty ambulation difficulty, requires equipment     Equipment Currently Used at Home oxygen;walker, rolling;bedside commode     Do you currently have service(s) that help you manage your care at home? Yes     Name and Contact number of agency [facility]     Is the pt/caregiver preference to resume services with current agency Yes     Who is going to help you get home at discharge? du martínez,      How do you  get to doctors appointments? family or friend will provide     Are you on dialysis? No     Discharge Plan A Home Health     Discharge Plan B Home Health     DME Needed Upon Discharge  none     Discharge Plan discussed with: Patient;Spouse/sig other     Discharge Barriers Identified None        Relationship/Environment    Name(s) of Who Lives With Patient du pablo,                  SS spoke with pt and , states pt lives at home with her. Current with [facility], choice obtained to remain with them at d/c. Pt uses o2, walker, bsc. Plan at d/c to return home. IM obtained. SS following.

## 2022-04-08 NOTE — PROGRESS NOTES
Patient with new onset atrial fibrillation underwent AKBAR with successful cardioversion to sinus rhythm today. Okay to discharge home from cardiology standpoint once recovery time is up. Discharge home on Toprol XL 50mg daily and Eliquis 5mg BID. Follow up with her cardiologist, Dr. Nieves, in 3 weeks.

## 2022-04-08 NOTE — PROGRESS NOTES
[facility] - [address] [place] [address] Medicine  Progress Note    Patient Name: Lawanda Martínez  MRN: [id number]  Patient Class: IP- Inpatient   Admission Date: 4/7/2022  Length of Stay: 1 days  Attending Physician: Woo Morley MD  Primary Care Provider: HEAVENLY Pulido        Subjective:     Principal Problem:Atrial fibrillation with rapid ventricular response        HPI:  Patient is 71 WF with a history of astma, copd, diastolic chf, gerd, htn, osteoporosis, seizure disorder and oxygen dependent o2 dependent 2L. The patient states that she has been short of breath for 14 days. She has HH that came and saw her Monday and sent her to her pcp. She states that her pcp did a cxr and ekg and told her it was her heart and gave her some lasix and steroid shot. Pt states that she became really sob yesterday after walking 10 steps. HH came out today and noticed that she was cough and short of breath more and recommended her to come to the ED for treatment. Patient states that she has an appointment with her cardiologist tomorrow at 10:15. She denies cp,nausea or vomiting. Echo in 12/20/21 showed a ef of 70%.       Overview/Hospital Course:  04/07 Admit to impatient for diuresing, HR control for new afib, iv abx and nebs   4/08 BP hypotensive, asymptomatic. Pt feels better this am. Tolerating abx. Harsh cough non productive add mucinex and incentive spirometer. Slight bump in BUN and Creat 23/1.30. HR irregular no distress noted. Currently being followed by cardiology.       Interval History:No acute distress. 2L O2 with wheezing and rhonchi BL. Harsh non productive cough. HR irregular.     Review of Systems   Constitutional: Negative.    HENT: Negative.     Eyes: Negative.    Respiratory:  Positive for cough and shortness of breath (mild).    Cardiovascular: Negative.    Gastrointestinal: Negative.    Endocrine: Negative.    Genitourinary: Negative.    Musculoskeletal: Negative.    Skin: Negative.     Allergic/Immunologic: Negative.    Neurological:  Positive for weakness.   Hematological: Negative.    Psychiatric/Behavioral: Negative.     Objective:     Vital Signs (Most Recent):  Temp: 98.6 °F (37 °C) (04/08/22 0730)  Pulse: 70 (04/08/22 0746)  Resp: 20 (04/08/22 0746)  BP: (!) 89/62 (04/08/22 0730)  SpO2: 97 % (04/08/22 0742)   Vital Signs (24h Range):  Temp:  [97.2 °F (36.2 °C)-98.6 °F (37 °C)] 98.6 °F (37 °C)  Pulse:  [] 70  Resp:  [16-28] 20  SpO2:  [93 %-98 %] 97 %  BP: ()/(60-75) 89/62     Weight: 95.7 kg (211 lb)  Body mass index is 36.22 kg/m².  No intake or output data in the 24 hours ending 04/08/22 1115   Physical Exam  Vitals and nursing note reviewed.   Constitutional:       General: She is not in acute distress.     Appearance: She is ill-appearing. She is not toxic-appearing or diaphoretic.      Interventions: Nasal cannula in place.      Comments: 2L   HENT:      Head: Normocephalic and atraumatic.      Right Ear: External ear normal.      Left Ear: External ear normal.      Nose: Nose normal.      Mouth/Throat:      Mouth: Mucous membranes are moist.   Eyes:      Pupils: Pupils are equal, round, and reactive to light.   Cardiovascular:      Rate and Rhythm: Normal rate. Rhythm irregular.      Pulses: Normal pulses.      Heart sounds: Normal heart sounds.   Pulmonary:      Breath sounds: Wheezing and rhonchi present.   Abdominal:      General: Abdomen is protuberant. Bowel sounds are normal.      Palpations: Abdomen is soft.   Musculoskeletal:         General: Normal range of motion.      Cervical back: Normal range of motion and neck supple.   Skin:     General: Skin is warm.      Capillary Refill: Capillary refill takes less than 2 seconds.   Neurological:      Mental Status: She is alert and oriented to person, place, and time.   Psychiatric:         Mood and Affect: Mood normal.         Behavior: Behavior normal.       Significant Labs: All pertinent labs within the past 24  hours have been reviewed.  Recent Lab Results         04/08/22  0548   04/07/22  1402   04/07/22  1344        Albumin/Globulin Ratio 1.0           Albumin 3.4           Alkaline Phosphatase 193           ALT 42           Anion Gap 11           aPTT     26.6       AST 37           Baso # 0.03           Basophil % 0.4           BILIRUBIN TOTAL 0.2           BUN 23           BUN/CREAT RATIO 18           Calcium 9.2           Chloride 100           CO2 29           ID NOW COVID-19, (JOSIE)   Negative         Creatinine 1.30           Differential Type Auto           eGFR if non  43           Eos # 0.07           Eosinophil % 1.0           Globulin, Total 3.4           Glucose 117           Hematocrit 42.0           Hemoglobin 13.6           Immature Grans (Abs) 0.02           Immature Granulocytes 0.3           INR     0.93       Lymph # 2.18           Lymph % 30.7           Magnesium 2.0           MCH 29.4           MCHC 32.4           MCV 90.9           Mono # 0.74           Mono % 10.4           MPV 10.9           Neutrophils, Abs 4.07           Neutrophils Relative 57.2           nRBC 0.0           NUCLEATED RBC ABSOLUTE 0.00           Platelets 237           Potassium 3.6           PROTEIN TOTAL 6.8           Protime     12.5       RBC 4.62           RDW 11.9           Sodium 136           WBC 7.11                   Significant Imaging: I have reviewed all pertinent imaging results/findings within the past 24 hours.      Assessment/Plan:      * Atrial fibrillation with rapid ventricular response  Patient with Paroxysmal (<7 days) atrial fibrillation which is controlled currently with Beta Blocker. Patient is currently in sinus rhythm .[id number] Score: 3. Anticoagulation indicated. Anticoagulation done with heparin. Echo today, dc heparin and start apixaban 5mg bid  4/08 recommendations per cardiology        Seizure  Continue oral dilantin 100mg daily      HLD (hyperlipidemia)    Continue oral  statin    Pneumonia due to infectious organism  4/08 tolerating abx       COPD exacerbation  Duo nebs tid, iv rocephin and azithromycin for pneumonia      Chronic diastolic heart failure  IV lasix 40mg daily, monitor bun/creat and bnp  4/08 continue lasix monitor renal function      VTE Risk Mitigation (From admission, onward)         Ordered     apixaban tablet 5 mg  2 times daily         04/07/22 1545     IP VTE HIGH RISK PATIENT  Once         04/07/22 1453     Place sequential compression device  Until discontinued         04/07/22 1453                Discharge Planning   ALEX:      Code Status: Full Code   Is the patient medically ready for discharge?:     Reason for patient still in hospital (select all that apply): Treatment  Discharge Plan A: Home Health                  HEAVENLY Jennings  Department of Hospital Medicine   [address]

## 2022-04-08 NOTE — PLAN OF CARE
Problem: Physical Therapy  Goal: Physical Therapy Goal  Description: Short Term Goals to be met by: 22    Patient will increase functional independence with mobility by performin. Supine to sit with independently  2. Sit to stand transfer with Stand by assist Rolling walker  3. Bed to chair transfer with Stand by assist using Rolling walker  4. Gait  x 100 feet with Stand by assist using Rolling walker  5. Lower extremity exercise program x30 reps per handout, with assistance as needed    Long Term Goals to be met by: 22    Pt will regain full independent functional mobility with Rolling walker to return to home situation and prior activities of daily living.   Outcome: Ongoing, Progressing

## 2022-04-08 NOTE — PLAN OF CARE
Problem: Occupational Therapy  Goal: Occupational Therapy Goal  Description: STG:  Pt will perform grooming with setup  Pt will bathe with Zak with setup at EOB  Pt will perform UE dressing with Christiano  Pt will perform LE dressing with Christiano  Pt will sit EOB x 10 min with SBA   Pt will transfer bed/chair/bsc with SBA with RW  Pt will perform standing task x 3 min with SBA with RW  Pt will tolerate 15 minutes of tx without fatigue      LT.Restore to max I with self care and mobility.      Outcome: Ongoing, Progressing

## 2022-04-08 NOTE — ASSESSMENT & PLAN NOTE
IV lasix 40mg daily, monitor bun/creat and bnp  4/08 slight bump in Cr, will hold Lasix PO at d/c and patient will follow up with PCP within 1 week for recheck.

## 2022-04-11 ENCOUNTER — TELEPHONE (OUTPATIENT)
Dept: FAMILY MEDICINE | Facility: CLINIC | Age: 72
End: 2022-04-11
Payer: MEDICARE

## 2022-04-11 ENCOUNTER — DOCUMENTATION ONLY (OUTPATIENT)
Dept: FAMILY MEDICINE | Facility: CLINIC | Age: 72
End: 2022-04-11
Payer: MEDICARE

## 2022-04-11 NOTE — PROGRESS NOTES
Rec'd note from Landon:  Pt dc from [facility] 4/8/22 for afib/chf exacerbation. Dc instruction state for pt to hold lasix and potassium until fu with PCP. Patient has fu 4/14/22. Reports she was not instructed to hold meds on dc and has continued to take lasix and potassium. Trace edema ble, sob with conversation, improved with rest, wheezing left upper and lower lobes. Right CTA. O2 sats 98% on 2L via [place], /82, P63, R 20, temp 97.1. weight 200lbs.    Nathalia reviewed, patient does need to hold lasix and potassium until follow up on 4/14. Landon notified.

## 2022-04-11 NOTE — PLAN OF CARE
[facility] - [facility] Telemetry  Discharge Final Note    Primary Care Provider: HEAVENLY Pulido    Expected Discharge Date: 4/8/2022    Final Discharge Note (most recent)     Final Note - 04/11/22 0928        Final Note    Assessment Type Final Discharge Note     Anticipated Discharge Disposition Home-Health Care Svc        Post-Acute Status    Post-Acute Authorization Home Health     Home Health Status Set-up Complete/Auth obtained     Patient choice form signed by patient/caregiver List with quality metrics by geographic area provided;List from CMS Compare;List from System Post-Acute Care     Discharge Delays None known at this time                 Important Message from Medicare  Important Message from Medicare regarding Discharge Appeal Rights: Given to patient/caregiver, Explained to patient/caregiver, Signed/date by patient/caregiver     Date IMM was signed: 04/08/22  Time IMM was signed: 1024    Contact Info     David Nieves MD   Specialty: Interventional Cardiology, Cardiology    [address] [place] [id number]   Phone: [phone]       Next Steps: Follow up on 4/27/2022    Instructions: 2:00 pm Schedule follow up with her cardiologist, Dr. Nieves, in 3 weeks; Hospital discharge, new onset afib s/p cardioversion    HEAVENLY Pulido   Specialty: Family Medicine, Emergency Medicine   Relationship: PCP - General    [address] [place] [address] 15  [facility] [place] [address]   Phone: [phone]       Next Steps: Follow up on 4/14/2022    Instructions: 10:15 am Hospital follow up s/p AKBAR DCCV success for new onset A-Fib      pt d/c home with  4/8/22. D/c summary faxed. No further needs.

## 2022-04-13 LAB
BSA FOR ECHO PROCEDURE: 2.08 M2
EJECTION FRACTION: 45 %

## 2022-04-14 ENCOUNTER — OFFICE VISIT (OUTPATIENT)
Dept: FAMILY MEDICINE | Facility: CLINIC | Age: 72
End: 2022-04-14
Payer: MEDICARE

## 2022-04-14 VITALS
BODY MASS INDEX: 36.19 KG/M2 | DIASTOLIC BLOOD PRESSURE: 60 MMHG | OXYGEN SATURATION: 95 % | WEIGHT: 212 LBS | HEIGHT: 64 IN | TEMPERATURE: 99 F | SYSTOLIC BLOOD PRESSURE: 110 MMHG | RESPIRATION RATE: 20 BRPM | HEART RATE: 59 BPM

## 2022-04-14 DIAGNOSIS — I50.32 CHRONIC DIASTOLIC HEART FAILURE: Primary | ICD-10-CM

## 2022-04-14 DIAGNOSIS — J45.901 ASTHMA WITH ACUTE EXACERBATION, UNSPECIFIED ASTHMA SEVERITY, UNSPECIFIED WHETHER PERSISTENT: ICD-10-CM

## 2022-04-14 DIAGNOSIS — I48.91 ATRIAL FIBRILLATION, UNSPECIFIED TYPE: ICD-10-CM

## 2022-04-14 DIAGNOSIS — E87.6 HYPOKALEMIA: ICD-10-CM

## 2022-04-14 DIAGNOSIS — J40 BRONCHITIS: ICD-10-CM

## 2022-04-14 LAB
ALBUMIN SERPL BCP-MCNC: 3.5 G/DL (ref 3.5–5)
ALBUMIN/GLOB SERPL: 0.9 {RATIO}
ALP SERPL-CCNC: 214 U/L (ref 55–142)
ALT SERPL W P-5'-P-CCNC: 64 U/L (ref 13–56)
ANION GAP SERPL CALCULATED.3IONS-SCNC: 10 MMOL/L (ref 7–16)
AST SERPL W P-5'-P-CCNC: 34 U/L (ref 15–37)
BILIRUB SERPL-MCNC: 0.3 MG/DL (ref 0–1.2)
BUN SERPL-MCNC: 21 MG/DL (ref 7–18)
BUN/CREAT SERPL: 19 (ref 6–20)
CALCIUM SERPL-MCNC: 9.5 MG/DL (ref 8.5–10.1)
CHLORIDE SERPL-SCNC: 104 MMOL/L (ref 98–107)
CO2 SERPL-SCNC: 31 MMOL/L (ref 21–32)
CREAT SERPL-MCNC: 1.13 MG/DL (ref 0.55–1.02)
GLOBULIN SER-MCNC: 3.7 G/DL (ref 2–4)
GLUCOSE SERPL-MCNC: 102 MG/DL (ref 74–106)
NT-PROBNP SERPL-MCNC: 560 PG/ML (ref 1–125)
POTASSIUM SERPL-SCNC: 4.9 MMOL/L (ref 3.5–5.1)
PROT SERPL-MCNC: 7.2 G/DL (ref 6.4–8.2)
SODIUM SERPL-SCNC: 140 MMOL/L (ref 136–145)

## 2022-04-14 PROCEDURE — 83880 NT-PRO NATRIURETIC PEPTIDE: ICD-10-PCS | Mod: ,,, | Performed by: CLINICAL MEDICAL LABORATORY

## 2022-04-14 PROCEDURE — 99495 TRANSJ CARE MGMT MOD F2F 14D: CPT | Mod: ,,, | Performed by: NURSE PRACTITIONER

## 2022-04-14 PROCEDURE — 1125F AMNT PAIN NOTED PAIN PRSNT: CPT | Mod: ,,, | Performed by: NURSE PRACTITIONER

## 2022-04-14 PROCEDURE — 99495 TCM SERVICES (MODERATE COMPLEXITY): ICD-10-PCS | Mod: ,,, | Performed by: NURSE PRACTITIONER

## 2022-04-14 PROCEDURE — 1160F PR REVIEW ALL MEDS BY PRESCRIBER/CLIN PHARMACIST DOCUMENTED: ICD-10-PCS | Mod: ,,, | Performed by: NURSE PRACTITIONER

## 2022-04-14 PROCEDURE — 3288F FALL RISK ASSESSMENT DOCD: CPT | Mod: ,,, | Performed by: NURSE PRACTITIONER

## 2022-04-14 PROCEDURE — 3074F SYST BP LT 130 MM HG: CPT | Mod: ,,, | Performed by: NURSE PRACTITIONER

## 2022-04-14 PROCEDURE — 3008F BODY MASS INDEX DOCD: CPT | Mod: ,,, | Performed by: NURSE PRACTITIONER

## 2022-04-14 PROCEDURE — 1160F RVW MEDS BY RX/DR IN RCRD: CPT | Mod: ,,, | Performed by: NURSE PRACTITIONER

## 2022-04-14 PROCEDURE — 3008F PR BODY MASS INDEX (BMI) DOCUMENTED: ICD-10-PCS | Mod: ,,, | Performed by: NURSE PRACTITIONER

## 2022-04-14 PROCEDURE — 1125F PR PAIN SEVERITY QUANTIFIED, PAIN PRESENT: ICD-10-PCS | Mod: ,,, | Performed by: NURSE PRACTITIONER

## 2022-04-14 PROCEDURE — 1159F PR MEDICATION LIST DOCUMENTED IN MEDICAL RECORD: ICD-10-PCS | Mod: ,,, | Performed by: NURSE PRACTITIONER

## 2022-04-14 PROCEDURE — 3078F DIAST BP <80 MM HG: CPT | Mod: ,,, | Performed by: NURSE PRACTITIONER

## 2022-04-14 PROCEDURE — 80053 COMPREHENSIVE METABOLIC PANEL: ICD-10-PCS | Mod: ,,, | Performed by: CLINICAL MEDICAL LABORATORY

## 2022-04-14 PROCEDURE — 1101F PT FALLS ASSESS-DOCD LE1/YR: CPT | Mod: ,,, | Performed by: NURSE PRACTITIONER

## 2022-04-14 PROCEDURE — 83880 ASSAY OF NATRIURETIC PEPTIDE: CPT | Mod: ,,, | Performed by: CLINICAL MEDICAL LABORATORY

## 2022-04-14 PROCEDURE — 3074F PR MOST RECENT SYSTOLIC BLOOD PRESSURE < 130 MM HG: ICD-10-PCS | Mod: ,,, | Performed by: NURSE PRACTITIONER

## 2022-04-14 PROCEDURE — 80053 COMPREHEN METABOLIC PANEL: CPT | Mod: ,,, | Performed by: CLINICAL MEDICAL LABORATORY

## 2022-04-14 PROCEDURE — 1101F PR PT FALLS ASSESS DOC 0-1 FALLS W/OUT INJ PAST YR: ICD-10-PCS | Mod: ,,, | Performed by: NURSE PRACTITIONER

## 2022-04-14 PROCEDURE — 1159F MED LIST DOCD IN RCRD: CPT | Mod: ,,, | Performed by: NURSE PRACTITIONER

## 2022-04-14 PROCEDURE — 3078F PR MOST RECENT DIASTOLIC BLOOD PRESSURE < 80 MM HG: ICD-10-PCS | Mod: ,,, | Performed by: NURSE PRACTITIONER

## 2022-04-14 PROCEDURE — 3288F PR FALLS RISK ASSESSMENT DOCUMENTED: ICD-10-PCS | Mod: ,,, | Performed by: NURSE PRACTITIONER

## 2022-04-14 RX ORDER — IPRATROPIUM BROMIDE AND ALBUTEROL SULFATE 2.5; .5 MG/3ML; MG/3ML
3 SOLUTION RESPIRATORY (INHALATION) EVERY 6 HOURS PRN
Qty: 100 ML | Refills: 5 | Status: SHIPPED | OUTPATIENT
Start: 2022-04-14 | End: 2022-10-27 | Stop reason: SDUPTHER

## 2022-04-14 NOTE — PROGRESS NOTES
2022     HEAVENLY Pulido   [facility]  [address]  [place] [address]     PATIENT NAME: Lawanda Martínez  : 1950  DATE: 22  MRN: [id number]      Billing Provider: HEAVENLY Pulido  Level of Service:   Patient PCP Information     Provider PCP Type    HEAVENLY Pulido General          Reason for Visit / Chief Complaint: Transitional Care (Hospital follow up, discharged from [facility] 2022 r/t new onset Afib with RVR, pneumonia, medications reconciled at visit. Patient was supposed to be holding Potassium and Lasix was unaware at discharge on Friday to hold medications, HH reviewed dc instructions and on  instructed HH to let patient know medications did need to be held until follow up with PCP. Patient did not get message until yesterday so has taken medications daily except this morning. Will repeat labs)       Update PCP  Update Chief Complaint         History of Present Illness / Problem Focused Workflow     Lawanda Martínez presents to the clinic TCC afib dyspnea pneumonia. She reports feeling fatigued since starting metoprolol but not having any palpitations, no fever cough improved      Transitional Care Note    Family and/or Caretaker present at visit?  No.  Diagnostic tests reviewed/disposition: No diagnosic tests pending after this hospitalization.  Disease/illness education: atrial fib  Home health/community services discussion/referrals: Patient has home health established at [facility].   Establishment or re-establishment of referral orders for community resources: No other necessary community resources.   Discussion with other health care providers: has cardio appt .     Discharge medications reviewed and reconciled:  Discharged medications reconciled with the current medications, Discharge medications reviewed and did not hold lasix until yesterday will repeat labs              Review of Systems     Review of Systems   Constitutional:  Positive for fatigue. Negative for activity change.   HENT: Negative for nasal congestion, rhinorrhea, sore throat and trouble swallowing.    Eyes: Negative for visual disturbance.   Respiratory: Positive for cough and shortness of breath. Negative for chest tightness and wheezing.    Cardiovascular: Negative for chest pain and leg swelling.   Gastrointestinal: Negative for abdominal pain, change in bowel habit and change in bowel habit.   Genitourinary: Negative for difficulty urinating.   Integumentary:  Negative for pallor and rash.   Neurological: Negative for light-headedness and headaches.        Medical / Social / Family History     Past Medical History:   Diagnosis Date    Asthma     CHF (congestive heart failure)     COPD (chronic obstructive pulmonary disease)     WILLSON (dyspnea on exertion)     GERD (gastroesophageal reflux disease)     HTN (hypertension)     Hyperlipidemia     Osteoporosis     Oxygen dependent     02 2L    Seizure disorder        Past Surgical History:   Procedure Laterality Date    CARDIOVERSION N/A 4/8/2022    Procedure: Cardioversion;  Surgeon: Adal Chung MD;  Location: [facility] CATH LAB;  Service: Cardiology;  Laterality: N/A;    COLONOSCOPY      REVISION OF TOTAL KNEE REPLACEMENT       TUBAL LIGATION         Social History  Ms.  reports that she has never smoked. She has never used smokeless tobacco. She reports that she does not drink alcohol and does not use drugs.    Family History  Ms.'s family history includes Cancer in her father; Diabetes in her brother, mother, and sister; Heart disease in her mother and sister; Hypertension in her mother and sister.    Medications and Allergies     Medications  Outpatient Medications Marked as Taking for the 4/14/22 encounter (Office Visit) with HEAVENLY Pulido   Medication Sig Dispense Refill    albuterol (VENTOLIN HFA) 90 mcg/actuation inhaler Inhale 2 puffs into the lungs every 6 (six) hours as needed  for Wheezing. Rescue 18 g 0    apixaban (ELIQUIS) 5 mg Tab Take 1 tablet (5 mg total) by mouth 2 (two) times daily. 60 tablet 0    calcium carbonate/vitamin D3 (CALCARB 600 WITH VITAMIN D ORAL) Take by mouth.      fluticasone propionate (FLONASE) 50 mcg/actuation nasal spray 1 spray (50 mcg total) by Each Nostril route once daily. 16 g 1    fluticasone-umeclidin-vilanter (TRELEGY ELLIPTA) 200-62.5-25 mcg inhaler Inhale 1 puff into the lungs once daily. 60 each 6    furosemide (LASIX) 40 MG tablet Take 1 tablet (40 mg total) by mouth once daily. HOLD THIS MEDICATION UNTIL YOU FOLLOW UP WITH PRIMARY CARE PROVIDER 90 tablet 1    gabapentin (NEURONTIN) 300 MG capsule Take 1 capsule (300 mg total) by mouth 3 (three) times daily. 270 capsule 1    guaiFENesin (MUCINEX) 600 mg 12 hr tablet Take 2 tablets (1,200 mg total) by mouth 2 (two) times daily. for 7 days 28 tablet 0    ipratropium (ATROVENT) 42 mcg (0.06 %) nasal spray 2 sprays by Nasal route 4 (four) times daily. 15 mL 0    isosorbide mononitrate (IMDUR) 30 MG 24 hr tablet Take 1 tablet (30 mg total) by mouth once daily. 90 tablet 1    loratadine (CLARITIN) 10 mg tablet Take 10 mg by mouth once daily.      metoprolol succinate (TOPROL-XL) 50 MG 24 hr tablet Take 1 tablet (50 mg total) by mouth once daily. 30 tablet 0    montelukast (SINGULAIR) 10 mg tablet Take 1 tablet (10 mg total) by mouth once daily. 90 tablet 1    paroxetine (PAXIL) 40 MG tablet Take 1 tablet (40 mg total) by mouth once daily. 90 tablet 1    phenytoin (DILANTIN) 100 MG ER capsule Take 1 capsule (100 mg total) by mouth 2 (two) times daily. 180 capsule 2    potassium chloride (KLOR-CON) 10 MEQ TbSR Take 1 tablet (10 mEq total) by mouth 2 (two) times daily. HOLD THIS MEDICATION UNTIL YOU FOLLOW UP WITH YOUR PRIMARY CARE PROVIDER 180 tablet 1    pravastatin (PRAVACHOL) 40 MG tablet Take 1 tablet (40 mg total) by mouth nightly. 90 tablet 1       Allergies  Review of patient's  "allergies indicates:  No Known Allergies    Physical Examination     Vitals:    04/14/22 1014   BP: 110/60   BP Location: Left arm   Patient Position: Sitting   Pulse: (!) 59   Resp: 20   Temp: 98.7 °F (37.1 °C)   TempSrc: Oral   SpO2: 95%   Weight: 96.2 kg (212 lb)   Height: 5' 4" (1.626 m)      Physical Exam  Vitals and nursing note reviewed.   Constitutional:       General: She is not in acute distress.     Appearance: She is obese. She is not ill-appearing, toxic-appearing or diaphoretic.   HENT:      Right Ear: External ear normal.      Left Ear: External ear normal.      Nose: Nose normal.      Mouth/Throat:      Mouth: Mucous membranes are moist.   Eyes:      Conjunctiva/sclera: Conjunctivae normal.      Pupils: Pupils are equal, round, and reactive to light.   Cardiovascular:      Rate and Rhythm: Normal rate and regular rhythm.   Pulmonary:      Breath sounds: Wheezing present.   Musculoskeletal:      Cervical back: Normal range of motion and neck supple. No rigidity or tenderness.      Right lower leg: No edema.      Left lower leg: No edema.   Lymphadenopathy:      Cervical: No cervical adenopathy.   Skin:     General: Skin is warm.      Capillary Refill: Capillary refill takes less than 2 seconds.   Neurological:      Mental Status: She is alert.        BMP  Lab Results   Component Value Date     04/08/2022    K 3.6 04/08/2022     04/08/2022    CO2 29 04/08/2022    BUN 23 (H) 04/08/2022    CREATININE 1.30 (H) 04/08/2022    CALCIUM 9.2 04/08/2022    ANIONGAP 11 04/08/2022    EGFRNONAA 43 (L) 04/08/2022       Assessment and Plan (including Health Maintenance)      Problem List  Smart Sets  Document Outside HM   :    Plan:     Continue to hold lasix daily weights discussed metoprolol, repeat BNP BMP today    Health Maintenance Due   Topic Date Due    TETANUS VACCINE  Never done    Shingles Vaccine (1 of 2) Never done    Mammogram  10/07/2020    Colorectal Cancer Screening  01/11/2022 "       Problem List Items Addressed This Visit        Pulmonary    Asthma    Relevant Medications    albuterol-ipratropium (DUO-NEB) 2.5 mg-0.5 mg/3 mL nebulizer solution       Cardiac/Vascular    Chronic diastolic heart failure - Primary (Chronic)    Relevant Orders    NT-Pro Natriuretic Peptide      Other Visit Diagnoses     Hypokalemia        Relevant Orders    Comprehensive Metabolic Panel    Bronchitis        Relevant Medications    albuterol-ipratropium (DUO-NEB) 2.5 mg-0.5 mg/3 mL nebulizer solution    Atrial fibrillation, unspecified type            Chronic diastolic heart failure  -     NT-Pro Natriuretic Peptide; Future; Expected date: 04/14/2022    Hypokalemia  -     Comprehensive Metabolic Panel; Future; Expected date: 04/14/2022    Asthma with acute exacerbation, unspecified asthma severity, unspecified whether persistent  -     albuterol-ipratropium (DUO-NEB) 2.5 mg-0.5 mg/3 mL nebulizer solution; Take 3 mLs by nebulization every 6 (six) hours as needed for Wheezing. Rescue  Dispense: 100 mL; Refill: 5    Bronchitis  -     albuterol-ipratropium (DUO-NEB) 2.5 mg-0.5 mg/3 mL nebulizer solution; Take 3 mLs by nebulization every 6 (six) hours as needed for Wheezing. Rescue  Dispense: 100 mL; Refill: 5    Atrial fibrillation, unspecified type       Health Maintenance Topics with due status: Not Due       Topic Last Completion Date    DEXA Scan 03/05/2020    Lipid Panel 12/30/2021       Procedures     Future Appointments   Date Time Provider Department Center   4/26/2022  9:00 AM [facility]   4/27/2022  1:30 PM David Nieves MD [facility]   5/16/2022 10:30 AM HEAVENLY Pulido [facility] [place]   5/20/2022  1:30 PM [facility] [place]        Follow up in about 4 weeks (around 5/12/2022).     Signature:  HEAVENLY Pulido    Date of encounter: 4/14/22

## 2022-04-26 ENCOUNTER — OFFICE VISIT (OUTPATIENT)
Dept: PULMONOLOGY | Facility: CLINIC | Age: 72
End: 2022-04-26
Payer: MEDICARE

## 2022-04-26 VITALS
DIASTOLIC BLOOD PRESSURE: 72 MMHG | SYSTOLIC BLOOD PRESSURE: 118 MMHG | HEART RATE: 53 BPM | OXYGEN SATURATION: 97 % | HEIGHT: 64 IN | WEIGHT: 205 LBS | BODY MASS INDEX: 35 KG/M2

## 2022-04-26 DIAGNOSIS — J45.901 ASTHMA WITH ACUTE EXACERBATION, UNSPECIFIED ASTHMA SEVERITY, UNSPECIFIED WHETHER PERSISTENT: ICD-10-CM

## 2022-04-26 DIAGNOSIS — I50.32 CHRONIC DIASTOLIC HEART FAILURE: Chronic | ICD-10-CM

## 2022-04-26 DIAGNOSIS — J45.50 SEVERE PERSISTENT ASTHMA WITHOUT COMPLICATION: Primary | ICD-10-CM

## 2022-04-26 DIAGNOSIS — J45.40 MODERATE PERSISTENT ASTHMA WITHOUT COMPLICATION: ICD-10-CM

## 2022-04-26 PROCEDURE — 3288F PR FALLS RISK ASSESSMENT DOCUMENTED: ICD-10-PCS | Mod: CPTII,,, | Performed by: INTERNAL MEDICINE

## 2022-04-26 PROCEDURE — 99214 PR OFFICE/OUTPT VISIT, EST, LEVL IV, 30-39 MIN: ICD-10-PCS | Mod: S$PBB,,, | Performed by: INTERNAL MEDICINE

## 2022-04-26 PROCEDURE — 1126F AMNT PAIN NOTED NONE PRSNT: CPT | Mod: CPTII,,, | Performed by: INTERNAL MEDICINE

## 2022-04-26 PROCEDURE — 3074F SYST BP LT 130 MM HG: CPT | Mod: CPTII,,, | Performed by: INTERNAL MEDICINE

## 2022-04-26 PROCEDURE — 3008F BODY MASS INDEX DOCD: CPT | Mod: CPTII,,, | Performed by: INTERNAL MEDICINE

## 2022-04-26 PROCEDURE — 1101F PT FALLS ASSESS-DOCD LE1/YR: CPT | Mod: CPTII,,, | Performed by: INTERNAL MEDICINE

## 2022-04-26 PROCEDURE — 99214 OFFICE O/P EST MOD 30 MIN: CPT | Mod: S$PBB,,, | Performed by: INTERNAL MEDICINE

## 2022-04-26 PROCEDURE — 3078F DIAST BP <80 MM HG: CPT | Mod: CPTII,,, | Performed by: INTERNAL MEDICINE

## 2022-04-26 PROCEDURE — 1101F PR PT FALLS ASSESS DOC 0-1 FALLS W/OUT INJ PAST YR: ICD-10-PCS | Mod: CPTII,,, | Performed by: INTERNAL MEDICINE

## 2022-04-26 PROCEDURE — 3078F PR MOST RECENT DIASTOLIC BLOOD PRESSURE < 80 MM HG: ICD-10-PCS | Mod: CPTII,,, | Performed by: INTERNAL MEDICINE

## 2022-04-26 PROCEDURE — 3288F FALL RISK ASSESSMENT DOCD: CPT | Mod: CPTII,,, | Performed by: INTERNAL MEDICINE

## 2022-04-26 PROCEDURE — 1126F PR PAIN SEVERITY QUANTIFIED, NO PAIN PRESENT: ICD-10-PCS | Mod: CPTII,,, | Performed by: INTERNAL MEDICINE

## 2022-04-26 PROCEDURE — 3008F PR BODY MASS INDEX (BMI) DOCUMENTED: ICD-10-PCS | Mod: CPTII,,, | Performed by: INTERNAL MEDICINE

## 2022-04-26 PROCEDURE — 1160F RVW MEDS BY RX/DR IN RCRD: CPT | Mod: CPTII,,, | Performed by: INTERNAL MEDICINE

## 2022-04-26 PROCEDURE — 99215 OFFICE O/P EST HI 40 MIN: CPT | Mod: PBBFAC | Performed by: INTERNAL MEDICINE

## 2022-04-26 PROCEDURE — 1159F PR MEDICATION LIST DOCUMENTED IN MEDICAL RECORD: ICD-10-PCS | Mod: CPTII,,, | Performed by: INTERNAL MEDICINE

## 2022-04-26 PROCEDURE — 1111F PR DISCHARGE MEDS RECONCILED W/ CURRENT OUTPATIENT MED LIST: ICD-10-PCS | Mod: CPTII,,, | Performed by: INTERNAL MEDICINE

## 2022-04-26 PROCEDURE — 3074F PR MOST RECENT SYSTOLIC BLOOD PRESSURE < 130 MM HG: ICD-10-PCS | Mod: CPTII,,, | Performed by: INTERNAL MEDICINE

## 2022-04-26 PROCEDURE — 1160F PR REVIEW ALL MEDS BY PRESCRIBER/CLIN PHARMACIST DOCUMENTED: ICD-10-PCS | Mod: CPTII,,, | Performed by: INTERNAL MEDICINE

## 2022-04-26 PROCEDURE — 1159F MED LIST DOCD IN RCRD: CPT | Mod: CPTII,,, | Performed by: INTERNAL MEDICINE

## 2022-04-26 PROCEDURE — 1111F DSCHRG MED/CURRENT MED MERGE: CPT | Mod: CPTII,,, | Performed by: INTERNAL MEDICINE

## 2022-04-26 RX ORDER — ALBUTEROL SULFATE 90 UG/1
2 AEROSOL, METERED RESPIRATORY (INHALATION) EVERY 4 HOURS PRN
Qty: 18 G | Refills: 11 | Status: SHIPPED | OUTPATIENT
Start: 2022-04-26 | End: 2022-08-22

## 2022-04-26 RX ORDER — FLUTICASONE FUROATE, UMECLIDINIUM BROMIDE AND VILANTEROL TRIFENATATE 200; 62.5; 25 UG/1; UG/1; UG/1
1 POWDER RESPIRATORY (INHALATION) DAILY
Qty: 60 EACH | Refills: 11 | Status: SHIPPED | OUTPATIENT
Start: 2022-04-26 | End: 2022-10-27 | Stop reason: SDUPTHER

## 2022-04-26 NOTE — PROGRESS NOTES
PCP: HEAVENLY Pulido    Referring Provider:     Subjective:   Lawanda Martínez is a 71 y.o. female, nonsmoker, with history of hyperlipidemia, arthritis, asthma, COPD oxygen dependent, diastolic CHF, HTN, GERD, afib, uses a walker due to knee giving way, who presents following hospitalization for shortness of breath.     Pt states had chest pain and left arm pain. Was in afib. Had successful cardioversion 22.  She denies syncope.  No bleeding issues.  Pt states she was seen at [facility] last week  with atrial fibrillation and spontaneously converted to NSR.      She has SOB, history of COPD, on oxygen.  She is followed by pulmonary.  She reports mild lower extremity edema.         Family history of CAD in mother who  in her 70s, sister has [facility] valve    ECHO 22:  Sinus bradycardia.  HR 56    21:  EF 70%.  Indeterminate left ventricular diastolic function.              22:  Atrial fibrillation observed.  The left ventricle is normal in size with normal systolic function.  EF 55%.     AKBAR 22:  Atrial fibrillation observed.  Normal appearing left atrial appendage. No thrombus is present in the appendage.                         The left ventricle is normal in size with mildly decreased systolic function. EF 45%.                         Grade 1 plaque present in the transverse aorta.   Cardioversion 22:  Cardioversion restored sinus rhythm with early recurrence of atrial fibrillation (ERAF).                                        No left atrial appendage clot on AKBAR   [facility] 2020:  Nonobstructive coronary artery disease. LVEDP is 19 mmHg                             LVEF is 55%. Diastolic dysfunction.      Past Surgical History:   Procedure Laterality Date    CARDIOVERSION N/A 2022    Procedure: Cardioversion;  Surgeon: Adal Chung MD;  Location: [facility] CATH LAB;  Service: Cardiology;  Laterality: N/A;    COLONOSCOPY      REVISION OF TOTAL KNEE REPLACEMENT        TUBAL LIGATION        Family History   Problem Relation Age of Onset    Diabetes Mother     Heart disease Mother     Hypertension Mother     Cancer Father     Diabetes Sister     Heart disease Sister     Hypertension Sister     Diabetes Brother       Social History     Socioeconomic History    Marital status:    Tobacco Use    Smoking status: Never Smoker    Smokeless tobacco: Never Used   Substance and Sexual Activity    Alcohol use: Never    Drug use: Never    Sexual activity: Not Currently        Lab Results   Component Value Date     04/14/2022    K 4.9 04/14/2022     04/14/2022    CO2 31 04/14/2022    BUN 21 (H) 04/14/2022    CREATININE 1.13 (H) 04/14/2022    CALCIUM 9.5 04/14/2022    ANIONGAP 10 04/14/2022    EGFRNONAA 51 (L) 04/14/2022       Lab Results   Component Value Date    CHOL 233 (H) 12/30/2021     Lab Results   Component Value Date     (H) 12/30/2021     Lab Results   Component Value Date    LDLCALC 101 12/30/2021     Lab Results   Component Value Date    TRIG 147 12/30/2021     Lab Results   Component Value Date    CHOLHDL 2.3 12/30/2021       Lab Results   Component Value Date    WBC 7.11 04/08/2022    HGB 13.6 04/08/2022    HCT 42.0 04/08/2022    MCV 90.9 04/08/2022     04/08/2022           Current Outpatient Medications:     albuterol (VENTOLIN HFA) 90 mcg/actuation inhaler, Inhale 2 puffs into the lungs every 4 (four) hours as needed for Wheezing or Shortness of Breath. Rescue, Disp: 18 g, Rfl: 11    albuterol-ipratropium (DUO-NEB) 2.5 mg-0.5 mg/3 mL nebulizer solution, Take 3 mLs by nebulization every 6 (six) hours as needed for Wheezing. Rescue, Disp: 100 mL, Rfl: 5    apixaban (ELIQUIS) 5 mg Tab, Take 1 tablet (5 mg total) by mouth 2 (two) times daily., Disp: 60 tablet, Rfl: 0    calcium carbonate/vitamin D3 (CALCARB 600 WITH VITAMIN D ORAL), Take by mouth., Disp: , Rfl:     fluticasone propionate (FLONASE) 50 mcg/actuation nasal spray,  1 spray (50 mcg total) by Each Nostril route once daily., Disp: 16 g, Rfl: 1    fluticasone-umeclidin-vilanter (TRELEGY ELLIPTA) 200-62.5-25 mcg inhaler, Inhale 1 puff into the lungs once daily., Disp: 60 each, Rfl: 11    furosemide (LASIX) 40 MG tablet, Take 1 tablet (40 mg total) by mouth once daily. HOLD THIS MEDICATION UNTIL YOU FOLLOW UP WITH PRIMARY CARE PROVIDER, Disp: 90 tablet, Rfl: 1    gabapentin (NEURONTIN) 300 MG capsule, Take 1 capsule (300 mg total) by mouth 3 (three) times daily., Disp: 270 capsule, Rfl: 1    loratadine (CLARITIN) 10 mg tablet, Take 10 mg by mouth once daily., Disp: , Rfl:     metoprolol succinate (TOPROL-XL) 50 MG 24 hr tablet, Take 1 tablet (50 mg total) by mouth once daily., Disp: 30 tablet, Rfl: 0    montelukast (SINGULAIR) 10 mg tablet, Take 1 tablet (10 mg total) by mouth once daily., Disp: 90 tablet, Rfl: 1    paroxetine (PAXIL) 40 MG tablet, Take 1 tablet (40 mg total) by mouth once daily., Disp: 90 tablet, Rfl: 1    phenytoin (DILANTIN) 100 MG ER capsule, Take 1 capsule (100 mg total) by mouth 2 (two) times daily., Disp: 180 capsule, Rfl: 2    potassium chloride (KLOR-CON) 10 MEQ TbSR, Take 1 tablet (10 mEq total) by mouth 2 (two) times daily. HOLD THIS MEDICATION UNTIL YOU FOLLOW UP WITH YOUR PRIMARY CARE PROVIDER, Disp: 180 tablet, Rfl: 1    pravastatin (PRAVACHOL) 40 MG tablet, Take 1 tablet (40 mg total) by mouth nightly., Disp: 90 tablet, Rfl: 1    ipratropium (ATROVENT) 42 mcg (0.06 %) nasal spray, 2 sprays by Nasal route 4 (four) times daily., Disp: 15 mL, Rfl: 0    isosorbide mononitrate (IMDUR) 30 MG 24 hr tablet, Take 1 tablet (30 mg total) by mouth once daily. (Patient not taking: Reported on 4/27/2022), Disp: 90 tablet, Rfl: 1       Review of Systems   Respiratory: Negative for cough and shortness of breath.    Cardiovascular: Negative for chest pain, palpitations, orthopnea, claudication, leg swelling and PND.   Musculoskeletal: Positive for joint  "pain.         Objective:   /82 (BP Location: Right arm)   Pulse (!) 55   Resp 20   Ht 5' 4" (1.626 m)   Wt 93 kg (205 lb)   SpO2 95%   BMI 35.19 kg/m²     Physical Exam  Constitutional:       General: She is not in acute distress.  Eyes:      Extraocular Movements: Extraocular movements intact.   Cardiovascular:      Rate and Rhythm: Normal rate and regular rhythm.      Pulses: Normal pulses.      Heart sounds: Normal heart sounds. No murmur heard.  Pulmonary:      Effort: Pulmonary effort is normal.      Breath sounds: Normal breath sounds. No wheezing.   Abdominal:      Palpations: Abdomen is soft.   Musculoskeletal:         General: No swelling.      Cervical back: Neck supple.   Skin:     Findings: No rash.   Neurological:      Mental Status: She is alert and oriented to person, place, and time.           Assessment:     1. Chronic diastolic heart failure  EKG 12-lead    EKG 12-lead         Plan:     Problem List Items Addressed This Visit        Cardiac/Vascular    Chronic diastolic heart failure - Primary (Chronic)    Relevant Orders    EKG 12-lead         #HFpEF  NYHA class II  Left heart cath, nonobstructive coronary artery disease.  Dyspnea is improved  Continue lasix daily, adjust as needed     #Atrial fibrillation   Successful cardioversion 4/8/22, last week had another episode of afib, spontaneously converted to NSR.   On metoprolol and Eliquis   Refer to Jud Foss     #HTN  Well controlled    #Hyperlipidemia  Continue pravastatin    Follow up in 3 mo.       "

## 2022-04-26 NOTE — PROGRESS NOTES
Subjective:       Patient ID: Lawanda Martínez is a 71 y.o. female.    Chief Complaint: Follow-up (3 month follow up), Headache, and Nausea    71-year-old female initially referred for shortness of breath and cough.  She missed multiple follow-up visits with me.  She continues to get periodic antibiotics and steroids.  She had been initiated on prednisone 5 mg daily indefinitely, we were able to wean this off.  Short of breath with small chores around the house unable to do much without dyspnea.  She has been initiated on nocturnal oxygen.  She has significant lower extremity edema requiring the use of compression stockings daily.  She has also missed her follow-up visit with Cardiology.  Underwent left heart catheterization 02/22/2019 that revealed diastolic dysfunction.  She was hospitalized 12/2021, after discharge her medications were changed back to symbicort BID and PRN nebulizer. Resumed Trelegy 200, she is using albuterol every 6 hours as this was what she was told to do. Had 2 recent admissions to [facility] for atrial fibrillation with RVR and is not on Toprol XL. She is also completing a steroid taper.     Asthma  She complains of cough and shortness of breath. There is no hemoptysis, sputum production or wheezing. Associated symptoms include headaches. Pertinent negatives include no appetite change, chest pain, fever, myalgias, postnasal drip, rhinorrhea or sore throat. Her past medical history is significant for asthma.   Shortness of Breath  Associated symptoms include headaches and leg swelling. Pertinent negatives include no abdominal pain, chest pain, fever, hemoptysis, rash, rhinorrhea, sore throat, sputum production, vomiting or wheezing. Her past medical history is significant for asthma.   Follow-up  Associated symptoms include coughing and headaches. Pertinent negatives include no abdominal pain, arthralgias, chest pain, chills, fever, joint swelling, myalgias, nausea, rash, sore throat, vomiting or  weakness.   Headache   Associated symptoms include coughing. Pertinent negatives include no abdominal pain, back pain, fever, nausea, rhinorrhea, sore throat, vomiting or weakness.   Nausea  Associated symptoms include coughing and headaches. Pertinent negatives include no abdominal pain, arthralgias, chest pain, chills, fever, joint swelling, myalgias, nausea, rash, sore throat, vomiting or weakness.     Social History     Tobacco Use    Smoking status: Never Smoker    Smokeless tobacco: Never Used   Substance Use Topics    Alcohol use: Never    Drug use: Never      Review of Systems   Constitutional: Positive for weight gain. Negative for fever, chills, activity change and appetite change.   HENT: Negative for postnasal drip, rhinorrhea, sore throat and voice change.    Respiratory: Positive for cough, shortness of breath, asthma nighttime symptoms, dyspnea on extertion and use of rescue inhaler. Negative for hemoptysis, sputum production, chest tightness and wheezing.    Cardiovascular: Positive for palpitations and leg swelling. Negative for chest pain.   Musculoskeletal: Negative for arthralgias, back pain, joint swelling and myalgias.   Skin: Negative for rash.   Gastrointestinal: Positive for abdominal distention. Negative for nausea, vomiting, abdominal pain and acid reflux.   Neurological: Positive for headaches. Negative for syncope, weakness and light-headedness.   Hematological: Negative for adenopathy. Does not bruise/bleed easily and no excessive bruising.   Psychiatric/Behavioral: Negative for confusion and sleep disturbance. The patient is not nervous/anxious.        Objective:      Physical Exam   Constitutional: She is oriented to person, place, and time. She appears well-developed and well-nourished. No distress. She is obese.   HENT:   Head: Normocephalic.   Nose: Nose normal.   Neck: No tracheal deviation present. No thyromegaly present.   Difficult to assess JVD   Cardiovascular: Normal  rate, regular rhythm and normal heart sounds.   Pulmonary/Chest: Effort normal. No respiratory distress. She has wheezes. She has no rhonchi. She has no rales.   Diffuse wheeze and squeeks   Abdominal: Soft. Bowel sounds are normal. There is no abdominal tenderness.   Musculoskeletal:         General: Edema (compression stockings in place) present. No deformity.      Cervical back: Neck supple.   Lymphadenopathy: No supraclavicular adenopathy is present.     She has no cervical adenopathy.   Neurological: She is alert and oriented to person, place, and time. No cranial nerve deficit.   Skin: Skin is warm and dry. No rash noted. No cyanosis. Nails show no clubbing.   Psychiatric: She has a normal mood and affect. Her behavior is normal.   Vitals reviewed.    Personal Diagnostic Review  Pulmonary function tests: obesity related changes. Significant bronchodilator response    No flowsheet data found.      Assessment:       1. Severe persistent asthma without complication    2. Chronic diastolic heart failure    3. Asthma with acute exacerbation, unspecified asthma severity, unspecified whether persistent    4. Moderate persistent asthma without complication        Outpatient Encounter Medications as of 4/26/2022   Medication Sig Dispense Refill    albuterol-ipratropium (DUO-NEB) 2.5 mg-0.5 mg/3 mL nebulizer solution Take 3 mLs by nebulization every 6 (six) hours as needed for Wheezing. Rescue 100 mL 5    [DISCONTINUED] albuterol (VENTOLIN HFA) 90 mcg/actuation inhaler Inhale 2 puffs into the lungs every 6 (six) hours as needed for Wheezing. Rescue 18 g 0    [DISCONTINUED] fluticasone-umeclidin-vilanter (TRELEGY ELLIPTA) 200-62.5-25 mcg inhaler Inhale 1 puff into the lungs once daily. 60 each 6    albuterol (VENTOLIN HFA) 90 mcg/actuation inhaler Inhale 2 puffs into the lungs every 4 (four) hours as needed for Wheezing or Shortness of Breath. Rescue 18 g 11    apixaban (ELIQUIS) 5 mg Tab Take 1 tablet (5 mg total)  by mouth 2 (two) times daily. 60 tablet 0    calcium carbonate/vitamin D3 (CALCARB 600 WITH VITAMIN D ORAL) Take by mouth.      fluticasone propionate (FLONASE) 50 mcg/actuation nasal spray 1 spray (50 mcg total) by Each Nostril route once daily. 16 g 1    fluticasone-umeclidin-vilanter (TRELEGY ELLIPTA) 200-62.5-25 mcg inhaler Inhale 1 puff into the lungs once daily. 60 each 11    furosemide (LASIX) 40 MG tablet Take 1 tablet (40 mg total) by mouth once daily. HOLD THIS MEDICATION UNTIL YOU FOLLOW UP WITH PRIMARY CARE PROVIDER 90 tablet 1    gabapentin (NEURONTIN) 300 MG capsule Take 1 capsule (300 mg total) by mouth 3 (three) times daily. 270 capsule 1    ipratropium (ATROVENT) 42 mcg (0.06 %) nasal spray 2 sprays by Nasal route 4 (four) times daily. 15 mL 0    isosorbide mononitrate (IMDUR) 30 MG 24 hr tablet Take 1 tablet (30 mg total) by mouth once daily. 90 tablet 1    loratadine (CLARITIN) 10 mg tablet Take 10 mg by mouth once daily.      metoprolol succinate (TOPROL-XL) 50 MG 24 hr tablet Take 1 tablet (50 mg total) by mouth once daily. 30 tablet 0    montelukast (SINGULAIR) 10 mg tablet Take 1 tablet (10 mg total) by mouth once daily. 90 tablet 1    paroxetine (PAXIL) 40 MG tablet Take 1 tablet (40 mg total) by mouth once daily. 90 tablet 1    phenytoin (DILANTIN) 100 MG ER capsule Take 1 capsule (100 mg total) by mouth 2 (two) times daily. 180 capsule 2    potassium chloride (KLOR-CON) 10 MEQ TbSR Take 1 tablet (10 mEq total) by mouth 2 (two) times daily. HOLD THIS MEDICATION UNTIL YOU FOLLOW UP WITH YOUR PRIMARY CARE PROVIDER 180 tablet 1    pravastatin (PRAVACHOL) 40 MG tablet Take 1 tablet (40 mg total) by mouth nightly. 90 tablet 1    [DISCONTINUED] meclizine (ANTIVERT) 12.5 mg tablet TAKE 1 TABLET TWICE DAILY AS NEEDED 90 tablet 1     No facility-administered encounter medications on file as of 4/26/2022.     No orders of the defined types were placed in this encounter.      Plan:        Shortness of breath  - does have a possible asthma component  - continueTrelegy 200, continue other medication. Advised on albuterol use PRN as prescribed  - IgE- mildly elevated  - 6MWT- this was ordered but she missed the visit at which it was scheduled to be completed  - HFpEF continues to be an issue for her, continue cardiology follow-up  - now on metoprolol which may be contributing to her current wheezing. She is to follow with Dr Nieves tomorrow. I will send this note to him to see if other options are available for rate control.   - complete steroid taper, if still symptomatic next week, call for urgent visit    RTC in 3 months

## 2022-04-27 ENCOUNTER — OFFICE VISIT (OUTPATIENT)
Dept: CARDIOLOGY | Facility: CLINIC | Age: 72
End: 2022-04-27
Payer: MEDICARE

## 2022-04-27 VITALS
OXYGEN SATURATION: 95 % | HEART RATE: 55 BPM | DIASTOLIC BLOOD PRESSURE: 82 MMHG | RESPIRATION RATE: 20 BRPM | BODY MASS INDEX: 35 KG/M2 | HEIGHT: 64 IN | WEIGHT: 205 LBS | SYSTOLIC BLOOD PRESSURE: 118 MMHG

## 2022-04-27 DIAGNOSIS — I50.32 CHRONIC DIASTOLIC HEART FAILURE: Primary | ICD-10-CM

## 2022-04-27 PROCEDURE — 99213 OFFICE O/P EST LOW 20 MIN: CPT | Mod: S$PBB,,, | Performed by: INTERNAL MEDICINE

## 2022-04-27 PROCEDURE — 1159F PR MEDICATION LIST DOCUMENTED IN MEDICAL RECORD: ICD-10-PCS | Mod: CPTII,,, | Performed by: INTERNAL MEDICINE

## 2022-04-27 PROCEDURE — 3079F DIAST BP 80-89 MM HG: CPT | Mod: CPTII,,, | Performed by: INTERNAL MEDICINE

## 2022-04-27 PROCEDURE — 1111F DSCHRG MED/CURRENT MED MERGE: CPT | Mod: CPTII,,, | Performed by: INTERNAL MEDICINE

## 2022-04-27 PROCEDURE — 3079F PR MOST RECENT DIASTOLIC BLOOD PRESSURE 80-89 MM HG: ICD-10-PCS | Mod: CPTII,,, | Performed by: INTERNAL MEDICINE

## 2022-04-27 PROCEDURE — 3008F PR BODY MASS INDEX (BMI) DOCUMENTED: ICD-10-PCS | Mod: CPTII,,, | Performed by: INTERNAL MEDICINE

## 2022-04-27 PROCEDURE — 99214 OFFICE O/P EST MOD 30 MIN: CPT | Mod: PBBFAC | Performed by: INTERNAL MEDICINE

## 2022-04-27 PROCEDURE — 3074F SYST BP LT 130 MM HG: CPT | Mod: CPTII,,, | Performed by: INTERNAL MEDICINE

## 2022-04-27 PROCEDURE — 93005 ELECTROCARDIOGRAM TRACING: CPT | Mod: PBBFAC | Performed by: INTERNAL MEDICINE

## 2022-04-27 PROCEDURE — 93010 EKG 12-LEAD: ICD-10-PCS | Mod: S$PBB,,, | Performed by: INTERNAL MEDICINE

## 2022-04-27 PROCEDURE — 1111F PR DISCHARGE MEDS RECONCILED W/ CURRENT OUTPATIENT MED LIST: ICD-10-PCS | Mod: CPTII,,, | Performed by: INTERNAL MEDICINE

## 2022-04-27 PROCEDURE — 3008F BODY MASS INDEX DOCD: CPT | Mod: CPTII,,, | Performed by: INTERNAL MEDICINE

## 2022-04-27 PROCEDURE — 3074F PR MOST RECENT SYSTOLIC BLOOD PRESSURE < 130 MM HG: ICD-10-PCS | Mod: CPTII,,, | Performed by: INTERNAL MEDICINE

## 2022-04-27 PROCEDURE — 99213 PR OFFICE/OUTPT VISIT, EST, LEVL III, 20-29 MIN: ICD-10-PCS | Mod: S$PBB,,, | Performed by: INTERNAL MEDICINE

## 2022-04-27 PROCEDURE — 93010 ELECTROCARDIOGRAM REPORT: CPT | Mod: S$PBB,,, | Performed by: INTERNAL MEDICINE

## 2022-04-27 PROCEDURE — 1159F MED LIST DOCD IN RCRD: CPT | Mod: CPTII,,, | Performed by: INTERNAL MEDICINE

## 2022-05-17 RX ORDER — METOPROLOL SUCCINATE 50 MG/1
50 TABLET, EXTENDED RELEASE ORAL DAILY
Qty: 30 TABLET | Refills: 5 | Status: SHIPPED | OUTPATIENT
Start: 2022-05-17 | End: 2022-10-27 | Stop reason: SDUPTHER

## 2022-05-17 NOTE — TELEPHONE ENCOUNTER
Nurse asked if refills would be sent today. She had called yesterday for them. I apologized to her and told her Dr. Nieves was in the cath lab the afternoon. I would send him a message requesting him sign them for me today. I hoped he would see it but it may be in the morning before the prescriptions are at the pharmacy.

## 2022-05-19 ENCOUNTER — OFFICE VISIT (OUTPATIENT)
Dept: FAMILY MEDICINE | Facility: CLINIC | Age: 72
End: 2022-05-19
Payer: MEDICARE

## 2022-05-19 VITALS
HEIGHT: 64 IN | TEMPERATURE: 98 F | SYSTOLIC BLOOD PRESSURE: 118 MMHG | HEART RATE: 62 BPM | OXYGEN SATURATION: 96 % | WEIGHT: 214 LBS | BODY MASS INDEX: 36.54 KG/M2 | RESPIRATION RATE: 20 BRPM | DIASTOLIC BLOOD PRESSURE: 58 MMHG

## 2022-05-19 DIAGNOSIS — F41.9 ANXIETY: ICD-10-CM

## 2022-05-19 DIAGNOSIS — Z79.899 HIGH RISK MEDICATION USE: ICD-10-CM

## 2022-05-19 DIAGNOSIS — I48.91 ATRIAL FIBRILLATION, UNSPECIFIED TYPE: Primary | ICD-10-CM

## 2022-05-19 DIAGNOSIS — E78.5 HYPERLIPIDEMIA, UNSPECIFIED HYPERLIPIDEMIA TYPE: ICD-10-CM

## 2022-05-19 DIAGNOSIS — J45.40 MODERATE PERSISTENT ASTHMA, UNSPECIFIED WHETHER COMPLICATED: ICD-10-CM

## 2022-05-19 DIAGNOSIS — R06.09 DOE (DYSPNEA ON EXERTION): ICD-10-CM

## 2022-05-19 DIAGNOSIS — R56.9 SEIZURES: ICD-10-CM

## 2022-05-19 PROBLEM — J18.9 PNEUMONIA DUE TO INFECTIOUS ORGANISM: Status: RESOLVED | Noted: 2021-12-19 | Resolved: 2022-05-19

## 2022-05-19 PROCEDURE — 3074F PR MOST RECENT SYSTOLIC BLOOD PRESSURE < 130 MM HG: ICD-10-PCS | Mod: ,,, | Performed by: NURSE PRACTITIONER

## 2022-05-19 PROCEDURE — 3008F BODY MASS INDEX DOCD: CPT | Mod: ,,, | Performed by: NURSE PRACTITIONER

## 2022-05-19 PROCEDURE — 1159F MED LIST DOCD IN RCRD: CPT | Mod: ,,, | Performed by: NURSE PRACTITIONER

## 2022-05-19 PROCEDURE — 3008F PR BODY MASS INDEX (BMI) DOCUMENTED: ICD-10-PCS | Mod: ,,, | Performed by: NURSE PRACTITIONER

## 2022-05-19 PROCEDURE — 99213 PR OFFICE/OUTPT VISIT, EST, LEVL III, 20-29 MIN: ICD-10-PCS | Mod: ,,, | Performed by: NURSE PRACTITIONER

## 2022-05-19 PROCEDURE — 3078F PR MOST RECENT DIASTOLIC BLOOD PRESSURE < 80 MM HG: ICD-10-PCS | Mod: ,,, | Performed by: NURSE PRACTITIONER

## 2022-05-19 PROCEDURE — 3078F DIAST BP <80 MM HG: CPT | Mod: ,,, | Performed by: NURSE PRACTITIONER

## 2022-05-19 PROCEDURE — 1159F PR MEDICATION LIST DOCUMENTED IN MEDICAL RECORD: ICD-10-PCS | Mod: ,,, | Performed by: NURSE PRACTITIONER

## 2022-05-19 PROCEDURE — 3074F SYST BP LT 130 MM HG: CPT | Mod: ,,, | Performed by: NURSE PRACTITIONER

## 2022-05-19 PROCEDURE — 80186 PHENYTOIN LEVEL, FREE: ICD-10-PCS | Mod: 90,,, | Performed by: CLINICAL MEDICAL LABORATORY

## 2022-05-19 PROCEDURE — 1160F RVW MEDS BY RX/DR IN RCRD: CPT | Mod: ,,, | Performed by: NURSE PRACTITIONER

## 2022-05-19 PROCEDURE — 1125F PR PAIN SEVERITY QUANTIFIED, PAIN PRESENT: ICD-10-PCS | Mod: ,,, | Performed by: NURSE PRACTITIONER

## 2022-05-19 PROCEDURE — 1160F PR REVIEW ALL MEDS BY PRESCRIBER/CLIN PHARMACIST DOCUMENTED: ICD-10-PCS | Mod: ,,, | Performed by: NURSE PRACTITIONER

## 2022-05-19 PROCEDURE — 1125F AMNT PAIN NOTED PAIN PRSNT: CPT | Mod: ,,, | Performed by: NURSE PRACTITIONER

## 2022-05-19 PROCEDURE — 99213 OFFICE O/P EST LOW 20 MIN: CPT | Mod: ,,, | Performed by: NURSE PRACTITIONER

## 2022-05-19 PROCEDURE — 80186 ASSAY OF PHENYTOIN FREE: CPT | Mod: 90,,, | Performed by: CLINICAL MEDICAL LABORATORY

## 2022-05-19 RX ORDER — BUSPIRONE HYDROCHLORIDE 5 MG/1
5 TABLET ORAL 2 TIMES DAILY
Qty: 60 TABLET | Refills: 11 | Status: SHIPPED | OUTPATIENT
Start: 2022-05-19 | End: 2022-07-14 | Stop reason: SDUPTHER

## 2022-05-19 NOTE — PROGRESS NOTES
[facility]     PATIENT NAME: Lawanda Martínez   : 1950    AGE: 72 y.o. DATE: 2022   MRN: [id number]        Reason for Visit / Chief Complaint: Medicare AWV Follow Up (Subsequent AWV [employer] )        Lawanda Martínez presents for a subsequent Medicare AWV today. Feeling well no couging shortness of breath or palpitations, does have some low back pain with walking, no radiation loss of control of bowel or bladder     The following components were reviewed and updated:    Medical/Social/Family History:  Past Medical History:   Diagnosis Date    Asthma     CHF (congestive heart failure)     COPD (chronic obstructive pulmonary disease)     WILLSON (dyspnea on exertion)     GERD (gastroesophageal reflux disease)     HTN (hypertension)     Hyperlipidemia     Osteoporosis     Oxygen dependent     02 2L    Seizure disorder         Family History   Problem Relation Age of Onset    Diabetes Mother     Heart disease Mother     Hypertension Mother     Cancer Father     Diabetes Sister     Heart disease Sister     Hypertension Sister     Diabetes Brother         Social History     Tobacco Use   Smoking Status Never Smoker   Smokeless Tobacco Never Used      Social History     Substance and Sexual Activity   Alcohol Use Never       Family History   Problem Relation Age of Onset    Diabetes Mother     Heart disease Mother     Hypertension Mother     Cancer Father     Diabetes Sister     Heart disease Sister     Hypertension Sister     Diabetes Brother        Past Surgical History:   Procedure Laterality Date    CARDIOVERSION N/A 2022    Procedure: Cardioversion;  Surgeon: Adal Chung MD;  Location[facility] CATH LAB;  Service: Cardiology;  Laterality: N/A;    COLONOSCOPY      REVISION OF TOTAL KNEE REPLACEMENT       TUBAL LIGATION           · Allergies and Current Medications   Review of patient's allergies indicates:  No Known  Allergies    Current Outpatient Medications:     albuterol (VENTOLIN HFA) 90 mcg/actuation inhaler, Inhale 2 puffs into the lungs every 4 (four) hours as needed for Wheezing or Shortness of Breath. Rescue, Disp: 18 g, Rfl: 11    albuterol-ipratropium (DUO-NEB) 2.5 mg-0.5 mg/3 mL nebulizer solution, Take 3 mLs by nebulization every 6 (six) hours as needed for Wheezing. Rescue, Disp: 100 mL, Rfl: 5    apixaban (ELIQUIS) 5 mg Tab, Take 1 tablet (5 mg total) by mouth 2 (two) times daily., Disp: 180 tablet, Rfl: 1    busPIRone (BUSPAR) 5 MG Tab, Take 1 tablet (5 mg total) by mouth 2 (two) times daily., Disp: 60 tablet, Rfl: 11    calcium carbonate/vitamin D3 (CALCARB 600 WITH VITAMIN D ORAL), Take by mouth., Disp: , Rfl:     fluticasone propionate (FLONASE) 50 mcg/actuation nasal spray, 1 spray (50 mcg total) by Each Nostril route once daily., Disp: 16 g, Rfl: 1    fluticasone-umeclidin-vilanter (TRELEGY ELLIPTA) 200-62.5-25 mcg inhaler, Inhale 1 puff into the lungs once daily., Disp: 60 each, Rfl: 11    furosemide (LASIX) 40 MG tablet, Take 1 tablet (40 mg total) by mouth once daily. HOLD THIS MEDICATION UNTIL YOU FOLLOW UP WITH PRIMARY CARE PROVIDER (Patient taking differently: Take 40 mg by mouth once daily.), Disp: 90 tablet, Rfl: 1    gabapentin (NEURONTIN) 300 MG capsule, Take 1 capsule (300 mg total) by mouth 3 (three) times daily., Disp: 270 capsule, Rfl: 1    loratadine (CLARITIN) 10 mg tablet, Take 10 mg by mouth once daily., Disp: , Rfl:     metoprolol succinate (TOPROL-XL) 50 MG 24 hr tablet, Take 1 tablet (50 mg total) by mouth once daily., Disp: 30 tablet, Rfl: 5    montelukast (SINGULAIR) 10 mg tablet, Take 1 tablet (10 mg total) by mouth once daily., Disp: 90 tablet, Rfl: 1    paroxetine (PAXIL) 40 MG tablet, Take 1 tablet (40 mg total) by mouth once daily., Disp: 90 tablet, Rfl: 1    phenytoin (DILANTIN) 100 MG ER capsule, Take 1 capsule (100 mg total) by mouth 2 (two) times daily., Disp:  180 capsule, Rfl: 2    potassium chloride (KLOR-CON) 10 MEQ TbSR, Take 1 tablet (10 mEq total) by mouth 2 (two) times daily. HOLD THIS MEDICATION UNTIL YOU FOLLOW UP WITH YOUR PRIMARY CARE PROVIDER, Disp: 180 tablet, Rfl: 1    pravastatin (PRAVACHOL) 40 MG tablet, Take 1 tablet (40 mg total) by mouth nightly., Disp: 90 tablet, Rfl: 1    ipratropium (ATROVENT) 42 mcg (0.06 %) nasal spray, 2 sprays by Nasal route 4 (four) times daily. (Patient not taking: Reported on 5/20/2022), Disp: 15 mL, Rfl: 0    · Health Risk Assessment   Fall Risk: yes. Education given   Advance Directive:  Does not have an advanced directive. Verbal education and written education included in today's AVS.   Depression: saw Nathalia yesterday and depression med added    HTN: DASH diet, exercise, weight management, med compliance, home BP monitoring, and follow-up discussed.   T2DM: NO   Tobacco use: nonsmoker  STI: N/A    Alcohol misuse: no   Statin Use: yes      · Health Risk Assessment  What [age] 70-80  Are you male or female?: Female  During the past four weeks, how much have you been bothered by emotional problems such as feeling anxious, depressed, irritable, sad, or downhearted and blue?: Moderately  During the past five weeks, has your physical and/or emotional health limited your social activities with family, friends, neighbors, or groups?: Slightly  During the past four weeks, how much bodily pain have you generally had?: Moderate pain  During the past four weeks, was someone available to help if you needed and wanted help?: Yes, as much as I wanted  During the past four weeks, what was the hardest physical activity you could do for at least two minutes?: Very light  Can you get to places out of walking distance without help?  (For example, can you travel alone on buses or taxis, or drive your own car?): No  Can you go shopping for groceries or clothes without someone's help?: No  Can you prepare your own meals?: Yes  Can  you do your own housework without help?: Yes  Because of any health problems, do you need the help of another person with your personal care needs such as eating, bathing, dressing, or getting around the house?: No  Can you handle your own money without help?: Yes  During the past four weeks, how would you rate your health in general?: Fair  How have things been going for you during the past four weeks?: Good and bad parts about equal  Are you having difficulties driving your car?: Not applicable, I do not use a car  Do you always fasten your seat belt when you are in a car?: Yes, usually  How often in the past four weeks have you been bothered by falling or dizzy when standing up?: Sometimes  How often in the past four weeks have you been bothered by sexual problems?: Never  How often in the past four weeks have you been bothered by trouble eating well?: Sometimes  How often in the past four weeks have you been bothered by teeth or denture problems?: Sometimes  How often in the past four weeks have you been bothered with problems using the telephone?: Never  How often in the past four weeks have you been bothered by tiredness or fatigue?: Sometimes  Have you fallen two or more times in the past year?: Yes  Are you afraid of falling?: Yes  Are you a smoker?: No  During the past four weeks, how many drinks of wine, beer, or other alcoholic beverages did you have?: No alcohol at all  Do you exercise for about 20 minutes three or more days a week?: No, I usually do not exercise this much  Have you been given any information to help you with hazards in your house that might hurt you?: Yes  Have you been given any information to help you with keeping track of your medications?: Yes  How often do you have trouble taking medicines the way you've been told to take them?: I always take them as prescribed  How confident are you that you can control and manage most of your health problems?: Very confident  What is your race?  (Check all that apply.):     · Health Maintenance   Last eye exam: couple yrs. appt made for next Tuesday with Dr. Sumanth Crum   Last CV screen with lipids: 12/30/2021    Diabetes screening with fasting glucose or A1c: 04/14/2022   Colorectal cancer screen: 01/11/2017- repeat 5 yrs   Flu Vaccine: 10/08/2021   Pneumonia vaccines: Prevnar 13 10/11/2018, Pneumo 23 10/22/2019   COVID vaccine: Moderna 02/13/2021, 03/12/2021, 11/05/2021   Hep B vaccine: N/A   DEXA: 03/05/2020   Last pap/pelvic: 06/13/2019   Last Mammogram: 10/07/2019    Last PSA screen: N/A   AAA screening: N/A (once in lifetime for males 65-75 who have smoked > 100 cigarettes in lifetime)  HIV Screeing: N/A  Hepatitis C Screen: 06/06/2019 NR  Low Dose CT Scan: N/A    Health Maintenance Topics with due status: Not Due       Topic Last Completion Date    DEXA Scan 03/05/2020    Lipid Panel 12/30/2021     Health Maintenance Due   Topic Date Due    Mammogram  10/07/2020        Incontinence  Bowel: no  Bladder: no    Lab results available in Epic or see dates from [facility] above:   Lab Results   Component Value Date    CHOL 233 (H) 12/30/2021     Lab Results   Component Value Date     (H) 12/30/2021     Lab Results   Component Value Date    LDLCALC 101 12/30/2021     Lab Results   Component Value Date    TRIG 147 12/30/2021     Lab Results   Component Value Date    CHOLHDL 2.3 12/30/2021       No results found for: LABA1C, HGBA1C    Sodium   Date Value Ref Range Status   04/14/2022 140 136 - 145 mmol/L Final     Potassium   Date Value Ref Range Status   04/14/2022 4.9 3.5 - 5.1 mmol/L Final     Chloride   Date Value Ref Range Status   04/14/2022 104 98 - 107 mmol/L Final     CO2   Date Value Ref Range Status   04/14/2022 31 21 - 32 mmol/L Final     Glucose   Date Value Ref Range Status   04/14/2022 102 74 - 106 mg/dL Final     BUN   Date Value Ref Range Status   04/14/2022 21 (H) 7 - 18 mg/dL Final     Creatinine   Date Value Ref Range Status  "  04/14/2022 1.13 (H) 0.55 - 1.02 mg/dL Final     Calcium   Date Value Ref Range Status   04/14/2022 9.5 8.5 - 10.1 mg/dL Final     Total Protein   Date Value Ref Range Status   04/14/2022 7.2 6.4 - 8.2 g/dL Final     Albumin   Date Value Ref Range Status   04/14/2022 3.5 3.5 - 5.0 g/dL Final     Bilirubin, Total   Date Value Ref Range Status   04/14/2022 0.3 0.0 - 1.2 mg/dL Final     Alk Phos   Date Value Ref Range Status   04/14/2022 214 (H) 55 - 142 U/L Final     AST   Date Value Ref Range Status   04/14/2022 34 15 - 37 U/L Final     ALT   Date Value Ref Range Status   04/14/2022 64 (H) 13 - 56 U/L Final     Anion Gap   Date Value Ref Range Status   04/14/2022 10 7 - 16 mmol/L Final     eGFR   Date Value Ref Range Status   04/14/2022 51 (L) >=60 mL/min/1.73m² Final         No results found for: PSA         · Care Team   PCP: Rahul    Eye specialist: [facility]  Other:        **See Completed Assessments for Annual Wellness visit within the encounter summary    The following assessments were completed & reviewed:  · Depression Screening  · Cognitive function Screening  · Timed Get Up Test  · Whisper Test  · Vision Screen  · Health Risk Assessment  · Checklist of ADLs and IADLs      Objective  Vitals:    05/20/22 1410   BP: 122/72   Pulse: 60   Resp: 20   Temp: 98.5 °F (36.9 °C)   TempSrc: Oral   SpO2: 96%   Weight: 97.3 kg (214 lb 9.6 oz)   Height: 5' 4" (1.626 m)   PainSc:   5   PainLoc: Back      Body mass index is 36.84 kg/m².  Ideal body weight: 54.7 kg (120 lb 9.5 oz)   Heart healthy diet and exercise encouraged and education given.     Physical Exam  Vitals and nursing note reviewed.   Constitutional:       General: She is not in acute distress.     Appearance: She is not ill-appearing or toxic-appearing.   HENT:      Head: Normocephalic.      Right Ear: External ear normal.      Left Ear: External ear normal.      Nose: Nose normal.      Mouth/Throat:      Mouth: Mucous membranes are moist.      " Pharynx: Oropharynx is clear. No oropharyngeal exudate or posterior oropharyngeal erythema.   Eyes:      Extraocular Movements: Extraocular movements intact.      Conjunctiva/sclera: Conjunctivae normal.      Pupils: Pupils are equal, round, and reactive to light.   Cardiovascular:      Rate and Rhythm: Normal rate and regular rhythm.   Pulmonary:      Effort: Pulmonary effort is normal.      Breath sounds: Normal breath sounds. No wheezing, rhonchi or rales.   Musculoskeletal:      Cervical back: Normal range of motion and neck supple.   Skin:     General: Skin is warm.      Capillary Refill: Capillary refill takes less than 2 seconds.   Neurological:      General: No focal deficit present.      Mental Status: She is alert and oriented to person, place, and time.   Psychiatric:         Behavior: Behavior normal.           Assessment:     1. Encounter for subsequent annual wellness visit (AWV) in Medicare patient    2. Atrial fibrillation, unspecified type    3. Anxiety    4. Dorsalgia, unspecified    5. Screening for malignant neoplasm of colon  - Ambulatory referral/consult to Gastroenterology; Future    6. Osteoporosis, unspecified osteoporosis type, unspecified pathological fracture presence  - DXA Bone Density Spine And Hip; Future    7. Encounter for screening mammogram for malignant neoplasm of breast  - Mammo Digital Screening Bilat; Future    8. BMI 36.0-36.9,adult    9. Moderate persistent asthma, unspecified whether complicated         Plan:    Discussed and provided with a screening schedule and personal prevention plan in accordance with USPSTF age appropriate recommendations and Medicare screening guidelines.   Education, counseling, and referrals were provided as needed.  After Visit Summary printed and given to patient which includes written education and a list of any referrals if indicated.   Discussed can take tylenol 1000 mg three times daily as needed for pain  Try using ice or heat for pain  If  sing heat make sure to stretch gently        F/u plan for yearly AWV.    Kelsy Kay, RADHAP

## 2022-05-19 NOTE — PROGRESS NOTES
2022     HEAVENLY Pulido   [facility]  [address]  [place] [address]     PATIENT NAME: Lawanda Martínez  : 1950  DATE: 22  MRN: [id number]      Billing Provider: HEAVENLY Pulido  Level of Service:   Patient PCP Information     Provider PCP Type    HEAVENLY Pulido General          Reason for Visit / Chief Complaint: Follow-up (Afib with RVR at [facility] in April, needs refill on Eliquis)       Update PCP  Update Chief Complaint         History of Present Illness / Problem Focused Workflow     Lawanda Martínez presents to the clinic here to follow up from [place] was in Afib with rvr did resolve. She has been feeling well no complaints of heart racing however has run out of eliquis.I will send this in today She has been doing well also with her breathing not needing oxygen as much no coughing- she does have some WILLSON resolved with rest. She also has some cp- reports this is associated with stressors for son and her spouse arguing. She is on Paxil discussed anxiety and avoid stressful situations and trial of Buspar.   She has an upcoming appointment with Cardiology with [facility] to discuss atrial fib treatments especially since she had the episodes so close together for [place] and [place]. She had exacerbation of wheeze and cough with family spraying aerosol bug spray      Review of Systems     Review of Systems   Constitutional: Negative for activity change, chills, fatigue and fever.   HENT: Positive for rhinorrhea. Negative for nasal congestion, ear pain, nosebleeds, sore throat and trouble swallowing.    Eyes: Negative for visual disturbance.   Respiratory: Positive for shortness of breath. Negative for chest tightness and wheezing.    Cardiovascular: Positive for chest pain and claudication. Negative for palpitations and leg swelling.   Gastrointestinal: Negative for abdominal pain, change in bowel habit, nausea, vomiting and change in  bowel habit.   Genitourinary: Negative for difficulty urinating.   Musculoskeletal: Positive for arthralgias and back pain.   Integumentary:  Negative for pallor, rash and mole/lesion.   Neurological: Negative for dizziness, weakness and headaches.   Psychiatric/Behavioral: The patient is nervous/anxious.         Medical / Social / Family History     Past Medical History:   Diagnosis Date    Asthma     CHF (congestive heart failure)     COPD (chronic obstructive pulmonary disease)     WILLSON (dyspnea on exertion)     GERD (gastroesophageal reflux disease)     HTN (hypertension)     Hyperlipidemia     Osteoporosis     Oxygen dependent     02 2L    Seizure disorder        Past Surgical History:   Procedure Laterality Date    CARDIOVERSION N/A 4/8/2022    Procedure: Cardioversion;  Surgeon: Adal Chung MD;  Location: [facility] C[facility] LAB;  Service: Cardiology;  Laterality: N/A;    COLONOSCOPY      REVISION OF TOTAL KNEE REPLACEMENT       TUBAL LIGATION         Social History  Ms.  reports that she has never smoked. She has never used smokeless tobacco. She reports that she does not drink alcohol and does not use drugs.    Family History  Ms.'s family history includes Cancer in her father; Diabetes in her brother, mother, and sister; Heart disease in her mother and sister; Hypertension in her mother and sister.    Medications and Allergies     Medications  Outpatient Medications Marked as Taking for the 5/19/22 encounter (Office Visit) with HEAVENLY Pulido   Medication Sig Dispense Refill    albuterol (VENTOLIN HFA) 90 mcg/actuation inhaler Inhale 2 puffs into the lungs every 4 (four) hours as needed for Wheezing or Shortness of Breath. Rescue 18 g 11    albuterol-ipratropium (DUO-NEB) 2.5 mg-0.5 mg/3 mL nebulizer solution Take 3 mLs by nebulization every 6 (six) hours as needed for Wheezing. Rescue 100 mL 5    calcium carbonate/vitamin D3 (CALCARB 600 WITH VITAMIN D ORAL) Take by  "mouth.      fluticasone propionate (FLONASE) 50 mcg/actuation nasal spray 1 spray (50 mcg total) by Each Nostril route once daily. 16 g 1    fluticasone-umeclidin-vilanter (TRELEGY ELLIPTA) 200-62.5-25 mcg inhaler Inhale 1 puff into the lungs once daily. 60 each 11    furosemide (LASIX) 40 MG tablet Take 1 tablet (40 mg total) by mouth once daily. HOLD THIS MEDICATION UNTIL YOU FOLLOW UP WITH PRIMARY CARE PROVIDER (Patient taking differently: Take 40 mg by mouth once daily.) 90 tablet 1    gabapentin (NEURONTIN) 300 MG capsule Take 1 capsule (300 mg total) by mouth 3 (three) times daily. 270 capsule 1    ipratropium (ATROVENT) 42 mcg (0.06 %) nasal spray 2 sprays by Nasal route 4 (four) times daily. 15 mL 0    loratadine (CLARITIN) 10 mg tablet Take 10 mg by mouth once daily.      metoprolol succinate (TOPROL-XL) 50 MG 24 hr tablet Take 1 tablet (50 mg total) by mouth once daily. 30 tablet 5    montelukast (SINGULAIR) 10 mg tablet Take 1 tablet (10 mg total) by mouth once daily. 90 tablet 1    paroxetine (PAXIL) 40 MG tablet Take 1 tablet (40 mg total) by mouth once daily. 90 tablet 1    phenytoin (DILANTIN) 100 MG ER capsule Take 1 capsule (100 mg total) by mouth 2 (two) times daily. 180 capsule 2    potassium chloride (KLOR-CON) 10 MEQ TbSR Take 1 tablet (10 mEq total) by mouth 2 (two) times daily. HOLD THIS MEDICATION UNTIL YOU FOLLOW UP WITH YOUR PRIMARY CARE PROVIDER 180 tablet 1    pravastatin (PRAVACHOL) 40 MG tablet Take 1 tablet (40 mg total) by mouth nightly. 90 tablet 1    [DISCONTINUED] apixaban (ELIQUIS) 5 mg Tab Take 5 mg by mouth 2 (two) times daily.         Allergies  Review of patient's allergies indicates:  No Known Allergies    Physical Examination     Vitals:    05/19/22 1344   BP: (!) 118/58   BP Location: Left arm   Patient Position: Sitting   Pulse: 62   Resp: 20   Temp: 98.3 °F (36.8 °C)   TempSrc: Oral   SpO2: 96%   Weight: 97.1 kg (214 lb)   Height: 5' 4" (1.626 m)    "   Physical Exam  Vitals and nursing note reviewed.   Constitutional:       General: She is not in acute distress.     Appearance: She is not ill-appearing or toxic-appearing.   HENT:      Head: Normocephalic.      Right Ear: External ear normal.      Left Ear: External ear normal.      Nose: Nose normal.      Mouth/Throat:      Mouth: Mucous membranes are moist.      Pharynx: Oropharynx is clear. No oropharyngeal exudate or posterior oropharyngeal erythema.   Eyes:      Extraocular Movements: Extraocular movements intact.      Conjunctiva/sclera: Conjunctivae normal.      Pupils: Pupils are equal, round, and reactive to light.   Cardiovascular:      Rate and Rhythm: Normal rate and regular rhythm.   Pulmonary:      Effort: Pulmonary effort is normal.      Breath sounds: Normal breath sounds. No wheezing, rhonchi or rales.   Musculoskeletal:      Cervical back: Normal range of motion and neck supple.   Skin:     General: Skin is warm.      Capillary Refill: Capillary refill takes less than 2 seconds.   Neurological:      General: No focal deficit present.      Mental Status: She is alert and oriented to person, place, and time.   Psychiatric:         Behavior: Behavior normal.          Assessment and Plan (including Health Maintenance)      Problem List  Smart Sets  Document Outside HM   :    Plan:   Start Buspar for anxiety, refill eliquis, check dilantin level, activity as tolerated avoid stress and irritants especially aerosol sprays, keep appointment with [place] [facility].      Health Maintenance Due   Topic Date Due    TETANUS VACCINE  Never done    Shingles Vaccine (1 of 2) Never done    Mammogram  10/07/2020    Colorectal Cancer Screening  01/11/2022    COVID-19 Vaccine (4 - Booster for Moderna series) 03/05/2022       Problem List Items Addressed This Visit        Pulmonary    Asthma       Cardiac/Vascular    HLD (hyperlipidemia)      Other Visit Diagnoses     Atrial fibrillation, unspecified type    -   Primary    Relevant Medications    apixaban (ELIQUIS) 5 mg Tab    Anxiety        Relevant Medications    busPIRone (BUSPAR) 5 MG Tab    Seizures        Relevant Orders    Phenytoin Level, Free    High risk medication use        Relevant Orders    Phenytoin Level, Free    WILLSON (dyspnea on exertion)            Atrial fibrillation, unspecified type  -     apixaban (ELIQUIS) 5 mg Tab; Take 1 tablet (5 mg total) by mouth 2 (two) times daily.  Dispense: 180 tablet; Refill: 1    Anxiety  -     busPIRone (BUSPAR) 5 MG Tab; Take 1 tablet (5 mg total) by mouth 2 (two) times daily.  Dispense: 60 tablet; Refill: 11    Seizures  -     Phenytoin Level, Free; Future; Expected date: 05/19/2022    High risk medication use  -     Phenytoin Level, Free; Future; Expected date: 05/19/2022    Moderate persistent asthma, unspecified whether complicated    WILLSON (dyspnea on exertion)    Hyperlipidemia, unspecified hyperlipidemia type       Health Maintenance Topics with due status: Not Due       Topic Last Completion Date    DEXA Scan 03/05/2020    Lipid Panel 12/30/2021       Procedures     Future Appointments   Date Time Provider Department Center   5/20/2022  1:15 PM MODERNA VACCINE, [facility] FAMILY [facility]   5/20/2022  1:30 PM AWV NURSE, [facility]   7/26/2022 10:00 AM Romie Torres MD [facility]   5/24/2023  1:30 PM AWV NURSE, [facility]        No follow-ups on file.     Signature:  HEAVENLY Pulido    Date of encounter: 5/19/22

## 2022-05-19 NOTE — PATIENT INSTRUCTIONS
Decrease anxiety will add Buspar twice daily to help with external causes of anxiety, encouraged to continue to increase activity, avoid irritants and aerosols that exacerbated  breathing. Keep appointment with [place] [facility]

## 2022-05-20 ENCOUNTER — OFFICE VISIT (OUTPATIENT)
Dept: FAMILY MEDICINE | Facility: CLINIC | Age: 72
End: 2022-05-20
Payer: MEDICARE

## 2022-05-20 ENCOUNTER — IMMUNIZATION (OUTPATIENT)
Dept: FAMILY MEDICINE | Facility: CLINIC | Age: 72
End: 2022-05-20
Payer: MEDICARE

## 2022-05-20 VITALS
OXYGEN SATURATION: 96 % | WEIGHT: 214.63 LBS | BODY MASS INDEX: 36.64 KG/M2 | HEART RATE: 60 BPM | RESPIRATION RATE: 20 BRPM | HEIGHT: 64 IN | DIASTOLIC BLOOD PRESSURE: 72 MMHG | SYSTOLIC BLOOD PRESSURE: 122 MMHG | TEMPERATURE: 99 F

## 2022-05-20 DIAGNOSIS — M54.9 DORSALGIA, UNSPECIFIED: ICD-10-CM

## 2022-05-20 DIAGNOSIS — Z12.31 ENCOUNTER FOR SCREENING MAMMOGRAM FOR MALIGNANT NEOPLASM OF BREAST: ICD-10-CM

## 2022-05-20 DIAGNOSIS — Z12.11 SCREENING FOR MALIGNANT NEOPLASM OF COLON: ICD-10-CM

## 2022-05-20 DIAGNOSIS — J45.40 MODERATE PERSISTENT ASTHMA, UNSPECIFIED WHETHER COMPLICATED: ICD-10-CM

## 2022-05-20 DIAGNOSIS — M81.0 OSTEOPOROSIS, UNSPECIFIED OSTEOPOROSIS TYPE, UNSPECIFIED PATHOLOGICAL FRACTURE PRESENCE: ICD-10-CM

## 2022-05-20 DIAGNOSIS — F41.9 ANXIETY: ICD-10-CM

## 2022-05-20 DIAGNOSIS — Z23 NEED FOR VACCINATION: Primary | ICD-10-CM

## 2022-05-20 DIAGNOSIS — I48.91 ATRIAL FIBRILLATION, UNSPECIFIED TYPE: ICD-10-CM

## 2022-05-20 DIAGNOSIS — Z00.00 ENCOUNTER FOR SUBSEQUENT ANNUAL WELLNESS VISIT (AWV) IN MEDICARE PATIENT: Primary | ICD-10-CM

## 2022-05-20 PROCEDURE — 91306 COVID-19, MRNA, LNP-S, PF, 100 MCG/0.25 ML DOSE VACCINE (MODERNA BOOSTER): ICD-10-PCS | Mod: ,,, | Performed by: NURSE PRACTITIONER

## 2022-05-20 PROCEDURE — G0439 PPPS, SUBSEQ VISIT: HCPCS | Mod: ,,, | Performed by: NURSE PRACTITIONER

## 2022-05-20 PROCEDURE — 1160F PR REVIEW ALL MEDS BY PRESCRIBER/CLIN PHARMACIST DOCUMENTED: ICD-10-PCS | Mod: ,,, | Performed by: NURSE PRACTITIONER

## 2022-05-20 PROCEDURE — 3078F DIAST BP <80 MM HG: CPT | Mod: ,,, | Performed by: NURSE PRACTITIONER

## 2022-05-20 PROCEDURE — 3288F PR FALLS RISK ASSESSMENT DOCUMENTED: ICD-10-PCS | Mod: ,,, | Performed by: NURSE PRACTITIONER

## 2022-05-20 PROCEDURE — 3078F PR MOST RECENT DIASTOLIC BLOOD PRESSURE < 80 MM HG: ICD-10-PCS | Mod: ,,, | Performed by: NURSE PRACTITIONER

## 2022-05-20 PROCEDURE — 1159F MED LIST DOCD IN RCRD: CPT | Mod: ,,, | Performed by: NURSE PRACTITIONER

## 2022-05-20 PROCEDURE — 3008F BODY MASS INDEX DOCD: CPT | Mod: ,,, | Performed by: NURSE PRACTITIONER

## 2022-05-20 PROCEDURE — 3074F PR MOST RECENT SYSTOLIC BLOOD PRESSURE < 130 MM HG: ICD-10-PCS | Mod: ,,, | Performed by: NURSE PRACTITIONER

## 2022-05-20 PROCEDURE — 3008F PR BODY MASS INDEX (BMI) DOCUMENTED: ICD-10-PCS | Mod: ,,, | Performed by: NURSE PRACTITIONER

## 2022-05-20 PROCEDURE — 1101F PT FALLS ASSESS-DOCD LE1/YR: CPT | Mod: ,,, | Performed by: NURSE PRACTITIONER

## 2022-05-20 PROCEDURE — 3074F SYST BP LT 130 MM HG: CPT | Mod: ,,, | Performed by: NURSE PRACTITIONER

## 2022-05-20 PROCEDURE — 1101F PR PT FALLS ASSESS DOC 0-1 FALLS W/OUT INJ PAST YR: ICD-10-PCS | Mod: ,,, | Performed by: NURSE PRACTITIONER

## 2022-05-20 PROCEDURE — 1125F PR PAIN SEVERITY QUANTIFIED, PAIN PRESENT: ICD-10-PCS | Mod: ,,, | Performed by: NURSE PRACTITIONER

## 2022-05-20 PROCEDURE — 0064A COVID-19, MRNA, LNP-S, PF, 100 MCG/0.25 ML DOSE VACCINE (MODERNA BOOSTER): CPT | Mod: ,,, | Performed by: NURSE PRACTITIONER

## 2022-05-20 PROCEDURE — 0064A COVID-19, MRNA, LNP-S, PF, 100 MCG/0.25 ML DOSE VACCINE (MODERNA BOOSTER): ICD-10-PCS | Mod: ,,, | Performed by: NURSE PRACTITIONER

## 2022-05-20 PROCEDURE — 1160F RVW MEDS BY RX/DR IN RCRD: CPT | Mod: ,,, | Performed by: NURSE PRACTITIONER

## 2022-05-20 PROCEDURE — 1125F AMNT PAIN NOTED PAIN PRSNT: CPT | Mod: ,,, | Performed by: NURSE PRACTITIONER

## 2022-05-20 PROCEDURE — 3288F FALL RISK ASSESSMENT DOCD: CPT | Mod: ,,, | Performed by: NURSE PRACTITIONER

## 2022-05-20 PROCEDURE — 1159F PR MEDICATION LIST DOCUMENTED IN MEDICAL RECORD: ICD-10-PCS | Mod: ,,, | Performed by: NURSE PRACTITIONER

## 2022-05-20 PROCEDURE — 91306 COVID-19, MRNA, LNP-S, PF, 100 MCG/0.25 ML DOSE VACCINE (MODERNA BOOSTER): CPT | Mod: ,,, | Performed by: NURSE PRACTITIONER

## 2022-05-20 PROCEDURE — G0439 PR MEDICARE ANNUAL WELLNESS SUBSEQUENT VISIT: ICD-10-PCS | Mod: ,,, | Performed by: NURSE PRACTITIONER

## 2022-05-20 NOTE — PATIENT INSTRUCTIONS
Counseling and Referral of Other Preventative  (Italic type indicates deductible and co-insurance are waived)    Patient Name: Lawanda Martínez  Today's Date: 5/20/2022    Health Maintenance         Date Due Completion Date    Mammogram 10/07/2020 10/7/2019    TETANUS VACCINE 05/20/2023 (Originally 4/29/1968) ---    Shingles Vaccine (1 of 2) 05/20/2023 (Originally 4/29/2001) ---    Colorectal Cancer Screening 05/20/2023 (Originally 1950) 1/11/2017    DEXA Scan 03/05/2023 3/5/2020    Lipid Panel 12/30/2026 12/30/2021          Orders Placed This Encounter   Procedures    DXA Bone Density Spine And Hip    Mammo Digital Screening Bilat    Ambulatory referral/consult to Gastroenterology

## 2022-05-21 LAB — PHENYTOIN FREE SERPL-MCNC: 0.8 MCG/ML (ref 1–2)

## 2022-05-27 ENCOUNTER — LAB REQUISITION (OUTPATIENT)
Dept: LAB | Facility: HOSPITAL | Age: 72
End: 2022-05-27
Attending: NURSE PRACTITIONER
Payer: MEDICARE

## 2022-05-27 DIAGNOSIS — J44.9 CHRONIC OBSTRUCTIVE PULMONARY DISEASE, UNSPECIFIED: ICD-10-CM

## 2022-05-27 DIAGNOSIS — G40.911 EPILEPSY, UNSPECIFIED, INTRACTABLE, WITH STATUS EPILEPTICUS: ICD-10-CM

## 2022-05-27 LAB — PHENYTOIN SERPL-MCNC: 6.6 ΜG/ML (ref 10–20)

## 2022-05-27 PROCEDURE — 80185 ASSAY OF PHENYTOIN TOTAL: CPT | Performed by: NURSE PRACTITIONER

## 2022-06-01 ENCOUNTER — TELEPHONE (OUTPATIENT)
Dept: CARDIOLOGY | Facility: CLINIC | Age: 72
End: 2022-06-01
Payer: MEDICARE

## 2022-06-01 LAB
LEFT EYE DM RETINOPATHY: NEGATIVE
RIGHT EYE DM RETINOPATHY: NEGATIVE

## 2022-06-02 ENCOUNTER — PATIENT OUTREACH (OUTPATIENT)
Dept: FAMILY MEDICINE | Facility: CLINIC | Age: 72
End: 2022-06-02
Payer: MEDICARE

## 2022-06-02 ENCOUNTER — LAB REQUISITION (OUTPATIENT)
Dept: LAB | Facility: HOSPITAL | Age: 72
End: 2022-06-02
Attending: NURSE PRACTITIONER
Payer: MEDICARE

## 2022-06-02 DIAGNOSIS — G40.911 EPILEPSY, UNSPECIFIED, INTRACTABLE, WITH STATUS EPILEPTICUS: ICD-10-CM

## 2022-06-02 LAB — PHENYTOIN SERPL-MCNC: 5.8 ΜG/ML (ref 10–20)

## 2022-06-02 PROCEDURE — 80185 ASSAY OF PHENYTOIN TOTAL: CPT | Performed by: NURSE PRACTITIONER

## 2022-06-07 DIAGNOSIS — G89.29 CHRONIC LOW BACK PAIN WITH BILATERAL SCIATICA, UNSPECIFIED BACK PAIN LATERALITY: ICD-10-CM

## 2022-06-07 DIAGNOSIS — F32.A DEPRESSION, UNSPECIFIED DEPRESSION TYPE: ICD-10-CM

## 2022-06-07 DIAGNOSIS — M54.41 CHRONIC LOW BACK PAIN WITH BILATERAL SCIATICA, UNSPECIFIED BACK PAIN LATERALITY: ICD-10-CM

## 2022-06-07 DIAGNOSIS — M54.42 CHRONIC LOW BACK PAIN WITH BILATERAL SCIATICA, UNSPECIFIED BACK PAIN LATERALITY: ICD-10-CM

## 2022-06-07 DIAGNOSIS — T78.40XS ALLERGY, SEQUELA: ICD-10-CM

## 2022-06-07 RX ORDER — GABAPENTIN 300 MG/1
300 CAPSULE ORAL 3 TIMES DAILY
Qty: 270 CAPSULE | Refills: 1 | Status: SHIPPED | OUTPATIENT
Start: 2022-06-07 | End: 2022-10-27 | Stop reason: SDUPTHER

## 2022-06-07 RX ORDER — PAROXETINE HYDROCHLORIDE 40 MG/1
40 TABLET, FILM COATED ORAL DAILY
Qty: 90 TABLET | Refills: 1 | Status: SHIPPED | OUTPATIENT
Start: 2022-06-07 | End: 2022-08-22

## 2022-06-07 RX ORDER — MONTELUKAST SODIUM 10 MG/1
10 TABLET ORAL DAILY
Qty: 90 TABLET | Refills: 1 | Status: SHIPPED | OUTPATIENT
Start: 2022-06-07 | End: 2022-10-27 | Stop reason: SDUPTHER

## 2022-06-13 DIAGNOSIS — R42 DIZZINESS: Primary | ICD-10-CM

## 2022-06-13 PROCEDURE — 85610 PROTHROMBIN TIME: CPT | Performed by: INTERNAL MEDICINE

## 2022-06-13 RX ORDER — MECLIZINE HCL 12.5 MG 12.5 MG/1
12.5 TABLET ORAL 2 TIMES DAILY PRN
Qty: 60 TABLET | Refills: 0 | Status: SHIPPED | OUTPATIENT
Start: 2022-06-13 | End: 2022-09-15 | Stop reason: SDUPTHER

## 2022-06-14 PROCEDURE — 80185 ASSAY OF PHENYTOIN TOTAL: CPT | Performed by: NURSE PRACTITIONER

## 2022-06-15 ENCOUNTER — TELEPHONE (OUTPATIENT)
Dept: CARDIOLOGY | Facility: CLINIC | Age: 72
End: 2022-06-15
Payer: MEDICARE

## 2022-06-15 ENCOUNTER — LAB REQUISITION (OUTPATIENT)
Dept: LAB | Facility: HOSPITAL | Age: 72
End: 2022-06-15
Attending: NURSE PRACTITIONER
Payer: MEDICARE

## 2022-06-15 DIAGNOSIS — G40.89 OTHER SEIZURES: ICD-10-CM

## 2022-06-15 LAB — PHENYTOIN SERPL-MCNC: 9.2 ΜG/ML (ref 10–20)

## 2022-07-13 ENCOUNTER — LAB REQUISITION (OUTPATIENT)
Dept: LAB | Facility: HOSPITAL | Age: 72
End: 2022-07-13
Attending: NURSE PRACTITIONER
Payer: MEDICARE

## 2022-07-13 DIAGNOSIS — G40.89 OTHER SEIZURES: ICD-10-CM

## 2022-07-13 LAB — PHENYTOIN SERPL-MCNC: 4.1 ΜG/ML (ref 10–20)

## 2022-07-13 PROCEDURE — 80185 ASSAY OF PHENYTOIN TOTAL: CPT | Performed by: NURSE PRACTITIONER

## 2022-07-14 DIAGNOSIS — F41.9 ANXIETY: ICD-10-CM

## 2022-07-14 RX ORDER — BUSPIRONE HYDROCHLORIDE 5 MG/1
5 TABLET ORAL 2 TIMES DAILY
Qty: 120 TABLET | Refills: 2 | Status: SHIPPED | OUTPATIENT
Start: 2022-07-14 | End: 2022-08-04

## 2022-07-15 ENCOUNTER — LAB REQUISITION (OUTPATIENT)
Dept: LAB | Facility: HOSPITAL | Age: 72
End: 2022-07-15
Attending: NURSE PRACTITIONER
Payer: MEDICARE

## 2022-07-15 DIAGNOSIS — R05.9 COUGH, UNSPECIFIED: ICD-10-CM

## 2022-07-15 LAB
ANION GAP SERPL CALCULATED.3IONS-SCNC: 12 MMOL/L (ref 7–16)
BUN SERPL-MCNC: 14 MG/DL (ref 7–18)
BUN/CREAT SERPL: 15 (ref 6–20)
CALCIUM SERPL-MCNC: 9.2 MG/DL (ref 8.5–10.1)
CHLORIDE SERPL-SCNC: 105 MMOL/L (ref 98–107)
CO2 SERPL-SCNC: 30 MMOL/L (ref 21–32)
CREAT SERPL-MCNC: 0.93 MG/DL (ref 0.55–1.02)
GLUCOSE SERPL-MCNC: 115 MG/DL (ref 74–106)
POTASSIUM SERPL-SCNC: 4.8 MMOL/L (ref 3.5–5.1)
SARS-COV+SARS-COV-2 AG RESP QL IA.RAPID: NEGATIVE
SODIUM SERPL-SCNC: 142 MMOL/L (ref 136–145)

## 2022-07-15 PROCEDURE — 80048 BASIC METABOLIC PNL TOTAL CA: CPT | Performed by: NURSE PRACTITIONER

## 2022-07-15 PROCEDURE — 87426 SARSCOV CORONAVIRUS AG IA: CPT | Performed by: NURSE PRACTITIONER

## 2022-07-20 ENCOUNTER — LAB REQUISITION (OUTPATIENT)
Dept: LAB | Facility: HOSPITAL | Age: 72
End: 2022-07-20
Attending: NURSE PRACTITIONER
Payer: MEDICARE

## 2022-07-20 DIAGNOSIS — I48.0 PAROXYSMAL ATRIAL FIBRILLATION: ICD-10-CM

## 2022-07-20 DIAGNOSIS — I50.32 CHRONIC DIASTOLIC (CONGESTIVE) HEART FAILURE: ICD-10-CM

## 2022-07-20 DIAGNOSIS — I16.0 HYPERTENSIVE URGENCY: ICD-10-CM

## 2022-07-20 LAB
ANION GAP SERPL CALCULATED.3IONS-SCNC: 10 MMOL/L (ref 7–16)
BASOPHILS # BLD AUTO: 0.02 K/UL (ref 0–0.2)
BASOPHILS NFR BLD AUTO: 0.3 % (ref 0–1)
BUN SERPL-MCNC: 14 MG/DL (ref 7–18)
BUN/CREAT SERPL: 13 (ref 6–20)
CALCIUM SERPL-MCNC: 9.1 MG/DL (ref 8.5–10.1)
CHLORIDE SERPL-SCNC: 104 MMOL/L (ref 98–107)
CO2 SERPL-SCNC: 32 MMOL/L (ref 21–32)
CREAT SERPL-MCNC: 1.1 MG/DL (ref 0.55–1.02)
DIFFERENTIAL METHOD BLD: ABNORMAL
EOSINOPHIL # BLD AUTO: 0.88 K/UL (ref 0–0.5)
EOSINOPHIL NFR BLD AUTO: 14.7 % (ref 1–4)
EOSINOPHIL NFR BLD MANUAL: 8 % (ref 1–4)
ERYTHROCYTE [DISTWIDTH] IN BLOOD BY AUTOMATED COUNT: 13.6 % (ref 11.5–14.5)
GLUCOSE SERPL-MCNC: 150 MG/DL (ref 74–106)
HCT VFR BLD AUTO: 40.1 % (ref 38–47)
HGB BLD-MCNC: 12.8 G/DL (ref 12–16)
LYMPHOCYTES # BLD AUTO: 1.77 K/UL (ref 1–4.8)
LYMPHOCYTES NFR BLD AUTO: 29.5 % (ref 27–41)
LYMPHOCYTES NFR BLD MANUAL: 31 % (ref 27–41)
MCH RBC QN AUTO: 30.1 PG (ref 27–31)
MCHC RBC AUTO-ENTMCNC: 31.9 G/DL (ref 32–36)
MCV RBC AUTO: 94.4 FL (ref 80–96)
MONOCYTES # BLD AUTO: 0.54 K/UL (ref 0–0.8)
MONOCYTES NFR BLD AUTO: 9 % (ref 2–6)
MONOCYTES NFR BLD MANUAL: 5 % (ref 2–6)
MPC BLD CALC-MCNC: 11.8 FL (ref 9.4–12.4)
NEUTROPHILS # BLD AUTO: 2.78 K/UL (ref 1.8–7.7)
NEUTROPHILS NFR BLD AUTO: 46.5 % (ref 53–65)
NEUTS SEG NFR BLD MANUAL: 56 % (ref 50–62)
NRBC BLD MANUAL-RTO: ABNORMAL %
NT-PROBNP SERPL-MCNC: 439 PG/ML (ref 1–125)
PHENYTOIN SERPL-MCNC: 3.9 ΜG/ML (ref 10–20)
PLATELET # BLD AUTO: 217 K/UL (ref 150–400)
PLATELET MORPHOLOGY: NORMAL
POTASSIUM SERPL-SCNC: 4.1 MMOL/L (ref 3.5–5.1)
RBC # BLD AUTO: 4.25 M/UL (ref 4.2–5.4)
RBC MORPH BLD: NORMAL
SODIUM SERPL-SCNC: 142 MMOL/L (ref 136–145)
WBC # BLD AUTO: 5.99 K/UL (ref 4.5–11)

## 2022-07-20 PROCEDURE — 83880 ASSAY OF NATRIURETIC PEPTIDE: CPT | Performed by: NURSE PRACTITIONER

## 2022-07-20 PROCEDURE — 80048 BASIC METABOLIC PNL TOTAL CA: CPT | Performed by: NURSE PRACTITIONER

## 2022-07-20 PROCEDURE — 85025 COMPLETE CBC W/AUTO DIFF WBC: CPT | Performed by: NURSE PRACTITIONER

## 2022-07-20 PROCEDURE — 80185 ASSAY OF PHENYTOIN TOTAL: CPT | Performed by: NURSE PRACTITIONER

## 2022-07-27 ENCOUNTER — TELEPHONE (OUTPATIENT)
Dept: FAMILY MEDICINE | Facility: CLINIC | Age: 72
End: 2022-07-27

## 2022-07-27 ENCOUNTER — LAB REQUISITION (OUTPATIENT)
Dept: LAB | Facility: HOSPITAL | Age: 72
End: 2022-07-27
Attending: NURSE PRACTITIONER
Payer: MEDICARE

## 2022-07-27 DIAGNOSIS — I11.0 HYPERTENSIVE HEART DISEASE WITH HEART FAILURE: ICD-10-CM

## 2022-07-27 DIAGNOSIS — I48.0 PAROXYSMAL ATRIAL FIBRILLATION: ICD-10-CM

## 2022-07-27 DIAGNOSIS — I50.32 CHRONIC DIASTOLIC (CONGESTIVE) HEART FAILURE: ICD-10-CM

## 2022-07-27 DIAGNOSIS — J44.9 CHRONIC OBSTRUCTIVE PULMONARY DISEASE, UNSPECIFIED: ICD-10-CM

## 2022-07-27 LAB
NT-PROBNP SERPL-MCNC: 1204 PG/ML (ref 1–125)
PHENYTOIN SERPL-MCNC: 9.5 ΜG/ML (ref 10–20)

## 2022-07-27 PROCEDURE — 83880 ASSAY OF NATRIURETIC PEPTIDE: CPT | Performed by: NURSE PRACTITIONER

## 2022-07-27 PROCEDURE — 80185 ASSAY OF PHENYTOIN TOTAL: CPT | Performed by: NURSE PRACTITIONER

## 2022-07-28 NOTE — TELEPHONE ENCOUNTER
Contacted pt for [facility] call. Pt reports she is doing okay. She reports her breathing is about the same and has not gotten any worse. She reports she is using oxygen continuously. Pt reports she uses her inhaler and nebulizer meds as needed. She denies chest pain or heart related symptoms today. Discussed all f/u appts, pt vu. Pt reports she is current with [facility] nurse made visit yesterday. Pt reports she has oxygen, BSC, RW, nebulizer. She denies any other DME needs. Medications reviewed and discussed. Instructed to take med bottles to all dr appts. Pt denies any needs at this time. Instructed to contact her  nurse or dr for new or worsening symptoms. Pt vu.

## 2022-08-01 ENCOUNTER — LAB REQUISITION (OUTPATIENT)
Dept: LAB | Facility: HOSPITAL | Age: 72
End: 2022-08-01
Attending: NURSE PRACTITIONER
Payer: MEDICARE

## 2022-08-01 LAB — NT-PROBNP SERPL-MCNC: 838 PG/ML (ref 1–125)

## 2022-08-01 PROCEDURE — 83880 ASSAY OF NATRIURETIC PEPTIDE: CPT | Performed by: NURSE PRACTITIONER

## 2022-08-04 ENCOUNTER — OFFICE VISIT (OUTPATIENT)
Dept: FAMILY MEDICINE | Facility: CLINIC | Age: 72
End: 2022-08-04
Payer: MEDICARE

## 2022-08-04 VITALS
TEMPERATURE: 99 F | OXYGEN SATURATION: 97 % | BODY MASS INDEX: 36.19 KG/M2 | RESPIRATION RATE: 20 BRPM | SYSTOLIC BLOOD PRESSURE: 118 MMHG | HEART RATE: 60 BPM | WEIGHT: 212 LBS | DIASTOLIC BLOOD PRESSURE: 54 MMHG | HEIGHT: 64 IN

## 2022-08-04 DIAGNOSIS — E78.5 HYPERLIPIDEMIA, UNSPECIFIED HYPERLIPIDEMIA TYPE: ICD-10-CM

## 2022-08-04 DIAGNOSIS — R06.02 SOB (SHORTNESS OF BREATH): ICD-10-CM

## 2022-08-04 DIAGNOSIS — F41.9 ANXIETY: ICD-10-CM

## 2022-08-04 DIAGNOSIS — K42.9 UMBILICAL HERNIA WITHOUT OBSTRUCTION AND WITHOUT GANGRENE: Primary | ICD-10-CM

## 2022-08-04 DIAGNOSIS — I50.32 CHRONIC DIASTOLIC HEART FAILURE: ICD-10-CM

## 2022-08-04 DIAGNOSIS — I48.91 ATRIAL FIBRILLATION, UNSPECIFIED TYPE: ICD-10-CM

## 2022-08-04 DIAGNOSIS — J45.40 MODERATE PERSISTENT ASTHMA, UNSPECIFIED WHETHER COMPLICATED: ICD-10-CM

## 2022-08-04 DIAGNOSIS — R56.9 SEIZURES: ICD-10-CM

## 2022-08-04 PROCEDURE — 3288F PR FALLS RISK ASSESSMENT DOCUMENTED: ICD-10-PCS | Mod: ,,, | Performed by: NURSE PRACTITIONER

## 2022-08-04 PROCEDURE — 1159F MED LIST DOCD IN RCRD: CPT | Mod: ,,, | Performed by: NURSE PRACTITIONER

## 2022-08-04 PROCEDURE — 3078F PR MOST RECENT DIASTOLIC BLOOD PRESSURE < 80 MM HG: ICD-10-PCS | Mod: ,,, | Performed by: NURSE PRACTITIONER

## 2022-08-04 PROCEDURE — 99495 TCM SERVICES (MODERATE COMPLEXITY): ICD-10-PCS | Mod: ,,, | Performed by: NURSE PRACTITIONER

## 2022-08-04 PROCEDURE — 1160F RVW MEDS BY RX/DR IN RCRD: CPT | Mod: ,,, | Performed by: NURSE PRACTITIONER

## 2022-08-04 PROCEDURE — 1160F PR REVIEW ALL MEDS BY PRESCRIBER/CLIN PHARMACIST DOCUMENTED: ICD-10-PCS | Mod: ,,, | Performed by: NURSE PRACTITIONER

## 2022-08-04 PROCEDURE — 3074F SYST BP LT 130 MM HG: CPT | Mod: ,,, | Performed by: NURSE PRACTITIONER

## 2022-08-04 PROCEDURE — 3008F BODY MASS INDEX DOCD: CPT | Mod: ,,, | Performed by: NURSE PRACTITIONER

## 2022-08-04 PROCEDURE — 3008F PR BODY MASS INDEX (BMI) DOCUMENTED: ICD-10-PCS | Mod: ,,, | Performed by: NURSE PRACTITIONER

## 2022-08-04 PROCEDURE — 3078F DIAST BP <80 MM HG: CPT | Mod: ,,, | Performed by: NURSE PRACTITIONER

## 2022-08-04 PROCEDURE — 1126F PR PAIN SEVERITY QUANTIFIED, NO PAIN PRESENT: ICD-10-PCS | Mod: ,,, | Performed by: NURSE PRACTITIONER

## 2022-08-04 PROCEDURE — 1101F PT FALLS ASSESS-DOCD LE1/YR: CPT | Mod: ,,, | Performed by: NURSE PRACTITIONER

## 2022-08-04 PROCEDURE — 3074F PR MOST RECENT SYSTOLIC BLOOD PRESSURE < 130 MM HG: ICD-10-PCS | Mod: ,,, | Performed by: NURSE PRACTITIONER

## 2022-08-04 PROCEDURE — 1159F PR MEDICATION LIST DOCUMENTED IN MEDICAL RECORD: ICD-10-PCS | Mod: ,,, | Performed by: NURSE PRACTITIONER

## 2022-08-04 PROCEDURE — 3288F FALL RISK ASSESSMENT DOCD: CPT | Mod: ,,, | Performed by: NURSE PRACTITIONER

## 2022-08-04 PROCEDURE — 99495 TRANSJ CARE MGMT MOD F2F 14D: CPT | Mod: ,,, | Performed by: NURSE PRACTITIONER

## 2022-08-04 PROCEDURE — 1101F PR PT FALLS ASSESS DOC 0-1 FALLS W/OUT INJ PAST YR: ICD-10-PCS | Mod: ,,, | Performed by: NURSE PRACTITIONER

## 2022-08-04 PROCEDURE — 1126F AMNT PAIN NOTED NONE PRSNT: CPT | Mod: ,,, | Performed by: NURSE PRACTITIONER

## 2022-08-04 RX ORDER — PREDNISONE 20 MG/1
20 TABLET ORAL
COMMUNITY
End: 2022-10-27 | Stop reason: ALTCHOICE

## 2022-08-04 RX ORDER — BUSPIRONE HYDROCHLORIDE 10 MG/1
10 TABLET ORAL 2 TIMES DAILY
COMMUNITY
Start: 2022-06-02 | End: 2022-08-04 | Stop reason: SDUPTHER

## 2022-08-04 RX ORDER — PHENYTOIN SODIUM 100 MG/1
100 CAPSULE, EXTENDED RELEASE ORAL 4 TIMES DAILY
Qty: 360 CAPSULE | Refills: 1 | Status: SHIPPED | OUTPATIENT
Start: 2022-08-04 | End: 2022-08-22

## 2022-08-04 RX ORDER — BENZONATATE 100 MG/1
100 CAPSULE ORAL 3 TIMES DAILY PRN
COMMUNITY
End: 2022-08-19 | Stop reason: SDUPTHER

## 2022-08-04 RX ORDER — PRAVASTATIN SODIUM 40 MG/1
40 TABLET ORAL NIGHTLY
Qty: 90 TABLET | Refills: 1 | Status: SHIPPED | OUTPATIENT
Start: 2022-08-04 | End: 2023-01-19

## 2022-08-04 RX ORDER — BUSPIRONE HYDROCHLORIDE 10 MG/1
10 TABLET ORAL 2 TIMES DAILY
Qty: 180 TABLET | Refills: 1 | Status: SHIPPED | OUTPATIENT
Start: 2022-08-04 | End: 2023-01-19

## 2022-08-04 RX ORDER — BUDESONIDE AND FORMOTEROL FUMARATE DIHYDRATE 80; 4.5 UG/1; UG/1
2 AEROSOL RESPIRATORY (INHALATION) 2 TIMES DAILY
COMMUNITY
End: 2022-08-04 | Stop reason: ALTCHOICE

## 2022-08-04 NOTE — PATIENT INSTRUCTIONS
Home health repeat BNP, CMP Dilantin End of this week  No lifting or straining. Nothing heavier than a gallon of milk  Daily weights notify if >3-5 pounds in 24 hours  Avoid irritants for better asthma control

## 2022-08-04 NOTE — PROGRESS NOTES
2022     HEAVENLY Pulido   [facility]  [address]  [place] [address]     PATIENT NAME: Lawanda Martínez  : 1950  DATE: 22  MRN: [id number]      Billing Provider: HEAVENLY Pulido  Level of Service:   Patient PCP Information     Provider PCP Type    HEAVENLY Pulido General          Reason for Visit / Chief Complaint: Transitional Care (Admitted to [place] 2022, discharged 2022. Patient was having some abdominal pain, CT showed mild small bowel obstruction r/t hernia. Patient did develop Afib with RVR. Medications reconciled)       Update PCP  Update Chief Complaint         History of Present Illness / Problem Focused Workflow     Lawanda Martínez presents to the clinic here for a follow up from [facility] had abdominal pain showed small bowel obstruction from hernia her bowels are working she is needing to see pulmonology to get clearance for repair. She is going next week to [place] cardiology. She gets SOB with activity her stomach started hurting again after picking up a watermelon but hernia did go back in and pain resolved. Discussed she sheould not  anything larger than a gallon of milk      Transitional Care Note    Family and/or Caretaker present at visit?  Yes.  Diagnostic tests reviewed/disposition: I have reviewed all completed as well as pending diagnostic tests at the time of discharge.  Disease/illness education: Hernia Afib COPD/Asthama  Home health/community services discussion/referrals: Patient has home health established at [facility].   Establishment or re-establishment of referral orders for community resources: No other necessary community resources.   Discussion with other health care providers: No discussion with other health care providers necessary.     Discharge medications reviewed and reconciled:  Discharged medications reconciled with the current medications              Review of Systems      Review of Systems   Constitutional: Negative for chills, fatigue and fever.   HENT: Positive for nasal congestion. Negative for mouth sores, nosebleeds, sore throat and trouble swallowing.    Eyes: Negative for visual disturbance.   Respiratory: Positive for cough and shortness of breath. Negative for chest tightness and wheezing.    Cardiovascular: Negative for chest pain, palpitations, leg swelling and claudication.   Gastrointestinal: Negative for abdominal pain, constipation, diarrhea, nausea and vomiting.   Genitourinary: Negative for difficulty urinating.   Musculoskeletal: Positive for arthralgias.   Integumentary:  Negative for rash.   Neurological: Positive for weakness. Negative for headaches.        Medical / Social / Family History     Past Medical History:   Diagnosis Date    Asthma     CHF (congestive heart failure)     COPD (chronic obstructive pulmonary disease)     WILLSON (dyspnea on exertion)     GERD (gastroesophageal reflux disease)     HTN (hypertension)     Hyperlipidemia     Osteoporosis     Oxygen dependent     02 2L    Seizure disorder        Past Surgical History:   Procedure Laterality Date    CARDIOVERSION N/A 4/8/2022    Procedure: Cardioversion;  Surgeon: Adal Chung MD;  Location: [facility] CATH LAB;  Service: Cardiology;  Laterality: N/A;    COLONOSCOPY      REVISION OF TOTAL KNEE REPLACEMENT       TUBAL LIGATION         Social History  Ms.  reports that she has never smoked. She has never used smokeless tobacco. She reports that she does not drink alcohol and does not use drugs.    Family History  Ms.'s family history includes Cancer in her father; Diabetes in her brother, mother, and sister; Heart disease in her mother and sister; Hypertension in her mother and sister.    Medications and Allergies     Medications  Outpatient Medications Marked as Taking for the 8/4/22 encounter (Office Visit) with HEAVENLY Pulido   Medication Sig Dispense Refill     albuterol (VENTOLIN HFA) 90 mcg/actuation inhaler Inhale 2 puffs into the lungs every 4 (four) hours as needed for Wheezing or Shortness of Breath. Rescue 18 g 11    albuterol-ipratropium (DUO-NEB) 2.5 mg-0.5 mg/3 mL nebulizer solution Take 3 mLs by nebulization every 6 (six) hours as needed for Wheezing. Rescue 100 mL 5    apixaban (ELIQUIS) 5 mg Tab Take 1 tablet (5 mg total) by mouth 2 (two) times daily. 180 tablet 1    benzonatate (TESSALON) 100 MG capsule Take 100 mg by mouth 3 (three) times daily as needed for Cough.      calcium carbonate/vitamin D3 (CALCARB 600 WITH VITAMIN D ORAL) Take by mouth.      fluticasone propionate (FLONASE) 50 mcg/actuation nasal spray 1 spray (50 mcg total) by Each Nostril route once daily. 16 g 1    furosemide (LASIX) 40 MG tablet Take 1 tablet (40 mg total) by mouth once daily. HOLD THIS MEDICATION UNTIL YOU FOLLOW UP WITH PRIMARY CARE PROVIDER (Patient taking differently: Take 40 mg by mouth once daily.) 90 tablet 1    gabapentin (NEURONTIN) 300 MG capsule Take 1 capsule (300 mg total) by mouth 3 (three) times daily. 270 capsule 1    ipratropium (ATROVENT) 42 mcg (0.06 %) nasal spray 2 sprays by Nasal route 4 (four) times daily. 15 mL 0    loratadine (CLARITIN) 10 mg tablet Take 10 mg by mouth once daily.      meclizine (ANTIVERT) 12.5 mg tablet Take 1 tablet (12.5 mg total) by mouth 2 (two) times daily as needed for Dizziness. 60 tablet 0    metoprolol succinate (TOPROL-XL) 50 MG 24 hr tablet Take 1 tablet (50 mg total) by mouth once daily. (Patient taking differently: Take 50 mg by mouth 2 (two) times daily.) 30 tablet 5    montelukast (SINGULAIR) 10 mg tablet Take 1 tablet (10 mg total) by mouth once daily. 90 tablet 1    paroxetine (PAXIL) 40 MG tablet Take 1 tablet (40 mg total) by mouth once daily. 90 tablet 1    predniSONE (DELTASONE) 20 MG tablet Take 20 mg by mouth.      [DISCONTINUED] budesonide-formoterol 80-4.5 mcg (SYMBICORT) 80-4.5 mcg/actuation  "HFAA Inhale 2 puffs into the lungs 2 (two) times a day. Controller      [DISCONTINUED] busPIRone (BUSPAR) 10 MG tablet Take 10 mg by mouth 2 (two) times daily.      [DISCONTINUED] phenytoin (DILANTIN) 100 MG ER capsule Take 1 capsule (100 mg total) by mouth 2 (two) times daily. (Patient taking differently: Take 100 mg by mouth 4 (four) times daily.) 180 capsule 2    [DISCONTINUED] pravastatin (PRAVACHOL) 40 MG tablet Take 1 tablet (40 mg total) by mouth nightly. 90 tablet 1       Allergies  Review of patient's allergies indicates:  No Known Allergies    Physical Examination     Vitals:    08/04/22 0904   BP: (!) 118/54   BP Location: Right arm   Patient Position: Sitting   Pulse: 60   Resp: 20   Temp: 99.1 °F (37.3 °C)   TempSrc: Oral   SpO2: 97%   Weight: 96.2 kg (212 lb)   Height: 5' 4" (1.626 m)      Physical Exam  Vitals and nursing note reviewed.   Constitutional:       General: She is not in acute distress.     Appearance: Normal appearance. She is not ill-appearing, toxic-appearing or diaphoretic.   HENT:      Right Ear: External ear normal.      Left Ear: External ear normal.   Eyes:      Conjunctiva/sclera: Conjunctivae normal.      Pupils: Pupils are equal, round, and reactive to light.   Cardiovascular:      Rate and Rhythm: Normal rate and regular rhythm.   Pulmonary:      Effort: Pulmonary effort is normal.      Breath sounds: Wheezing present.   Abdominal:      General: Bowel sounds are normal. There is no distension.      Palpations: Abdomen is soft.      Tenderness: There is no guarding.      Hernia: A hernia is present.   Musculoskeletal:         General: Normal range of motion.      Cervical back: Normal range of motion and neck supple. No rigidity or tenderness.   Skin:     General: Skin is warm and dry.      Capillary Refill: Capillary refill takes less than 2 seconds.   Neurological:      General: No focal deficit present.      Mental Status: She is alert and oriented to person, place, and " time.   Psychiatric:         Mood and Affect: Mood normal.         Behavior: Behavior normal.          Assessment and Plan (including Health Maintenance)      Problem List  Smart Sets  Document Outside HM   :    Plan:   Labs end of the week stressed no heavy lifting if abdominal pain worsens must go to ER since concern for strangulation daily weights notify if >3-5 pounds 24 hours, stressed to avoid irritants for asthma      Health Maintenance Due   Topic Date Due    Mammogram  10/07/2020       Problem List Items Addressed This Visit        Pulmonary    Asthma       Cardiac/Vascular    Chronic diastolic heart failure (Chronic)    HLD (hyperlipidemia)    Relevant Medications    pravastatin (PRAVACHOL) 40 MG tablet      Other Visit Diagnoses     Umbilical hernia without obstruction and without gangrene    -  Primary    Atrial fibrillation, unspecified type        SOB (shortness of breath)        Anxiety        Relevant Medications    busPIRone (BUSPAR) 10 MG tablet    Seizures        Relevant Medications    phenytoin (DILANTIN) 100 MG ER capsule        Umbilical hernia without obstruction and without gangrene    Chronic diastolic heart failure    Moderate persistent asthma, unspecified whether complicated    Atrial fibrillation, unspecified type    SOB (shortness of breath)    Anxiety  -     busPIRone (BUSPAR) 10 MG tablet; Take 1 tablet (10 mg total) by mouth 2 (two) times daily.  Dispense: 180 tablet; Refill: 1    Seizures  -     phenytoin (DILANTIN) 100 MG ER capsule; Take 1 capsule (100 mg total) by mouth 4 (four) times daily.  Dispense: 360 capsule; Refill: 1    Hyperlipidemia, unspecified hyperlipidemia type  -     pravastatin (PRAVACHOL) 40 MG tablet; Take 1 tablet (40 mg total) by mouth nightly.  Dispense: 90 tablet; Refill: 1       Health Maintenance Topics with due status: Not Due       Topic Last Completion Date    DEXA Scan 03/05/2020    Influenza Vaccine 10/08/2021    Lipid Panel 12/30/2021        Procedures     Future Appointments   Date Time Provider Department Center   8/23/2022  1:30 PM [facility] MAMMO1 RLRD MAMMO [facility]   8/23/2022  2:00 PM [facility] XR4 DEXA [facility] XRAY [facility]   9/1/2022  9:15 AM HEAVENLY Pulido [facility] RUKHSANA [place]   5/24/2023  1:30 PM AWV NUR[facility] [place]        Follow up in about 4 weeks (around 9/1/2022).     Signature:  HEAVENLY Pulido    Date of encounter: 8/4/22

## 2022-08-05 ENCOUNTER — TELEPHONE (OUTPATIENT)
Dept: FAMILY MEDICINE | Facility: CLINIC | Age: 72
End: 2022-08-05
Payer: MEDICARE

## 2022-08-05 ENCOUNTER — LAB REQUISITION (OUTPATIENT)
Dept: LAB | Facility: HOSPITAL | Age: 72
End: 2022-08-05
Attending: NURSE PRACTITIONER
Payer: MEDICARE

## 2022-08-05 DIAGNOSIS — I50.32 CHRONIC DIASTOLIC (CONGESTIVE) HEART FAILURE: ICD-10-CM

## 2022-08-05 DIAGNOSIS — I11.0 HYPERTENSIVE HEART DISEASE WITH HEART FAILURE: ICD-10-CM

## 2022-08-05 DIAGNOSIS — J44.9 CHRONIC OBSTRUCTIVE PULMONARY DISEASE, UNSPECIFIED: ICD-10-CM

## 2022-08-05 LAB
ALBUMIN SERPL BCP-MCNC: 3.2 G/DL (ref 3.5–5)
ALBUMIN/GLOB SERPL: 1.1 {RATIO}
ALP SERPL-CCNC: 121 U/L (ref 55–142)
ALT SERPL W P-5'-P-CCNC: 89 U/L (ref 13–56)
ANION GAP SERPL CALCULATED.3IONS-SCNC: 6 MMOL/L (ref 7–16)
AST SERPL W P-5'-P-CCNC: 32 U/L (ref 15–37)
BILIRUB SERPL-MCNC: 0.3 MG/DL (ref 0–1.2)
BUN SERPL-MCNC: 17 MG/DL (ref 7–18)
BUN/CREAT SERPL: 15 (ref 6–20)
CALCIUM SERPL-MCNC: 9.1 MG/DL (ref 8.5–10.1)
CHLORIDE SERPL-SCNC: 103 MMOL/L (ref 98–107)
CO2 SERPL-SCNC: 36 MMOL/L (ref 21–32)
CREAT SERPL-MCNC: 1.11 MG/DL (ref 0.55–1.02)
GLOBULIN SER-MCNC: 2.9 G/DL (ref 2–4)
GLUCOSE SERPL-MCNC: 152 MG/DL (ref 74–106)
NT-PROBNP SERPL-MCNC: 494 PG/ML (ref 1–125)
PHENYTOIN SERPL-MCNC: 4.2 ΜG/ML (ref 10–20)
POTASSIUM SERPL-SCNC: 5.7 MMOL/L (ref 3.5–5.1)
PROT SERPL-MCNC: 6.1 G/DL (ref 6.4–8.2)
SODIUM SERPL-SCNC: 139 MMOL/L (ref 136–145)

## 2022-08-05 PROCEDURE — 83880 ASSAY OF NATRIURETIC PEPTIDE: CPT | Performed by: NURSE PRACTITIONER

## 2022-08-05 PROCEDURE — 80185 ASSAY OF PHENYTOIN TOTAL: CPT | Performed by: NURSE PRACTITIONER

## 2022-08-05 PROCEDURE — 80053 COMPREHEN METABOLIC PANEL: CPT | Performed by: NURSE PRACTITIONER

## 2022-08-05 NOTE — TELEPHONE ENCOUNTER
Spoke with Willow from [facility]  Who states Pt  jus got out of hospital states not feelingl well, states tried to sleep without ox but woke up and had to put it on .  O2 is 98% on 2 liters,  Has productive cough of white sputum,  States she is out of musinex, has some wheezing no fever.  States resent potassium is 5.7.  Instructed for pt to wear 02 at all times Take musinex to help with secretions, drink a lot of fluids, report to ER for any worsening shortness of breath, fever, fatigue, Recheck potassium on Monday and report level to clinic Per v/o HEAVENLY Cruz /  Order given to Willow from [facility]

## 2022-08-08 PROCEDURE — 85610 PROTHROMBIN TIME: CPT

## 2022-08-19 RX ORDER — BENZONATATE 100 MG/1
100 CAPSULE ORAL 3 TIMES DAILY PRN
Qty: 60 CAPSULE | Refills: 0 | Status: SHIPPED | OUTPATIENT
Start: 2022-08-19 | End: 2022-12-08 | Stop reason: SDUPTHER

## 2022-09-01 ENCOUNTER — OFFICE VISIT (OUTPATIENT)
Dept: FAMILY MEDICINE | Facility: CLINIC | Age: 72
End: 2022-09-01
Payer: MEDICARE

## 2022-09-01 VITALS
SYSTOLIC BLOOD PRESSURE: 130 MMHG | RESPIRATION RATE: 16 BRPM | HEART RATE: 69 BPM | HEIGHT: 64 IN | OXYGEN SATURATION: 94 % | BODY MASS INDEX: 36.02 KG/M2 | DIASTOLIC BLOOD PRESSURE: 62 MMHG | WEIGHT: 211 LBS

## 2022-09-01 DIAGNOSIS — M25.522 LEFT ELBOW PAIN: ICD-10-CM

## 2022-09-01 DIAGNOSIS — Z51.81 ENCOUNTER FOR MEDICATION MONITORING: ICD-10-CM

## 2022-09-01 DIAGNOSIS — N18.31 CHRONIC KIDNEY DISEASE, STAGE 3A: ICD-10-CM
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
## 2022-09-01 DIAGNOSIS — I20.9 ANGINA PECTORIS: ICD-10-CM
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
## 2022-09-01 DIAGNOSIS — F33.9 DEPRESSION, RECURRENT: ICD-10-CM

## 2022-09-01 DIAGNOSIS — E66.01 SEVERE OBESITY (BMI 35.0-39.9) WITH COMORBIDITY: ICD-10-CM

## 2022-09-01 PROCEDURE — 1160F PR REVIEW ALL MEDS BY PRESCRIBER/CLIN PHARMACIST DOCUMENTED: ICD-10-PCS | Mod: ,,, | Performed by: NURSE PRACTITIONER

## 2022-09-01 PROCEDURE — 1159F PR MEDICATION LIST DOCUMENTED IN MEDICAL RECORD: ICD-10-PCS | Mod: ,,, | Performed by: NURSE PRACTITIONER

## 2022-09-01 PROCEDURE — 80186 PHENYTOIN LEVEL, FREE: ICD-10-PCS | Mod: 90,,, | Performed by: CLINICAL MEDICAL LABORATORY

## 2022-09-01 PROCEDURE — 1101F PT FALLS ASSESS-DOCD LE1/YR: CPT | Mod: ,,, | Performed by: NURSE PRACTITIONER

## 2022-09-01 PROCEDURE — 3075F PR MOST RECENT SYSTOLIC BLOOD PRESS GE 130-139MM HG: ICD-10-PCS | Mod: ,,, | Performed by: NURSE PRACTITIONER

## 2022-09-01 PROCEDURE — 1160F RVW MEDS BY RX/DR IN RCRD: CPT | Mod: ,,, | Performed by: NURSE PRACTITIONER

## 2022-09-01 PROCEDURE — 1126F AMNT PAIN NOTED NONE PRSNT: CPT | Mod: ,,, | Performed by: NURSE PRACTITIONER

## 2022-09-01 PROCEDURE — 1126F PR PAIN SEVERITY QUANTIFIED, NO PAIN PRESENT: ICD-10-PCS | Mod: ,,, | Performed by: NURSE PRACTITIONER

## 2022-09-01 PROCEDURE — 3008F BODY MASS INDEX DOCD: CPT | Mod: ,,, | Performed by: NURSE PRACTITIONER

## 2022-09-01 PROCEDURE — 1101F PR PT FALLS ASSESS DOC 0-1 FALLS W/OUT INJ PAST YR: ICD-10-PCS | Mod: ,,, | Performed by: NURSE PRACTITIONER

## 2022-09-01 PROCEDURE — 3078F PR MOST RECENT DIASTOLIC BLOOD PRESSURE < 80 MM HG: ICD-10-PCS | Mod: ,,, | Performed by: NURSE PRACTITIONER

## 2022-09-01 PROCEDURE — 1159F MED LIST DOCD IN RCRD: CPT | Mod: ,,, | Performed by: NURSE PRACTITIONER

## 2022-09-01 PROCEDURE — 3288F FALL RISK ASSESSMENT DOCD: CPT | Mod: ,,, | Performed by: NURSE PRACTITIONER

## 2022-09-01 PROCEDURE — 3008F PR BODY MASS INDEX (BMI) DOCUMENTED: ICD-10-PCS | Mod: ,,, | Performed by: NURSE PRACTITIONER

## 2022-09-01 PROCEDURE — 80186 ASSAY OF PHENYTOIN FREE: CPT | Mod: 90,,, | Performed by: CLINICAL MEDICAL LABORATORY

## 2022-09-01 PROCEDURE — 3075F SYST BP GE 130 - 139MM HG: CPT | Mod: ,,, | Performed by: NURSE PRACTITIONER

## 2022-09-01 PROCEDURE — 99213 PR OFFICE/OUTPT VISIT, EST, LEVL III, 20-29 MIN: ICD-10-PCS | Mod: ,,, | Performed by: NURSE PRACTITIONER

## 2022-09-01 PROCEDURE — 3288F PR FALLS RISK ASSESSMENT DOCUMENTED: ICD-10-PCS | Mod: ,,, | Performed by: NURSE PRACTITIONER

## 2022-09-01 PROCEDURE — 3078F DIAST BP <80 MM HG: CPT | Mod: ,,, | Performed by: NURSE PRACTITIONER

## 2022-09-01 PROCEDURE — 99213 OFFICE O/P EST LOW 20 MIN: CPT | Mod: ,,, | Performed by: NURSE PRACTITIONER

## 2022-09-01 NOTE — PROGRESS NOTES
2022     HEAVENLY Pulido   [facility]  [address]  [place] [address]     PATIENT NAME: Lawanda Martínez  : 1950  DATE: 22  MRN: [id number]      Billing Provider: HEAVENLY uPlido  Level of Service:   Patient PCP Information       Provider PCP Type    HEAVENLY Pulido General            Reason for Visit / Chief Complaint: Follow-up (Follow up hernia) and Elbow Pain (Left elbow pain after fall. Has been several weeks but still painful. )       Update PCP  Update Chief Complaint         History of Present Illness / Problem Focused Workflow     Lawanda Martínez presents to the clinic here for a 4 week follow up she had an ablation does report feeling well today, she is not coughing and heart regular rate ans rhythm today. However she did have shortness and breath, coughing and chest pain yesterday, she was emptying her bathroom and moving a rug due to a water leak. She is set to see cardio tomorrow in [place]. She went to  her eliquis and was $140- she is unable to afford this. We were able to find one sample kyrie for a week but encouraged her to discuss with cardio tomorrow. She is also complaining of left elbow pain, she fell last week out of her rolling walker with chair and caught herself with left elbow.       Review of Systems     Review of Systems   Constitutional:  Negative for activity change, appetite change, chills, fatigue and fever.   HENT:  Negative for ear pain, mouth sores, sore throat and voice change.    Eyes:  Negative for visual disturbance.   Respiratory:  Positive for shortness of breath. Negative for cough and wheezing.    Cardiovascular:  Positive for leg swelling. Negative for chest pain and palpitations.   Gastrointestinal:  Negative for abdominal pain, blood in stool, nausea and vomiting.   Genitourinary:  Negative for difficulty urinating.   Musculoskeletal:  Positive for arthralgias, back pain, gait problem and  myalgias.   Neurological:  Negative for dizziness, seizures, weakness and headaches.      Medical / Social / Family History     Past Medical History:   Diagnosis Date    Asthma     CHF (congestive heart failure)     COPD (chronic obstructive pulmonary disease)     WILLSON (dyspnea on exertion)     GERD (gastroesophageal reflux disease)     HTN (hypertension)     Hyperlipidemia     Osteoporosis     Oxygen dependent     02 2L    Seizure disorder        Past Surgical History:   Procedure Laterality Date    CARDIOVERSION N/A 4/8/2022    Procedure: Cardioversion;  Surgeon: Adal Chung MD;  Location: [facility] CATH LAB;  Service: Cardiology;  Laterality: N/A;    COLONOSCOPY      REVISION OF TOTAL KNEE REPLACEMENT       TUBAL LIGATION         Social History  Ms.  reports that she has never smoked. She has never used smokeless tobacco. She reports that she does not drink alcohol and does not use drugs.    Family History  Ms.'s family history includes Cancer in her father; Diabetes in her brother, mother, and sister; Heart disease in her mother and sister; Hypertension in her mother and sister.    Medications and Allergies     Medications  Outpatient Medications Marked as Taking for the 9/1/22 encounter (Office Visit) with HEAVENLY Pulido   Medication Sig Dispense Refill    albuterol (PROVENTIL/VENTOLIN HFA) 90 mcg/actuation inhaler Inhale 1-2 puffs into the lungs every 6 (six) hours as needed for Wheezing. 54 g 1    benzonatate (TESSALON) 100 MG capsule Take 1 capsule (100 mg total) by mouth 3 (three) times daily as needed for Cough. 60 capsule 0    busPIRone (BUSPAR) 10 MG tablet Take 1 tablet (10 mg total) by mouth 2 (two) times daily. 180 tablet 1    busPIRone (BUSPAR) 5 MG Tab Take 1 tablet (5 mg total) by mouth 2 (two) times daily. 180 tablet 0    calcium carbonate/vitamin D3 (CALCARB 600 WITH VITAMIN D ORAL) Take by mouth.      fluticasone propionate (FLONASE) 50 mcg/actuation nasal spray 1 spray (50  "mcg total) by Each Nostril route once daily. 16 g 1    fluticasone-umeclidin-vilanter (TRELEGY ELLIPTA) 200-62.5-25 mcg inhaler Inhale 1 puff into the lungs once daily. 60 each 11    furosemide (LASIX) 40 MG tablet Take 1 tablet (40 mg total) by mouth once daily. HOLD THIS MEDICATION UNTIL YOU FOLLOW UP WITH PRIMARY CARE PROVIDER (Patient taking differently: Take 40 mg by mouth once daily.) 90 tablet 1    gabapentin (NEURONTIN) 300 MG capsule Take 1 capsule (300 mg total) by mouth 3 (three) times daily. 270 capsule 1    ipratropium (ATROVENT) 42 mcg (0.06 %) nasal spray 2 sprays by Nasal route 4 (four) times daily. 15 mL 0    loratadine (CLARITIN) 10 mg tablet TAKE 1 TABLET EVERY DAY 90 tablet 1    meclizine (ANTIVERT) 12.5 mg tablet Take 1 tablet (12.5 mg total) by mouth 2 (two) times daily as needed for Dizziness. 60 tablet 0    metoprolol succinate (TOPROL-XL) 50 MG 24 hr tablet Take 1 tablet (50 mg total) by mouth once daily. (Patient taking differently: Take 50 mg by mouth 2 (two) times daily.) 30 tablet 5    montelukast (SINGULAIR) 10 mg tablet Take 1 tablet (10 mg total) by mouth once daily. 90 tablet 1    pantoprazole (PROTONIX) 40 MG tablet Take 1 tablet (40 mg total) by mouth once daily. 90 tablet 0    paroxetine (PAXIL) 40 MG tablet Take 1 tablet (40 mg total) by mouth once daily. 90 tablet 0    phenytoin (DILANTIN) 100 MG ER capsule TAKE 1 CAPSULE TWICE DAILY (Patient taking differently: Take 100 mg by mouth 4 (four) times daily.) 180 capsule 1    pravastatin (PRAVACHOL) 40 MG tablet Take 1 tablet (40 mg total) by mouth nightly. 90 tablet 1    predniSONE (DELTASONE) 20 MG tablet Take 20 mg by mouth.         Allergies  Review of patient's allergies indicates:  No Known Allergies    Physical Examination     Vitals:    09/01/22 0909   BP: 130/62   BP Location: Left arm   Patient Position: Sitting   Pulse: 69   Resp: 16   SpO2: (!) 94%   Weight: 95.7 kg (211 lb)   Height: 5' 4" (1.626 m)      Physical " Exam  Vitals and nursing note reviewed.   Constitutional:       General: She is not in acute distress.     Appearance: She is not ill-appearing, toxic-appearing or diaphoretic.   HENT:      Right Ear: External ear normal.      Left Ear: External ear normal.      Nose: Nose normal.      Mouth/Throat:      Mouth: Mucous membranes are moist.      Pharynx: Oropharynx is clear.   Eyes:      Extraocular Movements: Extraocular movements intact.      Conjunctiva/sclera: Conjunctivae normal.      Pupils: Pupils are equal, round, and reactive to light.   Cardiovascular:      Rate and Rhythm: Normal rate and regular rhythm.   Pulmonary:      Effort: Pulmonary effort is normal.      Breath sounds: Normal breath sounds. No wheezing, rhonchi or rales.   Musculoskeletal:      Left elbow: Swelling present. Tenderness present.      Left forearm: Tenderness present. No swelling or deformity.      Cervical back: Normal range of motion and neck supple.   Skin:     General: Skin is warm and dry.      Capillary Refill: Capillary refill takes less than 2 seconds.   Neurological:      General: No focal deficit present.      Mental Status: She is alert and oriented to person, place, and time.   Psychiatric:         Mood and Affect: Mood normal.         Behavior: Behavior normal.        Assessment and Plan (including Health Maintenance)      Problem List  Smart Sets  Document Outside HM   :    Plan:   Sample of eliquis provided, discussed she needs to discuss with cardiology about cost. Discussed safety and activity. Reminded her to follow up with pulmonology next week. Also looked into surgeon appointment for hernia and apparently it was yesterday, will see if can be rescheduled. Will also check dilantin level since it has been off some she still has not had any seizure activity. Continue home health and see if physical therapy is coming or can be added for recent fall      Health Maintenance Due   Topic Date Due    Mammogram  10/07/2020     Influenza Vaccine (1) 09/01/2022       Problem List Items Addressed This Visit          Psychiatric    Depression, recurrent       Cardiac/Vascular    Angina pectoris       Renal/    Chronic kidney disease, stage 3a       Endocrine    Severe obesity (BMI 35.0-39.9) with comorbidity     Other Visit Diagnoses       Fall in home, initial encounter    -  Primary    Relevant Orders    X-Ray Elbow 2 Views Left (Completed)    Left elbow pain        Relevant Orders    X-Ray Elbow 2 Views Left (Completed)    Encounter for medication monitoring        Relevant Orders    Phenytoin Level, Free          Fall in home, initial encounter  -     X-Ray Elbow 2 Views Left; Future; Expected date: 09/01/2022    Left elbow pain  -     X-Ray Elbow 2 Views Left; Future; Expected date: 09/01/2022    Encounter for medication monitoring  -     Phenytoin Level, Free; Future; Expected date: 09/01/2022    Severe obesity (BMI 35.0-39.9) with comorbidity    Depression, recurrent    Angina pectoris    Chronic kidney disease, stage 3a     Health Maintenance Topics with due status: Not Due       Topic Last Completion Date    DEXA Scan 03/05/2020    Lipid Panel 12/30/2021    High Dose Statin 09/01/2022       Procedures     Future Appointments   Date Time Provider Department Center   9/6/2022  2:40 PM [facility] 10:00 [facility]   5/24/2023  1:30 PM AWV NURSE, [facility]        No follow-ups on file.     Signature:  HEAVENLY Pulido    Date of encounter: 9/1/22

## 2022-09-01 NOTE — PATIENT INSTRUCTIONS
Appointment with Dr Villegas at [facility] is Monday 9/12/22 at 9:45am, arrive at 9:15 to complete paperwork. Address is: [address] [place] [address] located across from [facility]. You will need to enter the drive from [address] [place] [address]. The phone number is [phone] if you have any questions.

## 2022-09-04 LAB — PHENYTOIN FREE SERPL-MCNC: 1.1 MCG/ML (ref 1–2)

## 2022-09-06 ENCOUNTER — OFFICE VISIT (OUTPATIENT)
Dept: PULMONOLOGY | Facility: CLINIC | Age: 72
End: 2022-09-06
Payer: MEDICARE

## 2022-09-06 VITALS
DIASTOLIC BLOOD PRESSURE: 62 MMHG | OXYGEN SATURATION: 97 % | RESPIRATION RATE: 20 BRPM | HEART RATE: 56 BPM | TEMPERATURE: 98 F | SYSTOLIC BLOOD PRESSURE: 114 MMHG

## 2022-09-06 DIAGNOSIS — J44.1 COPD EXACERBATION: ICD-10-CM

## 2022-09-06 PROCEDURE — 3288F FALL RISK ASSESSMENT DOCD: CPT | Mod: CPTII,,, | Performed by: INTERNAL MEDICINE

## 2022-09-06 PROCEDURE — 1100F PR PT FALLS ASSESS DOC 2+ FALLS/FALL W/INJURY/YR: ICD-10-PCS | Mod: CPTII,,, | Performed by: INTERNAL MEDICINE

## 2022-09-06 PROCEDURE — 1126F PR PAIN SEVERITY QUANTIFIED, NO PAIN PRESENT: ICD-10-PCS | Mod: CPTII,,, | Performed by: INTERNAL MEDICINE

## 2022-09-06 PROCEDURE — 1100F PTFALLS ASSESS-DOCD GE2>/YR: CPT | Mod: CPTII,,, | Performed by: INTERNAL MEDICINE

## 2022-09-06 PROCEDURE — 99213 OFFICE O/P EST LOW 20 MIN: CPT | Mod: ,,, | Performed by: INTERNAL MEDICINE

## 2022-09-06 PROCEDURE — 3288F PR FALLS RISK ASSESSMENT DOCUMENTED: ICD-10-PCS | Mod: CPTII,,, | Performed by: INTERNAL MEDICINE

## 2022-09-06 PROCEDURE — 3078F PR MOST RECENT DIASTOLIC BLOOD PRESSURE < 80 MM HG: ICD-10-PCS | Mod: CPTII,,, | Performed by: INTERNAL MEDICINE

## 2022-09-06 PROCEDURE — 3074F SYST BP LT 130 MM HG: CPT | Mod: CPTII,,, | Performed by: INTERNAL MEDICINE

## 2022-09-06 PROCEDURE — 1126F AMNT PAIN NOTED NONE PRSNT: CPT | Mod: CPTII,,, | Performed by: INTERNAL MEDICINE

## 2022-09-06 PROCEDURE — 1159F PR MEDICATION LIST DOCUMENTED IN MEDICAL RECORD: ICD-10-PCS | Mod: CPTII,,, | Performed by: INTERNAL MEDICINE

## 2022-09-06 PROCEDURE — 1159F MED LIST DOCD IN RCRD: CPT | Mod: CPTII,,, | Performed by: INTERNAL MEDICINE

## 2022-09-06 PROCEDURE — 99213 PR OFFICE/OUTPT VISIT, EST, LEVL III, 20-29 MIN: ICD-10-PCS | Mod: ,,, | Performed by: INTERNAL MEDICINE

## 2022-09-06 PROCEDURE — 3074F PR MOST RECENT SYSTOLIC BLOOD PRESSURE < 130 MM HG: ICD-10-PCS | Mod: CPTII,,, | Performed by: INTERNAL MEDICINE

## 2022-09-06 PROCEDURE — 3078F DIAST BP <80 MM HG: CPT | Mod: CPTII,,, | Performed by: INTERNAL MEDICINE

## 2022-09-06 NOTE — ASSESSMENT & PLAN NOTE
Patient being evaluated for possible hernia surgery needs PFTs chest x-ray and ABGs to assess will set that up for here at [facility] see her back in 3 weeks and

## 2022-09-06 NOTE — PROGRESS NOTES
Subjective:       Patient ID: Lawanda Martínez is a 72 y.o. female.    Chief Complaint: Establish Care (Patient was seeing Dr. Torres. She is here today to establish care with a new physician. Patient also needs a hernia repair. She needs to get a pulmonolgy clearance to have this procedure scheduled.)    Patient presents today she is about usual short of breath she has limited with exertion otherwise has no complaints no sputum production no hemoptysis    Past Medical History:   Diagnosis Date    Asthma     CHF (congestive heart failure)     COPD (chronic obstructive pulmonary disease)     WILLSON (dyspnea on exertion)     GERD (gastroesophageal reflux disease)     HTN (hypertension)     Hyperlipidemia     Osteoporosis     Oxygen dependent     02 2L    Seizure disorder      Past Surgical History:   Procedure Laterality Date    CARDIOVERSION N/A 4/8/2022    Procedure: Cardioversion;  Surgeon: Adal Chung MD;  Location: [facility] CATH LAB;  Service: Cardiology;  Laterality: N/A;    COLONOSCOPY      REVISION OF TOTAL KNEE REPLACEMENT       TUBAL LIGATION       Family History   Problem Relation Age of Onset    Diabetes Mother     Heart disease Mother     Hypertension Mother     Cancer Father     Diabetes Sister     Heart disease Sister     Hypertension Sister     Diabetes Brother      Review of patient's allergies indicates:  No Known Allergies   Social History     Tobacco Use    Smoking status: Never    Smokeless tobacco: Never   Substance Use Topics    Alcohol use: Never    Drug use: Never      Review of Systems    Objective:      Physical Exam  Personal Diagnostic Review  none pertinent    No flowsheet data found.      Assessment:       No diagnosis found.    Outpatient Encounter Medications as of 9/6/2022   Medication Sig Dispense Refill    albuterol (PROVENTIL/VENTOLIN HFA) 90 mcg/actuation inhaler Inhale 1-2 puffs into the lungs every 6 (six) hours as needed for Wheezing. 54 g 1    albuterol-ipratropium  (DUO-NEB) 2.5 mg-0.5 mg/3 mL nebulizer solution Take 3 mLs by nebulization every 6 (six) hours as needed for Wheezing. Rescue 100 mL 5    apixaban (ELIQUIS) 5 mg Tab Take 1 tablet (5 mg total) by mouth 2 (two) times daily. 180 tablet 1    benzonatate (TESSALON) 100 MG capsule Take 1 capsule (100 mg total) by mouth 3 (three) times daily as needed for Cough. 60 capsule 0    busPIRone (BUSPAR) 10 MG tablet Take 1 tablet (10 mg total) by mouth 2 (two) times daily. 180 tablet 1    calcium carbonate/vitamin D3 (CALCARB 600 WITH VITAMIN D ORAL) Take by mouth.      fluticasone propionate (FLONASE) 50 mcg/actuation nasal spray 1 spray (50 mcg total) by Each Nostril route once daily. 16 g 1    fluticasone-umeclidin-vilanter (TRELEGY ELLIPTA) 200-62.5-25 mcg inhaler Inhale 1 puff into the lungs once daily. 60 each 11    gabapentin (NEURONTIN) 300 MG capsule Take 1 capsule (300 mg total) by mouth 3 (three) times daily. 270 capsule 1    ipratropium (ATROVENT) 42 mcg (0.06 %) nasal spray 2 sprays by Nasal route 4 (four) times daily. 15 mL 0    loratadine (CLARITIN) 10 mg tablet TAKE 1 TABLET EVERY DAY 90 tablet 1    meclizine (ANTIVERT) 12.5 mg tablet Take 1 tablet (12.5 mg total) by mouth 2 (two) times daily as needed for Dizziness. 60 tablet 0    metoprolol succinate (TOPROL-XL) 50 MG 24 hr tablet Take 1 tablet (50 mg total) by mouth once daily. (Patient taking differently: Take 50 mg by mouth 2 (two) times daily.) 30 tablet 5    montelukast (SINGULAIR) 10 mg tablet Take 1 tablet (10 mg total) by mouth once daily. 90 tablet 1    pantoprazole (PROTONIX) 40 MG tablet Take 1 tablet (40 mg total) by mouth once daily. 90 tablet 0    paroxetine (PAXIL) 40 MG tablet Take 1 tablet (40 mg total) by mouth once daily. 90 tablet 0    phenytoin (DILANTIN) 100 MG ER capsule TAKE 1 CAPSULE TWICE DAILY (Patient taking differently: Take 100 mg by mouth 4 (four) times daily.) 180 capsule 1    pravastatin (PRAVACHOL) 40 MG tablet Take 1 tablet  (40 mg total) by mouth nightly. 90 tablet 1    busPIRone (BUSPAR) 5 MG Tab Take 1 tablet (5 mg total) by mouth 2 (two) times daily. (Patient not taking: Reported on 9/6/2022) 180 tablet 0    furosemide (LASIX) 40 MG tablet Take 1 tablet (40 mg total) by mouth once daily. HOLD THIS MEDICATION UNTIL YOU FOLLOW UP WITH PRIMARY CARE PROVIDER (Patient taking differently: Take 40 mg by mouth once daily.) 90 tablet 1    predniSONE (DELTASONE) 20 MG tablet Take 20 mg by mouth.       No facility-administered encounter medications on file as of 9/6/2022.     No orders of the defined types were placed in this encounter.      Plan:       Problem List Items Addressed This Visit    None

## 2022-09-15 DIAGNOSIS — J45.41 MODERATE PERSISTENT ASTHMA WITH ACUTE EXACERBATION: ICD-10-CM

## 2022-09-15 DIAGNOSIS — R42 DIZZINESS: ICD-10-CM

## 2022-09-15 RX ORDER — IPRATROPIUM BROMIDE 42 UG/1
2 SPRAY, METERED NASAL 4 TIMES DAILY
Qty: 15 ML | Refills: 0 | Status: SHIPPED | OUTPATIENT
Start: 2022-09-15 | End: 2022-10-27 | Stop reason: SDUPTHER

## 2022-09-15 RX ORDER — MECLIZINE HCL 12.5 MG 12.5 MG/1
12.5 TABLET ORAL 2 TIMES DAILY PRN
Qty: 60 TABLET | Refills: 0 | Status: SHIPPED | OUTPATIENT
Start: 2022-09-15 | End: 2023-12-21 | Stop reason: SDUPTHER

## 2022-09-23 ENCOUNTER — LAB REQUISITION (OUTPATIENT)
Dept: LAB | Facility: HOSPITAL | Age: 72
End: 2022-09-23
Attending: NURSE PRACTITIONER
Payer: MEDICARE

## 2022-09-23 DIAGNOSIS — R30.0 DYSURIA: ICD-10-CM

## 2022-09-23 LAB
BACTERIA #/AREA URNS HPF: ABNORMAL /HPF
BILIRUB UR QL STRIP: NEGATIVE
CLARITY UR: CLEAR
COLOR UR: YELLOW
GLUCOSE UR STRIP-MCNC: NEGATIVE MG/DL
KETONES UR STRIP-SCNC: NEGATIVE MG/DL
LEUKOCYTE ESTERASE UR QL STRIP: ABNORMAL
NITRITE UR QL STRIP: NEGATIVE
PH UR STRIP: 6.5 PH UNITS
PROT UR QL STRIP: NEGATIVE
RBC # UR STRIP: NEGATIVE /UL
RBC #/AREA URNS HPF: ABNORMAL /HPF
SP GR UR STRIP: 1.02
SQUAMOUS #/AREA URNS LPF: ABNORMAL /LPF
UROBILINOGEN UR STRIP-ACNC: 0.2 MG/DL
WBC #/AREA URNS HPF: ABNORMAL /HPF

## 2022-09-23 PROCEDURE — 87077 CULTURE AEROBIC IDENTIFY: CPT | Performed by: NURSE PRACTITIONER

## 2022-09-23 PROCEDURE — 81001 URINALYSIS AUTO W/SCOPE: CPT | Performed by: NURSE PRACTITIONER

## 2022-09-23 PROCEDURE — 87186 SC STD MICRODIL/AGAR DIL: CPT | Performed by: NURSE PRACTITIONER

## 2022-09-23 RX ORDER — SULFAMETHOXAZOLE AND TRIMETHOPRIM 800; 160 MG/1; MG/1
1 TABLET ORAL 2 TIMES DAILY
Qty: 20 TABLET | Refills: 0 | Status: SHIPPED | OUTPATIENT
Start: 2022-09-23 | End: 2022-09-26

## 2022-09-25 LAB — UA COMPLETE W REFLEX CULTURE PNL UR: ABNORMAL

## 2022-09-26 RX ORDER — NITROFURANTOIN 25; 75 MG/1; MG/1
100 CAPSULE ORAL 2 TIMES DAILY
Qty: 20 CAPSULE | Refills: 0 | Status: SHIPPED | OUTPATIENT
Start: 2022-09-26 | End: 2022-10-27 | Stop reason: ALTCHOICE

## 2022-10-21 ENCOUNTER — TELEPHONE (OUTPATIENT)
Dept: PULMONOLOGY | Facility: CLINIC | Age: 72
End: 2022-10-21
Payer: MEDICARE

## 2022-10-21 NOTE — TELEPHONE ENCOUNTER
Marcelle  respiratory Therapist, [facility]  [phone]    , Pt: [phone]    Call received on 10/19/22 from Marcelle at [facility].    She reported that  was at [facility] with a written  order for CXR, Spirometry, ABG's.  She reproted that order had not been scheduled and  She reported that staff not availble at that time for PFT/ABG's.    She fax'd written order from  dated Tues 9/6/22,  day that MD saw  in [facility].  Note reflects that procedures were ordered for Pre-op clearance for Hernia Repair.    Marcelle was asked to assist Pt with rescheduling Pulmonary tests on day that staff could complete procedures, and asked to apologize to  for scheduling error.    On 10/20/22 call returned to [facility] and Todd reported that they have rescheduled pt.  CXR report from [facility] dated 10/19/22 was received.    Calls placed to  and to [facility] Respiratory today.  No answer at either number.  No voice mail at 's number with 2 attempts to reach her.  Message left with Respiratory with our phone and fax for reports.

## 2022-10-27 ENCOUNTER — APPOINTMENT (OUTPATIENT)
Dept: RADIOLOGY | Facility: CLINIC | Age: 72
End: 2022-10-27
Attending: NURSE PRACTITIONER
Payer: MEDICARE

## 2022-10-27 ENCOUNTER — OFFICE VISIT (OUTPATIENT)
Dept: FAMILY MEDICINE | Facility: CLINIC | Age: 72
End: 2022-10-27
Payer: MEDICARE

## 2022-10-27 VITALS
WEIGHT: 216 LBS | SYSTOLIC BLOOD PRESSURE: 112 MMHG | BODY MASS INDEX: 36.88 KG/M2 | RESPIRATION RATE: 20 BRPM | DIASTOLIC BLOOD PRESSURE: 52 MMHG | HEIGHT: 64 IN | HEART RATE: 74 BPM | TEMPERATURE: 98 F | OXYGEN SATURATION: 97 %

## 2022-10-27 DIAGNOSIS — R05.9 COUGH, UNSPECIFIED TYPE: ICD-10-CM

## 2022-10-27 DIAGNOSIS — J40 BRONCHITIS: Primary | ICD-10-CM

## 2022-10-27 DIAGNOSIS — J45.901 ASTHMA WITH ACUTE EXACERBATION, UNSPECIFIED ASTHMA SEVERITY, UNSPECIFIED WHETHER PERSISTENT: ICD-10-CM

## 2022-10-27 DIAGNOSIS — R56.9 SEIZURE: ICD-10-CM

## 2022-10-27 DIAGNOSIS — F32.A DEPRESSION, UNSPECIFIED DEPRESSION TYPE: ICD-10-CM

## 2022-10-27 DIAGNOSIS — J45.40 MODERATE PERSISTENT ASTHMA WITHOUT COMPLICATION: ICD-10-CM

## 2022-10-27 DIAGNOSIS — M54.42 CHRONIC LOW BACK PAIN WITH BILATERAL SCIATICA, UNSPECIFIED BACK PAIN LATERALITY: ICD-10-CM

## 2022-10-27 DIAGNOSIS — I50.32 CHRONIC DIASTOLIC HEART FAILURE: Chronic | ICD-10-CM

## 2022-10-27 DIAGNOSIS — T78.40XS ALLERGY, SEQUELA: ICD-10-CM

## 2022-10-27 DIAGNOSIS — K21.9 GASTROESOPHAGEAL REFLUX DISEASE WITHOUT ESOPHAGITIS: ICD-10-CM

## 2022-10-27 DIAGNOSIS — G89.29 CHRONIC LOW BACK PAIN WITH BILATERAL SCIATICA, UNSPECIFIED BACK PAIN LATERALITY: ICD-10-CM

## 2022-10-27 DIAGNOSIS — I20.9 ANGINA PECTORIS: ICD-10-CM

## 2022-10-27 DIAGNOSIS — M54.41 CHRONIC LOW BACK PAIN WITH BILATERAL SCIATICA, UNSPECIFIED BACK PAIN LATERALITY: ICD-10-CM

## 2022-10-27 DIAGNOSIS — J45.41 MODERATE PERSISTENT ASTHMA WITH ACUTE EXACERBATION: ICD-10-CM

## 2022-10-27 PROCEDURE — 71046 X-RAY EXAM CHEST 2 VIEWS: CPT | Mod: 26,,, | Performed by: RADIOLOGY

## 2022-10-27 PROCEDURE — 1159F MED LIST DOCD IN RCRD: CPT | Mod: ,,, | Performed by: NURSE PRACTITIONER

## 2022-10-27 PROCEDURE — 1125F AMNT PAIN NOTED PAIN PRSNT: CPT | Mod: ,,, | Performed by: NURSE PRACTITIONER

## 2022-10-27 PROCEDURE — 3078F DIAST BP <80 MM HG: CPT | Mod: ,,, | Performed by: NURSE PRACTITIONER

## 2022-10-27 PROCEDURE — 3074F SYST BP LT 130 MM HG: CPT | Mod: ,,, | Performed by: NURSE PRACTITIONER

## 2022-10-27 PROCEDURE — 71046 X-RAY EXAM CHEST 2 VIEWS: CPT | Mod: TC,RHCUB | Performed by: NURSE PRACTITIONER

## 2022-10-27 PROCEDURE — 99213 OFFICE O/P EST LOW 20 MIN: CPT | Mod: 25,,, | Performed by: NURSE PRACTITIONER

## 2022-10-27 PROCEDURE — 99213 PR OFFICE/OUTPT VISIT, EST, LEVL III, 20-29 MIN: ICD-10-PCS | Mod: 25,,, | Performed by: NURSE PRACTITIONER

## 2022-10-27 PROCEDURE — 1160F PR REVIEW ALL MEDS BY PRESCRIBER/CLIN PHARMACIST DOCUMENTED: ICD-10-PCS | Mod: ,,, | Performed by: NURSE PRACTITIONER

## 2022-10-27 PROCEDURE — 3078F PR MOST RECENT DIASTOLIC BLOOD PRESSURE < 80 MM HG: ICD-10-PCS | Mod: ,,, | Performed by: NURSE PRACTITIONER

## 2022-10-27 PROCEDURE — 96372 THER/PROPH/DIAG INJ SC/IM: CPT | Mod: ,,, | Performed by: NURSE PRACTITIONER

## 2022-10-27 PROCEDURE — 96372 PR INJECTION,THERAP/PROPH/DIAG2ST, IM OR SUBCUT: ICD-10-PCS | Mod: ,,, | Performed by: NURSE PRACTITIONER

## 2022-10-27 PROCEDURE — 71046 XR CHEST PA AND LATERAL: ICD-10-PCS | Mod: 26,,, | Performed by: RADIOLOGY

## 2022-10-27 PROCEDURE — 3074F PR MOST RECENT SYSTOLIC BLOOD PRESSURE < 130 MM HG: ICD-10-PCS | Mod: ,,, | Performed by: NURSE PRACTITIONER

## 2022-10-27 PROCEDURE — 1160F RVW MEDS BY RX/DR IN RCRD: CPT | Mod: ,,, | Performed by: NURSE PRACTITIONER

## 2022-10-27 PROCEDURE — 1159F PR MEDICATION LIST DOCUMENTED IN MEDICAL RECORD: ICD-10-PCS | Mod: ,,, | Performed by: NURSE PRACTITIONER

## 2022-10-27 PROCEDURE — 1125F PR PAIN SEVERITY QUANTIFIED, PAIN PRESENT: ICD-10-PCS | Mod: ,,, | Performed by: NURSE PRACTITIONER

## 2022-10-27 RX ORDER — AZITHROMYCIN 250 MG/1
TABLET, FILM COATED ORAL
Qty: 6 TABLET | Refills: 0 | Status: SHIPPED | OUTPATIENT
Start: 2022-10-27 | End: 2022-11-01

## 2022-10-27 RX ORDER — ALBUTEROL SULFATE 90 UG/1
1-2 AEROSOL, METERED RESPIRATORY (INHALATION) EVERY 6 HOURS PRN
Qty: 54 G | Refills: 1 | Status: SHIPPED | OUTPATIENT
Start: 2022-10-27 | End: 2023-01-17 | Stop reason: SDUPTHER

## 2022-10-27 RX ORDER — AZITHROMYCIN 250 MG/1
TABLET, FILM COATED ORAL
Qty: 6 TABLET | Refills: 0 | Status: SHIPPED | OUTPATIENT
Start: 2022-10-27 | End: 2022-10-27 | Stop reason: CLARIF

## 2022-10-27 RX ORDER — IPRATROPIUM BROMIDE 42 UG/1
2 SPRAY, METERED NASAL 4 TIMES DAILY
Qty: 15 ML | Refills: 0 | Status: SHIPPED | OUTPATIENT
Start: 2022-10-27 | End: 2023-09-14 | Stop reason: SDUPTHER

## 2022-10-27 RX ORDER — FUROSEMIDE 40 MG/1
40 TABLET ORAL DAILY
Qty: 90 TABLET | Refills: 1 | Status: SHIPPED | OUTPATIENT
Start: 2022-10-27 | End: 2022-12-08 | Stop reason: SDUPTHER

## 2022-10-27 RX ORDER — GABAPENTIN 300 MG/1
300 CAPSULE ORAL 3 TIMES DAILY
Qty: 270 CAPSULE | Refills: 1 | Status: SHIPPED | OUTPATIENT
Start: 2022-10-27 | End: 2023-09-14 | Stop reason: SDUPTHER

## 2022-10-27 RX ORDER — IPRATROPIUM BROMIDE AND ALBUTEROL SULFATE 2.5; .5 MG/3ML; MG/3ML
3 SOLUTION RESPIRATORY (INHALATION) EVERY 6 HOURS PRN
Qty: 100 ML | Refills: 5 | Status: SHIPPED | OUTPATIENT
Start: 2022-10-27 | End: 2023-09-14 | Stop reason: SDUPTHER

## 2022-10-27 RX ORDER — CEFTRIAXONE 1 G/1
1 INJECTION, POWDER, FOR SOLUTION INTRAMUSCULAR; INTRAVENOUS
Status: COMPLETED | OUTPATIENT
Start: 2022-10-27 | End: 2022-10-27

## 2022-10-27 RX ORDER — NAPROXEN SODIUM 220 MG/1
81 TABLET, FILM COATED ORAL DAILY
COMMUNITY
Start: 2022-10-11 | End: 2023-10-11

## 2022-10-27 RX ORDER — PANTOPRAZOLE SODIUM 40 MG/1
40 TABLET, DELAYED RELEASE ORAL DAILY
Qty: 90 TABLET | Refills: 1 | Status: SHIPPED | OUTPATIENT
Start: 2022-10-27 | End: 2023-04-11

## 2022-10-27 RX ORDER — MONTELUKAST SODIUM 10 MG/1
10 TABLET ORAL DAILY
Qty: 90 TABLET | Refills: 1 | Status: SHIPPED | OUTPATIENT
Start: 2022-10-27 | End: 2023-06-23

## 2022-10-27 RX ORDER — METOPROLOL SUCCINATE 50 MG/1
50 TABLET, EXTENDED RELEASE ORAL 2 TIMES DAILY
Qty: 180 TABLET | Refills: 1 | Status: SHIPPED | OUTPATIENT
Start: 2022-10-27 | End: 2022-12-08 | Stop reason: ALTCHOICE

## 2022-10-27 RX ORDER — PHENYTOIN SODIUM 100 MG/1
100 CAPSULE, EXTENDED RELEASE ORAL 3 TIMES DAILY
Qty: 270 CAPSULE | Refills: 1 | Status: SHIPPED | OUTPATIENT
Start: 2022-10-27 | End: 2023-05-25

## 2022-10-27 RX ORDER — FLUTICASONE FUROATE, UMECLIDINIUM BROMIDE AND VILANTEROL TRIFENATATE 200; 62.5; 25 UG/1; UG/1; UG/1
1 POWDER RESPIRATORY (INHALATION) DAILY
Qty: 60 EACH | Refills: 11 | Status: SHIPPED | OUTPATIENT
Start: 2022-10-27 | End: 2023-09-14 | Stop reason: SDUPTHER

## 2022-10-27 RX ORDER — PAROXETINE HYDROCHLORIDE 40 MG/1
40 TABLET, FILM COATED ORAL DAILY
Qty: 90 TABLET | Refills: 1 | Status: SHIPPED | OUTPATIENT
Start: 2022-10-27 | End: 2023-07-17

## 2022-10-27 RX ADMIN — CEFTRIAXONE 1 G: 1 INJECTION, POWDER, FOR SOLUTION INTRAMUSCULAR; INTRAVENOUS at 11:10

## 2022-10-27 NOTE — PROGRESS NOTES
HEAVENLY Pulido   [facility]  [address]  [place] [address]     PATIENT NAME: Radha Martínez  : 1950  DATE: 10/27/22  MRN: [id number]      Billing Provider: HEAVENLY Pulido  Level of Service:   Patient PCP Information       Provider PCP Type    HEAVENLY Pulido General            Reason for Visit / Chief Complaint: Shortness of Breath, Nasal Congestion, and Cough (Was scheduled for cataract surgery yesterday, anesthesiologist postponed surgery until patient was seen by PCP)       Update PCP  Update Chief Complaint         History of Present Illness / Problem Focused Workflow     Radha Martínez presents to the clinic shortness of breath nasal congestion and cough, went to get cataract surgery yesterday was coughing and they postponed, she has not had fever been taking mucinex and cough is productive and improved today. Her weight has been stable no more than a pound or two different every day      Review of Systems     Review of Systems   Constitutional:  Negative for appetite change, chills, fatigue and fever.   HENT:  Positive for nasal congestion and voice change. Negative for ear pain, sore throat and trouble swallowing.    Eyes:  Negative for visual disturbance.   Respiratory:  Positive for cough. Negative for chest tightness, shortness of breath and wheezing.    Cardiovascular:  Negative for chest pain, palpitations and leg swelling.   Gastrointestinal:  Negative for abdominal pain, nausea and vomiting.   Genitourinary:  Negative for difficulty urinating.   Integumentary:  Negative for rash.   Neurological:  Negative for weakness and headaches.      Medical / Social / Family History     Past Medical History:   Diagnosis Date    Asthma     CHF (congestive heart failure)     COPD (chronic obstructive pulmonary disease)     WILLSON (dyspnea on exertion)     GERD (gastroesophageal reflux disease)     HTN (hypertension)     Hyperlipidemia      Osteoporosis     Oxygen dependent     02 2L    Seizure disorder        Past Surgical History:   Procedure Laterality Date    CARDIOVERSION N/A 4/8/2022    Procedure: Cardioversion;  Surgeon: Adal Chung MD;  Location: [facility] CATH LAB;  Service: Cardiology;  Laterality: N/A;    COLONOSCOPY      REVISION OF TOTAL KNEE REPLACEMENT       TUBAL LIGATION         Social History  Ms.  reports that she has never smoked. She has never used smokeless tobacco. She reports that she does not drink alcohol and does not use drugs.    Family History  Ms.'s family history includes Cancer in her father; Diabetes in her brother, mother, and sister; Heart disease in her mother and sister; Hypertension in her mother and sister.    Medications and Allergies     Medications  Outpatient Medications Marked as Taking for the 10/27/22 encounter (Office Visit) with RADHA Pulido   Medication Sig Dispense Refill    apixaban (ELIQUIS) 5 mg Tab Take 1 tablet (5 mg total) by mouth 2 (two) times daily. 180 tablet 1    aspirin 81 MG Chew Take 81 mg by mouth once daily.      benzonatate (TESSALON) 100 MG capsule Take 1 capsule (100 mg total) by mouth 3 (three) times daily as needed for Cough. 60 capsule 0    busPIRone (BUSPAR) 10 MG tablet Take 1 tablet (10 mg total) by mouth 2 (two) times daily. 180 tablet 1    calcium carbonate/vitamin D3 (CALCARB 600 WITH VITAMIN D ORAL) Take by mouth.      fluticasone propionate (FLONASE) 50 mcg/actuation nasal spray 1 spray (50 mcg total) by Each Nostril route once daily. 16 g 1    loratadine (CLARITIN) 10 mg tablet TAKE 1 TABLET EVERY DAY 90 tablet 1    meclizine (ANTIVERT) 12.5 mg tablet Take 1 tablet (12.5 mg total) by mouth 2 (two) times daily as needed for Dizziness. 60 tablet 0    pravastatin (PRAVACHOL) 40 MG tablet Take 1 tablet (40 mg total) by mouth nightly. 90 tablet 1    [DISCONTINUED] albuterol (PROVENTIL/VENTOLIN HFA) 90 mcg/actuation inhaler Inhale 1-2 puffs into the  lungs every 6 (six) hours as needed for Wheezing. 54 g 1    [DISCONTINUED] albuterol-ipratropium (DUO-NEB) 2.5 mg-0.5 mg/3 mL nebulizer solution Take 3 mLs by nebulization every 6 (six) hours as needed for Wheezing. Rescue 100 mL 5    [DISCONTINUED] fluticasone-umeclidin-vilanter (TRELEGY ELLIPTA) 200-62.5-25 mcg inhaler Inhale 1 puff into the lungs once daily. 60 each 11    [DISCONTINUED] furosemide (LASIX) 40 MG tablet Take 1 tablet (40 mg total) by mouth once daily. HOLD THIS MEDICATION UNTIL YOU FOLLOW UP WITH PRIMARY CARE PROVIDER (Patient taking differently: Take 40 mg by mouth once daily.) 90 tablet 1    [DISCONTINUED] gabapentin (NEURONTIN) 300 MG capsule Take 1 capsule (300 mg total) by mouth 3 (three) times daily. 270 capsule 1    [DISCONTINUED] ipratropium (ATROVENT) 42 mcg (0.06 %) nasal spray 2 sprays by Nasal route 4 (four) times daily. 15 mL 0    [DISCONTINUED] metoprolol succinate (TOPROL-XL) 50 MG 24 hr tablet Take 1 tablet (50 mg total) by mouth once daily. (Patient taking differently: Take 50 mg by mouth 2 (two) times daily.) 30 tablet 5    [DISCONTINUED] montelukast (SINGULAIR) 10 mg tablet Take 1 tablet (10 mg total) by mouth once daily. 90 tablet 1    [DISCONTINUED] pantoprazole (PROTONIX) 40 MG tablet Take 1 tablet (40 mg total) by mouth once daily. 90 tablet 0    [DISCONTINUED] paroxetine (PAXIL) 40 MG tablet Take 1 tablet (40 mg total) by mouth once daily. 90 tablet 0     Current Facility-Administered Medications for the 10/27/22 encounter (Office Visit) with HEAVENLY Pulido   Medication Dose Route Frequency Provider Last Rate Last Admin    cefTRIAXone injection 1 g  1 g Intramuscular 1 time in Clinic/HOD HEAVENLY Pulido           Allergies  Review of patient's allergies indicates:  No Known Allergies    Physical Examination     Vitals:    10/27/22 1049   BP: (!) 112/52   BP Location: Right arm   Patient Position: Sitting   Pulse: 74   Resp: 20   Temp: 98.2 °F  "(36.8 °C)   TempSrc: Oral   SpO2: 97%   Weight: 98 kg (216 lb)   Height: 5' 4" (1.626 m)      Physical Exam  Vitals and nursing note reviewed.   Constitutional:       General: She is not in acute distress.     Appearance: Normal appearance. She is not ill-appearing or toxic-appearing.   HENT:      Head: Normocephalic.      Right Ear: External ear normal.      Left Ear: External ear normal.   Eyes:      Conjunctiva/sclera: Conjunctivae normal.      Pupils: Pupils are equal, round, and reactive to light.   Cardiovascular:      Rate and Rhythm: Normal rate and regular rhythm.   Pulmonary:      Effort: Pulmonary effort is normal.      Breath sounds: Wheezing present. No rhonchi or rales.   Musculoskeletal:      Cervical back: Normal range of motion and neck supple.   Skin:     General: Skin is warm.      Capillary Refill: Capillary refill takes less than 2 seconds.   Neurological:      Mental Status: She is alert and oriented to person, place, and time.   Psychiatric:         Behavior: Behavior normal.        Assessment and Plan (including Health Maintenance)      Problem List  Smart [employer]  Document Outside HM   :    Plan:   Rocephin today start azithromycin and continue mucinex and tessalon      Health Maintenance Due   Topic Date Due    Mammogram  10/07/2020    COVID-19 Vaccine (5 - Booster for Moderna series) 07/15/2022    Influenza Vaccine (1) 09/01/2022       Problem List Items Addressed This Visit          Neuro    Seizure    Relevant Medications    phenytoin (DILANTIN) 100 MG ER capsule       Pulmonary    Asthma    Relevant Medications    albuterol (PROVENTIL/VENTOLIN HFA) 90 mcg/actuation inhaler    albuterol-ipratropium (DUO-NEB) 2.5 mg-0.5 mg/3 mL nebulizer solution    fluticasone-umeclidin-vilanter (TRELEGY ELLIPTA) 200-62.5-25 mcg inhaler    ipratropium (ATROVENT) 42 mcg (0.06 %) nasal spray    cefTRIAXone injection 1 g (Start on 10/27/2022 11:45 AM)       Cardiac/Vascular    Chronic diastolic heart failure " (Chronic)    Relevant Medications    furosemide (LASIX) 40 MG tablet    Angina pectoris    Relevant Medications    metoprolol succinate (TOPROL-XL) 50 MG 24 hr tablet       GI    GERD (gastroesophageal reflux disease)    Relevant Medications    pantoprazole (PROTONIX) 40 MG tablet     Other Visit Diagnoses       Bronchitis    -  Primary    Relevant Medications    albuterol-ipratropium (DUO-NEB) 2.5 mg-0.5 mg/3 mL nebulizer solution    cefTRIAXone injection 1 g (Start on 10/27/2022 11:45 AM)    Chronic low back pain with bilateral sciatica, unspecified back pain laterality        Relevant Medications    gabapentin (NEURONTIN) 300 MG capsule    Allergy, sequela        Relevant Medications    montelukast (SINGULAIR) 10 mg tablet    Depression, unspecified depression type        Relevant Medications    paroxetine (PAXIL) 40 MG tablet    Cough, unspecified type        Relevant Orders    X-Ray Chest PA And Lateral          Bronchitis  -     albuterol-ipratropium (DUO-NEB) 2.5 mg-0.5 mg/3 mL nebulizer solution; Take 3 mLs by nebulization every 6 (six) hours as needed for Wheezing. Rescue  Dispense: 100 mL; Refill: 5  -     cefTRIAXone injection 1 g  -     Discontinue: azithromycin (Z-CORTNEY) 250 MG tablet; Take 2 tablets by mouth on day 1; Take 1 tablet by mouth on days 2-5  Dispense: 6 tablet; Refill: 0    Chronic diastolic heart failure  -     furosemide (LASIX) 40 MG tablet; Take 1 tablet (40 mg total) by mouth once daily.  Dispense: 90 tablet; Refill: 1    Angina pectoris  -     metoprolol succinate (TOPROL-XL) 50 MG 24 hr tablet; Take 1 tablet (50 mg total) by mouth 2 (two) times daily.  Dispense: 180 tablet; Refill: 1    Asthma with acute exacerbation, unspecified asthma severity, unspecified whether persistent  -     albuterol (PROVENTIL/VENTOLIN HFA) 90 mcg/actuation inhaler; Inhale 1-2 puffs into the lungs every 6 (six) hours as needed for Wheezing.  Dispense: 54 g; Refill: 1  -     albuterol-ipratropium (DUO-NEB)  2.5 mg-0.5 mg/3 mL nebulizer solution; Take 3 mLs by nebulization every 6 (six) hours as needed for Wheezing. Rescue  Dispense: 100 mL; Refill: 5    Moderate persistent asthma without complication  -     fluticasone-umeclidin-vilanter (TRELEGY ELLIPTA) 200-62.5-25 mcg inhaler; Inhale 1 puff into the lungs once daily.  Dispense: 60 each; Refill: 11  -     cefTRIAXone injection 1 g  -     Discontinue: azithromycin (Z-CORTNEY) 250 MG tablet; Take 2 tablets by mouth on day 1; Take 1 tablet by mouth on days 2-5  Dispense: 6 tablet; Refill: 0    Chronic low back pain with bilateral sciatica, unspecified back pain laterality  -     gabapentin (NEURONTIN) 300 MG capsule; Take 1 capsule (300 mg total) by mouth 3 (three) times daily.  Dispense: 270 capsule; Refill: 1    Allergy, sequela  -     montelukast (SINGULAIR) 10 mg tablet; Take 1 tablet (10 mg total) by mouth once daily.  Dispense: 90 tablet; Refill: 1    Depression, unspecified depression type  -     paroxetine (PAXIL) 40 MG tablet; Take 1 tablet (40 mg total) by mouth once daily.  Dispense: 90 tablet; Refill: 1    Gastroesophageal reflux disease without esophagitis  -     pantoprazole (PROTONIX) 40 MG tablet; Take 1 tablet (40 mg total) by mouth once daily.  Dispense: 90 tablet; Refill: 1    Seizure  -     phenytoin (DILANTIN) 100 MG ER capsule; Take 1 capsule (100 mg total) by mouth 3 (three) times daily.  Dispense: 270 capsule; Refill: 1    Cough, unspecified type  -     X-Ray Chest PA And Lateral; Future; Expected date: 10/27/2022    Moderate persistent asthma with acute exacerbation  -     ipratropium (ATROVENT) 42 mcg (0.06 %) nasal spray; 2 sprays by Nasal route 4 (four) times daily.  Dispense: 15 mL; Refill: 0    Other orders  -     azithromycin (Z-CORTNEY) 250 MG tablet; Take 2 tablets by mouth on day 1; Take 1 tablet by mouth on days 2-5  Dispense: 6 tablet; Refill: 0     Health Maintenance Topics with due status: Not Due       Topic Last Completion Date    COREY  Scan 03/05/2020    Lipid Panel 12/30/2021    High Dose Statin 10/27/2022       Procedures     Future Appointments   Date Time Provider Department Center   11/8/2022  9:00 AM HEAVENLY Pulido [facility] RUKHSANA Ribera   5/24/2023  1:30 PM AW[facility]        Follow up if symptoms worsen or fail to improve.     Signature:  HEAVENLY Pulido    Date of encounter: 10/27/22  10/27/2022

## 2022-11-04 ENCOUNTER — IMMUNIZATION (OUTPATIENT)
Dept: FAMILY MEDICINE | Facility: CLINIC | Age: 72
End: 2022-11-04
Payer: MEDICARE

## 2022-11-04 DIAGNOSIS — Z23 NEED FOR VACCINATION: Primary | ICD-10-CM

## 2022-11-04 PROCEDURE — 91313 COVID-19, MRNA, LNP-S, BIVALENT BOOSTER, PF, 50 MCG/0.5 ML: CPT | Mod: ,,, | Performed by: NURSE PRACTITIONER

## 2022-11-04 PROCEDURE — 0134A COVID-19, MRNA, LNP-S, BIVALENT BOOSTER, PF, 50 MCG/0.5 ML: ICD-10-PCS | Mod: ,,, | Performed by: NURSE PRACTITIONER

## 2022-11-04 PROCEDURE — 91313 COVID-19, MRNA, LNP-S, BIVALENT BOOSTER, PF, 50 MCG/0.5 ML: ICD-10-PCS | Mod: ,,, | Performed by: NURSE PRACTITIONER

## 2022-11-04 PROCEDURE — 0134A COVID-19, MRNA, LNP-S, BIVALENT BOOSTER, PF, 50 MCG/0.5 ML: CPT | Mod: ,,, | Performed by: NURSE PRACTITIONER

## 2022-11-09 DIAGNOSIS — Z71.89 COMPLEX CARE COORDINATION: ICD-10-CM

## 2022-11-23 ENCOUNTER — TELEPHONE (OUTPATIENT)
Dept: FAMILY MEDICINE | Facility: CLINIC | Age: 72
End: 2022-11-23

## 2022-12-08 ENCOUNTER — OFFICE VISIT (OUTPATIENT)
Dept: FAMILY MEDICINE | Facility: CLINIC | Age: 72
End: 2022-12-08
Payer: MEDICARE

## 2022-12-08 VITALS
WEIGHT: 217 LBS | RESPIRATION RATE: 20 BRPM | HEIGHT: 64 IN | TEMPERATURE: 98 F | BODY MASS INDEX: 37.05 KG/M2 | SYSTOLIC BLOOD PRESSURE: 110 MMHG | OXYGEN SATURATION: 98 % | DIASTOLIC BLOOD PRESSURE: 60 MMHG | HEART RATE: 78 BPM

## 2022-12-08 DIAGNOSIS — R05.9 COUGH, UNSPECIFIED TYPE: ICD-10-CM

## 2022-12-08 DIAGNOSIS — F41.9 ANXIETY: ICD-10-CM

## 2022-12-08 DIAGNOSIS — J45.50 SEVERE PERSISTENT ASTHMA WITHOUT COMPLICATION: Primary | ICD-10-CM

## 2022-12-08 DIAGNOSIS — I10 HYPERTENSION, UNSPECIFIED TYPE: ICD-10-CM

## 2022-12-08 DIAGNOSIS — K21.9 GASTROESOPHAGEAL REFLUX DISEASE WITHOUT ESOPHAGITIS: ICD-10-CM

## 2022-12-08 DIAGNOSIS — N18.31 CHRONIC KIDNEY DISEASE, STAGE 3A: ICD-10-CM

## 2022-12-08 DIAGNOSIS — E66.01 SEVERE OBESITY (BMI 35.0-39.9) WITH COMORBIDITY: ICD-10-CM

## 2022-12-08 DIAGNOSIS — I50.32 CHRONIC DIASTOLIC HEART FAILURE: Chronic | ICD-10-CM

## 2022-12-08 PROBLEM — G47.33 OSA ON CPAP: Status: ACTIVE | Noted: 2022-11-07

## 2022-12-08 PROCEDURE — 99495 TRANSJ CARE MGMT MOD F2F 14D: CPT | Mod: ,,, | Performed by: NURSE PRACTITIONER

## 2022-12-08 PROCEDURE — 3078F PR MOST RECENT DIASTOLIC BLOOD PRESSURE < 80 MM HG: ICD-10-PCS | Mod: ,,, | Performed by: NURSE PRACTITIONER

## 2022-12-08 PROCEDURE — 99495 TCM SERVICES (MODERATE COMPLEXITY): ICD-10-PCS | Mod: ,,, | Performed by: NURSE PRACTITIONER

## 2022-12-08 PROCEDURE — 3288F PR FALLS RISK ASSESSMENT DOCUMENTED: ICD-10-PCS | Mod: ,,, | Performed by: NURSE PRACTITIONER

## 2022-12-08 PROCEDURE — 1160F PR REVIEW ALL MEDS BY PRESCRIBER/CLIN PHARMACIST DOCUMENTED: ICD-10-PCS | Mod: ,,, | Performed by: NURSE PRACTITIONER

## 2022-12-08 PROCEDURE — 3074F SYST BP LT 130 MM HG: CPT | Mod: ,,, | Performed by: NURSE PRACTITIONER

## 2022-12-08 PROCEDURE — 1159F PR MEDICATION LIST DOCUMENTED IN MEDICAL RECORD: ICD-10-PCS | Mod: ,,, | Performed by: NURSE PRACTITIONER

## 2022-12-08 PROCEDURE — 1101F PT FALLS ASSESS-DOCD LE1/YR: CPT | Mod: ,,, | Performed by: NURSE PRACTITIONER

## 2022-12-08 PROCEDURE — 1160F RVW MEDS BY RX/DR IN RCRD: CPT | Mod: ,,, | Performed by: NURSE PRACTITIONER

## 2022-12-08 PROCEDURE — 1101F PR PT FALLS ASSESS DOC 0-1 FALLS W/OUT INJ PAST YR: ICD-10-PCS | Mod: ,,, | Performed by: NURSE PRACTITIONER

## 2022-12-08 PROCEDURE — 3008F PR BODY MASS INDEX (BMI) DOCUMENTED: ICD-10-PCS | Mod: ,,, | Performed by: NURSE PRACTITIONER

## 2022-12-08 PROCEDURE — 3078F DIAST BP <80 MM HG: CPT | Mod: ,,, | Performed by: NURSE PRACTITIONER

## 2022-12-08 PROCEDURE — 3008F BODY MASS INDEX DOCD: CPT | Mod: ,,, | Performed by: NURSE PRACTITIONER

## 2022-12-08 PROCEDURE — 1126F AMNT PAIN NOTED NONE PRSNT: CPT | Mod: ,,, | Performed by: NURSE PRACTITIONER

## 2022-12-08 PROCEDURE — 3288F FALL RISK ASSESSMENT DOCD: CPT | Mod: ,,, | Performed by: NURSE PRACTITIONER

## 2022-12-08 PROCEDURE — 3074F PR MOST RECENT SYSTOLIC BLOOD PRESSURE < 130 MM HG: ICD-10-PCS | Mod: ,,, | Performed by: NURSE PRACTITIONER

## 2022-12-08 PROCEDURE — 1159F MED LIST DOCD IN RCRD: CPT | Mod: ,,, | Performed by: NURSE PRACTITIONER

## 2022-12-08 PROCEDURE — 1126F PR PAIN SEVERITY QUANTIFIED, NO PAIN PRESENT: ICD-10-PCS | Mod: ,,, | Performed by: NURSE PRACTITIONER

## 2022-12-08 RX ORDER — NEBIVOLOL 5 MG/1
5 TABLET ORAL DAILY
Qty: 90 TABLET | Refills: 1 | Status: SHIPPED | OUTPATIENT
Start: 2022-12-08 | End: 2022-12-14 | Stop reason: SDUPTHER

## 2022-12-08 RX ORDER — FUROSEMIDE 40 MG/1
40 TABLET ORAL 2 TIMES DAILY
Qty: 180 TABLET | Refills: 1 | Status: SHIPPED | OUTPATIENT
Start: 2022-12-08 | End: 2023-05-08

## 2022-12-08 RX ORDER — CLOPIDOGREL BISULFATE 75 MG/1
75 TABLET ORAL DAILY
COMMUNITY
Start: 2022-12-06 | End: 2022-12-21 | Stop reason: SDUPTHER

## 2022-12-08 RX ORDER — NEBIVOLOL 5 MG/1
5 TABLET ORAL DAILY
COMMUNITY
End: 2022-12-08 | Stop reason: SDUPTHER

## 2022-12-08 RX ORDER — BENZONATATE 100 MG/1
100 CAPSULE ORAL 3 TIMES DAILY PRN
Qty: 60 CAPSULE | Refills: 0 | Status: SHIPPED | OUTPATIENT
Start: 2022-12-08 | End: 2023-01-10 | Stop reason: SDUPTHER

## 2022-12-08 NOTE — PROGRESS NOTES
2022     HEAVENLY Pulido   [facility]  [address]  [place] [address]     PATIENT NAME: Radha Martínez  : 1950  DATE: 22  MRN: [id number]      Billing Provider: HEAVENLY Pulido  Level of Service: TCM SERVICES (MODERATE COMPLEXITY)  Patient PCP Information       Provider PCP Type    HEAVENLY Pulido General            Reason for Visit / Chief Complaint: Transitional Care (Patient admitted to [facility]  with dx of Asthma Exacerbation with bronchitis, Afib, CHF. Discharged on . Patient has seen cardiology on 2022 and had echo done. Medication reconciliation done)       Update PCP  Update Chief Complaint         History of Present Illness / Problem Focused Workflow     Radha Martínez presents to the clinic TCC from [place] asthma exacerbation Afib CHF. She has seen cardiology since her discharge Had some WILLSON today when leaving eye appointment she did not have her walker and her oxygen was out. Discussed low salt diet and fluid intake. Since hospitalization and broncho wash her cough is rare and her WILLSON has improved, discussed activity intolerance and to gradually increase physical activity for endurance    Transitional Care Note    Family and/or Caretaker present at visit?  No.  Diagnostic tests reviewed/disposition: I have reviewed all completed as well as pending diagnostic tests at the time of discharge.  Disease/illness education: CHF asthma  Home health/community services discussion/referrals: Patient has home health established at [facility]  .   Establishment or re-establishment of referral orders for community resources: No other necessary community resources.   Discussion with other health care providers: No discussion with other health care providers necessary.     Discharge medications reviewed and reconciled:  Discharged medications reconciled with the current medications             Review of Systems     Review  of Systems   Constitutional:  Negative for appetite change, chills, fatigue and fever.   HENT:  Negative for nasal congestion, ear pain, sore throat and trouble swallowing.    Respiratory:  Positive for cough, chest tightness and shortness of breath. Negative for wheezing.         WILLSON   Cardiovascular:  Positive for chest pain and leg swelling. Negative for palpitations.        Mid sternum   Gastrointestinal:  Positive for abdominal pain and nausea. Negative for change in bowel habit, vomiting and change in bowel habit.        Eoigastric   Genitourinary:  Negative for difficulty urinating.   Integumentary:  Negative for rash.   Neurological:  Negative for weakness and headaches.      Medical / Social / Family History     Past Medical History:   Diagnosis Date    Asthma     CHF (congestive heart failure)     COPD (chronic obstructive pulmonary disease)     WILLSON (dyspnea on exertion)     GERD (gastroesophageal reflux disease)     HTN (hypertension)     Hyperlipidemia     Osteoporosis     Oxygen dependent     02 2L    Seizure disorder        Past Surgical History:   Procedure Laterality Date    CARDIOVERSION N/A 4/8/2022    Procedure: Cardioversion;  Surgeon: Adal Chung MD;  Location: [facility] CATH LAB;  Service: Cardiology;  Laterality: N/A;    COLONOSCOPY      REVISION OF TOTAL KNEE REPLACEMENT       TUBAL LIGATION         Social History  Ms.  reports that she has never smoked. She has never used smokeless tobacco. She reports that she does not drink alcohol and does not use drugs.    Family History  Ms.'s family history includes Cancer in her father; Diabetes in her brother, mother, and sister; Heart disease in her mother and sister; Hypertension in her mother and sister.    Medications and Allergies     Medications  Outpatient Medications Marked as Taking for the 12/8/22 encounter (Office Visit) with HEAVENLY Pulido   Medication Sig Dispense Refill    albuterol (PROVENTIL/VENTOLIN HFA) 90  mcg/actuation inhaler Inhale 1-2 puffs into the lungs every 6 (six) hours as needed for Wheezing. 54 g 1    albuterol-ipratropium (DUO-NEB) 2.5 mg-0.5 mg/3 mL nebulizer solution Take 3 mLs by nebulization every 6 (six) hours as needed for Wheezing. Rescue 100 mL 5    aspirin 81 MG Chew Take 81 mg by mouth once daily.      busPIRone (BUSPAR) 10 MG tablet Take 1 tablet (10 mg total) by mouth 2 (two) times daily. 180 tablet 1    calcium carbonate/vitamin D3 (CALCARB 600 WITH VITAMIN D ORAL) Take by mouth.      clopidogreL (PLAVIX) 75 mg tablet Take 75 mg by mouth once daily.      fluticasone propionate (FLONASE) 50 mcg/actuation nasal spray 1 spray (50 mcg total) by Each Nostril route once daily. 16 g 1    fluticasone-umeclidin-vilanter (TRELEGY ELLIPTA) 200-62.5-25 mcg inhaler Inhale 1 puff into the lungs once daily. 60 each 11    gabapentin (NEURONTIN) 300 MG capsule Take 1 capsule (300 mg total) by mouth 3 (three) times daily. 270 capsule 1    ipratropium (ATROVENT) 42 mcg (0.06 %) nasal spray 2 sprays by Nasal route 4 (four) times daily. 15 mL 0    loratadine (CLARITIN) 10 mg tablet TAKE 1 TABLET EVERY DAY 90 tablet 1    meclizine (ANTIVERT) 12.5 mg tablet Take 1 tablet (12.5 mg total) by mouth 2 (two) times daily as needed for Dizziness. 60 tablet 0    montelukast (SINGULAIR) 10 mg tablet Take 1 tablet (10 mg total) by mouth once daily. 90 tablet 1    pantoprazole (PROTONIX) 40 MG tablet Take 1 tablet (40 mg total) by mouth once daily. 90 tablet 1    paroxetine (PAXIL) 40 MG tablet Take 1 tablet (40 mg total) by mouth once daily. 90 tablet 1    phenytoin (DILANTIN) 100 MG ER capsule Take 1 capsule (100 mg total) by mouth 3 (three) times daily. 270 capsule 1    pravastatin (PRAVACHOL) 40 MG tablet Take 1 tablet (40 mg total) by mouth nightly. 90 tablet 1    [DISCONTINUED] benzonatate (TESSALON) 100 MG capsule Take 1 capsule (100 mg total) by mouth 3 (three) times daily as needed for Cough. 60 capsule 0     "[DISCONTINUED] furosemide (LASIX) 40 MG tablet Take 1 tablet (40 mg total) by mouth once daily. (Patient taking differently: Take 40 mg by mouth 2 (two) times a day.) 90 tablet 1    [DISCONTINUED] nebivoloL (BYSTOLIC) 5 MG Tab Take 5 mg by mouth once daily.         Allergies  Review of patient's allergies indicates:  No Known Allergies    Physical Examination     Vitals:    12/08/22 1242   BP: 110/60   BP Location: Right arm   Patient Position: Sitting   Pulse: 78   Resp: 20   Temp: 98.1 °F (36.7 °C)   TempSrc: Oral   SpO2: 98%   Weight: 98.4 kg (217 lb)   Height: 5' 4" (1.626 m)      Physical Exam  Vitals and nursing note reviewed.   Constitutional:       General: She is not in acute distress.     Appearance: Normal appearance. She is not ill-appearing or toxic-appearing.   HENT:      Right Ear: Tympanic membrane, ear canal and external ear normal.      Left Ear: Tympanic membrane and ear canal normal.      Nose: Nose normal.      Mouth/Throat:      Mouth: Mucous membranes are moist.      Pharynx: Oropharynx is clear. No oropharyngeal exudate or posterior oropharyngeal erythema.   Cardiovascular:      Rate and Rhythm: Normal rate and regular rhythm.   Pulmonary:      Effort: Pulmonary effort is normal.      Breath sounds: Normal breath sounds. No wheezing, rhonchi or rales.   Abdominal:      General: Bowel sounds are normal. There is no distension.      Palpations: Abdomen is soft.      Tenderness: There is no abdominal tenderness.      Hernia: No hernia is present.   Musculoskeletal:      Cervical back: Normal range of motion and neck supple.      Right lower leg: Edema present.      Left lower leg: Edema present.   Skin:     General: Skin is warm and dry.      Capillary Refill: Capillary refill takes less than 2 seconds.   Neurological:      General: No focal deficit present.      Mental Status: She is alert and oriented to person, place, and time.   Psychiatric:         Mood and Affect: Mood normal.         " Behavior: Behavior normal.        Assessment and Plan (including Health Maintenance)      Problem List  Smart Sets  Document Outside HM   :    Plan:   Needs assistance with medi planner had missed Neurontin also home health obtain CMP BNP dilantin next visit      Health Maintenance Due   Topic Date Due    Mammogram  10/07/2020       Problem List Items Addressed This Visit          Pulmonary    Asthma - Primary       Cardiac/Vascular    Chronic diastolic heart failure (Chronic)    Relevant Medications    furosemide (LASIX) 40 MG tablet       Renal/    Chronic kidney disease, stage 3a       Endocrine    Severe obesity (BMI 35.0-39.9) with comorbidity       GI    GERD (gastroesophageal reflux disease)     Other Visit Diagnoses       Hypertension, unspecified type        Relevant Medications    nebivoloL (BYSTOLIC) 5 MG Tab    Cough, unspecified type        Relevant Medications    benzonatate (TESSALON) 100 MG capsule    Anxiety              Severe persistent asthma without complication    Chronic diastolic heart failure  -     furosemide (LASIX) 40 MG tablet; Take 1 tablet (40 mg total) by mouth 2 (two) times a day.  Dispense: 180 tablet; Refill: 1    Hypertension, unspecified type  -     nebivoloL (BYSTOLIC) 5 MG Tab; Take 1 tablet (5 mg total) by mouth once daily.  Dispense: 90 tablet; Refill: 1    Cough, unspecified type  -     benzonatate (TESSALON) 100 MG capsule; Take 1 capsule (100 mg total) by mouth 3 (three) times daily as needed for Cough.  Dispense: 60 capsule; Refill: 0    Severe obesity (BMI 35.0-39.9) with comorbidity    Gastroesophageal reflux disease without esophagitis    Anxiety    Chronic kidney disease, stage 3a     Health Maintenance Topics with due status: Not Due       Topic Last Completion Date    DEXA Scan 03/05/2020    Lipid Panel 12/30/2021    High Dose Statin 12/08/2022       Procedures     Future Appointments   Date Time Provider Department Center   3/8/2023  9:30 AM Kelsy Sloan  HEAVENLY Kay [facility] RUKHSANA Ribera   5/24/2023  1:30 PM AWV NURSE, [facility] FAMILY MEDICINE [facility] RUKHSANA Ribera        Follow up in about 3 months (around 3/8/2023), or if symptoms worsen or fail to improve.     Signature:  HEAVENLY Pulido    Date of encounter: 12/8/22

## 2022-12-08 NOTE — PATIENT INSTRUCTIONS
Needs assistance with mediplanner had some med missed kobe last week  Get BNP CMP dilantin next week

## 2022-12-13 ENCOUNTER — LAB REQUISITION (OUTPATIENT)
Dept: LAB | Facility: HOSPITAL | Age: 72
End: 2022-12-13
Attending: NURSE PRACTITIONER
Payer: MEDICARE

## 2022-12-13 DIAGNOSIS — I50.32 CHRONIC DIASTOLIC (CONGESTIVE) HEART FAILURE: ICD-10-CM

## 2022-12-13 DIAGNOSIS — I11.0 HYPERTENSIVE HEART DISEASE WITH HEART FAILURE: ICD-10-CM

## 2022-12-13 LAB
ALBUMIN SERPL BCP-MCNC: 3.7 G/DL (ref 3.5–5)
ALBUMIN/GLOB SERPL: 1.1 {RATIO}
ALP SERPL-CCNC: 173 U/L (ref 55–142)
ALT SERPL W P-5'-P-CCNC: 37 U/L (ref 13–56)
ANION GAP SERPL CALCULATED.3IONS-SCNC: 8 MMOL/L (ref 7–16)
AST SERPL W P-5'-P-CCNC: 23 U/L (ref 15–37)
BILIRUB SERPL-MCNC: 0.5 MG/DL (ref ?–1.2)
BUN SERPL-MCNC: 18 MG/DL (ref 7–18)
BUN/CREAT SERPL: 15 (ref 6–20)
CALCIUM SERPL-MCNC: 9.4 MG/DL (ref 8.5–10.1)
CHLORIDE SERPL-SCNC: 102 MMOL/L (ref 98–107)
CO2 SERPL-SCNC: 36 MMOL/L (ref 21–32)
CREAT SERPL-MCNC: 1.18 MG/DL (ref 0.55–1.02)
EGFR (NO RACE VARIABLE) (RUSH/TITUS): 49 ML/MIN/1.73M²
GLOBULIN SER-MCNC: 3.5 G/DL (ref 2–4)
GLUCOSE SERPL-MCNC: 119 MG/DL (ref 74–106)
NT-PROBNP SERPL-MCNC: 424 PG/ML (ref 1–125)
PHENYTOIN SERPL-MCNC: 5.9 ΜG/ML (ref 10–20)
POTASSIUM SERPL-SCNC: 5.3 MMOL/L (ref 3.5–5.1)
PROT SERPL-MCNC: 7.2 G/DL (ref 6.4–8.2)
SODIUM SERPL-SCNC: 141 MMOL/L (ref 136–145)

## 2022-12-13 PROCEDURE — 80053 COMPREHEN METABOLIC PANEL: CPT | Performed by: NURSE PRACTITIONER

## 2022-12-13 PROCEDURE — 83880 ASSAY OF NATRIURETIC PEPTIDE: CPT | Performed by: NURSE PRACTITIONER

## 2022-12-13 PROCEDURE — 80185 ASSAY OF PHENYTOIN TOTAL: CPT | Performed by: NURSE PRACTITIONER

## 2022-12-14 DIAGNOSIS — I10 HYPERTENSION, UNSPECIFIED TYPE: ICD-10-CM

## 2022-12-14 RX ORDER — NEBIVOLOL 5 MG/1
5 TABLET ORAL DAILY
Qty: 90 TABLET | Refills: 1 | Status: SHIPPED | OUTPATIENT
Start: 2022-12-14 | End: 2023-01-10 | Stop reason: SDUPTHER

## 2022-12-21 RX ORDER — CLOPIDOGREL BISULFATE 75 MG/1
75 TABLET ORAL DAILY
Qty: 90 TABLET | Refills: 2 | Status: SHIPPED | OUTPATIENT
Start: 2022-12-21 | End: 2023-08-14

## 2022-12-28 ENCOUNTER — LAB REQUISITION (OUTPATIENT)
Dept: LAB | Facility: HOSPITAL | Age: 72
End: 2022-12-28
Attending: NURSE PRACTITIONER
Payer: MEDICARE

## 2022-12-28 DIAGNOSIS — G40.89 OTHER SEIZURES: ICD-10-CM

## 2022-12-28 LAB — PHENYTOIN SERPL-MCNC: 8.2 ΜG/ML (ref 10–20)

## 2022-12-28 PROCEDURE — 80185 ASSAY OF PHENYTOIN TOTAL: CPT | Performed by: NURSE PRACTITIONER

## 2023-01-10 DIAGNOSIS — I10 HYPERTENSION, UNSPECIFIED TYPE: ICD-10-CM

## 2023-01-10 DIAGNOSIS — R05.9 COUGH, UNSPECIFIED TYPE: ICD-10-CM

## 2023-01-10 RX ORDER — NEBIVOLOL 5 MG/1
5 TABLET ORAL DAILY
Qty: 90 TABLET | Refills: 1 | Status: SHIPPED | OUTPATIENT
Start: 2023-01-10 | End: 2023-07-17

## 2023-01-10 RX ORDER — BENZONATATE 100 MG/1
100 CAPSULE ORAL 3 TIMES DAILY PRN
Qty: 60 CAPSULE | Refills: 0 | Status: SHIPPED | OUTPATIENT
Start: 2023-01-10 | End: 2023-03-13 | Stop reason: SDUPTHER

## 2023-01-17 DIAGNOSIS — J45.901 ASTHMA WITH ACUTE EXACERBATION, UNSPECIFIED ASTHMA SEVERITY, UNSPECIFIED WHETHER PERSISTENT: ICD-10-CM

## 2023-01-17 RX ORDER — ALBUTEROL SULFATE 90 UG/1
1-2 AEROSOL, METERED RESPIRATORY (INHALATION) EVERY 6 HOURS PRN
Qty: 54 G | Refills: 1 | Status: SHIPPED | OUTPATIENT
Start: 2023-01-17 | End: 2023-09-14 | Stop reason: SDUPTHER

## 2023-01-18 ENCOUNTER — LAB REQUISITION (OUTPATIENT)
Dept: LAB | Facility: HOSPITAL | Age: 73
End: 2023-01-18
Attending: NURSE PRACTITIONER
Payer: MEDICARE

## 2023-01-18 DIAGNOSIS — I10 ESSENTIAL (PRIMARY) HYPERTENSION: ICD-10-CM

## 2023-01-18 DIAGNOSIS — I50.32 CHRONIC DIASTOLIC (CONGESTIVE) HEART FAILURE: ICD-10-CM

## 2023-01-18 LAB
ANION GAP SERPL CALCULATED.3IONS-SCNC: 13 MMOL/L (ref 7–16)
BACTERIA #/AREA URNS HPF: ABNORMAL /HPF
BASOPHILS # BLD AUTO: 0.02 K/UL (ref 0–0.2)
BASOPHILS NFR BLD AUTO: 0.3 % (ref 0–1)
BILIRUB UR QL STRIP: NEGATIVE
BUN SERPL-MCNC: 16 MG/DL (ref 7–18)
BUN/CREAT SERPL: 14 (ref 6–20)
CALCIUM SERPL-MCNC: 9.2 MG/DL (ref 8.5–10.1)
CHLORIDE SERPL-SCNC: 101 MMOL/L (ref 98–107)
CLARITY UR: CLEAR
CO2 SERPL-SCNC: 32 MMOL/L (ref 21–32)
COLOR UR: YELLOW
CREAT SERPL-MCNC: 1.18 MG/DL (ref 0.55–1.02)
DIFFERENTIAL METHOD BLD: ABNORMAL
EGFR (NO RACE VARIABLE) (RUSH/TITUS): 49 ML/MIN/1.73M²
EOSINOPHIL # BLD AUTO: 0.7 K/UL (ref 0–0.5)
EOSINOPHIL NFR BLD AUTO: 10.7 % (ref 1–4)
EOSINOPHIL NFR BLD MANUAL: 11 % (ref 1–4)
ERYTHROCYTE [DISTWIDTH] IN BLOOD BY AUTOMATED COUNT: 13.8 % (ref 11.5–14.5)
GLUCOSE SERPL-MCNC: 159 MG/DL (ref 74–106)
GLUCOSE UR STRIP-MCNC: NEGATIVE MG/DL
HCT VFR BLD AUTO: 39 % (ref 38–47)
HGB BLD-MCNC: 12.6 G/DL (ref 12–16)
KETONES UR STRIP-SCNC: NEGATIVE MG/DL
LEUKOCYTE ESTERASE UR QL STRIP: ABNORMAL
LYMPHOCYTES # BLD AUTO: 1.67 K/UL (ref 1–4.8)
LYMPHOCYTES NFR BLD AUTO: 25.5 % (ref 27–41)
LYMPHOCYTES NFR BLD MANUAL: 35 % (ref 27–41)
MCH RBC QN AUTO: 30.1 PG (ref 27–31)
MCHC RBC AUTO-ENTMCNC: 32.3 G/DL (ref 32–36)
MCV RBC AUTO: 93.3 FL (ref 80–96)
MONOCYTES # BLD AUTO: 0.5 K/UL (ref 0–0.8)
MONOCYTES NFR BLD AUTO: 7.6 % (ref 2–6)
MONOCYTES NFR BLD MANUAL: 4 % (ref 2–6)
MPC BLD CALC-MCNC: 10.4 FL (ref 9.4–12.4)
NEUTROPHILS # BLD AUTO: 3.66 K/UL (ref 1.8–7.7)
NEUTROPHILS NFR BLD AUTO: 55.9 % (ref 53–65)
NEUTS BAND NFR BLD MANUAL: 1 % (ref 1–5)
NEUTS SEG NFR BLD MANUAL: 49 % (ref 50–62)
NITRITE UR QL STRIP: POSITIVE
NRBC BLD MANUAL-RTO: ABNORMAL %
NT-PROBNP SERPL-MCNC: 438 PG/ML (ref 1–125)
PH UR STRIP: 6 PH UNITS
PLATELET # BLD AUTO: 254 K/UL (ref 150–400)
POTASSIUM SERPL-SCNC: 4.1 MMOL/L (ref 3.5–5.1)
PROT UR QL STRIP: NEGATIVE
RBC # BLD AUTO: 4.18 M/UL (ref 4.2–5.4)
RBC # UR STRIP: ABNORMAL /UL
RBC #/AREA URNS HPF: ABNORMAL /HPF
SODIUM SERPL-SCNC: 142 MMOL/L (ref 136–145)
SP GR UR STRIP: 1.01
SQUAMOUS #/AREA URNS LPF: ABNORMAL /LPF
UROBILINOGEN UR STRIP-ACNC: 0.2 MG/DL
WBC # BLD AUTO: 6.55 K/UL (ref 4.5–11)
WBC #/AREA URNS HPF: ABNORMAL /HPF

## 2023-01-18 PROCEDURE — 87077 CULTURE AEROBIC IDENTIFY: CPT | Performed by: NURSE PRACTITIONER

## 2023-01-18 PROCEDURE — 81001 URINALYSIS AUTO W/SCOPE: CPT | Performed by: NURSE PRACTITIONER

## 2023-01-18 PROCEDURE — 85025 COMPLETE CBC W/AUTO DIFF WBC: CPT | Performed by: NURSE PRACTITIONER

## 2023-01-18 PROCEDURE — 87086 URINE CULTURE/COLONY COUNT: CPT | Performed by: NURSE PRACTITIONER

## 2023-01-18 PROCEDURE — 83880 ASSAY OF NATRIURETIC PEPTIDE: CPT | Performed by: NURSE PRACTITIONER

## 2023-01-18 PROCEDURE — 80048 BASIC METABOLIC PNL TOTAL CA: CPT | Performed by: NURSE PRACTITIONER

## 2023-01-20 LAB — UA COMPLETE W REFLEX CULTURE PNL UR: ABNORMAL

## 2023-01-26 ENCOUNTER — OFFICE VISIT (OUTPATIENT)
Dept: FAMILY MEDICINE | Facility: CLINIC | Age: 73
End: 2023-01-26
Payer: MEDICARE

## 2023-01-26 ENCOUNTER — LAB REQUISITION (OUTPATIENT)
Dept: LAB | Facility: HOSPITAL | Age: 73
End: 2023-01-26
Attending: NURSE PRACTITIONER
Payer: MEDICARE

## 2023-01-26 VITALS
SYSTOLIC BLOOD PRESSURE: 102 MMHG | RESPIRATION RATE: 18 BRPM | TEMPERATURE: 98 F | DIASTOLIC BLOOD PRESSURE: 52 MMHG | OXYGEN SATURATION: 99 % | WEIGHT: 220 LBS | BODY MASS INDEX: 37.56 KG/M2 | HEART RATE: 71 BPM | HEIGHT: 64 IN

## 2023-01-26 DIAGNOSIS — G40.001 LOCALIZATION-RELATED (FOCAL) (PARTIAL) IDIOPATHIC EPILEPSY AND EPILEPTIC SYNDROMES WITH SEIZURES OF LOCALIZED ONSET, NOT INTRACTABLE, WITH STATUS EPILEPTICUS: ICD-10-CM

## 2023-01-26 DIAGNOSIS — R05.9 COUGH, UNSPECIFIED TYPE: ICD-10-CM

## 2023-01-26 DIAGNOSIS — J22 LOWER RESPIRATORY INFECTION (E.G., BRONCHITIS, PNEUMONIA, PNEUMONITIS, PULMONITIS): Primary | ICD-10-CM

## 2023-01-26 DIAGNOSIS — I50.32 CHRONIC DIASTOLIC HEART FAILURE: ICD-10-CM

## 2023-01-26 DIAGNOSIS — F33.9 DEPRESSION, RECURRENT: ICD-10-CM

## 2023-01-26 DIAGNOSIS — N18.31 CHRONIC KIDNEY DISEASE, STAGE 3A: ICD-10-CM

## 2023-01-26 DIAGNOSIS — R56.9 SEIZURE: Chronic | ICD-10-CM

## 2023-01-26 DIAGNOSIS — I20.9 ANGINA PECTORIS: ICD-10-CM

## 2023-01-26 DIAGNOSIS — E66.01 SEVERE OBESITY (BMI 35.0-39.9) WITH COMORBIDITY: ICD-10-CM

## 2023-01-26 DIAGNOSIS — I48.0 PAF (PAROXYSMAL ATRIAL FIBRILLATION): ICD-10-CM

## 2023-01-26 DIAGNOSIS — J44.1 COPD EXACERBATION: ICD-10-CM

## 2023-01-26 PROBLEM — I10 HTN (HYPERTENSION): Status: ACTIVE | Noted: 2022-11-07

## 2023-01-26 LAB
ALBUMIN SERPL BCP-MCNC: 4 G/DL (ref 3.5–5)
ALBUMIN/GLOB SERPL: 1.1 {RATIO}
ALP SERPL-CCNC: 196 U/L (ref 55–142)
ALT SERPL W P-5'-P-CCNC: 20 U/L (ref 13–56)
ANION GAP SERPL CALCULATED.3IONS-SCNC: 10 MMOL/L (ref 7–16)
AST SERPL W P-5'-P-CCNC: 22 U/L (ref 15–37)
BASOPHILS # BLD AUTO: 0.03 K/UL (ref 0–0.2)
BASOPHILS NFR BLD AUTO: 0.5 % (ref 0–1)
BILIRUB SERPL-MCNC: 0.4 MG/DL (ref ?–1.2)
BUN SERPL-MCNC: 16 MG/DL (ref 7–18)
BUN/CREAT SERPL: 14 (ref 6–20)
CALCIUM SERPL-MCNC: 9.7 MG/DL (ref 8.5–10.1)
CHLORIDE SERPL-SCNC: 100 MMOL/L (ref 98–107)
CO2 SERPL-SCNC: 35 MMOL/L (ref 21–32)
CREAT SERPL-MCNC: 1.18 MG/DL (ref 0.55–1.02)
DIFFERENTIAL METHOD BLD: ABNORMAL
EGFR (NO RACE VARIABLE) (RUSH/TITUS): 49 ML/MIN/1.73M²
EOSINOPHIL # BLD AUTO: 1 K/UL (ref 0–0.5)
EOSINOPHIL NFR BLD AUTO: 15.5 % (ref 1–4)
EOSINOPHIL NFR BLD MANUAL: 20 % (ref 1–4)
ERYTHROCYTE [DISTWIDTH] IN BLOOD BY AUTOMATED COUNT: 14.1 % (ref 11.5–14.5)
GLOBULIN SER-MCNC: 3.7 G/DL (ref 2–4)
GLUCOSE SERPL-MCNC: 125 MG/DL (ref 74–106)
HCT VFR BLD AUTO: 43.7 % (ref 38–47)
HGB BLD-MCNC: 14.1 G/DL (ref 12–16)
LYMPHOCYTES # BLD AUTO: 1.81 K/UL (ref 1–4.8)
LYMPHOCYTES NFR BLD AUTO: 28.1 % (ref 27–41)
LYMPHOCYTES NFR BLD MANUAL: 24 % (ref 27–41)
MCH RBC QN AUTO: 30.5 PG (ref 27–31)
MCHC RBC AUTO-ENTMCNC: 32.3 G/DL (ref 32–36)
MCV RBC AUTO: 94.6 FL (ref 80–96)
MONOCYTES # BLD AUTO: 0.49 K/UL (ref 0–0.8)
MONOCYTES NFR BLD AUTO: 7.6 % (ref 2–6)
MONOCYTES NFR BLD MANUAL: 6 % (ref 2–6)
MPC BLD CALC-MCNC: 10.4 FL (ref 9.4–12.4)
NEUTROPHILS # BLD AUTO: 3.11 K/UL (ref 1.8–7.7)
NEUTROPHILS NFR BLD AUTO: 48.3 % (ref 53–65)
NEUTS SEG NFR BLD MANUAL: 50 % (ref 50–62)
NRBC BLD MANUAL-RTO: ABNORMAL %
NT-PROBNP SERPL-MCNC: 487 PG/ML (ref 1–125)
PHENYTOIN SERPL-MCNC: 14.4 ΜG/ML (ref 10–20)
PLATELET # BLD AUTO: 291 K/UL (ref 150–400)
PLATELET MORPHOLOGY: NORMAL
POTASSIUM SERPL-SCNC: 4.7 MMOL/L (ref 3.5–5.1)
PROT SERPL-MCNC: 7.7 G/DL (ref 6.4–8.2)
RBC # BLD AUTO: 4.62 M/UL (ref 4.2–5.4)
RBC MORPH BLD: NORMAL
SODIUM SERPL-SCNC: 140 MMOL/L (ref 136–145)
WBC # BLD AUTO: 6.44 K/UL (ref 4.5–11)

## 2023-01-26 PROCEDURE — 3008F BODY MASS INDEX DOCD: CPT | Mod: ,,, | Performed by: NURSE PRACTITIONER

## 2023-01-26 PROCEDURE — 3078F PR MOST RECENT DIASTOLIC BLOOD PRESSURE < 80 MM HG: ICD-10-PCS | Mod: ,,, | Performed by: NURSE PRACTITIONER

## 2023-01-26 PROCEDURE — 1125F PR PAIN SEVERITY QUANTIFIED, PAIN PRESENT: ICD-10-PCS | Mod: ,,, | Performed by: NURSE PRACTITIONER

## 2023-01-26 PROCEDURE — 80053 COMPREHEN METABOLIC PANEL: CPT | Performed by: NURSE PRACTITIONER

## 2023-01-26 PROCEDURE — 99213 OFFICE O/P EST LOW 20 MIN: CPT | Mod: 25,,, | Performed by: NURSE PRACTITIONER

## 2023-01-26 PROCEDURE — 1159F MED LIST DOCD IN RCRD: CPT | Mod: ,,, | Performed by: NURSE PRACTITIONER

## 2023-01-26 PROCEDURE — 3074F SYST BP LT 130 MM HG: CPT | Mod: ,,, | Performed by: NURSE PRACTITIONER

## 2023-01-26 PROCEDURE — 1160F RVW MEDS BY RX/DR IN RCRD: CPT | Mod: ,,, | Performed by: NURSE PRACTITIONER

## 2023-01-26 PROCEDURE — 80185 ASSAY OF PHENYTOIN TOTAL: CPT | Performed by: NURSE PRACTITIONER

## 2023-01-26 PROCEDURE — 85025 COMPLETE CBC W/AUTO DIFF WBC: CPT | Performed by: NURSE PRACTITIONER

## 2023-01-26 PROCEDURE — 1159F PR MEDICATION LIST DOCUMENTED IN MEDICAL RECORD: ICD-10-PCS | Mod: ,,, | Performed by: NURSE PRACTITIONER

## 2023-01-26 PROCEDURE — 96372 THER/PROPH/DIAG INJ SC/IM: CPT | Mod: ,,, | Performed by: NURSE PRACTITIONER

## 2023-01-26 PROCEDURE — 3074F PR MOST RECENT SYSTOLIC BLOOD PRESSURE < 130 MM HG: ICD-10-PCS | Mod: ,,, | Performed by: NURSE PRACTITIONER

## 2023-01-26 PROCEDURE — 99213 PR OFFICE/OUTPT VISIT, EST, LEVL III, 20-29 MIN: ICD-10-PCS | Mod: 25,,, | Performed by: NURSE PRACTITIONER

## 2023-01-26 PROCEDURE — 83880 ASSAY OF NATRIURETIC PEPTIDE: CPT | Performed by: NURSE PRACTITIONER

## 2023-01-26 PROCEDURE — 1125F AMNT PAIN NOTED PAIN PRSNT: CPT | Mod: ,,, | Performed by: NURSE PRACTITIONER

## 2023-01-26 PROCEDURE — 3008F PR BODY MASS INDEX (BMI) DOCUMENTED: ICD-10-PCS | Mod: ,,, | Performed by: NURSE PRACTITIONER

## 2023-01-26 PROCEDURE — 1160F PR REVIEW ALL MEDS BY PRESCRIBER/CLIN PHARMACIST DOCUMENTED: ICD-10-PCS | Mod: ,,, | Performed by: NURSE PRACTITIONER

## 2023-01-26 PROCEDURE — 96372 PR INJECTION,THERAP/PROPH/DIAG2ST, IM OR SUBCUT: ICD-10-PCS | Mod: ,,, | Performed by: NURSE PRACTITIONER

## 2023-01-26 PROCEDURE — 3078F DIAST BP <80 MM HG: CPT | Mod: ,,, | Performed by: NURSE PRACTITIONER

## 2023-01-26 RX ORDER — DOXYCYCLINE 100 MG/1
100 CAPSULE ORAL EVERY 12 HOURS
Qty: 20 CAPSULE | Refills: 0 | Status: SHIPPED | OUTPATIENT
Start: 2023-01-26 | End: 2023-02-06 | Stop reason: ALTCHOICE

## 2023-01-26 RX ORDER — DEXAMETHASONE SODIUM PHOSPHATE 4 MG/ML
8 INJECTION, SOLUTION INTRA-ARTICULAR; INTRALESIONAL; INTRAMUSCULAR; INTRAVENOUS; SOFT TISSUE
Status: COMPLETED | OUTPATIENT
Start: 2023-01-26 | End: 2023-01-26

## 2023-01-26 RX ORDER — PREDNISONE 10 MG/1
TABLET ORAL
Qty: 18 TABLET | Refills: 0 | Status: SHIPPED | OUTPATIENT
Start: 2023-01-26 | End: 2023-02-06 | Stop reason: ALTCHOICE

## 2023-01-26 RX ADMIN — DEXAMETHASONE SODIUM PHOSPHATE 8 MG: 4 INJECTION, SOLUTION INTRA-ARTICULAR; INTRALESIONAL; INTRAMUSCULAR; INTRAVENOUS; SOFT TISSUE at 03:01

## 2023-01-26 NOTE — PROGRESS NOTES
2023     HEAVENLY Pulido   [facility]  [address]  [place] [address]     PATIENT NAME: Radha Martínez  : 1950  DATE: 23  MRN: [id number]      Billing Provider: HEAVENLY Pulido  Level of Service:   Patient PCP Information       Provider PCP Type    HEAVENLY Pulido General            Reason for Visit / Chief Complaint: Cough and Chest Congestion (Coughing up thick yellow/brown mucus, ongoing 3 days)       Update PCP  Update Chief Complaint         History of Present Illness / Problem Focused Workflow     Radha Martínez presents to the clinic coughing congestion shortness of breath for 3 days may have had some chills yesterday. Had to start wearing supplemental oxygen today at 2 liters for dyspnea    Cough  Patient complains of dyspnea and left ear congestion. Symptoms began 3 days ago. Symptoms have been gradually worsening since that time.The cough is productive of white, yellow, tan, and tenacious sputum and is aggravated by cold air, dust, exercise, fumes, stress, pollens, and reclining position. Associated symptoms include: change in voice, chills, shortness of breath, sputum production, and wheezing. Patient does have new pets. Patient does have a history of asthma. Patient does have a history of environmental allergens. Patient has not traveled recently. Patient does not have a history of smoking. Patient has had a previous chest x-ray. Patient  had a PPD done.        Review of Systems     Review of Systems   Constitutional:  Negative for appetite change, chills, fatigue and fever.   HENT:  Positive for nasal congestion. Negative for ear pain, sore throat and trouble swallowing.    Respiratory:  Positive for cough, chest tightness and shortness of breath. Negative for wheezing.         WILLSON   Cardiovascular:  Positive for leg swelling. Negative for chest pain and palpitations.        Mid sternum   Gastrointestinal:  Positive for  abdominal pain and nausea. Negative for change in bowel habit, vomiting and change in bowel habit.        Eoigastric   Genitourinary:  Negative for difficulty urinating.   Integumentary:  Negative for rash.   Neurological:  Negative for weakness and headaches.      Medical / Social / Family History     Past Medical History:   Diagnosis Date    Asthma     CHF (congestive heart failure)     COPD (chronic obstructive pulmonary disease)     WILLSON (dyspnea on exertion)     GERD (gastroesophageal reflux disease)     HTN (hypertension)     Hyperlipidemia     Osteoporosis     Oxygen dependent     02 2L    Seizure disorder        Past Surgical History:   Procedure Laterality Date    CARDIOVERSION N/A 4/8/2022    Procedure: Cardioversion;  Surgeon: Adal Chung MD;  Location: [facility] CATH LAB;  Service: Cardiology;  Laterality: N/A;    COLONOSCOPY      REVISION OF TOTAL KNEE REPLACEMENT       TUBAL LIGATION         Social History  Ms.  reports that she has never smoked. She has never been exposed to tobacco smoke. She has never used smokeless tobacco. She reports that she does not drink alcohol and does not use drugs.    Family History  Ms.'s family history includes Cancer in her father; Diabetes in her brother, mother, and sister; Heart disease in her mother and sister; Hypertension in her mother and sister.    Medications and Allergies     Medications  Outpatient Medications Marked as Taking for the 1/26/23 encounter (Office Visit) with HEAVENLY Pulido   Medication Sig Dispense Refill    albuterol (PROVENTIL/VENTOLIN HFA) 90 mcg/actuation inhaler Inhale 1-2 puffs into the lungs every 6 (six) hours as needed for Wheezing. 54 g 1    albuterol-ipratropium (DUO-NEB) 2.5 mg-0.5 mg/3 mL nebulizer solution Take 3 mLs by nebulization every 6 (six) hours as needed for Wheezing. Rescue 100 mL 5    aspirin 81 MG Chew Take 81 mg by mouth once daily.      benzonatate (TESSALON) 100 MG capsule Take 1 capsule (100 mg  total) by mouth 3 (three) times daily as needed for Cough. 60 capsule 0    busPIRone (BUSPAR) 10 MG tablet TAKE 1 TABLET TWICE DAILY 180 tablet 1    calcium carbonate/vitamin D3 (CALCARB 600 WITH VITAMIN D ORAL) Take by mouth.      clopidogreL (PLAVIX) 75 mg tablet Take 1 tablet (75 mg total) by mouth once daily. 90 tablet 2    fluticasone propionate (FLONASE) 50 mcg/actuation nasal spray 1 spray (50 mcg total) by Each Nostril route once daily. 16 g 1    fluticasone-umeclidin-vilanter (TRELEGY ELLIPTA) 200-62.5-25 mcg inhaler Inhale 1 puff into the lungs once daily. 60 each 11    furosemide (LASIX) 40 MG tablet Take 1 tablet (40 mg total) by mouth 2 (two) times a day. 180 tablet 1    gabapentin (NEURONTIN) 300 MG capsule Take 1 capsule (300 mg total) by mouth 3 (three) times daily. 270 capsule 1    ipratropium (ATROVENT) 42 mcg (0.06 %) nasal spray 2 sprays by Nasal route 4 (four) times daily. 15 mL 0    loratadine (CLARITIN) 10 mg tablet TAKE 1 TABLET EVERY DAY 90 tablet 1    meclizine (ANTIVERT) 12.5 mg tablet Take 1 tablet (12.5 mg total) by mouth 2 (two) times daily as needed for Dizziness. 60 tablet 0    montelukast (SINGULAIR) 10 mg tablet Take 1 tablet (10 mg total) by mouth once daily. 90 tablet 1    nebivoloL (BYSTOLIC) 5 MG Tab Take 1 tablet (5 mg total) by mouth once daily. 90 tablet 1    pantoprazole (PROTONIX) 40 MG tablet Take 1 tablet (40 mg total) by mouth once daily. 90 tablet 1    paroxetine (PAXIL) 40 MG tablet Take 1 tablet (40 mg total) by mouth once daily. 90 tablet 1    phenytoin (DILANTIN) 100 MG ER capsule Take 1 capsule (100 mg total) by mouth 3 (three) times daily. 270 capsule 1    pravastatin (PRAVACHOL) 40 MG tablet TAKE 1 TABLET EVERY NIGHT 90 tablet 1     Current Facility-Administered Medications for the 1/26/23 encounter (Office Visit) with HEAVENLY Pulido   Medication Dose Route Frequency Provider Last Rate Last Admin    [COMPLETED] dexAMETHasone injection 8 mg  8 mg  "Intramuscular 1 time in Clinic/HOD Kelsy Kay, FNP   8 mg at 01/26/23 1509       Allergies  Review of patient's allergies indicates:  No Known Allergies    Physical Examination     Vitals:    01/26/23 1429   BP: (!) 102/52   BP Location: Left arm   Patient Position: Sitting   Pulse: 71   Resp: 18   Temp: 98 °F (36.7 °C)   TempSrc: Oral   SpO2: 99%  Comment: on 2L via NC   Weight: 99.8 kg (220 lb)   Height: 5' 4" (1.626 m)      Physical Exam  Vitals and nursing note reviewed.   Constitutional:       General: She is not in acute distress.     Appearance: Normal appearance. She is not ill-appearing or toxic-appearing.   HENT:      Right Ear: Tympanic membrane, ear canal and external ear normal.      Left Ear: Tympanic membrane and ear canal normal.      Nose: Nose normal.      Mouth/Throat:      Mouth: Mucous membranes are moist.      Pharynx: Oropharynx is clear. No oropharyngeal exudate or posterior oropharyngeal erythema.   Cardiovascular:      Rate and Rhythm: Normal rate and regular rhythm.   Pulmonary:      Effort: Pulmonary effort is normal.      Breath sounds: Wheezing present. No rhonchi or rales.   Abdominal:      General: Bowel sounds are normal. There is no distension.      Palpations: Abdomen is soft.      Tenderness: There is no abdominal tenderness.      Hernia: No hernia is present.   Musculoskeletal:      Cervical back: Normal range of motion and neck supple.      Right lower leg: Edema present.      Left lower leg: Edema present.   Skin:     General: Skin is warm and dry.      Capillary Refill: Capillary refill takes less than 2 seconds.   Neurological:      General: No focal deficit present.      Mental Status: She is alert and oriented to person, place, and time.   Psychiatric:         Mood and Affect: Mood normal.         Behavior: Behavior normal.    CMP  Sodium   Date Value Ref Range Status   01/26/2023 140 136 - 145 mmol/L Final     Potassium   Date Value Ref Range Status "   01/26/2023 4.7 3.5 - 5.1 mmol/L Final     Chloride   Date Value Ref Range Status   01/26/2023 100 98 - 107 mmol/L Final     CO2   Date Value Ref Range Status   01/26/2023 35 (H) 21 - 32 mmol/L Final     Glucose   Date Value Ref Range Status   01/26/2023 125 (H) 74 - 106 mg/dL Final     BUN   Date Value Ref Range Status   01/26/2023 16 7 - 18 mg/dL Final     Creatinine   Date Value Ref Range Status   01/26/2023 1.18 (H) 0.55 - 1.02 mg/dL Final     Calcium   Date Value Ref Range Status   01/26/2023 9.7 8.5 - 10.1 mg/dL Final     Total Protein   Date Value Ref Range Status   01/26/2023 7.7 6.4 - 8.2 g/dL Final     Albumin   Date Value Ref Range Status   01/26/2023 4.0 3.5 - 5.0 g/dL Final     Bilirubin, Total   Date Value Ref Range Status   01/26/2023 0.4 >0.0 - 1.2 mg/dL Final     Alk Phos   Date Value Ref Range Status   01/26/2023 196 (H) 55 - 142 U/L Final     AST   Date Value Ref Range Status   01/26/2023 22 15 - 37 U/L Final     ALT   Date Value Ref Range Status   01/26/2023 20 13 - 56 U/L Final     Anion Gap   Date Value Ref Range Status   01/26/2023 10 7 - 16 mmol/L Final     eGFR   Date Value Ref Range Status   01/26/2023 49 (L) >=60 mL/min/1.73m² Final      Lab Results   Component Value Date    WBC 6.44 01/26/2023    RBC 4.62 01/26/2023    HGB 14.1 01/26/2023    HCT 43.7 01/26/2023    MCV 94.6 01/26/2023    MCH 30.5 01/26/2023    MCHC 32.3 01/26/2023    RDW 14.1 01/26/2023     01/26/2023    MPV 10.4 01/26/2023    LYMPH 28.1 01/26/2023    LYMPH 1.81 01/26/2023    LYMPH 24 (L) 01/26/2023    MONO 7.6 (H) 01/26/2023    MONO 6 01/26/2023    EOS 1.00 (H) 01/26/2023    BASO 0.03 01/26/2023    EOSINOPHIL 15.5 (H) 01/26/2023    EOSINOPHIL 20 (H) 01/26/2023    BASOPHIL 0.5 01/26/2023        Assessment and Plan (including Health Maintenance)      Problem List  Smart Sets  Document Outside HM   :    Plan:   Decadron today tomorrow start taper dose of prednisone      Health Maintenance Due   Topic Date Due     Mammogram  10/07/2020       Problem List Items Addressed This Visit          Neuro    Seizure       Psychiatric    Depression, recurrent       Pulmonary    COPD exacerbation    Overview     Solumedrol  Respiratory treatment         Relevant Medications    dexAMETHasone injection 8 mg (Completed)    predniSONE (DELTASONE) 10 MG tablet       Cardiac/Vascular    Chronic diastolic heart failure (Chronic)    Angina pectoris    PAF (paroxysmal atrial fibrillation)       Renal/    Chronic kidney disease, stage 3a       Endocrine    Severe obesity (BMI 35.0-39.9) with comorbidity     Other Visit Diagnoses       Lower respiratory infection (e.g., bronchitis, pneumonia, pneumonitis, pulmonitis)    -  Primary    Relevant Medications    doxycycline (MONODOX) 100 MG capsule    Cough, unspecified type        Relevant Orders    X-Ray Chest PA And Lateral (Completed)          Lower respiratory infection (e.g., bronchitis, pneumonia, pneumonitis, pulmonitis)  -     doxycycline (MONODOX) 100 MG capsule; Take 1 capsule (100 mg total) by mouth every 12 (twelve) hours. for 10 days  Dispense: 20 capsule; Refill: 0    COPD exacerbation  -     dexAMETHasone injection 8 mg  -     predniSONE (DELTASONE) 10 MG tablet; Take three tablets daily for three days, take two tablets daily for three days , take one tablet daily for three days  Dispense: 18 tablet; Refill: 0    Cough, unspecified type  -     X-Ray Chest PA And Lateral; Future; Expected date: 01/26/2023    PAF (paroxysmal atrial fibrillation)    Severe obesity (BMI 35.0-39.9) with comorbidity    Depression, recurrent    Seizure    Chronic diastolic heart failure    Angina pectoris    Chronic kidney disease, stage 3a       Health Maintenance Topics with due status: Not Due       Topic Last Completion Date    DEXA Scan 03/05/2020    Lipid Panel 12/30/2021    High Dose Statin 01/26/2023       Procedures     Future Appointments   Date Time Provider Department Center   3/8/2023  9:30 AM  HEAV[place]LY Pulido [facility] [place]   5/24/2023  1:30 PM AWV NURSE, [facility] [place]        No follow-ups on file.     Signature:  HEAVENLY Pulido    Date of encounter: 1/26/23

## 2023-02-01 ENCOUNTER — DOCUMENT SCAN (OUTPATIENT)
Dept: HOME HEALTH SERVICES | Facility: HOSPITAL | Age: 73
End: 2023-02-01
Payer: MEDICARE

## 2023-02-01 ENCOUNTER — OFFICE VISIT (OUTPATIENT)
Dept: FAMILY MEDICINE | Facility: CLINIC | Age: 73
End: 2023-02-01
Payer: MEDICARE

## 2023-02-01 VITALS
BODY MASS INDEX: 35.85 KG/M2 | SYSTOLIC BLOOD PRESSURE: 112 MMHG | TEMPERATURE: 98 F | HEART RATE: 67 BPM | DIASTOLIC BLOOD PRESSURE: 58 MMHG | OXYGEN SATURATION: 99 % | RESPIRATION RATE: 20 BRPM | HEIGHT: 64 IN | WEIGHT: 210 LBS

## 2023-02-01 DIAGNOSIS — J22 LOWER RESPIRATORY INFECTION (E.G., BRONCHITIS, PNEUMONIA, PNEUMONITIS, PULMONITIS): Primary | ICD-10-CM

## 2023-02-01 PROCEDURE — 1126F AMNT PAIN NOTED NONE PRSNT: CPT | Mod: ,,, | Performed by: NURSE PRACTITIONER

## 2023-02-01 PROCEDURE — 96372 THER/PROPH/DIAG INJ SC/IM: CPT | Mod: ,,, | Performed by: NURSE PRACTITIONER

## 2023-02-01 PROCEDURE — 3008F BODY MASS INDEX DOCD: CPT | Mod: ,,, | Performed by: NURSE PRACTITIONER

## 2023-02-01 PROCEDURE — 1160F PR REVIEW ALL MEDS BY PRESCRIBER/CLIN PHARMACIST DOCUMENTED: ICD-10-PCS | Mod: ,,, | Performed by: NURSE PRACTITIONER

## 2023-02-01 PROCEDURE — 99213 OFFICE O/P EST LOW 20 MIN: CPT | Mod: 25,,, | Performed by: NURSE PRACTITIONER

## 2023-02-01 PROCEDURE — 3074F PR MOST RECENT SYSTOLIC BLOOD PRESSURE < 130 MM HG: ICD-10-PCS | Mod: ,,, | Performed by: NURSE PRACTITIONER

## 2023-02-01 PROCEDURE — 3078F PR MOST RECENT DIASTOLIC BLOOD PRESSURE < 80 MM HG: ICD-10-PCS | Mod: ,,, | Performed by: NURSE PRACTITIONER

## 2023-02-01 PROCEDURE — 3078F DIAST BP <80 MM HG: CPT | Mod: ,,, | Performed by: NURSE PRACTITIONER

## 2023-02-01 PROCEDURE — 3074F SYST BP LT 130 MM HG: CPT | Mod: ,,, | Performed by: NURSE PRACTITIONER

## 2023-02-01 PROCEDURE — 3008F PR BODY MASS INDEX (BMI) DOCUMENTED: ICD-10-PCS | Mod: ,,, | Performed by: NURSE PRACTITIONER

## 2023-02-01 PROCEDURE — 1159F MED LIST DOCD IN RCRD: CPT | Mod: ,,, | Performed by: NURSE PRACTITIONER

## 2023-02-01 PROCEDURE — 1160F RVW MEDS BY RX/DR IN RCRD: CPT | Mod: ,,, | Performed by: NURSE PRACTITIONER

## 2023-02-01 PROCEDURE — 1126F PR PAIN SEVERITY QUANTIFIED, NO PAIN PRESENT: ICD-10-PCS | Mod: ,,, | Performed by: NURSE PRACTITIONER

## 2023-02-01 PROCEDURE — 96372 PR INJECTION,THERAP/PROPH/DIAG2ST, IM OR SUBCUT: ICD-10-PCS | Mod: ,,, | Performed by: NURSE PRACTITIONER

## 2023-02-01 PROCEDURE — 1159F PR MEDICATION LIST DOCUMENTED IN MEDICAL RECORD: ICD-10-PCS | Mod: ,,, | Performed by: NURSE PRACTITIONER

## 2023-02-01 PROCEDURE — 99213 PR OFFICE/OUTPT VISIT, EST, LEVL III, 20-29 MIN: ICD-10-PCS | Mod: 25,,, | Performed by: NURSE PRACTITIONER

## 2023-02-01 RX ORDER — CEFTRIAXONE 1 G/1
1 INJECTION, POWDER, FOR SOLUTION INTRAMUSCULAR; INTRAVENOUS
Status: COMPLETED | OUTPATIENT
Start: 2023-02-01 | End: 2023-02-01

## 2023-02-01 RX ADMIN — CEFTRIAXONE 1 G: 1 INJECTION, POWDER, FOR SOLUTION INTRAMUSCULAR; INTRAVENOUS at 11:02

## 2023-02-01 NOTE — PROGRESS NOTES
2023     HEAVENLY Pulido   [facility]  [address]  [place] [address]     PATIENT NAME: Radha Martínez  : 1950  DATE: 23  MRN: [id number]      Billing Provider: HEAVENLY Pulido  Level of Service:   Patient PCP Information       Provider PCP Type    HEAVENLY Pulido General            Reason for Visit / Chief Complaint: Follow-up and Cough (Pneumonia, still coughing up some thick brown/yellow phelgm and it was pink tinged one day)       Update PCP  Update Chief Complaint         History of Present Illness / Problem Focused Workflow     Radha Martínez presents to the clinic pneumonia still cough and short of breath on taper prednisone taking her doxycycline some chills    No results found for: HGBA1C     CMP  Sodium   Date Value Ref Range Status   2023 140 136 - 145 mmol/L Final     Potassium   Date Value Ref Range Status   2023 4.7 3.5 - 5.1 mmol/L Final     Chloride   Date Value Ref Range Status   2023 100 98 - 107 mmol/L Final     CO2   Date Value Ref Range Status   2023 35 (H) 21 - 32 mmol/L Final     Glucose   Date Value Ref Range Status   2023 125 (H) 74 - 106 mg/dL Final     BUN   Date Value Ref Range Status   2023 16 7 - 18 mg/dL Final     Creatinine   Date Value Ref Range Status   2023 1.18 (H) 0.55 - 1.02 mg/dL Final     Calcium   Date Value Ref Range Status   2023 9.7 8.5 - 10.1 mg/dL Final     Total Protein   Date Value Ref Range Status   2023 7.7 6.4 - 8.2 g/dL Final     Albumin   Date Value Ref Range Status   2023 4.0 3.5 - 5.0 g/dL Final     Bilirubin, Total   Date Value Ref Range Status   2023 0.4 >0.0 - 1.2 mg/dL Final     Alk Phos   Date Value Ref Range Status   2023 196 (H) 55 - 142 U/L Final     AST   Date Value Ref Range Status   2023 22 15 - 37 U/L Final     ALT   Date Value Ref Range Status   2023 20 13 - 56 U/L Final     Anion Gap   Date  Value Ref Range Status   01/26/2023 10 7 - 16 mmol/L Final     eGFR   Date Value Ref Range Status   01/26/2023 49 (L) >=60 mL/min/1.73m² Final        Lab Results   Component Value Date    WBC 6.44 01/26/2023    RBC 4.62 01/26/2023    HGB 14.1 01/26/2023    HCT 43.7 01/26/2023    MCV 94.6 01/26/2023    MCH 30.5 01/26/2023    MCHC 32.3 01/26/2023    RDW 14.1 01/26/2023     01/26/2023    MPV 10.4 01/26/2023    LYMPH 28.1 01/26/2023    LYMPH 1.81 01/26/2023    LYMPH 24 (L) 01/26/2023    MONO 7.6 (H) 01/26/2023    MONO 6 01/26/2023    EOS 1.00 (H) 01/26/2023    BASO 0.03 01/26/2023    EOSINOPHIL 15.5 (H) 01/26/2023    EOSINOPHIL 20 (H) 01/26/2023    BASOPHIL 0.5 01/26/2023        Lab Results   Component Value Date    CHOL 233 (H) 12/30/2021     Lab Results   Component Value Date     (H) 12/30/2021     Lab Results   Component Value Date    LDLCALC 101 12/30/2021     Lab Results   Component Value Date    TRIG 147 12/30/2021     Lab Results   Component Value Date    CHOLHDL 2.3 12/30/2021        Wt Readings from Last 3 Encounters:   02/01/23 1100 95.3 kg (210 lb)   01/26/23 1429 99.8 kg (220 lb)   12/08/22 1242 98.4 kg (217 lb)        BP Readings from Last 3 Encounters:   02/01/23 (!) 112/58   01/26/23 (!) 102/52   12/08/22 110/60    X-Ray Chest PA And Lateral  Narrative: EXAMINATION:  XR CHEST PA AND LATERAL    CLINICAL HISTORY:  Unspecified acute lower respiratory infection    COMPARISON:  02/02/2016, 03/29/2022, 04/07/2022, 10/27/2022, and 01/26/2023    FINDINGS:  PA and lateral chest:    The cardiac size is normal.  There is consolidation in the right middle lobe with a prominent component of atelectasis which has developed since the previous examination.  There is no associated adenopathy or pleural effusion.    The left lung is clear.  Degenerative changes are present in the thoracic spine.  Impression: Right middle lobe atelectasis has developed since the previous examination.  Continued follow-up  versus a chest CT is recommended.    Place of service: [facility]    Electronically signed by: Helene Brown  Date:    02/01/2023  Time:    12:37       Review of Systems     Review of Systems   Constitutional:  Positive for chills and fatigue. Negative for appetite change and fever.   HENT:  Positive for nasal congestion. Negative for mouth sores, sinus pressure/congestion, sore throat and voice change.    Respiratory:  Positive for cough, shortness of breath and wheezing. Negative for chest tightness.    Cardiovascular:  Negative for chest pain and palpitations.   Gastrointestinal:  Negative for abdominal pain, change in bowel habit, nausea, vomiting and change in bowel habit.   Neurological:  Negative for dizziness, seizures, syncope and weakness.      Medical / Social / Family History     Past Medical History:   Diagnosis Date    Asthma     CHF (congestive heart failure)     COPD (chronic obstructive pulmonary disease)     WILLSON (dyspnea on exertion)     GERD (gastroesophageal reflux disease)     HTN (hypertension)     Hyperlipidemia     Osteoporosis     Oxygen dependent     02 2L    Seizure disorder        Past Surgical History:   Procedure Laterality Date    CARDIOVERSION N/A 4/8/2022    Procedure: Cardioversion;  Surgeon: Adal Chung MD;  Location: [facility] CATH LAB;  Service: Cardiology;  Laterality: N/A;    COLONOSCOPY      REVISION OF TOTAL KNEE REPLACEMENT       TUBAL LIGATION         Social History  Ms.  reports that she has never smoked. She has never been exposed to tobacco smoke. She has never used smokeless tobacco. She reports that she does not drink alcohol and does not use drugs.    Family History  Ms.'s family history includes Cancer in her father; Diabetes in her brother, mother, and sister; Heart disease in her mother and sister; Hypertension in her mother and sister.    Medications and Allergies     Medications  Outpatient Medications Marked as Taking for the 2/1/23  encounter (Office Visit) with HEAVENLY Pulido   Medication Sig Dispense Refill    albuterol (PROVENTIL/VENTOLIN HFA) 90 mcg/actuation inhaler Inhale 1-2 puffs into the lungs every 6 (six) hours as needed for Wheezing. 54 g 1    albuterol-ipratropium (DUO-NEB) 2.5 mg-0.5 mg/3 mL nebulizer solution Take 3 mLs by nebulization every 6 (six) hours as needed for Wheezing. Rescue 100 mL 5    aspirin 81 MG Chew Take 81 mg by mouth once daily.      benzonatate (TESSALON) 100 MG capsule Take 1 capsule (100 mg total) by mouth 3 (three) times daily as needed for Cough. 60 capsule 0    busPIRone (BUSPAR) 10 MG tablet TAKE 1 TABLET TWICE DAILY 180 tablet 1    calcium carbonate/vitamin D3 (CALCARB 600 WITH VITAMIN D ORAL) Take by mouth.      clopidogreL (PLAVIX) 75 mg tablet Take 1 tablet (75 mg total) by mouth once daily. 90 tablet 2    doxycycline (MONODOX) 100 MG capsule Take 1 capsule (100 mg total) by mouth every 12 (twelve) hours. for 10 days 20 capsule 0    fluticasone propionate (FLONASE) 50 mcg/actuation nasal spray 1 spray (50 mcg total) by Each Nostril route once daily. 16 g 1    fluticasone-umeclidin-vilanter (TRELEGY ELLIPTA) 200-62.5-25 mcg inhaler Inhale 1 puff into the lungs once daily. 60 each 11    furosemide (LASIX) 40 MG tablet Take 1 tablet (40 mg total) by mouth 2 (two) times a day. 180 tablet 1    gabapentin (NEURONTIN) 300 MG capsule Take 1 capsule (300 mg total) by mouth 3 (three) times daily. 270 capsule 1    ipratropium (ATROVENT) 42 mcg (0.06 %) nasal spray 2 sprays by Nasal route 4 (four) times daily. 15 mL 0    loratadine (CLARITIN) 10 mg tablet TAKE 1 TABLET EVERY DAY 90 tablet 1    meclizine (ANTIVERT) 12.5 mg tablet Take 1 tablet (12.5 mg total) by mouth 2 (two) times daily as needed for Dizziness. 60 tablet 0    montelukast (SINGULAIR) 10 mg tablet Take 1 tablet (10 mg total) by mouth once daily. 90 tablet 1    nebivoloL (BYSTOLIC) 5 MG Tab Take 1 tablet (5 mg total) by mouth once  "daily. 90 tablet 1    pantoprazole (PROTONIX) 40 MG tablet Take 1 tablet (40 mg total) by mouth once daily. 90 tablet 1    paroxetine (PAXIL) 40 MG tablet Take 1 tablet (40 mg total) by mouth once daily. 90 tablet 1    phenytoin (DILANTIN) 100 MG ER capsule Take 1 capsule (100 mg total) by mouth 3 (three) times daily. 270 capsule 1    pravastatin (PRAVACHOL) 40 MG tablet TAKE 1 TABLET EVERY NIGHT 90 tablet 1    predniSONE (DELTASONE) 10 MG tablet Take three tablets daily for three days, take two tablets daily for three days , take one tablet daily for three days 18 tablet 0     Current Facility-Administered Medications for the 2/1/23 encounter (Office Visit) with HEAVENLY Pulido   Medication Dose Route Frequency Provider Last Rate Last Admin    [COMPLETED] cefTRIAXone injection 1 g  1 g Intramuscular 1 time in Clinic/HOD HEAVENLY Pulido   1 g at 02/01/23 1131       Allergies  Review of patient's allergies indicates:  No Known Allergies    Physical Examination     Vitals:    02/01/23 1100   BP: (!) 112/58   BP Location: Left arm   Patient Position: Sitting   Pulse: 67   Resp: 20   Temp: 98 °F (36.7 °C)   TempSrc: Oral   SpO2: 99%  Comment: oxygen at 2L via nc   Weight: 95.3 kg (210 lb)   Height: 5' 4" (1.626 m)      Physical Exam  Vitals and nursing note reviewed.   Constitutional:       General: She is not in acute distress.     Appearance: Normal appearance. She is not ill-appearing or toxic-appearing.   HENT:      Right Ear: Tympanic membrane, ear canal and external ear normal.      Left Ear: Tympanic membrane and ear canal normal.      Nose: Nose normal.      Mouth/Throat:      Mouth: Mucous membranes are moist.      Pharynx: Oropharynx is clear. No oropharyngeal exudate or posterior oropharyngeal erythema.   Cardiovascular:      Rate and Rhythm: Normal rate and regular rhythm.   Pulmonary:      Effort: Pulmonary effort is normal.      Breath sounds: Wheezing and rhonchi present. No " rales.   Abdominal:      General: Bowel sounds are normal. There is no distension.      Palpations: Abdomen is soft.      Tenderness: There is no abdominal tenderness.      Hernia: No hernia is present.   Musculoskeletal:      Cervical back: Normal range of motion and neck supple.      Right lower leg: Edema present.      Left lower leg: Edema present.   Skin:     General: Skin is warm and dry.      Capillary Refill: Capillary refill takes less than 2 seconds.   Neurological:      General: No focal deficit present.      Mental Status: She is alert and oriented to person, place, and time.   Psychiatric:         Mood and Affect: Mood normal.         Behavior: Behavior normal.   X-Ray Chest PA And Lateral  Narrative: EXAMINATION:  XR CHEST PA AND LATERAL    CLINICAL HISTORY:  Unspecified acute lower respiratory infection    COMPARISON:  02/02/2016, 03/29/2022, 04/07/2022, 10/27/2022, and 01/26/2023    FINDINGS:  PA and lateral chest:    The cardiac size is normal.  There is consolidation in the right middle lobe with a prominent component of atelectasis which has developed since the previous examination.  There is no associated adenopathy or pleural effusion.    The left lung is clear.  Degenerative changes are present in the thoracic spine.  Impression: Right middle lobe atelectasis has developed since the previous examination.  Continued follow-up versus a chest CT is recommended.    Place of service: [facility]    Electronically signed by: Helene Brown  Date:    02/01/2023  Time:    12:37        Assessment and Plan (including Health Maintenance)      Problem List  Smart Sets  Document Outside HM   :    Plan:   Continue antibiotic given rocephin 1 gm today follow up Monday      Health Maintenance Due   Topic Date Due    Mammogram  10/07/2020       Problem List Items Addressed This Visit    None  Visit Diagnoses       Lower respiratory infection (e.g., bronchitis, pneumonia, pneumonitis, pulmonitis)    -   Primary    rocephin today continue antibiotic follow up Monday home alth continue to monitor follow up Monday    Relevant Medications    cefTRIAXone injection 1 g (Completed)          Lower respiratory infection (e.g., bronchitis, pneumonia, pneumonitis, pulmonitis)  Comments:  rocephin today continue antibiotic follow up Monday home alth continue to monitor follow up Monday  Orders:  -     cefTRIAXone injection 1 g       Health Maintenance Topics with due status: Not Due       Topic Last Completion Date    DEXA Scan 03/05/2020    Lipid Panel 12/30/2021    High Dose Statin 02/01/2023       Procedures     Future Appointments   Date Time Provider Department Center   2/6/2023 10:45 AM HEAVENLY Pulido [facility] [place]   [facility]/8/[facility]   5/24/2023  1:30 PM AWV NURSE, [facility]        No follow-ups on file.     Signature:  HEAVENLY Pulido    Date of encounter: 2/1/23

## 2023-02-06 ENCOUNTER — OFFICE VISIT (OUTPATIENT)
Dept: FAMILY MEDICINE | Facility: CLINIC | Age: 73
End: 2023-02-06
Payer: MEDICARE

## 2023-02-06 VITALS
DIASTOLIC BLOOD PRESSURE: 56 MMHG | RESPIRATION RATE: 18 BRPM | TEMPERATURE: 98 F | OXYGEN SATURATION: 95 % | HEART RATE: 68 BPM | SYSTOLIC BLOOD PRESSURE: 116 MMHG | HEIGHT: 64 IN | BODY MASS INDEX: 37.56 KG/M2 | WEIGHT: 220 LBS

## 2023-02-06 DIAGNOSIS — N18.31 CHRONIC KIDNEY DISEASE, STAGE 3A: ICD-10-CM

## 2023-02-06 DIAGNOSIS — I50.32 CHRONIC DIASTOLIC HEART FAILURE: Chronic | ICD-10-CM

## 2023-02-06 DIAGNOSIS — R56.9 SEIZURE: ICD-10-CM

## 2023-02-06 DIAGNOSIS — J22 LOWER RESPIRATORY INFECTION (E.G., BRONCHITIS, PNEUMONIA, PNEUMONITIS, PULMONITIS): Primary | ICD-10-CM

## 2023-02-06 DIAGNOSIS — F33.9 DEPRESSION, RECURRENT: ICD-10-CM

## 2023-02-06 DIAGNOSIS — I10 HYPERTENSION, UNSPECIFIED TYPE: ICD-10-CM

## 2023-02-06 LAB
ANION GAP SERPL CALCULATED.3IONS-SCNC: 5 MMOL/L (ref 7–16)
BUN SERPL-MCNC: 26 MG/DL (ref 7–18)
BUN/CREAT SERPL: 19 (ref 6–20)
CALCIUM SERPL-MCNC: 9.2 MG/DL (ref 8.5–10.1)
CHLORIDE SERPL-SCNC: 104 MMOL/L (ref 98–107)
CO2 SERPL-SCNC: 37 MMOL/L (ref 21–32)
CREAT SERPL-MCNC: 1.38 MG/DL (ref 0.55–1.02)
EGFR (NO RACE VARIABLE) (RUSH/TITUS): 41 ML/MIN/1.73M²
GLUCOSE SERPL-MCNC: 138 MG/DL (ref 74–106)
NT-PROBNP SERPL-MCNC: 892 PG/ML (ref 1–125)
POTASSIUM SERPL-SCNC: 4.4 MMOL/L (ref 3.5–5.1)
SODIUM SERPL-SCNC: 142 MMOL/L (ref 136–145)

## 2023-02-06 PROCEDURE — 80048 BASIC METABOLIC PANEL: ICD-10-PCS | Mod: ,,, | Performed by: CLINICAL MEDICAL LABORATORY

## 2023-02-06 PROCEDURE — 99214 PR OFFICE/OUTPT VISIT, EST, LEVL IV, 30-39 MIN: ICD-10-PCS | Mod: 25,,, | Performed by: NURSE PRACTITIONER

## 2023-02-06 PROCEDURE — 1126F AMNT PAIN NOTED NONE PRSNT: CPT | Mod: ,,, | Performed by: NURSE PRACTITIONER

## 2023-02-06 PROCEDURE — 3078F DIAST BP <80 MM HG: CPT | Mod: ,,, | Performed by: NURSE PRACTITIONER

## 2023-02-06 PROCEDURE — 96372 PR INJECTION,THERAP/PROPH/DIAG2ST, IM OR SUBCUT: ICD-10-PCS | Mod: ,,, | Performed by: NURSE PRACTITIONER

## 2023-02-06 PROCEDURE — 3074F PR MOST RECENT SYSTOLIC BLOOD PRESSURE < 130 MM HG: ICD-10-PCS | Mod: ,,, | Performed by: NURSE PRACTITIONER

## 2023-02-06 PROCEDURE — 3078F PR MOST RECENT DIASTOLIC BLOOD PRESSURE < 80 MM HG: ICD-10-PCS | Mod: ,,, | Performed by: NURSE PRACTITIONER

## 2023-02-06 PROCEDURE — 1126F PR PAIN SEVERITY QUANTIFIED, NO PAIN PRESENT: ICD-10-PCS | Mod: ,,, | Performed by: NURSE PRACTITIONER

## 2023-02-06 PROCEDURE — 80048 BASIC METABOLIC PNL TOTAL CA: CPT | Mod: ,,, | Performed by: CLINICAL MEDICAL LABORATORY

## 2023-02-06 PROCEDURE — 1160F RVW MEDS BY RX/DR IN RCRD: CPT | Mod: ,,, | Performed by: NURSE PRACTITIONER

## 2023-02-06 PROCEDURE — 96372 THER/PROPH/DIAG INJ SC/IM: CPT | Mod: ,,, | Performed by: NURSE PRACTITIONER

## 2023-02-06 PROCEDURE — 3074F SYST BP LT 130 MM HG: CPT | Mod: ,,, | Performed by: NURSE PRACTITIONER

## 2023-02-06 PROCEDURE — 1159F MED LIST DOCD IN RCRD: CPT | Mod: ,,, | Performed by: NURSE PRACTITIONER

## 2023-02-06 PROCEDURE — 83880 ASSAY OF NATRIURETIC PEPTIDE: CPT | Mod: ,,, | Performed by: CLINICAL MEDICAL LABORATORY

## 2023-02-06 PROCEDURE — 99214 OFFICE O/P EST MOD 30 MIN: CPT | Mod: 25,,, | Performed by: NURSE PRACTITIONER

## 2023-02-06 PROCEDURE — 3008F BODY MASS INDEX DOCD: CPT | Mod: ,,, | Performed by: NURSE PRACTITIONER

## 2023-02-06 PROCEDURE — 1160F PR REVIEW ALL MEDS BY PRESCRIBER/CLIN PHARMACIST DOCUMENTED: ICD-10-PCS | Mod: ,,, | Performed by: NURSE PRACTITIONER

## 2023-02-06 PROCEDURE — 3008F PR BODY MASS INDEX (BMI) DOCUMENTED: ICD-10-PCS | Mod: ,,, | Performed by: NURSE PRACTITIONER

## 2023-02-06 PROCEDURE — 83880 NT-PRO NATRIURETIC PEPTIDE: ICD-10-PCS | Mod: ,,, | Performed by: CLINICAL MEDICAL LABORATORY

## 2023-02-06 PROCEDURE — 1159F PR MEDICATION LIST DOCUMENTED IN MEDICAL RECORD: ICD-10-PCS | Mod: ,,, | Performed by: NURSE PRACTITIONER

## 2023-02-06 RX ORDER — CEFTRIAXONE 1 G/1
1 INJECTION, POWDER, FOR SOLUTION INTRAMUSCULAR; INTRAVENOUS
Status: COMPLETED | OUTPATIENT
Start: 2023-02-06 | End: 2023-02-06

## 2023-02-06 RX ADMIN — CEFTRIAXONE 1 G: 1 INJECTION, POWDER, FOR SOLUTION INTRAMUSCULAR; INTRAVENOUS at 09:02

## 2023-02-06 NOTE — ASSESSMENT & PLAN NOTE
- work on diet, specifically cutting back bread, breaded things, cereal, pasta, potatoes, rice  - try to exercise at least 150min a week divided however you want  - if you can do weight, even very light weight do them  - try not to use to much salt, if any, remember that things that are preserved or frozen often contain large amounts of salt as a preserve  - continue current medications and follow up with cardiology

## 2023-02-06 NOTE — ASSESSMENT & PLAN NOTE
Continue medication reports symptoms improved, make sure to get adequate sleep follow well balanced diet and continue social support

## 2023-02-06 NOTE — PROGRESS NOTES
HEAVENLY Pulido   [facility]  [address]  [place] [address]     PATIENT NAME: Radha Martínez  : 1950  DATE: 23  MRN: [id number]      Billing Provider: HEAVENLY Pulido  Level of Service:   Patient PCP Information       Provider PCP Type    HEAVENLY Pulido General            Reason for Visit / Chief Complaint: Follow-up (R/t lower respiratory infection)       Update PCP  Update Chief Complaint         History of Present Illness / Problem Focused Workflow     Radha Martínez presents to the clinic follow up pneumonia she is feeling better still with WILLSON unable to lay down to sleep gained 10 pounds since last office visit last week.       X-Ray Chest PA And Lateral  Narrative: EXAMINATION:  XR CHEST PA AND LATERAL    CLINICAL HISTORY:  Unspecified acute lower respiratory infection    COMPARISON:  2016, 2022, 10/07/2022, 2023, and 2023    FINDINGS:  PA and lateral chest:    There has been interval resolution of the right middle lobe consolidation since the previous examination.  Hyperinflation is noted but no infiltrates or effusions are seen at this time.    The cardiac size is upper limits of normal.  Old rib fractures are noted on the right and there are degenerative changes in the thoracic spine.  There is an old midshaft right clavicular fracture.  Impression: There has been interval clearing of the previously noted right middle lobe consolidation.    Place of service: [facility]    Electronically signed by: Helene Brown  Date:    2023  Time:    09:29       No results found for: HGBA1C     CMP  Sodium   Date Value Ref Range Status   2023 140 136 - 145 mmol/L Final     Potassium   Date Value Ref Range Status   2023 4.7 3.5 - 5.1 mmol/L Final     Chloride   Date Value Ref Range Status   2023 100 98 - 107 mmol/L Final     CO2   Date Value Ref Range Status   2023 35 (H) 21 - 32  mmol/L Final     Glucose   Date Value Ref Range Status   01/26/2023 125 (H) 74 - 106 mg/dL Final     BUN   Date Value Ref Range Status   01/26/2023 16 7 - 18 mg/dL Final     Creatinine   Date Value Ref Range Status   01/26/2023 1.18 (H) 0.55 - 1.02 mg/dL Final     Calcium   Date Value Ref Range Status   01/26/2023 9.7 8.5 - 10.1 mg/dL Final     Total Protein   Date Value Ref Range Status   01/26/2023 7.7 6.4 - 8.2 g/dL Final     Albumin   Date Value Ref Range Status   01/26/2023 4.0 3.5 - 5.0 g/dL Final     Bilirubin, Total   Date Value Ref Range Status   01/26/2023 0.4 >0.0 - 1.2 mg/dL Final     Alk Phos   Date Value Ref Range Status   01/26/2023 196 (H) 55 - 142 U/L Final     AST   Date Value Ref Range Status   01/26/2023 22 15 - 37 U/L Final     ALT   Date Value Ref Range Status   01/26/2023 20 13 - 56 U/L Final     Anion Gap   Date Value Ref Range Status   01/26/2023 10 7 - 16 mmol/L Final     eGFR   Date Value Ref Range Status   01/26/2023 49 (L) >=60 mL/min/1.73m² Final        Lab Results   Component Value Date    WBC 6.44 01/26/2023    RBC 4.62 01/26/2023    HGB 14.1 01/26/2023    HCT 43.7 01/26/2023    MCV 94.6 01/26/2023    MCH 30.5 01/26/2023    MCHC 32.3 01/26/2023    RDW 14.1 01/26/2023     01/26/2023    MPV 10.4 01/26/2023    LYMPH 28.1 01/26/2023    LYMPH 1.81 01/26/2023    LYMPH 24 (L) 01/26/2023    MONO 7.6 (H) 01/26/2023    MONO 6 01/26/2023    EOS 1.00 (H) 01/26/2023    BASO 0.03 01/26/2023    EOSINOPHIL 15.5 (H) 01/26/2023    EOSINOPHIL 20 (H) 01/26/2023    BASOPHIL 0.5 01/26/2023        Lab Results   Component Value Date    CHOL 233 (H) 12/30/2021     Lab Results   Component Value Date     (H) 12/30/2021     Lab Results   Component Value Date    LDLCALC 101 12/30/2021     Lab Results   Component Value Date    TRIG 147 12/30/2021     Lab Results   Component Value Date    CHOLHDL 2.3 12/30/2021        Wt Readings from Last 3 Encounters:   02/06/23 0856 99.8 kg (220 lb)   02/01/23 1100  95.3 kg (210 lb)   01/26/23 1429 99.8 kg (220 lb)        BP Readings from Last 3 Encounters:   02/06/23 (!) 116/56   02/01/23 (!) 112/58   01/26/23 (!) 102/52        Review of Systems     Review of Systems   Constitutional:  Positive for chills and fatigue. Negative for appetite change and fever.   HENT:  Positive for nasal congestion. Negative for mouth sores, sinus pressure/congestion, sore throat and voice change.    Respiratory:  Positive for cough, shortness of breath and wheezing. Negative for chest tightness.    Cardiovascular:  Positive for leg swelling. Negative for chest pain and palpitations.   Gastrointestinal:  Negative for abdominal pain, change in bowel habit, nausea, vomiting and change in bowel habit.   Neurological:  Negative for dizziness, seizures, syncope and weakness.      Medical / Social / Family History     Past Medical History:   Diagnosis Date    Asthma     CHF (congestive heart failure)     COPD (chronic obstructive pulmonary disease)     WILLSON (dyspnea on exertion)     GERD (gastroesophageal reflux disease)     HTN (hypertension)     Hyperlipidemia     Osteoporosis     Oxygen dependent     02 2L    Seizure disorder        Past Surgical History:   Procedure Laterality Date    CARDIOVERSION N/A 4/8/2022    Procedure: Cardioversion;  Surgeon: Adal Chung MD;  Location: [facility] CATH LAB;  Service: Cardiology;  Laterality: N/A;    COLONOSCOPY      REVISION OF TOTAL KNEE REPLACEMENT       TUBAL LIGATION         Social History  Ms.  reports that she has never smoked. She has never been exposed to tobacco smoke. She has never used smokeless tobacco. She reports that she does not drink alcohol and does not use drugs.    Family History  Ms.'s family history includes Cancer in her father; Diabetes in her brother, mother, and sister; Heart disease in her mother and sister; Hypertension in her mother and sister.    Medications and Allergies     Medications  Outpatient Medications Marked as  Taking for the 2/6/23 encounter (Office Visit) with HEAVENLY Pulido   Medication Sig Dispense Refill    albuterol (PROVENTIL/VENTOLIN HFA) 90 mcg/actuation inhaler Inhale 1-2 puffs into the lungs every 6 (six) hours as needed for Wheezing. 54 g 1    albuterol-ipratropium (DUO-NEB) 2.5 mg-0.5 mg/3 mL nebulizer solution Take 3 mLs by nebulization every 6 (six) hours as needed for Wheezing. Rescue 100 mL 5    aspirin 81 MG Chew Take 81 mg by mouth once daily.      benzonatate (TESSALON) 100 MG capsule Take 1 capsule (100 mg total) by mouth 3 (three) times daily as needed for Cough. 60 capsule 0    busPIRone (BUSPAR) 10 MG tablet TAKE 1 TABLET TWICE DAILY 180 tablet 1    calcium carbonate/vitamin D3 (CALCARB 600 WITH VITAMIN D ORAL) Take by mouth.      clopidogreL (PLAVIX) 75 mg tablet Take 1 tablet (75 mg total) by mouth once daily. 90 tablet 2    fluticasone propionate (FLONASE) 50 mcg/actuation nasal spray 1 spray (50 mcg total) by Each Nostril route once daily. 16 g 1    fluticasone-umeclidin-vilanter (TRELEGY ELLIPTA) 200-62.5-25 mcg inhaler Inhale 1 puff into the lungs once daily. 60 each 11    furosemide (LASIX) 40 MG tablet Take 1 tablet (40 mg total) by mouth 2 (two) times a day. 180 tablet 1    gabapentin (NEURONTIN) 300 MG capsule Take 1 capsule (300 mg total) by mouth 3 (three) times daily. 270 capsule 1    ipratropium (ATROVENT) 42 mcg (0.06 %) nasal spray 2 sprays by Nasal route 4 (four) times daily. 15 mL 0    loratadine (CLARITIN) 10 mg tablet TAKE 1 TABLET EVERY DAY 90 tablet 1    meclizine (ANTIVERT) 12.5 mg tablet Take 1 tablet (12.5 mg total) by mouth 2 (two) times daily as needed for Dizziness. 60 tablet 0    montelukast (SINGULAIR) 10 mg tablet Take 1 tablet (10 mg total) by mouth once daily. 90 tablet 1    nebivoloL (BYSTOLIC) 5 MG Tab Take 1 tablet (5 mg total) by mouth once daily. 90 tablet 1    pantoprazole (PROTONIX) 40 MG tablet Take 1 tablet (40 mg total) by mouth once daily.  "90 tablet 1    paroxetine (PAXIL) 40 MG tablet Take 1 tablet (40 mg total) by mouth once daily. 90 tablet 1    phenytoin (DILANTIN) 100 MG ER capsule Take 1 capsule (100 mg total) by mouth 3 (three) times daily. 270 capsule 1    pravastatin (PRAVACHOL) 40 MG tablet TAKE 1 TABLET EVERY NIGHT 90 tablet 1     Current Facility-Administered Medications for the 2/6/23 encounter (Office Visit) with Kelsy Sloan HEAVENLY Kay   Medication Dose Route Frequency Provider Last Rate Last Admin    [COMPLETED] cefTRIAXone injection 1 g  1 g Intramuscular 1 time in Clinic/HOD Kelsy WynneHEAVENLY moreau   1 g at 02/06/23 0937       Allergies  Review of patient's allergies indicates:  No Known Allergies    Physical Examination     Vitals:    02/06/23 0856   BP: (!) 116/56   BP Location: Left arm   Patient Position: Sitting   Pulse: 68   Resp: 18   Temp: 98.4 °F (36.9 °C)   TempSrc: Oral   SpO2: 95%   Weight: 99.8 kg (220 lb)   Height: 5' 4" (1.626 m)      Physical Exam  Vitals and nursing note reviewed.   Constitutional:       General: She is not in acute distress.     Appearance: Normal appearance. She is not ill-appearing or toxic-appearing.   HENT:      Right Ear: Tympanic membrane, ear canal and external ear normal.      Left Ear: Tympanic membrane and ear canal normal.      Nose: Nose normal.      Mouth/Throat:      Mouth: Mucous membranes are moist.      Pharynx: Oropharynx is clear. No oropharyngeal exudate or posterior oropharyngeal erythema.   Cardiovascular:      Rate and Rhythm: Normal rate and regular rhythm.   Pulmonary:      Effort: Pulmonary effort is normal.      Breath sounds: Wheezing and rhonchi present. No rales.   Abdominal:      General: Bowel sounds are normal. There is no distension.      Palpations: Abdomen is soft.      Tenderness: There is no abdominal tenderness.      Hernia: No hernia is present.   Musculoskeletal:      Cervical back: Normal range of motion and neck supple.      Right lower leg: " Edema present.      Left lower leg: Edema present.   Skin:     General: Skin is warm and dry.      Capillary Refill: Capillary refill takes less than 2 seconds.   Neurological:      General: No focal deficit present.      Mental Status: She is alert and oriented to person, place, and time.   Psychiatric:         Mood and Affect: Mood normal.         Behavior: Behavior normal.      X-Ray Chest PA And Lateral  Narrative: EXAMINATION:  XR CHEST PA AND LATERAL    CLINICAL HISTORY:  Unspecified acute lower respiratory infection    COMPARISON:  02/02/2016, 04/07/2022, 10/07/2022, 01/26/2023, and 02/01/2023    FINDINGS:  PA and lateral chest:    There has been interval resolution of the right middle lobe consolidation since the previous examination.  Hyperinflation is noted but no infiltrates or effusions are seen at this time.    The cardiac size is upper limits of normal.  Old rib fractures are noted on the right and there are degenerative changes in the thoracic spine.  There is an old midshaft right clavicular fracture.  Impression: There has been interval clearing of the previously noted right middle lobe consolidation.    [facility] of service: [facility]    Electronically signed by: Helene Brown  Date:    02/06/2023  Time:    09:29     Assessment and Plan (including Health Maintenance)      Problem List  Smart Sets  Document Outside HM   :    Plan:   Improved xray today will check lab and may add zaroxolyn tomorrow after renal and potassium resulted, start daily weights      Health Maintenance Due   Topic Date Due    Mammogram  10/07/2020    DEXA Scan  03/05/2023       Problem List Items Addressed This Visit          Neuro    Seizure    Current Assessment & Plan     Continue current medication patient controlled  Make sure to get adequate sleep avoid triggers            Psychiatric    Depression, recurrent    Current Assessment & Plan     Continue medication reports symptoms improved, make sure to get  adequate sleep follow well balanced diet and continue social support            Cardiac/Vascular    Chronic diastolic heart failure (Chronic)    Current Assessment & Plan     Daily weights low sodium diet and labs today  Will check BNP BMP and make medication adjustments pending results               Relevant Orders    Basic Metabolic Panel    NT-Pro Natriuretic Peptide    HTN (hypertension)    Current Assessment & Plan     - work on diet, specifically cutting back bread, breaded things, cereal, pasta, potatoes, rice  - try to exercise at least 150min a week divided however you want  - if you can do weight, even very light weight do them  - try not to use to much salt, if any, remember that things that are preserved or frozen often contain large amounts of salt as a preserve  - continue current medications and follow up with cardiology            Renal/    Chronic kidney disease, stage 3a    Current Assessment & Plan     Keep blood pressure controlled avoid NSAIDS continue to follow labs and monitor          Other Visit Diagnoses       Lower respiratory infection (e.g., bronchitis, pneumonia, pneumonitis, pulmonitis)   (Acute)  -  Primary    Relevant Medications    cefTRIAXone injection 1 g (Completed)    Other Relevant Orders    X-Ray Chest PA And Lateral (Completed)          Lower respiratory infection (e.g., bronchitis, pneumonia, pneumonitis, pulmonitis)  -     X-Ray Chest PA And Lateral; Future; Expected date: 02/06/2023  -     cefTRIAXone injection 1 g    Chronic diastolic heart failure  -     Basic Metabolic Panel; Future; Expected date: 02/06/2023  -     NT-Pro Natriuretic Peptide; Future; Expected date: 02/06/2023    Seizure    Depression, recurrent    Hypertension, unspecified type    Chronic kidney disease, stage 3a       Health Maintenance Topics with due status: Not Due       Topic Last Completion Date    Lipid Panel 12/30/2021    High Dose Statin 02/06/2023       Procedures     Future Appointments    Date Time Provider Department Center   2/6/2023 10:45 AM [facility]ido [facility]   3/8/2023  9[facility]   5/24/2023  1:30 PM AWV NURSE, [facility]        No follow-ups on file.     Signature:  HEAVENLY Pulido    Date of encounter: 2/6/232/6/2023

## 2023-02-06 NOTE — ASSESSMENT & PLAN NOTE
Daily weights low sodium diet and labs today  Will check BNP BMP and make medication adjustments pending results

## 2023-02-07 DIAGNOSIS — I50.32 CHRONIC DIASTOLIC HEART FAILURE: Primary | ICD-10-CM

## 2023-02-07 RX ORDER — METOLAZONE 2.5 MG/1
TABLET ORAL
Qty: 6 TABLET | Refills: 0 | Status: SHIPPED | OUTPATIENT
Start: 2023-02-07 | End: 2023-09-14

## 2023-02-07 NOTE — PROGRESS NOTES
Zaroxolyn 2.5 mg 30 min before 1 st dose of lasix, daily for 2 days, repeat lab Thursday and send weight log

## 2023-02-10 ENCOUNTER — LAB REQUISITION (OUTPATIENT)
Dept: LAB | Facility: HOSPITAL | Age: 73
End: 2023-02-10
Attending: NURSE PRACTITIONER
Payer: MEDICARE

## 2023-02-10 DIAGNOSIS — I50.32 CHRONIC DIASTOLIC (CONGESTIVE) HEART FAILURE: ICD-10-CM

## 2023-02-10 DIAGNOSIS — I11.0 HYPERTENSIVE HEART DISEASE WITH HEART FAILURE: ICD-10-CM

## 2023-02-10 LAB
ANION GAP SERPL CALCULATED.3IONS-SCNC: 4 MMOL/L (ref 7–16)
BUN SERPL-MCNC: 18 MG/DL (ref 7–18)
BUN/CREAT SERPL: 14 (ref 6–20)
CALCIUM SERPL-MCNC: 9.2 MG/DL (ref 8.5–10.1)
CHLORIDE SERPL-SCNC: 99 MMOL/L (ref 98–107)
CO2 SERPL-SCNC: 40 MMOL/L (ref 21–32)
CREAT SERPL-MCNC: 1.28 MG/DL (ref 0.55–1.02)
EGFR (NO RACE VARIABLE) (RUSH/TITUS): 45 ML/MIN/1.73M²
GLUCOSE SERPL-MCNC: 122 MG/DL (ref 74–106)
NT-PROBNP SERPL-MCNC: 494 PG/ML (ref 1–125)
POTASSIUM SERPL-SCNC: 3.1 MMOL/L (ref 3.5–5.1)
SODIUM SERPL-SCNC: 140 MMOL/L (ref 136–145)

## 2023-02-10 PROCEDURE — 83880 ASSAY OF NATRIURETIC PEPTIDE: CPT | Performed by: NURSE PRACTITIONER

## 2023-02-10 PROCEDURE — 80048 BASIC METABOLIC PNL TOTAL CA: CPT | Performed by: NURSE PRACTITIONER

## 2023-02-13 ENCOUNTER — LAB REQUISITION (OUTPATIENT)
Dept: LAB | Facility: HOSPITAL | Age: 73
End: 2023-02-13
Attending: NURSE PRACTITIONER
Payer: MEDICARE

## 2023-02-13 DIAGNOSIS — J44.9 CHRONIC OBSTRUCTIVE PULMONARY DISEASE, UNSPECIFIED: ICD-10-CM

## 2023-02-13 DIAGNOSIS — I11.0 HYPERTENSIVE HEART DISEASE WITH HEART FAILURE: ICD-10-CM

## 2023-02-13 LAB
ANION GAP SERPL CALCULATED.3IONS-SCNC: 4 MMOL/L (ref 7–16)
BUN SERPL-MCNC: 19 MG/DL (ref 7–18)
BUN/CREAT SERPL: 15 (ref 6–20)
CALCIUM SERPL-MCNC: 9.5 MG/DL (ref 8.5–10.1)
CHLORIDE SERPL-SCNC: 98 MMOL/L (ref 98–107)
CO2 SERPL-SCNC: 41 MMOL/L (ref 21–32)
CREAT SERPL-MCNC: 1.28 MG/DL (ref 0.55–1.02)
EGFR (NO RACE VARIABLE) (RUSH/TITUS): 45 ML/MIN/1.73M²
GLUCOSE SERPL-MCNC: 136 MG/DL (ref 74–106)
NT-PROBNP SERPL-MCNC: 293 PG/ML (ref 1–125)
POTASSIUM SERPL-SCNC: 3.4 MMOL/L (ref 3.5–5.1)
SODIUM SERPL-SCNC: 140 MMOL/L (ref 136–145)

## 2023-02-13 PROCEDURE — 83880 ASSAY OF NATRIURETIC PEPTIDE: CPT | Performed by: NURSE PRACTITIONER

## 2023-02-13 PROCEDURE — 80048 BASIC METABOLIC PNL TOTAL CA: CPT | Performed by: NURSE PRACTITIONER

## 2023-02-14 ENCOUNTER — OFFICE VISIT (OUTPATIENT)
Dept: FAMILY MEDICINE | Facility: CLINIC | Age: 73
End: 2023-02-14
Payer: MEDICARE

## 2023-02-14 VITALS
RESPIRATION RATE: 22 BRPM | HEIGHT: 64 IN | WEIGHT: 214 LBS | BODY MASS INDEX: 36.54 KG/M2 | SYSTOLIC BLOOD PRESSURE: 112 MMHG | HEART RATE: 64 BPM | DIASTOLIC BLOOD PRESSURE: 48 MMHG | OXYGEN SATURATION: 98 % | TEMPERATURE: 98 F

## 2023-02-14 DIAGNOSIS — R06.00 DYSPNEA, UNSPECIFIED TYPE: ICD-10-CM

## 2023-02-14 DIAGNOSIS — K21.9 GASTROESOPHAGEAL REFLUX DISEASE WITHOUT ESOPHAGITIS: ICD-10-CM

## 2023-02-14 DIAGNOSIS — J45.50 SEVERE PERSISTENT ASTHMA WITHOUT COMPLICATION: Primary | ICD-10-CM

## 2023-02-14 DIAGNOSIS — E78.5 HYPERLIPIDEMIA, UNSPECIFIED HYPERLIPIDEMIA TYPE: ICD-10-CM

## 2023-02-14 DIAGNOSIS — M81.0 OSTEOPOROSIS, UNSPECIFIED OSTEOPOROSIS TYPE, UNSPECIFIED PATHOLOGICAL FRACTURE PRESENCE: ICD-10-CM

## 2023-02-14 DIAGNOSIS — J22 LOWER RESPIRATORY INFECTION (E.G., BRONCHITIS, PNEUMONIA, PNEUMONITIS, PULMONITIS): ICD-10-CM

## 2023-02-14 DIAGNOSIS — E66.01 SEVERE OBESITY (BMI 35.0-39.9) WITH COMORBIDITY: ICD-10-CM

## 2023-02-14 DIAGNOSIS — G47.33 OSA ON CPAP: ICD-10-CM

## 2023-02-14 PROCEDURE — 3074F PR MOST RECENT SYSTOLIC BLOOD PRESSURE < 130 MM HG: ICD-10-PCS | Mod: ,,, | Performed by: NURSE PRACTITIONER

## 2023-02-14 PROCEDURE — 96372 THER/PROPH/DIAG INJ SC/IM: CPT | Mod: ,,, | Performed by: NURSE PRACTITIONER

## 2023-02-14 PROCEDURE — 96372 PR INJECTION,THERAP/PROPH/DIAG2ST, IM OR SUBCUT: ICD-10-PCS | Mod: ,,, | Performed by: NURSE PRACTITIONER

## 2023-02-14 PROCEDURE — 1159F PR MEDICATION LIST DOCUMENTED IN MEDICAL RECORD: ICD-10-PCS | Mod: ,,, | Performed by: NURSE PRACTITIONER

## 2023-02-14 PROCEDURE — 3074F SYST BP LT 130 MM HG: CPT | Mod: ,,, | Performed by: NURSE PRACTITIONER

## 2023-02-14 PROCEDURE — 1159F MED LIST DOCD IN RCRD: CPT | Mod: ,,, | Performed by: NURSE PRACTITIONER

## 2023-02-14 PROCEDURE — 1126F PR PAIN SEVERITY QUANTIFIED, NO PAIN PRESENT: ICD-10-PCS | Mod: ,,, | Performed by: NURSE PRACTITIONER

## 2023-02-14 PROCEDURE — 3078F DIAST BP <80 MM HG: CPT | Mod: ,,, | Performed by: NURSE PRACTITIONER

## 2023-02-14 PROCEDURE — 1160F RVW MEDS BY RX/DR IN RCRD: CPT | Mod: ,,, | Performed by: NURSE PRACTITIONER

## 2023-02-14 PROCEDURE — 3008F PR BODY MASS INDEX (BMI) DOCUMENTED: ICD-10-PCS | Mod: ,,, | Performed by: NURSE PRACTITIONER

## 2023-02-14 PROCEDURE — 1160F PR REVIEW ALL MEDS BY PRESCRIBER/CLIN PHARMACIST DOCUMENTED: ICD-10-PCS | Mod: ,,, | Performed by: NURSE PRACTITIONER

## 2023-02-14 PROCEDURE — 99213 OFFICE O/P EST LOW 20 MIN: CPT | Mod: 25,,, | Performed by: NURSE PRACTITIONER

## 2023-02-14 PROCEDURE — 3008F BODY MASS INDEX DOCD: CPT | Mod: ,,, | Performed by: NURSE PRACTITIONER

## 2023-02-14 PROCEDURE — 1126F AMNT PAIN NOTED NONE PRSNT: CPT | Mod: ,,, | Performed by: NURSE PRACTITIONER

## 2023-02-14 PROCEDURE — 3078F PR MOST RECENT DIASTOLIC BLOOD PRESSURE < 80 MM HG: ICD-10-PCS | Mod: ,,, | Performed by: NURSE PRACTITIONER

## 2023-02-14 PROCEDURE — 99213 PR OFFICE/OUTPT VISIT, EST, LEVL III, 20-29 MIN: ICD-10-PCS | Mod: 25,,, | Performed by: NURSE PRACTITIONER

## 2023-02-14 RX ORDER — PREDNISONE 10 MG/1
TABLET ORAL
Qty: 18 TABLET | Refills: 0 | Status: SHIPPED | OUTPATIENT
Start: 2023-02-14 | End: 2023-08-14

## 2023-02-14 RX ORDER — DEXAMETHASONE SODIUM PHOSPHATE 4 MG/ML
4 INJECTION, SOLUTION INTRA-ARTICULAR; INTRALESIONAL; INTRAMUSCULAR; INTRAVENOUS; SOFT TISSUE
Status: COMPLETED | OUTPATIENT
Start: 2023-02-14 | End: 2023-02-14

## 2023-02-14 RX ADMIN — DEXAMETHASONE SODIUM PHOSPHATE 4 MG: 4 INJECTION, SOLUTION INTRA-ARTICULAR; INTRALESIONAL; INTRAMUSCULAR; INTRAVENOUS; SOFT TISSUE at 02:02

## 2023-02-14 NOTE — PATIENT INSTRUCTIONS
Decadron today and start taper dose of prednisone home health continue to monitor, refer back to Dr Flowers

## 2023-02-14 NOTE — ASSESSMENT & PLAN NOTE
Repeay chest xray today pulse prednisone and get back to pulmonology, home health continue to monitor also

## 2023-02-14 NOTE — ASSESSMENT & PLAN NOTE
Needs BMD will treat pending results, encouraged calcium with vitamin D and weight bearing exercises

## 2023-02-14 NOTE — PROGRESS NOTES
2023     HEAVENLY Pulido   [facility]  [address]  [place] [address]     PATIENT NAME: Radha Martínez  : 1950  DATE: 23  MRN: [id number]      Billing Provider: HEAVENLY Pulido  Level of Service:   Patient PCP Information       Provider PCP Type    HEAVENLY Pulido General            Reason for Visit / Chief Complaint: Follow-up (Patient seen by home health yesterday, respirations were labored but lungs sounded better. Patient's activity level has decreased. Weight has come back down per home health. BNP and BMP drawn yesterday) and Shortness of Breath (Audible wheezing, patient in wheelchair today, very short of breath)       Update PCP  Update Chief Complaint         History of Present Illness / Problem Focused Workflow     Radha Martínez presents to the clinic yesterday started having shortness of breath and wheezing.Recent elevation of BNP 2/6 added zaroxolyn and improved now at 293 yesterday.  Denies any fever or chills cough productive with white thick sputum. Did have some chest pain yesterday did not last very long. She became short of breath and wheezing unable to catch her breath and needing her oxygen again She does have a new puppy noted this is when exacerbation started a few weeks ago. She has sleep apnea on her chart but has no recall of sleep apnea or a cpap machine. Discussed will pulse her with steroid and see if she can get to pulmonology sooner than her scheduled appointment in March    BNP  293 2023, 494 02/10/2023,  892 2023, 487 2023    Patient Active Problem List   Diagnosis    Asthma    GERD (gastroesophageal reflux disease)    Osteoporosis    Chronic diastolic heart failure    COPD exacerbation    Dyspnea    HLD (hyperlipidemia)    Seizure    Severe obesity (BMI 35.0-39.9) with comorbidity    Depression, recurrent    Angina pectoris    Chronic kidney disease, stage 3a    PACO on CPAP    HTN  (hypertension)    PAF (paroxysmal atrial fibrillation)       No results found for: HGBA1C     CMP  Sodium   Date Value Ref Range Status   02/13/2023 140 136 - 145 mmol/L Final     Potassium   Date Value Ref Range Status   02/13/2023 3.4 (L) 3.5 - 5.1 mmol/L Final     Chloride   Date Value Ref Range Status   02/13/2023 98 98 - 107 mmol/L Final     CO2   Date Value Ref Range Status   02/13/2023 41 (H) 21 - 32 mmol/L Final     Glucose   Date Value Ref Range Status   02/13/2023 136 (H) 74 - 106 mg/dL Final     BUN   Date Value Ref Range Status   02/13/2023 19 (H) 7 - 18 mg/dL Final     Creatinine   Date Value Ref Range Status   02/13/2023 1.28 (H) 0.55 - 1.02 mg/dL Final     Calcium   Date Value Ref Range Status   02/13/2023 9.5 8.5 - 10.1 mg/dL Final     Total Protein   Date Value Ref Range Status   01/26/2023 7.7 6.4 - 8.2 g/dL Final     Albumin   Date Value Ref Range Status   01/26/2023 4.0 3.5 - 5.0 g/dL Final     Bilirubin, Total   Date Value Ref Range Status   01/26/2023 0.4 >0.0 - 1.2 mg/dL Final     Alk Phos   Date Value Ref Range Status   01/26/2023 196 (H) 55 - 142 U/L Final     AST   Date Value Ref Range Status   01/26/2023 22 15 - 37 U/L Final     ALT   Date Value Ref Range Status   01/26/2023 20 13 - 56 U/L Final     Anion Gap   Date Value Ref Range Status   02/13/2023 4 (L) 7 - 16 mmol/L Final     eGFR   Date Value Ref Range Status   02/13/2023 45 (L) >=60 mL/min/1.73m² Final        Lab Results   Component Value Date    WBC 6.44 01/26/2023    RBC 4.62 01/26/2023    HGB 14.1 01/26/2023    HCT 43.7 01/26/2023    MCV 94.6 01/26/2023    MCH 30.5 01/26/2023    MCHC 32.3 01/26/2023    RDW 14.1 01/26/2023     01/26/2023    MPV 10.4 01/26/2023    LYMPH 28.1 01/26/2023    LYMPH 1.81 01/26/2023    LYMPH 24 (L) 01/26/2023    MONO 7.6 (H) 01/26/2023    MONO 6 01/26/2023    EOS 1.00 (H) 01/26/2023    BASO 0.03 01/26/2023    EOSINOPHIL 15.5 (H) 01/26/2023    EOSINOPHIL 20 (H) 01/26/2023    BASOPHIL 0.5 01/26/2023         Lab Results   Component Value Date    CHOL 233 (H) 12/30/2021     Lab Results   Component Value Date     (H) 12/30/2021     Lab Results   Component Value Date    LDLCALC 101 12/30/2021     Lab Results   Component Value Date    TRIG 147 12/30/2021     Lab Results   Component Value Date    CHOLHDL 2.3 12/30/2021        Wt Readings from Last 3 Encounters:   02/14/23 1240 97.1 kg (214 lb)   02/06/23 0856 99.8 kg (220 lb)   02/01/23 1100 95.3 kg (210 lb)        BP Readings from Last 3 Encounters:   02/14/23 (!) 112/48   02/06/23 (!) 116/56   02/01/23 (!) 112/58        Review of Systems     Review of Systems   Constitutional:  Positive for activity change and fatigue. Negative for appetite change, chills, fever and unexpected weight change.   HENT:  Positive for nasal congestion and postnasal drip. Negative for sore throat and trouble swallowing.    Eyes:  Negative for visual disturbance.   Respiratory:  Positive for cough, chest tightness, shortness of breath and wheezing.    Cardiovascular:  Positive for chest pain. Negative for leg swelling.   Gastrointestinal:  Negative for abdominal pain, change in bowel habit, nausea, vomiting and change in bowel habit.   Genitourinary:  Negative for difficulty urinating.   Musculoskeletal:  Positive for arthralgias, back pain and gait problem.   Integumentary:  Negative for rash.   Neurological:  Negative for dizziness, seizures, syncope and light-headedness.      Medical / Social / Family History     Past Medical History:   Diagnosis Date    Asthma     CHF (congestive heart failure)     COPD (chronic obstructive pulmonary disease)     WILLSON (dyspnea on exertion)     GERD (gastroesophageal reflux disease)     HTN (hypertension)     Hyperlipidemia     Osteoporosis     Oxygen dependent     02 2L    Seizure disorder        Past Surgical History:   Procedure Laterality Date    CARDIOVERSION N/A 4/8/2022    Procedure: Cardioversion;  Surgeon: Adal Chung MD;   Location: [facility] CATH LAB;  Service: Cardiology;  Laterality: N/A;    COLONOSCOPY      REVISION OF TOTAL KNEE REPLACEMENT       TUBAL LIGATION         Social History  Ms.  reports that she has never smoked. She has never been exposed to tobacco smoke. She has never used smokeless tobacco. She reports that she does not drink alcohol and does not use drugs.    Family History  Ms.'s family history includes Cancer in her father; Diabetes in her brother, mother, and sister; Heart disease in her mother and sister; Hypertension in her mother and sister.    Medications and Allergies     Medications  Outpatient Medications Marked as Taking for the 2/14/23 encounter (Office Visit) with [facility]   Medication Sig Dispense Refill    albuterol (PROVENTIL/VENTOLIN HFA) 90 mcg/actuation inhaler Inhale 1-2 puffs into the lungs every 6 (six) hours as needed for Wheezing. 54 g 1    albuterol-ipratropium (DUO-NEB) 2.5 mg-0.5 mg/3 mL nebulizer solution Take 3 mLs by nebulization every 6 (six) hours as needed for Wheezing. Rescue 100 mL 5    aspirin 81 MG Chew Take 81 mg by mouth once daily.      benzonatate (TESSALON) 100 MG capsule Take 1 capsule (100 mg total) by mouth 3 (three) times daily as needed for Cough. 60 capsule 0    busPIRone (BUSPAR) 10 MG tablet TAKE 1 TABLET TWICE DAILY 180 tablet 1    calcium carbonate/vitamin D3 (CALCARB 600 WITH VITAMIN D ORAL) Take by mouth.      clopidogreL (PLAVIX) 75 mg tablet Take 1 tablet (75 mg total) by mouth once daily. 90 tablet 2    fluticasone propionate (FLONASE) 50 mcg/actuation nasal spray 1 spray (50 mcg total) by Each Nostril route once daily. 16 g 1    fluticasone-umeclidin-vilanter (TRELEGY ELLIPTA) 200-62.5-25 mcg inhaler Inhale 1 puff into the lungs once daily. 60 each 11    furosemide (LASIX) 40 MG tablet Take 1 tablet (40 mg total) by mouth 2 (two) times a day. 180 tablet 1    gabapentin (NEURONTIN) 300 MG capsule Take 1 capsule (300 mg total) by mouth 3  "(three) times daily. 270 capsule 1    ipratropium (ATROVENT) 42 mcg (0.06 %) nasal spray 2 sprays by Nasal route 4 (four) times daily. 15 mL 0    loratadine (CLARITIN) 10 mg tablet TAKE 1 TABLET EVERY DAY 90 tablet 1    meclizine (ANTIVERT) 12.5 mg tablet Take 1 tablet (12.5 mg total) by mouth 2 (two) times daily as needed for Dizziness. 60 tablet 0    metOLazone (ZAROXOLYN) 2.5 MG tablet Take 1 tablet by mouth 30 min before 1st dose of lasix daily x2 days 6 tablet 0    montelukast (SINGULAIR) 10 mg tablet Take 1 tablet (10 mg total) by mouth once daily. 90 tablet 1    nebivoloL (BYSTOLIC) 5 MG Tab Take 1 tablet (5 mg total) by mouth once daily. 90 tablet 1    pantoprazole (PROTONIX) 40 MG tablet Take 1 tablet (40 mg total) by mouth once daily. 90 tablet 1    paroxetine (PAXIL) 40 MG tablet Take 1 tablet (40 mg total) by mouth once daily. 90 tablet 1    phenytoin (DILANTIN) 100 MG ER capsule Take 1 capsule (100 mg total) by mouth 3 (three) times daily. 270 capsule 1    pravastatin (PRAVACHOL) 40 MG tablet TAKE 1 TABLET EVERY NIGHT 90 tablet 1     Current Facility-Administered Medications for the 2/14/23 encounter (Office Visit) with HEAVENLY Pulido   Medication Dose Route Frequency Provider Last Rate Last Admin    [COMPLETED] dexAMETHasone injection 4 mg  4 mg Intramuscular 1 time in Clinic/HOD HEAVENLY Pulido   4 mg at 02/14/23 1405       Allergies  Review of patient's allergies indicates:  No Known Allergies    Physical Examination     Vitals:    02/14/23 1240 02/14/23 1249   BP: (!) 112/48    BP Location: Left arm    Patient Position: Sitting    Pulse: 64 64   Resp: (!) 24 (!) 22   Temp: 97.6 °F (36.4 °C)    TempSrc: Oral    SpO2: (!) 84%  Comment: room air after ambulation 98%  Comment: 2L O2 via NC   Weight: 97.1 kg (214 lb)    Height: 5' 4" (1.626 m)       Physical Exam  Vitals and nursing note reviewed.   Constitutional:       General: She is not in acute distress.     Appearance: " She is not toxic-appearing.   HENT:      Right Ear: External ear normal.      Left Ear: External ear normal.      Mouth/Throat:      Mouth: Mucous membranes are moist.   Eyes:      Conjunctiva/sclera: Conjunctivae normal.      Pupils: Pupils are equal, round, and reactive to light.   Cardiovascular:      Rate and Rhythm: Normal rate and regular rhythm.   Pulmonary:      Effort: Pulmonary effort is normal.      Breath sounds: Wheezing present. No rhonchi or rales.   Chest:      Chest wall: No tenderness.   Musculoskeletal:      Right lower leg: No edema.      Left lower leg: No edema.   Skin:     General: Skin is warm.      Capillary Refill: Capillary refill takes less than 2 seconds.   Neurological:      Mental Status: She is alert and oriented to person, place, and time.    X-Ray Chest PA And Lateral  Narrative: EXAMINATION:  XR CHEST PA AND LATERAL    CLINICAL HISTORY:  Dyspnea, unspecified    COMPARISON:  02/02/2016, 10/27/2022, 01/26/2023, 02/21/2023 and 02/06/2023    FINDINGS:  PA and lateral chest:    Lungs are mildly hyperinflated but no infiltrates or effusions are seen.  The previously noted right lower lobe infiltrate has completely resolved.  The left lung is clear.    The cardiac size is upper limits of normal.  Degenerative changes are present in the thoracic spine.  There is an old midshaft right clavicular fracture.  Impression: There has been complete interval clearing of the previously noted right lower lobe infiltrate.    Place of service: [facility]    Electronically signed by: Helene Brown  Date:    02/14/2023  Time:    14:17       Assessment and Plan (including Health Maintenance)      Problem List  Smart Sets  Document Outside HM   :    Plan:   Chest xr ay today and get back with Dr Lionel warren. Home health continue to monitor       Health Maintenance Due   Topic Date Due    Mammogram  10/07/2020    DEXA Scan  03/05/2023       Problem List Items Addressed This Visit           Pulmonary    Asthma - Primary    Current Assessment & Plan     Not controlled avoid triggers will continue to monitor         Relevant Medications    predniSONE (DELTASONE) 10 MG tablet    dexAMETHasone injection 4 mg (Completed)    Other Relevant Orders    Ambulatory referral/consult to Pulmonology    Dyspnea    Current Assessment & Plan     Repeay chest xray today pulse prednisone and get back to pulmonology, home health continue to monitor also         Relevant Medications    predniSONE (DELTASONE) 10 MG tablet    Other Relevant Orders    X-Ray Chest PA And Lateral (Completed)    Ambulatory referral/consult to Pulmonology       Cardiac/Vascular    HLD (hyperlipidemia)    Current Assessment & Plan     The current medical regimen is effective;  continue present plan and medications.             Endocrine    Severe obesity (BMI 35.0-39.9) with comorbidity    Current Assessment & Plan     - work on diet, specifically cutting back bread, breaded things, cereal, pasta, potatoes, rice  - try to exercise at least 150min a week divided however you want  - if you can do weight, even very light weight do them  - try not to use to much salt, if any, remember that things that are preserved or frozen often contain large amounts of salt as a preserve            GI    GERD (gastroesophageal reflux disease)    Current Assessment & Plan     The current medical regimen is effective;  continue present plan and medications.             Orthopedic    Osteoporosis    Current Assessment & Plan     Needs BMD will treat pending results, encouraged calcium with vitamin D and weight bearing exercises            Other    PACO on CPAP    Current Assessment & Plan     Does not have a cpap          Other Visit Diagnoses       Lower respiratory infection (e.g., bronchitis, pneumonia, pneumonitis, pulmonitis)        resolved          Severe persistent asthma without complication  -     Ambulatory referral/consult to Pulmonology; Future; Expected  date: 02/21/2023  -     predniSONE (DELTASONE) 10 MG tablet; Take three tablets daily for three days, take two tablets daily for three days, take one tablet daily for three days.  Dispense: 18 tablet; Refill: 0  -     dexAMETHasone injection 4 mg    Hyperlipidemia, unspecified hyperlipidemia type    Severe obesity (BMI 35.0-39.9) with comorbidity    Gastroesophageal reflux disease without esophagitis    Osteoporosis, unspecified osteoporosis type, unspecified pathological fracture presence    PACO on CPAP    Dyspnea, unspecified type  -     X-Ray Chest PA And Lateral; Future; Expected date: 02/14/2023  -     Ambulatory referral/consult to Pulmonology; Future; Expected date: 02/21/2023  -     predniSONE (DELTASONE) 10 MG tablet; Take three tablets daily for three days, take two tablets daily for three days, take one tablet daily for three days.  Dispense: 18 tablet; Refill: 0    Lower respiratory infection (e.g., bronchitis, pneumonia, pneumonitis, pulmonitis)  Comments:  resolved       Health Maintenance Topics with due status: Not Due       Topic Last Completion Date    Lipid Panel 12/30/2021    High Dose Statin 02/14/2023       Procedures     Future Appointments   Date Time Provider Department Center   3/8/2023  [facility]   5/24/2023  1:30 PM AWV NURSE, [facility]        No follow-ups on file.     Signature:  HEAVENLY Pulido    Date of encounter: 2/14/23

## 2023-02-15 ENCOUNTER — EXTERNAL HOME HEALTH (OUTPATIENT)
Dept: HOME HEALTH SERVICES | Facility: HOSPITAL | Age: 73
End: 2023-02-15
Payer: MEDICARE

## 2023-02-23 ENCOUNTER — DOCUMENT SCAN (OUTPATIENT)
Dept: HOME HEALTH SERVICES | Facility: HOSPITAL | Age: 73
End: 2023-02-23
Payer: MEDICARE

## 2023-02-27 ENCOUNTER — LAB REQUISITION (OUTPATIENT)
Dept: LAB | Facility: HOSPITAL | Age: 73
End: 2023-02-27
Attending: NURSE PRACTITIONER
Payer: MEDICARE

## 2023-02-27 DIAGNOSIS — I50.32 CHRONIC DIASTOLIC HEART FAILURE: ICD-10-CM

## 2023-02-27 DIAGNOSIS — J44.1 COPD EXACERBATION: Primary | ICD-10-CM

## 2023-02-27 DIAGNOSIS — I48.0 PAF (PAROXYSMAL ATRIAL FIBRILLATION): ICD-10-CM

## 2023-02-27 DIAGNOSIS — I48.0 PAROXYSMAL ATRIAL FIBRILLATION: ICD-10-CM

## 2023-02-27 DIAGNOSIS — R56.9 UNSPECIFIED CONVULSIONS: ICD-10-CM

## 2023-02-27 DIAGNOSIS — I11.0 HYPERTENSIVE HEART DISEASE WITH HEART FAILURE: ICD-10-CM

## 2023-02-27 LAB
ALBUMIN SERPL BCP-MCNC: 3.3 G/DL (ref 3.5–5)
ALBUMIN/GLOB SERPL: 0.9 {RATIO}
ALP SERPL-CCNC: 132 U/L (ref 55–142)
ALT SERPL W P-5'-P-CCNC: 83 U/L (ref 13–56)
ANION GAP SERPL CALCULATED.3IONS-SCNC: 14 MMOL/L (ref 7–16)
AST SERPL W P-5'-P-CCNC: 55 U/L (ref 15–37)
BASOPHILS # BLD AUTO: 0 K/UL (ref 0–0.2)
BASOPHILS NFR BLD AUTO: 0 % (ref 0–1)
BILIRUB SERPL-MCNC: 0.4 MG/DL (ref ?–1.2)
BUN SERPL-MCNC: 21 MG/DL (ref 7–18)
BUN/CREAT SERPL: 13 (ref 6–20)
CALCIUM SERPL-MCNC: 8 MG/DL (ref 8.5–10.1)
CHLORIDE SERPL-SCNC: 98 MMOL/L (ref 98–107)
CO2 SERPL-SCNC: 30 MMOL/L (ref 21–32)
CREAT SERPL-MCNC: 1.56 MG/DL (ref 0.55–1.02)
DIFFERENTIAL METHOD BLD: ABNORMAL
EGFR (NO RACE VARIABLE) (RUSH/TITUS): 35 ML/MIN/1.73M²
EOSINOPHIL # BLD AUTO: 0.01 K/UL (ref 0–0.5)
EOSINOPHIL NFR BLD AUTO: 0.1 % (ref 1–4)
ERYTHROCYTE [DISTWIDTH] IN BLOOD BY AUTOMATED COUNT: 13.4 % (ref 11.5–14.5)
GLOBULIN SER-MCNC: 3.7 G/DL (ref 2–4)
GLUCOSE SERPL-MCNC: 249 MG/DL (ref 74–106)
HCT VFR BLD AUTO: 40.6 % (ref 38–47)
HGB BLD-MCNC: 13.2 G/DL (ref 12–16)
LYMPHOCYTES # BLD AUTO: 0.88 K/UL (ref 1–4.8)
LYMPHOCYTES NFR BLD AUTO: 11.4 % (ref 27–41)
LYMPHOCYTES NFR BLD MANUAL: 15 % (ref 27–41)
MCH RBC QN AUTO: 31.2 PG (ref 27–31)
MCHC RBC AUTO-ENTMCNC: 32.5 G/DL (ref 32–36)
MCV RBC AUTO: 96 FL (ref 80–96)
MONOCYTES # BLD AUTO: 0.59 K/UL (ref 0–0.8)
MONOCYTES NFR BLD AUTO: 7.6 % (ref 2–6)
MONOCYTES NFR BLD MANUAL: 6 % (ref 2–6)
MPC BLD CALC-MCNC: 10.3 FL (ref 9.4–12.4)
NEUTROPHILS # BLD AUTO: 6.26 K/UL (ref 1.8–7.7)
NEUTROPHILS NFR BLD AUTO: 80.9 % (ref 53–65)
NEUTS SEG NFR BLD MANUAL: 79 % (ref 50–62)
NRBC BLD MANUAL-RTO: ABNORMAL %
NT-PROBNP SERPL-MCNC: 752 PG/ML (ref 1–125)
PHENYTOIN SERPL-MCNC: 9 ΜG/ML (ref 10–20)
PLATELET # BLD AUTO: 270 K/UL (ref 150–400)
PLATELET MORPHOLOGY: NORMAL
POTASSIUM SERPL-SCNC: 4.2 MMOL/L (ref 3.5–5.1)
PROT SERPL-MCNC: 7 G/DL (ref 6.4–8.2)
RBC # BLD AUTO: 4.23 M/UL (ref 4.2–5.4)
RBC MORPH BLD: NORMAL
SODIUM SERPL-SCNC: 138 MMOL/L (ref 136–145)
WBC # BLD AUTO: 7.74 K/UL (ref 4.5–11)

## 2023-02-27 PROCEDURE — 80185 ASSAY OF PHENYTOIN TOTAL: CPT | Performed by: NURSE PRACTITIONER

## 2023-02-27 PROCEDURE — 83880 ASSAY OF NATRIURETIC PEPTIDE: CPT | Performed by: NURSE PRACTITIONER

## 2023-02-27 PROCEDURE — 80053 COMPREHEN METABOLIC PANEL: CPT | Performed by: NURSE PRACTITIONER

## 2023-02-27 PROCEDURE — 85025 COMPLETE CBC W/AUTO DIFF WBC: CPT | Performed by: NURSE PRACTITIONER

## 2023-03-06 ENCOUNTER — DOCUMENT SCAN (OUTPATIENT)
Dept: HOME HEALTH SERVICES | Facility: HOSPITAL | Age: 73
End: 2023-03-06
Payer: MEDICARE

## 2023-03-09 ENCOUNTER — DOCUMENT SCAN (OUTPATIENT)
Dept: HOME HEALTH SERVICES | Facility: HOSPITAL | Age: 73
End: 2023-03-09
Payer: MEDICARE

## 2023-03-13 DIAGNOSIS — R05.9 COUGH, UNSPECIFIED TYPE: ICD-10-CM

## 2023-03-13 RX ORDER — BENZONATATE 100 MG/1
100 CAPSULE ORAL 3 TIMES DAILY PRN
Qty: 60 CAPSULE | Refills: 0 | Status: SHIPPED | OUTPATIENT
Start: 2023-03-13 | End: 2023-08-14 | Stop reason: SDUPTHER

## 2023-03-14 ENCOUNTER — OFFICE VISIT (OUTPATIENT)
Dept: FAMILY MEDICINE | Facility: CLINIC | Age: 73
End: 2023-03-14
Payer: MEDICARE

## 2023-03-14 VITALS
WEIGHT: 215.19 LBS | HEIGHT: 64 IN | TEMPERATURE: 98 F | DIASTOLIC BLOOD PRESSURE: 73 MMHG | RESPIRATION RATE: 20 BRPM | SYSTOLIC BLOOD PRESSURE: 112 MMHG | HEART RATE: 80 BPM | BODY MASS INDEX: 36.74 KG/M2 | OXYGEN SATURATION: 98 %

## 2023-03-14 DIAGNOSIS — J45.50 SEVERE PERSISTENT ASTHMA WITHOUT COMPLICATION: ICD-10-CM

## 2023-03-14 PROCEDURE — 99212 PR OFFICE/OUTPT VISIT, EST, LEVL II, 10-19 MIN: ICD-10-PCS | Mod: ,,, | Performed by: NURSE PRACTITIONER

## 2023-03-14 PROCEDURE — 1101F PR PT FALLS ASSESS DOC 0-1 FALLS W/OUT INJ PAST YR: ICD-10-PCS | Mod: ,,, | Performed by: NURSE PRACTITIONER

## 2023-03-14 PROCEDURE — 1101F PT FALLS ASSESS-DOCD LE1/YR: CPT | Mod: ,,, | Performed by: NURSE PRACTITIONER

## 2023-03-14 PROCEDURE — 3008F BODY MASS INDEX DOCD: CPT | Mod: ,,, | Performed by: NURSE PRACTITIONER

## 2023-03-14 PROCEDURE — 1160F PR REVIEW ALL MEDS BY PRESCRIBER/CLIN PHARMACIST DOCUMENTED: ICD-10-PCS | Mod: ,,, | Performed by: NURSE PRACTITIONER

## 2023-03-14 PROCEDURE — 3078F PR MOST RECENT DIASTOLIC BLOOD PRESSURE < 80 MM HG: ICD-10-PCS | Mod: ,,, | Performed by: NURSE PRACTITIONER

## 2023-03-14 PROCEDURE — 99212 OFFICE O/P EST SF 10 MIN: CPT | Mod: ,,, | Performed by: NURSE PRACTITIONER

## 2023-03-14 PROCEDURE — 3074F PR MOST RECENT SYSTOLIC BLOOD PRESSURE < 130 MM HG: ICD-10-PCS | Mod: ,,, | Performed by: NURSE PRACTITIONER

## 2023-03-14 PROCEDURE — 3288F FALL RISK ASSESSMENT DOCD: CPT | Mod: ,,, | Performed by: NURSE PRACTITIONER

## 2023-03-14 PROCEDURE — 3008F PR BODY MASS INDEX (BMI) DOCUMENTED: ICD-10-PCS | Mod: ,,, | Performed by: NURSE PRACTITIONER

## 2023-03-14 PROCEDURE — 1159F PR MEDICATION LIST DOCUMENTED IN MEDICAL RECORD: ICD-10-PCS | Mod: ,,, | Performed by: NURSE PRACTITIONER

## 2023-03-14 PROCEDURE — 1159F MED LIST DOCD IN RCRD: CPT | Mod: ,,, | Performed by: NURSE PRACTITIONER

## 2023-03-14 PROCEDURE — 3074F SYST BP LT 130 MM HG: CPT | Mod: ,,, | Performed by: NURSE PRACTITIONER

## 2023-03-14 PROCEDURE — 3078F DIAST BP <80 MM HG: CPT | Mod: ,,, | Performed by: NURSE PRACTITIONER

## 2023-03-14 PROCEDURE — 3288F PR FALLS RISK ASSESSMENT DOCUMENTED: ICD-10-PCS | Mod: ,,, | Performed by: NURSE PRACTITIONER

## 2023-03-14 PROCEDURE — 1160F RVW MEDS BY RX/DR IN RCRD: CPT | Mod: ,,, | Performed by: NURSE PRACTITIONER

## 2023-03-14 NOTE — PATIENT INSTRUCTIONS
Avoid irritants as symptoms improve with injections encourage to increase physical activity to help with endurance and breathing.

## 2023-03-14 NOTE — PROGRESS NOTES
HEAVENLY Pulido   [facility], [place]  [address]  [place] [address]     PATIENT NAME: Radha Martínez  : 1950  DATE: 3/14/23  MRN: [id number]      Billing Provider: HEAVENLY Pulido  Level of Service:   Patient PCP Information       Provider PCP Type    HEAVENLY Pulido General            Reason for Visit / Chief Complaint: Shortness of Breath (Follow up from discharge from [facility]. Audible wheezing upon exhale. Usage of 2L of O2. ) and Wheezing       Update PCP  Update Chief Complaint         History of Present Illness / Problem Focused Workflow     Radha Martínez presents to the clinic follow up asthma exacerbation now taking an injection every two weeks and reports improvement of symptoms she did have a night time awakening last night and needed nebulizer , it is exacerbated by change in weather and uri. She has not needed supplemental oxygen as often since starting the injection.     No results found for: HGBA1C     CMP  Sodium   Date Value Ref Range Status   2023 138 136 - 145 mmol/L Final     Potassium   Date Value Ref Range Status   2023 4.2 3.5 - 5.1 mmol/L Final     Chloride   Date Value Ref Range Status   2023 98 98 - 107 mmol/L Final     CO2   Date Value Ref Range Status   2023 30 21 - 32 mmol/L Final     Glucose   Date Value Ref Range Status   2023 249 (H) 74 - 106 mg/dL Final     BUN   Date Value Ref Range Status   2023 21 (H) 7 - 18 mg/dL Final     Creatinine   Date Value Ref Range Status   2023 1.56 (H) 0.55 - 1.02 mg/dL Final     Calcium   Date Value Ref Range Status   2023 8.0 (L) 8.5 - 10.1 mg/dL Final     Total Protein   Date Value Ref Range Status   2023 7.0 6.4 - 8.2 g/dL Final     Albumin   Date Value Ref Range Status   2023 3.3 (L) 3.5 - 5.0 g/dL Final     Bilirubin, Total   Date Value Ref Range Status   2023 0.4 >0.0 - 1.2 mg/dL Final     Alk Phos   Date Value  Ref Range Status   02/27/2023 132 55 - 142 U/L Final     AST   Date Value Ref Range Status   02/27/2023 55 (H) 15 - 37 U/L Final     ALT   Date Value Ref Range Status   02/27/2023 83 (H) 13 - 56 U/L Final     Anion Gap   Date Value Ref Range Status   02/27/2023 14 7 - 16 mmol/L Final     eGFR   Date Value Ref Range Status   02/27/2023 35 (L) >=60 mL/min/1.73m² Final        Lab Results   Component Value Date    WBC 7.74 02/27/2023    RBC 4.23 02/27/2023    HGB 13.2 02/27/2023    HCT 40.6 02/27/2023    MCV 96.0 02/27/2023    MCH 31.2 (H) 02/27/2023    MCHC 32.5 02/27/2023    RDW 13.4 02/27/2023     02/27/2023    MPV 10.3 02/27/2023    LYMPH 11.4 (L) 02/27/2023    LYMPH 0.88 (L) 02/27/2023    LYMPH 15 (L) 02/27/2023    MONO 7.6 (H) 02/27/2023    MONO 6 02/27/2023    EOS 0.01 02/27/2023    BASO 0.00 02/27/2023    EOSINOPHIL 0.1 (L) 02/27/2023    BASOPHIL 0.0 02/27/2023        Lab Results   Component Value Date    CHOL 233 (H) 12/30/2021     Lab Results   Component Value Date     (H) 12/30/2021     Lab Results   Component Value Date    LDLCALC 101 12/30/2021     Lab Results   Component Value Date    TRIG 147 12/30/2021     Lab Results   Component Value Date    CHOLHDL 2.3 12/30/2021        Wt Readings from Last 3 Encounters:   03/14/23 0841 97.6 kg (215 lb 3.2 oz)   02/14/23 1240 97.1 kg (214 lb)   02/06/23 0856 99.8 kg (220 lb)        BP Readings from Last 3 Encounters:   03/14/23 112/73   02/14/23 (!) 112/48   02/06/23 (!) 116/56        Review of Systems     Review of Systems   Constitutional:  Negative for activity change, appetite change, chills, fatigue, fever and unexpected weight change.   HENT:  Positive for nasal congestion and postnasal drip. Negative for sore throat and trouble swallowing.    Eyes:  Negative for visual disturbance.   Respiratory:  Positive for cough, shortness of breath and wheezing. Negative for chest tightness.    Cardiovascular:  Negative for chest pain and leg swelling.    Gastrointestinal:  Negative for abdominal pain, change in bowel habit, nausea, vomiting and change in bowel habit.   Genitourinary:  Negative for difficulty urinating.   Musculoskeletal:  Positive for arthralgias, back pain and gait problem.   Integumentary:  Negative for rash.   Neurological:  Negative for dizziness, seizures, syncope and light-headedness.      Medical / Social / Family History     Past Medical History:   Diagnosis Date    Asthma     CHF (congestive heart failure)     COPD (chronic obstructive pulmonary disease)     WILLSON (dyspnea on exertion)     GERD (gastroesophageal reflux disease)     HTN (hypertension)     Hyperlipidemia     Osteoporosis     Oxygen dependent     02 2L    Seizure disorder        Past Surgical History:   Procedure Laterality Date    CARDIOVERSION N/A 4/8/2022    Procedure: Cardioversion;  Surgeon: Adal Chung MD;  Location: [facility] CATH LAB;  Service: Cardiology;  Laterality: N/A;    COLONOSCOPY      REVISION OF TOTAL KNEE REPLACEMENT       TUBAL LIGATION         Social History  Ms.  reports that she has never smoked. She has never been exposed to tobacco smoke. She has never used smokeless tobacco. She reports that she does not drink alcohol and does not use drugs.    Family History  Ms.'s family history includes Cancer in her father; Diabetes in her brother, mother, and sister; Heart disease in her mother and sister; Hypertension in her mother and sister.    Medications and Allergies     Medications  Outpatient Medications Marked as Taking for the 3/14/23 encounter (Office Visit) with HEAVENLY Pulido   Medication Sig Dispense Refill    albuterol (PROVENTIL/VENTOLIN HFA) 90 mcg/actuation inhaler Inhale 1-2 puffs into the lungs every 6 (six) hours as needed for Wheezing. 54 g 1    aspirin 81 MG Chew Take 81 mg by mouth once daily.      benzonatate (TESSALON) 100 MG capsule Take 1 capsule (100 mg total) by mouth 3 (three) times daily as needed for Cough.  60 capsule 0    busPIRone (BUSPAR) 10 MG tablet TAKE 1 TABLET TWICE DAILY 180 tablet 1    calcium carbonate/vitamin D3 (CALCARB 600 WITH VITAMIN D ORAL) Take by mouth.      clopidogreL (PLAVIX) 75 mg tablet Take 1 tablet (75 mg total) by mouth once daily. 90 tablet 2    fluticasone propionate (FLONASE) 50 mcg/actuation nasal spray 1 spray (50 mcg total) by Each Nostril route once daily. 16 g 1    fluticasone-umeclidin-vilanter (TRELEGY ELLIPTA) 200-62.5-25 mcg inhaler Inhale 1 puff into the lungs once daily. 60 each 11    furosemide (LASIX) 40 MG tablet Take 1 tablet (40 mg total) by mouth 2 (two) times a day. 180 tablet 1    gabapentin (NEURONTIN) 300 MG capsule Take 1 capsule (300 mg total) by mouth 3 (three) times daily. 270 capsule 1    ipratropium (ATROVENT) 42 mcg (0.06 %) nasal spray 2 sprays by Nasal route 4 (four) times daily. 15 mL 0    loratadine (CLARITIN) 10 mg tablet TAKE 1 TABLET EVERY DAY 90 tablet 1    meclizine (ANTIVERT) 12.5 mg tablet Take 1 tablet (12.5 mg total) by mouth 2 (two) times daily as needed for Dizziness. 60 tablet 0    metOLazone (ZAROXOLYN) 2.5 MG tablet Take 1 tablet by mouth 30 min before 1st dose of lasix daily x2 days 6 tablet 0    montelukast (SINGULAIR) 10 mg tablet Take 1 tablet (10 mg total) by mouth once daily. 90 tablet 1    nebivoloL (BYSTOLIC) 5 MG Tab Take 1 tablet (5 mg total) by mouth once daily. 90 tablet 1    pantoprazole (PROTONIX) 40 MG tablet Take 1 tablet (40 mg total) by mouth once daily. 90 tablet 1    paroxetine (PAXIL) 40 MG tablet Take 1 tablet (40 mg total) by mouth once daily. 90 tablet 1    phenytoin (DILANTIN) 100 MG ER capsule Take 1 capsule (100 mg total) by mouth 3 (three) times daily. 270 capsule 1    pravastatin (PRAVACHOL) 40 MG tablet TAKE 1 TABLET EVERY NIGHT 90 tablet 1    predniSONE (DELTASONE) 10 MG tablet Take three tablets daily for three days, take two tablets daily for three days, take one tablet daily for three days. 18 tablet 0  "      Allergies  Review of patient's allergies indicates:  No Known Allergies    Physical Examination     Vitals:    03/14/23 0841   BP: 112/73   BP Location: Left arm   Patient Position: Sitting   Pulse: 80   Resp: 20   Temp: 98.4 °F (36.9 °C)   TempSrc: Oral   SpO2: 98%  Comment: 2L of O2   Weight: 97.6 kg (215 lb 3.2 oz)   Height: 5' 4" (1.626 m)      Physical Exam  Vitals and nursing note reviewed.   Constitutional:       General: She is not in acute distress.     Appearance: She is not toxic-appearing.   HENT:      Right Ear: External ear normal.      Left Ear: External ear normal.      Mouth/Throat:      Mouth: Mucous membranes are moist.   Eyes:      Conjunctiva/sclera: Conjunctivae normal.      Pupils: Pupils are equal, round, and reactive to light.   Cardiovascular:      Rate and Rhythm: Normal rate and regular rhythm.   Pulmonary:      Effort: Pulmonary effort is normal.      Breath sounds: Wheezing present. No rhonchi or rales.   Chest:      Chest wall: No tenderness.   Musculoskeletal:      Right lower leg: No edema.      Left lower leg: No edema.   Skin:     General: Skin is warm.      Capillary Refill: Capillary refill takes less than 2 seconds.   Neurological:      Mental Status: She is alert and oriented to person, place, and time.        Assessment and Plan (including Health Maintenance)      Problem List  Smart Sets  Document Outside HM   :    Plan:     Patient Instructions   Avoid irritants as symptoms improve with injections encourage to increase physical activity to help with endurance and breathing.      Health Maintenance Due   Topic Date Due    Mammogram  10/07/2020    DEXA Scan  03/05/2023       Problem List Items Addressed This Visit          Pulmonary    Asthma    Current Assessment & Plan     Improving with injections avoid irritants and gradual increase of activity          Severe persistent asthma without complication       Health Maintenance Topics with due status: Not Due       Topic " Last Completion Date    Lipid Panel 12/30/2021    High Dose Statin 03/14/2023       Procedures     Future Appointments   Date Time Provider Department Center   5/24/2023  1:30 [facility] RUKHSANA Ribera        No follow-ups on file.     Signature:  HEAVENLY Pulido    Date of encounter: 3/14/23

## 2023-03-25 ENCOUNTER — DOCUMENT SCAN (OUTPATIENT)
Dept: HOME HEALTH SERVICES | Facility: HOSPITAL | Age: 73
End: 2023-03-25
Payer: MEDICARE

## 2023-03-31 ENCOUNTER — EXTERNAL HOME HEALTH (OUTPATIENT)
Dept: HOME HEALTH SERVICES | Facility: HOSPITAL | Age: 73
End: 2023-03-31
Payer: MEDICARE

## 2023-04-05 ENCOUNTER — DOCUMENT SCAN (OUTPATIENT)
Dept: HOME HEALTH SERVICES | Facility: HOSPITAL | Age: 73
End: 2023-04-05
Payer: MEDICARE

## 2023-04-13 ENCOUNTER — TELEPHONE (OUTPATIENT)
Dept: FAMILY MEDICINE | Facility: CLINIC | Age: 73
End: 2023-04-13
Payer: MEDICARE

## 2023-04-13 NOTE — TELEPHONE ENCOUNTER
Alejandra @ [facility] care called in regards to Mrs. Martínez. She has gained  9 pounds within 1 weeks time. She admitted to not taking her lasix the past three days due to being out of town. She does have a productive cough but lungs sounds are clear with 97% room air pulse ox. Nathalia recommends resuming lasix with daily weight checks. If continuing to increase or no decrease in weight she should go to the Er. Patient to call back Monday with weight log.  Alejandra verbalized understanding and will relay to the patient.

## 2023-04-29 DIAGNOSIS — I50.32 CHRONIC DIASTOLIC HEART FAILURE: Chronic | ICD-10-CM

## 2023-05-08 RX ORDER — FUROSEMIDE 40 MG/1
TABLET ORAL
Qty: 180 TABLET | Refills: 1 | Status: SHIPPED | OUTPATIENT
Start: 2023-05-08 | End: 2023-09-14 | Stop reason: SDUPTHER

## 2023-05-22 ENCOUNTER — PATIENT OUTREACH (OUTPATIENT)
Dept: ADMINISTRATIVE | Facility: HOSPITAL | Age: 73
End: 2023-05-22

## 2023-05-22 NOTE — PROGRESS NOTES
Health maintenance record review for population health care gaps    Closing care gaps for [employer] insurance.      Unable to retrieve any records or reports to close any gaps for the insurance health maintenance at this time.

## 2023-05-25 DIAGNOSIS — R56.9 SEIZURE: ICD-10-CM

## 2023-05-25 RX ORDER — PHENYTOIN SODIUM 100 MG/1
CAPSULE, EXTENDED RELEASE ORAL
Qty: 270 CAPSULE | Refills: 1 | Status: SHIPPED | OUTPATIENT
Start: 2023-05-25 | End: 2023-09-14 | Stop reason: SDUPTHER

## 2023-05-27 ENCOUNTER — EXTERNAL HOME HEALTH (OUTPATIENT)
Dept: HOME HEALTH SERVICES | Facility: HOSPITAL | Age: 73
End: 2023-05-27
Payer: MEDICARE

## 2023-06-09 DIAGNOSIS — Z71.89 COMPLEX CARE COORDINATION: ICD-10-CM

## 2023-06-23 DIAGNOSIS — T78.40XS ALLERGY, SEQUELA: ICD-10-CM

## 2023-06-23 RX ORDER — MONTELUKAST SODIUM 10 MG/1
TABLET ORAL
Qty: 90 TABLET | Refills: 1 | Status: SHIPPED | OUTPATIENT
Start: 2023-06-23 | End: 2023-09-14 | Stop reason: SDUPTHER

## 2023-07-17 DIAGNOSIS — F32.A DEPRESSION, UNSPECIFIED DEPRESSION TYPE: ICD-10-CM

## 2023-07-17 DIAGNOSIS — I10 HYPERTENSION, UNSPECIFIED TYPE: ICD-10-CM

## 2023-07-17 RX ORDER — PAROXETINE HYDROCHLORIDE 40 MG/1
TABLET, FILM COATED ORAL
Qty: 90 TABLET | Refills: 1 | Status: SHIPPED | OUTPATIENT
Start: 2023-07-17 | End: 2023-09-14 | Stop reason: SDUPTHER

## 2023-07-17 RX ORDER — NEBIVOLOL 5 MG/1
5 TABLET ORAL DAILY
Qty: 90 TABLET | Refills: 1 | Status: SHIPPED | OUTPATIENT
Start: 2023-07-17 | End: 2023-09-14 | Stop reason: SDUPTHER

## 2023-08-14 ENCOUNTER — OFFICE VISIT (OUTPATIENT)
Dept: FAMILY MEDICINE | Facility: CLINIC | Age: 73
End: 2023-08-14
Payer: MEDICARE

## 2023-08-14 VITALS
OXYGEN SATURATION: 95 % | HEART RATE: 80 BPM | SYSTOLIC BLOOD PRESSURE: 110 MMHG | BODY MASS INDEX: 36.37 KG/M2 | TEMPERATURE: 98 F | DIASTOLIC BLOOD PRESSURE: 72 MMHG | HEIGHT: 64 IN | WEIGHT: 213.06 LBS | RESPIRATION RATE: 20 BRPM

## 2023-08-14 DIAGNOSIS — E66.01 SEVERE OBESITY (BMI 35.0-39.9) WITH COMORBIDITY: ICD-10-CM

## 2023-08-14 DIAGNOSIS — Z78.0 ASYMPTOMATIC MENOPAUSAL STATE: ICD-10-CM

## 2023-08-14 DIAGNOSIS — G47.33 OSA ON CPAP: ICD-10-CM

## 2023-08-14 DIAGNOSIS — Z12.31 ENCOUNTER FOR SCREENING MAMMOGRAM FOR MALIGNANT NEOPLASM OF BREAST: ICD-10-CM

## 2023-08-14 DIAGNOSIS — E78.5 HYPERLIPIDEMIA, UNSPECIFIED HYPERLIPIDEMIA TYPE: ICD-10-CM

## 2023-08-14 DIAGNOSIS — Z12.12 ENCOUNTER FOR SCREENING FOR COLORECTAL MALIGNANT NEOPLASM: Primary | ICD-10-CM

## 2023-08-14 DIAGNOSIS — R05.9 COUGH, UNSPECIFIED TYPE: ICD-10-CM

## 2023-08-14 DIAGNOSIS — I10 HYPERTENSION, UNSPECIFIED TYPE: ICD-10-CM

## 2023-08-14 DIAGNOSIS — Z12.11 ENCOUNTER FOR SCREENING FOR COLORECTAL MALIGNANT NEOPLASM: Primary | ICD-10-CM

## 2023-08-14 PROCEDURE — 3078F DIAST BP <80 MM HG: CPT | Mod: ,,,

## 2023-08-14 PROCEDURE — 1100F PR PT FALLS ASSESS DOC 2+ FALLS/FALL W/INJURY/YR: ICD-10-PCS | Mod: ,,,

## 2023-08-14 PROCEDURE — 1160F RVW MEDS BY RX/DR IN RCRD: CPT | Mod: ,,,

## 2023-08-14 PROCEDURE — 3074F PR MOST RECENT SYSTOLIC BLOOD PRESSURE < 130 MM HG: ICD-10-PCS | Mod: ,,,

## 2023-08-14 PROCEDURE — 3074F SYST BP LT 130 MM HG: CPT | Mod: ,,,

## 2023-08-14 PROCEDURE — G0439 PR MEDICARE ANNUAL WELLNESS SUBSEQUENT VISIT: ICD-10-PCS | Mod: ,,,

## 2023-08-14 PROCEDURE — 1100F PTFALLS ASSESS-DOCD GE2>/YR: CPT | Mod: ,,,

## 2023-08-14 PROCEDURE — 1159F MED LIST DOCD IN RCRD: CPT | Mod: ,,,

## 2023-08-14 PROCEDURE — 1159F PR MEDICATION LIST DOCUMENTED IN MEDICAL RECORD: ICD-10-PCS | Mod: ,,,

## 2023-08-14 PROCEDURE — 1160F PR REVIEW ALL MEDS BY PRESCRIBER/CLIN PHARMACIST DOCUMENTED: ICD-10-PCS | Mod: ,,,

## 2023-08-14 PROCEDURE — 3008F BODY MASS INDEX DOCD: CPT | Mod: ,,,

## 2023-08-14 PROCEDURE — 3008F PR BODY MASS INDEX (BMI) DOCUMENTED: ICD-10-PCS | Mod: ,,,

## 2023-08-14 PROCEDURE — 3078F PR MOST RECENT DIASTOLIC BLOOD PRESSURE < 80 MM HG: ICD-10-PCS | Mod: ,,,

## 2023-08-14 PROCEDURE — 1126F PR PAIN SEVERITY QUANTIFIED, NO PAIN PRESENT: ICD-10-PCS | Mod: ,,,

## 2023-08-14 PROCEDURE — 1126F AMNT PAIN NOTED NONE PRSNT: CPT | Mod: ,,,

## 2023-08-14 PROCEDURE — 3288F PR FALLS RISK ASSESSMENT DOCUMENTED: ICD-10-PCS | Mod: ,,,

## 2023-08-14 PROCEDURE — 3288F FALL RISK ASSESSMENT DOCD: CPT | Mod: ,,,

## 2023-08-14 PROCEDURE — G0439 PPPS, SUBSEQ VISIT: HCPCS | Mod: ,,,

## 2023-08-14 RX ORDER — BENZONATATE 100 MG/1
100 CAPSULE ORAL 3 TIMES DAILY PRN
Qty: 60 CAPSULE | Refills: 0 | Status: SHIPPED | OUTPATIENT
Start: 2023-08-14 | End: 2023-09-14

## 2023-08-14 NOTE — PATIENT INSTRUCTIONS
Counseling and Referral of Other Preventative  (Italic type indicates deductible and co-insurance are waived)    Patient Name: Radha Martínez  Today's Date: 8/14/2023    Health Maintenance         Date Due Completion Date    TETANUS VACCINE Never done ---    Shingles Vaccine (1 of 2) Never done ---    Mammogram 10/07/2020 10/7/2019    Colorectal Cancer Screening 01/11/2022 1/11/2017    Lipid Panel 12/30/2022 12/30/2021    COVID-19 Vaccine (6 - Moderna series) 03/04/2023 11/4/2022    DEXA Scan 03/05/2023 3/5/2020    Influenza Vaccine (1) 09/01/2023 11/8/2022    High Dose Statin 03/14/2024 3/14/2023          No orders of the defined types were placed in this encounter.

## 2023-08-14 NOTE — PROGRESS NOTES
[facility] AWV   [facility]     PATIENT NAME: Radha Martínez   : 1950    AGE: 73 y.o. DATE: 2023   MRN: [id number]        Reason for Visit / Chief Complaint: Medicare AWV ([employer] SUBS AWV )        Radha Martínez presents for a Subsequent Medicare AWV today.     The following components were reviewed and updated:    Medical/Social/Family History:  Past Medical History:   Diagnosis Date    Asthma     CHF (congestive heart failure)     COPD (chronic obstructive pulmonary disease)     WILLSON (dyspnea on exertion)     GERD (gastroesophageal reflux disease)     HTN (hypertension)     Hyperlipidemia     Osteoporosis     Oxygen dependent     02 2L    Seizure disorder         Family History   Problem Relation Age of Onset    Diabetes Mother     Heart disease Mother     Hypertension Mother     Cancer Father     Diabetes Sister     Heart disease Sister     Hypertension Sister     Diabetes Brother         Social History     Tobacco Use   Smoking Status Never    Passive exposure: Never   Smokeless Tobacco Never      Social History     Substance and Sexual Activity   Alcohol Use Never       Family History   Problem Relation Age of Onset    Diabetes Mother     Heart disease Mother     Hypertension Mother     Cancer Father     Diabetes Sister     Heart disease Sister     Hypertension Sister     Diabetes Brother        Past Surgical History:   Procedure Laterality Date    CARDIOVERSION N/A 2022    Procedure: Cardioversion;  Surgeon: Adal Chung MD;  Location: [facility] CATH LAB;  Service: Cardiology;  Laterality: N/A;    COLONOSCOPY      EYE SURGERY      REVISION OF TOTAL KNEE REPLACEMENT       TUBAL LIGATION           Allergies and Current Medications   Review of patient's allergies indicates:  No Known Allergies    Current Outpatient Medications:     albuterol (PROVENTIL/VENTOLIN HFA) 90 mcg/actuation inhaler, Inhale 1-2 puffs into the lungs every 6 (six) hours as needed for  Wheezing., Disp: 54 g, Rfl: 1    albuterol-ipratropium (DUO-NEB) 2.5 mg-0.5 mg/3 mL nebulizer solution, Take 3 mLs by nebulization every 6 (six) hours as needed for Wheezing. Rescue, Disp: 100 mL, Rfl: 5    aspirin 81 MG Chew, Take 81 mg by mouth once daily., Disp: , Rfl:     busPIRone (BUSPAR) 10 MG tablet, TAKE 1 TABLET TWICE DAILY, Disp: 180 tablet, Rfl: 1    calcium carbonate/vitamin D3 (CALCARB 600 WITH VITAMIN D ORAL), Take by mouth., Disp: , Rfl:     fluticasone propionate (FLONASE) 50 mcg/actuation nasal spray, USE 1 SPRAY IN EACH NOSTRIL EVERY DAY, Disp: 32 g, Rfl: 2    fluticasone-umeclidin-vilanter (TRELEGY ELLIPTA) 200-62.5-25 mcg inhaler, Inhale 1 puff into the lungs once daily., Disp: 60 each, Rfl: 11    furosemide (LASIX) 40 MG tablet, TAKE 1 TABLET TWICE DAILY, Disp: 180 tablet, Rfl: 1    gabapentin (NEURONTIN) 300 MG capsule, Take 1 capsule (300 mg total) by mouth 3 (three) times daily., Disp: 270 capsule, Rfl: 1    ipratropium (ATROVENT) 42 mcg (0.06 %) nasal spray, 2 sprays by Nasal route 4 (four) times daily., Disp: 15 mL, Rfl: 0    loratadine (CLARITIN) 10 mg tablet, TAKE 1 TABLET EVERY DAY, Disp: 90 tablet, Rfl: 1    meclizine (ANTIVERT) 12.5 mg tablet, Take 1 tablet (12.5 mg total) by mouth 2 (two) times daily as needed for Dizziness., Disp: 60 tablet, Rfl: 0    montelukast (SINGULAIR) 10 mg tablet, TAKE 1 TABLET ONE TIME DAILY, Disp: 90 tablet, Rfl: 1    nebivoloL (BYSTOLIC) 5 MG Tab, Take 1 tablet (5 mg total) by mouth once daily., Disp: 90 tablet, Rfl: 1    pantoprazole (PROTONIX) 40 MG tablet, TAKE 1 TABLET ONE TIME DAILY, Disp: 90 tablet, Rfl: 1    paroxetine (PAXIL) 40 MG tablet, TAKE 1 TABLET ONE TIME DAILY, Disp: 90 tablet, Rfl: 1    phenytoin (DILANTIN) 100 MG ER capsule, TAKE 1 CAPSULE THREE TIMES DAILY, Disp: 270 capsule, Rfl: 1    pravastatin (PRAVACHOL) 40 MG tablet, TAKE 1 TABLET EVERY NIGHT, Disp: 90 tablet, Rfl: 1    benzonatate (TESSALON) 100 MG capsule, Take 1 capsule (100 mg  total) by mouth 3 (three) times daily as needed for Cough., Disp: 60 capsule, Rfl: 0    metOLazone (ZAROXOLYN) 2.5 MG tablet, Take 1 tablet by mouth 30 min before 1st dose of lasix daily x2 days (Patient not taking: Reported on 8/14/2023), Disp: 6 tablet, Rfl: 0    Health Risk Assessment   Fall Risk: Yes. Pt instructed on fall precautions and written and verbal education given   Obesity: BMI 36.57     Advance Directive:  Does not have an advanced directive. Verbal education and written education included in today's AVS.   Depression: PHQ9 2    HTN:  yes, DASH diet, exercise, weight management, med compliance, home BP monitoring, and follow-up discussed.   T2DM: NA       Tobacco Use: Denies  STI: NA   Alcohol Use: Denies   Statin Use: Yes      Health Maintenance   Last eye exam: 2023   Last CV screen with lipids: 12/30/2021   Diabetes screening with fasting glucose or A1c: 02/13/2023              Colonoscopy: 01/11/2017 repeat in 5 years              Flu Vaccine: 11/08/2022   Pneumonia vaccines: 13-10/11/2018 23-10/22/2019   COVID vaccine: Moderna 02/13/2021 03/132/2021 11/05/2021 05/20/2022 11/04/2022              Hep B vaccine: NA              DEXA: 03/05/2020   Last pap/pelvic: 01/12/2022   Last Mammogram: 10/07/2019   Last PSA screen: NA               AAA screening: NA (once in a lifetime for males 65-75 who have smoked > 100 cigarettes in lifetime)  HIV Screeing: NA  Hepatitis C Screen: 06/06/2019  Low Dose CT Scan: 10/29/2021      Incontinence  Bowel: No  Bladder: Yes      Health Maintenance Topics with due status: Not Due       Topic Last Completion Date    Influenza Vaccine 11/08/2022    High Dose Statin 03/14/2023     Health Maintenance Due   Topic Date Due    TETANUS VACCINE  Never done    Shingles Vaccine (1 of 2) Never done    Mammogram  10/07/2020    Colorectal Cancer Screening  01/11/2022    Lipid Panel  12/30/2022    COVID-19 Vaccine (6 - Moderna series) 03/04/2023    DEXA Scan  03/05/2023         Lab  "results available in Epic or see dates from [facility] above:   Lab Results   Component Value Date    CHOL 233 (H) 12/30/2021     Lab Results   Component Value Date     (H) 12/30/2021     Lab Results   Component Value Date    LDLCALC 101 12/30/2021     Lab Results   Component Value Date    TRIG 147 12/30/2021     Lab Results   Component Value Date    CHOLHDL 2.3 12/30/2021       No results found for: "LABA1C", "HGBA1C"    Sodium   Date Value Ref Range Status   02/27/2023 138 136 - 145 mmol/L Final     Potassium   Date Value Ref Range Status   02/27/2023 4.2 3.5 - 5.1 mmol/L Final     Chloride   Date Value Ref Range Status   02/27/2023 98 98 - 107 mmol/L Final     CO2   Date Value Ref Range Status   02/27/2023 30 21 - 32 mmol/L Final     Glucose   Date Value Ref Range Status   02/27/2023 249 (H) 74 - 106 mg/dL Final     BUN   Date Value Ref Range Status   02/27/2023 21 (H) 7 - 18 mg/dL Final     Creatinine   Date Value Ref Range Status   02/27/2023 1.56 (H) 0.55 - 1.02 mg/dL Final     Calcium   Date Value Ref Range Status   02/27/2023 8.0 (L) 8.5 - 10.1 mg/dL Final     Total Protein   Date Value Ref Range Status   02/27/2023 7.0 6.4 - 8.2 g/dL Final     Albumin   Date Value Ref Range Status   02/27/2023 3.3 (L) 3.5 - 5.0 g/dL Final     Bilirubin, Total   Date Value Ref Range Status   02/27/2023 0.4 >0.0 - 1.2 mg/dL Final     Alk Phos   Date Value Ref Range Status   02/27/2023 132 55 - 142 U/L Final     AST   Date Value Ref Range Status   02/27/2023 55 (H) 15 - 37 U/L Final     ALT   Date Value Ref Range Status   02/27/2023 83 (H) 13 - 56 U/L Final     Anion Gap   Date Value Ref Range Status   02/27/2023 14 7 - 16 mmol/L Final     eGFR   Date Value Ref Range Status   08/05/2022 52 (L) >=60 mL/min/1.73m² Final                 Care Team   PCP: Nathalia Kay P    Eye specialist: Dr. Crum         **See Completed Assessments for Annual Wellness visit within the encounter summary    The following assessments were " "completed & reviewed:  Depression Screening  Cognitive function Screening  Timed Get Up Test  Whisper Test  Vision Screen  Health Risk Assessment  Checklist of ADLs and IADLs      Objective  Vitals:    08/14/23 0905   BP: 110/72   Pulse: 80   Resp: 20   Temp: 98.1 °F (36.7 °C)   SpO2: 95%   Weight: 96.6 kg (213 lb 0.6 oz)   Height: 5' 4" (1.626 m)   PainSc: 0-No pain      Body mass index is 36.57 kg/m².  Ideal body weight: 54.7 kg (120 lb 9.5 oz)       Physical Exam  Vitals and nursing note reviewed.   Constitutional:       General: She is not in acute distress.     Appearance: Normal appearance. She is obese. She is not ill-appearing.   HENT:      Head: Normocephalic.      Right Ear: Tympanic membrane, ear canal and external ear normal.      Left Ear: Tympanic membrane, ear canal and external ear normal.      Nose: Nose normal.      Mouth/Throat:      Mouth: Mucous membranes are dry.   Eyes:      Extraocular Movements: Extraocular movements intact.      Conjunctiva/sclera: Conjunctivae normal.      Pupils: Pupils are equal, round, and reactive to light.   Neck:      Vascular: No carotid bruit.   Cardiovascular:      Rate and Rhythm: Normal rate and regular rhythm.      Pulses: Normal pulses.      Heart sounds: Normal heart sounds.   Pulmonary:      Effort: Pulmonary effort is normal. No respiratory distress.      Breath sounds: Normal breath sounds. No wheezing.      Comments: WILLSON and wears Oxygen at home when she needs it  Abdominal:      General: Bowel sounds are normal.      Palpations: Abdomen is soft.      Tenderness: There is no abdominal tenderness.   Musculoskeletal:         General: Normal range of motion.      Cervical back: Normal range of motion and neck supple. No tenderness.      Right lower leg: No edema.      Left lower leg: No edema.   Skin:     General: Skin is warm and dry.      Capillary Refill: Capillary refill takes less than 2 seconds.   Neurological:      General: No focal deficit present. "      Mental Status: She is alert and oriented to person, place, and time.   Psychiatric:         Mood and Affect: Mood normal.         Behavior: Behavior normal.         Thought Content: Thought content normal.         Judgment: Judgment normal.           Assessment:     1. Hyperlipidemia, unspecified hyperlipidemia type  - Lipid Panel; Future    2. Encounter for screening for colorectal malignant neoplasm  - Ambulatory referral/consult to Gastroenterology; Future    3. Encounter for screening mammogram for malignant neoplasm of breast  - Mammo Digital Screening Bilat; Future    4. Asymptomatic menopausal state  - DXA Bone Density Axial Skeleton 1 or more sites; Future    5. BMI 36.0-36.9,adult    6. Severe obesity (BMI 35.0-39.9) with comorbidity    7. Hypertension, unspecified type    8. PACO on CPAP    9. Cough, unspecified type  - benzonatate (TESSALON) 100 MG capsule; Take 1 capsule (100 mg total) by mouth 3 (three) times daily as needed for Cough.  Dispense: 60 capsule; Refill: 0       Problem List Items Addressed This Visit          Pulmonary    Cough    Current Assessment & Plan     Tessalon perles refilled         Relevant Medications    benzonatate (TESSALON) 100 MG capsule       Cardiac/Vascular    HLD (hyperlipidemia)    Relevant Orders    Lipid Panel    HTN (hypertension)       Renal/    Encounter for screening mammogram for malignant neoplasm of breast - Primary    Relevant Orders    Mammo Digital Screening Bilat    Asymptomatic menopausal state    Relevant Orders    DXA Bone Density Axial Skeleton 1 or more sites       Endocrine    Severe obesity (BMI 35.0-39.9) with comorbidity    BMI 36.0-36.9,adult       Other    PACO on CPAP            Plan:  Encounter for screening for colorectal malignant neoplasm  -     Ambulatory referral/consult to Gastroenterology; Future; Expected date: 02/14/2024    Hyperlipidemia, unspecified hyperlipidemia type  -     Lipid Panel; Future; Expected date:  02/14/2024    Encounter for screening mammogram for malignant neoplasm of breast  -     Mammo Digital Screening Bilat; Future; Expected date: 02/14/2024    Asymptomatic menopausal state  -     DXA Bone Density Axial Skeleton 1 or more sites; Future; Expected date: 02/14/2024    BMI 36.0-36.9,adult    Severe obesity (BMI 35.0-39.9) with comorbidity    Hypertension, unspecified type    PACO on CPAP    Cough, unspecified type  -     benzonatate (TESSALON) 100 MG capsule; Take 1 capsule (100 mg total) by mouth 3 (three) times daily as needed for Cough.  Dispense: 60 capsule; Refill: 0          Referrals:   Mammogram, Dexa, Lipid Panel, Colonoscopy    Advised to call office if does not hear from anyone with referral appt within 2-3 weeks to check on status of referral. Voiced understanding      Discussed and provided with a screening schedule and personal prevention plan in accordance with USPSTF age appropriate recommendations and Medicare screening guidelines.   Education, counseling, and referrals were provided as needed.  After Visit Summary printed and given to patient which includes written education and a list of any referrals if indicated.     Verbal and written education provided including diet, exercise, Advanced Directives, preventive health, and falls.     F/u plan for yearly AWV.    Signature:

## 2023-08-28 RX ORDER — CLOPIDOGREL BISULFATE 75 MG/1
75 TABLET ORAL
Qty: 90 TABLET | Refills: 0 | Status: SHIPPED | OUTPATIENT
Start: 2023-08-28 | End: 2024-03-28

## 2023-09-05 ENCOUNTER — PATIENT OUTREACH (OUTPATIENT)
Dept: ADMINISTRATIVE | Facility: HOSPITAL | Age: 73
End: 2023-09-05

## 2023-09-05 NOTE — PROGRESS NOTES
Health maintenance record review for population health care gaps    Closing care gaps for [employer] insurance.      Health Maintenance Due   Topic Date Due    TETANUS VACCINE  Never done    Shingles Vaccine (1 of 2) Never done    Mammogram  10/07/2020    Colorectal Cancer Screening  01/11/2022    Lipid Panel  12/30/2022    COVID-19 Vaccine (6 - Moderna series) 03/04/2023    DEXA Scan  03/05/2023    Influenza Vaccine (1) 09/01/2023

## 2023-09-06 DIAGNOSIS — M25.561 ACUTE PAIN OF RIGHT KNEE: Primary | ICD-10-CM

## 2023-09-07 ENCOUNTER — OFFICE VISIT (OUTPATIENT)
Dept: ORTHOPEDICS | Facility: CLINIC | Age: 73
End: 2023-09-07
Payer: MEDICARE

## 2023-09-07 ENCOUNTER — HOSPITAL ENCOUNTER (OUTPATIENT)
Dept: RADIOLOGY | Facility: HOSPITAL | Age: 73
Discharge: HOME | End: 2023-09-07
Attending: NURSE PRACTITIONER
Payer: MEDICARE

## 2023-09-07 VITALS
WEIGHT: 213 LBS | TEMPERATURE: 98 F | BODY MASS INDEX: 36.37 KG/M2 | SYSTOLIC BLOOD PRESSURE: 128 MMHG | RESPIRATION RATE: 16 BRPM | DIASTOLIC BLOOD PRESSURE: 74 MMHG | OXYGEN SATURATION: 99 % | HEIGHT: 64 IN | HEART RATE: 69 BPM

## 2023-09-07 DIAGNOSIS — M25.561 ACUTE PAIN OF RIGHT KNEE: ICD-10-CM

## 2023-09-07 DIAGNOSIS — Z96.652 HISTORY OF TOTAL KNEE ARTHROPLASTY, LEFT: ICD-10-CM

## 2023-09-07 DIAGNOSIS — M17.11 PRIMARY OSTEOARTHRITIS OF RIGHT KNEE: Primary | ICD-10-CM

## 2023-09-07 PROCEDURE — 99215 OFFICE O/P EST HI 40 MIN: CPT | Mod: PBBFAC | Performed by: NURSE PRACTITIONER

## 2023-09-07 PROCEDURE — 1159F MED LIST DOCD IN RCRD: CPT | Mod: CPTII,,, | Performed by: NURSE PRACTITIONER

## 2023-09-07 PROCEDURE — 3074F PR MOST RECENT SYSTOLIC BLOOD PRESSURE < 130 MM HG: ICD-10-PCS | Mod: CPTII,,, | Performed by: NURSE PRACTITIONER

## 2023-09-07 PROCEDURE — 99214 OFFICE O/P EST MOD 30 MIN: CPT | Mod: S$PBB,,, | Performed by: NURSE PRACTITIONER

## 2023-09-07 PROCEDURE — 1159F PR MEDICATION LIST DOCUMENTED IN MEDICAL RECORD: ICD-10-PCS | Mod: CPTII,,, | Performed by: NURSE PRACTITIONER

## 2023-09-07 PROCEDURE — 3074F SYST BP LT 130 MM HG: CPT | Mod: CPTII,,, | Performed by: NURSE PRACTITIONER

## 2023-09-07 PROCEDURE — 99214 PR OFFICE/OUTPT VISIT, EST, LEVL IV, 30-39 MIN: ICD-10-PCS | Mod: S$PBB,,, | Performed by: NURSE PRACTITIONER

## 2023-09-07 PROCEDURE — 3008F BODY MASS INDEX DOCD: CPT | Mod: CPTII,,, | Performed by: NURSE PRACTITIONER

## 2023-09-07 PROCEDURE — 73562 X-RAY EXAM OF KNEE 3: CPT | Mod: 26,RT,, | Performed by: RADIOLOGY

## 2023-09-07 PROCEDURE — 3078F DIAST BP <80 MM HG: CPT | Mod: CPTII,,, | Performed by: NURSE PRACTITIONER

## 2023-09-07 PROCEDURE — 73562 XR KNEE AP LAT WITH SUNRISE RIGHT: ICD-10-PCS | Mod: 26,RT,, | Performed by: RADIOLOGY

## 2023-09-07 PROCEDURE — 1160F PR REVIEW ALL MEDS BY PRESCRIBER/CLIN PHARMACIST DOCUMENTED: ICD-10-PCS | Mod: CPTII,,, | Performed by: NURSE PRACTITIONER

## 2023-09-07 PROCEDURE — 3078F PR MOST RECENT DIASTOLIC BLOOD PRESSURE < 80 MM HG: ICD-10-PCS | Mod: CPTII,,, | Performed by: NURSE PRACTITIONER

## 2023-09-07 PROCEDURE — 73562 X-RAY EXAM OF KNEE 3: CPT | Mod: TC,RT

## 2023-09-07 PROCEDURE — 1160F RVW MEDS BY RX/DR IN RCRD: CPT | Mod: CPTII,,, | Performed by: NURSE PRACTITIONER

## 2023-09-07 PROCEDURE — 3008F PR BODY MASS INDEX (BMI) DOCUMENTED: ICD-10-PCS | Mod: CPTII,,, | Performed by: NURSE PRACTITIONER

## 2023-09-07 NOTE — PATIENT INSTRUCTIONS
Safety guidelines and activity restrictions discussed with the patient.  She verbalizes understanding.  She understands that while we will set her up a tentative surgery date today she will have to return in talked with Dr. Castillo and discussed treatment options and whether or not she is a candidate for the surgery.  Also we will have to discuss the procedure itself with the patient.  Talked with the patient told her that her insurance at times may not give approval for swing bed directly out of the hospital.  She understands she may have to go home from the hospital.  We will have return to orthopedic clinic follow-up Dr. Castillo to discuss surgery.

## 2023-09-07 NOTE — PROGRESS NOTES
73-year-old female presents ambulatory orthopedic clinic valuation of her right knee.  She was known to orthopedic clinic having undergone left total knee replacement arthroplasty by Dr. Castillo on 06/12/2018.  She is well pleased results thus far.  States she is had at least 2 years of ongoing pain with her right knee.  He was gotten progressively worse.  She is had the benefit of intra-articular steroid injections in the past and found it beneficial.  She is been alternating at home between Advil and Aleve over-the-counter for discomfort.  States he was getting where it does not provide comfort any longer.  She is interested in setting up surgery today for total knee replacement arthroplasty.      X-ray: X-rays today of the right knee AP, lateral and sunrise view shows severe tricompartmental DJD with complete loss of the medial joint space.  Genu varum is noted radiographically.  No evidence of acute fracture, dislocation or pathologic bone noted.      PE:  In general she is awake, alert oriented appropriately.  No acute distress noted.  Respirations even unlabored.  She is noted be ambulating with the aid of a Rollator slight antalgic limp on the right.  Physical exam right knee skin is warm, dry and intact.  No soft tissue swelling noted.  No intra-articular effusion appreciated clinically.  Range motion right knee lacks approximately 5° full extension with further flexion approximately 95°.  There was excellent ligamentous balance instability noted clinically.  Anterior drawer test negative.  Posterior drawer test negative.  Crepitance is appreciated over the patella and medial joint line with extension flexion of the knee.      Impression:  1.)  DJD knee-right 2.) history of left total knee replacement arthroplasty 06/12/2018     Plan:  Safety guidelines and activity restrictions discussed with the patient.  She verbalizes understanding.  She understands that while we will set her up a tentative surgery  date today she will have to return in talked with Dr. Castillo and discussed treatment options and whether or not she is a candidate for the surgery.  Also we will have to discuss the procedure itself with the patient.  Talked with the patient told her that her insurance at times may not give approval for swing bed directly out of the hospital.  She understands she may have to go home from the hospital.  We will have return to orthopedic clinic follow-up Dr. Castillo to discuss surgery.

## 2023-09-14 ENCOUNTER — OFFICE VISIT (OUTPATIENT)
Dept: FAMILY MEDICINE | Facility: CLINIC | Age: 73
End: 2023-09-14
Payer: MEDICARE

## 2023-09-14 VITALS
OXYGEN SATURATION: 97 % | SYSTOLIC BLOOD PRESSURE: 126 MMHG | BODY MASS INDEX: 37.73 KG/M2 | WEIGHT: 221 LBS | HEART RATE: 71 BPM | HEIGHT: 64 IN | TEMPERATURE: 98 F | DIASTOLIC BLOOD PRESSURE: 74 MMHG | RESPIRATION RATE: 20 BRPM

## 2023-09-14 DIAGNOSIS — J40 BRONCHITIS: ICD-10-CM

## 2023-09-14 DIAGNOSIS — R56.9 SEIZURE: ICD-10-CM

## 2023-09-14 DIAGNOSIS — E78.5 HYPERLIPIDEMIA, UNSPECIFIED HYPERLIPIDEMIA TYPE: ICD-10-CM

## 2023-09-14 DIAGNOSIS — F32.A DEPRESSION, UNSPECIFIED DEPRESSION TYPE: ICD-10-CM

## 2023-09-14 DIAGNOSIS — J45.901 ASTHMA WITH ACUTE EXACERBATION, UNSPECIFIED ASTHMA SEVERITY, UNSPECIFIED WHETHER PERSISTENT: ICD-10-CM

## 2023-09-14 DIAGNOSIS — R05.9 COUGH, UNSPECIFIED TYPE: ICD-10-CM

## 2023-09-14 DIAGNOSIS — G89.29 CHRONIC LOW BACK PAIN WITH BILATERAL SCIATICA, UNSPECIFIED BACK PAIN LATERALITY: ICD-10-CM

## 2023-09-14 DIAGNOSIS — T78.40XS ALLERGY, SEQUELA: ICD-10-CM

## 2023-09-14 DIAGNOSIS — M54.42 CHRONIC LOW BACK PAIN WITH BILATERAL SCIATICA, UNSPECIFIED BACK PAIN LATERALITY: ICD-10-CM

## 2023-09-14 DIAGNOSIS — I10 HYPERTENSION, UNSPECIFIED TYPE: ICD-10-CM

## 2023-09-14 DIAGNOSIS — J45.41 MODERATE PERSISTENT ASTHMA WITH ACUTE EXACERBATION: ICD-10-CM

## 2023-09-14 DIAGNOSIS — M54.41 CHRONIC LOW BACK PAIN WITH BILATERAL SCIATICA, UNSPECIFIED BACK PAIN LATERALITY: ICD-10-CM

## 2023-09-14 DIAGNOSIS — F41.9 ANXIETY: ICD-10-CM

## 2023-09-14 DIAGNOSIS — I50.32 CHRONIC DIASTOLIC HEART FAILURE: ICD-10-CM

## 2023-09-14 DIAGNOSIS — I50.9 CONGESTIVE HEART FAILURE, UNSPECIFIED HF CHRONICITY, UNSPECIFIED HEART FAILURE TYPE: Primary | ICD-10-CM

## 2023-09-14 DIAGNOSIS — J45.40 MODERATE PERSISTENT ASTHMA WITHOUT COMPLICATION: ICD-10-CM

## 2023-09-14 PROBLEM — T78.40XA ALLERGIES: Status: ACTIVE | Noted: 2023-09-14

## 2023-09-14 LAB
ALBUMIN SERPL BCP-MCNC: 3.6 G/DL (ref 3.5–5)
ALBUMIN/GLOB SERPL: 0.9 {RATIO}
ALP SERPL-CCNC: 244 U/L (ref 55–142)
ALT SERPL W P-5'-P-CCNC: 33 U/L (ref 13–56)
ANION GAP SERPL CALCULATED.3IONS-SCNC: 6 MMOL/L (ref 7–16)
AST SERPL W P-5'-P-CCNC: 24 U/L (ref 15–37)
BASOPHILS # BLD AUTO: 0.04 K/UL (ref 0–0.2)
BASOPHILS NFR BLD AUTO: 1 % (ref 0–1)
BILIRUB SERPL-MCNC: 0.3 MG/DL (ref ?–1.2)
BUN SERPL-MCNC: 17 MG/DL (ref 7–18)
BUN/CREAT SERPL: 16 (ref 6–20)
CALCIUM SERPL-MCNC: 9.1 MG/DL (ref 8.5–10.1)
CHLORIDE SERPL-SCNC: 105 MMOL/L (ref 98–107)
CHOLEST SERPL-MCNC: 222 MG/DL (ref 0–200)
CHOLEST/HDLC SERPL: 2.4 {RATIO}
CO2 SERPL-SCNC: 32 MMOL/L (ref 21–32)
CREAT SERPL-MCNC: 1.04 MG/DL (ref 0.55–1.02)
DIFFERENTIAL METHOD BLD: ABNORMAL
EGFR (NO RACE VARIABLE) (RUSH/TITUS): 57 ML/MIN/1.73M2
EOSINOPHIL # BLD AUTO: 0.44 K/UL (ref 0–0.5)
EOSINOPHIL NFR BLD AUTO: 10.6 % (ref 1–4)
ERYTHROCYTE [DISTWIDTH] IN BLOOD BY AUTOMATED COUNT: 14.4 % (ref 11.5–14.5)
GLOBULIN SER-MCNC: 3.9 G/DL (ref 2–4)
GLUCOSE SERPL-MCNC: 111 MG/DL (ref 74–106)
HCT VFR BLD AUTO: 40 % (ref 38–47)
HDLC SERPL-MCNC: 93 MG/DL (ref 40–60)
HGB BLD-MCNC: 12.8 G/DL (ref 12–16)
IMM GRANULOCYTES # BLD AUTO: 0.01 K/UL (ref 0–0.04)
IMM GRANULOCYTES NFR BLD: 0.2 % (ref 0–0.4)
LDLC SERPL CALC-MCNC: 111 MG/DL
LDLC/HDLC SERPL: 1.2 {RATIO}
LYMPHOCYTES # BLD AUTO: 1.66 K/UL (ref 1–4.8)
LYMPHOCYTES NFR BLD AUTO: 39.9 % (ref 27–41)
MCH RBC QN AUTO: 28.6 PG (ref 27–31)
MCHC RBC AUTO-ENTMCNC: 32 G/DL (ref 32–36)
MCV RBC AUTO: 89.5 FL (ref 80–96)
MONOCYTES # BLD AUTO: 0.36 K/UL (ref 0–0.8)
MONOCYTES NFR BLD AUTO: 8.7 % (ref 2–6)
MPC BLD CALC-MCNC: 11 FL (ref 9.4–12.4)
NEUTROPHILS # BLD AUTO: 1.65 K/UL (ref 1.8–7.7)
NEUTROPHILS NFR BLD AUTO: 39.6 % (ref 53–65)
NONHDLC SERPL-MCNC: 129 MG/DL
NRBC # BLD AUTO: 0 X10E3/UL
NRBC, AUTO (.00): 0 %
NT-PROBNP SERPL-MCNC: 559 PG/ML (ref 1–125)
PLATELET # BLD AUTO: 266 K/UL (ref 150–400)
POTASSIUM SERPL-SCNC: 4.3 MMOL/L (ref 3.5–5.1)
PROT SERPL-MCNC: 7.5 G/DL (ref 6.4–8.2)
RBC # BLD AUTO: 4.47 M/UL (ref 4.2–5.4)
SODIUM SERPL-SCNC: 139 MMOL/L (ref 136–145)
TRIGL SERPL-MCNC: 89 MG/DL (ref 35–150)
VLDLC SERPL-MCNC: 18 MG/DL
WBC # BLD AUTO: 4.16 K/UL (ref 4.5–11)

## 2023-09-14 PROCEDURE — 80053 COMPREHEN METABOLIC PANEL: CPT | Mod: ,,, | Performed by: CLINICAL MEDICAL LABORATORY

## 2023-09-14 PROCEDURE — 80053 COMPREHENSIVE METABOLIC PANEL: ICD-10-PCS | Mod: ,,, | Performed by: CLINICAL MEDICAL LABORATORY

## 2023-09-14 PROCEDURE — 3074F PR MOST RECENT SYSTOLIC BLOOD PRESSURE < 130 MM HG: ICD-10-PCS | Mod: ,,,

## 2023-09-14 PROCEDURE — 1159F PR MEDICATION LIST DOCUMENTED IN MEDICAL RECORD: ICD-10-PCS | Mod: ,,,

## 2023-09-14 PROCEDURE — 1160F RVW MEDS BY RX/DR IN RCRD: CPT | Mod: ,,,

## 2023-09-14 PROCEDURE — 83880 ASSAY OF NATRIURETIC PEPTIDE: CPT | Mod: ,,, | Performed by: CLINICAL MEDICAL LABORATORY

## 2023-09-14 PROCEDURE — 1125F AMNT PAIN NOTED PAIN PRSNT: CPT | Mod: ,,,

## 2023-09-14 PROCEDURE — 99214 PR OFFICE/OUTPT VISIT, EST, LEVL IV, 30-39 MIN: ICD-10-PCS | Mod: ,,,

## 2023-09-14 PROCEDURE — 80186 PHENYTOIN LEVEL, FREE: ICD-10-PCS | Mod: 90,,, | Performed by: CLINICAL MEDICAL LABORATORY

## 2023-09-14 PROCEDURE — 85025 CBC WITH DIFFERENTIAL: ICD-10-PCS | Mod: ,,, | Performed by: CLINICAL MEDICAL LABORATORY

## 2023-09-14 PROCEDURE — 3078F PR MOST RECENT DIASTOLIC BLOOD PRESSURE < 80 MM HG: ICD-10-PCS | Mod: ,,,

## 2023-09-14 PROCEDURE — 3008F BODY MASS INDEX DOCD: CPT | Mod: ,,,

## 2023-09-14 PROCEDURE — 80061 LIPID PANEL: CPT | Mod: ,,, | Performed by: CLINICAL MEDICAL LABORATORY

## 2023-09-14 PROCEDURE — 1160F PR REVIEW ALL MEDS BY PRESCRIBER/CLIN PHARMACIST DOCUMENTED: ICD-10-PCS | Mod: ,,,

## 2023-09-14 PROCEDURE — 1125F PR PAIN SEVERITY QUANTIFIED, PAIN PRESENT: ICD-10-PCS | Mod: ,,,

## 2023-09-14 PROCEDURE — 3008F PR BODY MASS INDEX (BMI) DOCUMENTED: ICD-10-PCS | Mod: ,,,

## 2023-09-14 PROCEDURE — 3288F PR FALLS RISK ASSESSMENT DOCUMENTED: ICD-10-PCS | Mod: ,,,

## 2023-09-14 PROCEDURE — 99214 OFFICE O/P EST MOD 30 MIN: CPT | Mod: ,,,

## 2023-09-14 PROCEDURE — 1101F PT FALLS ASSESS-DOCD LE1/YR: CPT | Mod: ,,,

## 2023-09-14 PROCEDURE — 1159F MED LIST DOCD IN RCRD: CPT | Mod: ,,,

## 2023-09-14 PROCEDURE — 80061 LIPID PANEL: ICD-10-PCS | Mod: ,,, | Performed by: CLINICAL MEDICAL LABORATORY

## 2023-09-14 PROCEDURE — 3074F SYST BP LT 130 MM HG: CPT | Mod: ,,,

## 2023-09-14 PROCEDURE — 85025 COMPLETE CBC W/AUTO DIFF WBC: CPT | Mod: ,,, | Performed by: CLINICAL MEDICAL LABORATORY

## 2023-09-14 PROCEDURE — 80186 ASSAY OF PHENYTOIN FREE: CPT | Mod: 90,,, | Performed by: CLINICAL MEDICAL LABORATORY

## 2023-09-14 PROCEDURE — 1101F PR PT FALLS ASSESS DOC 0-1 FALLS W/OUT INJ PAST YR: ICD-10-PCS | Mod: ,,,

## 2023-09-14 PROCEDURE — 3078F DIAST BP <80 MM HG: CPT | Mod: ,,,

## 2023-09-14 PROCEDURE — 83880 NT-PRO NATRIURETIC PEPTIDE: ICD-10-PCS | Mod: ,,, | Performed by: CLINICAL MEDICAL LABORATORY

## 2023-09-14 PROCEDURE — 3288F FALL RISK ASSESSMENT DOCD: CPT | Mod: ,,,

## 2023-09-14 RX ORDER — FUROSEMIDE 40 MG/1
40 TABLET ORAL 2 TIMES DAILY
Qty: 180 TABLET | Refills: 1 | Status: SHIPPED | OUTPATIENT
Start: 2023-09-14

## 2023-09-14 RX ORDER — MONTELUKAST SODIUM 10 MG/1
10 TABLET ORAL DAILY
Qty: 90 TABLET | Refills: 1 | Status: SHIPPED | OUTPATIENT
Start: 2023-09-14

## 2023-09-14 RX ORDER — IPRATROPIUM BROMIDE AND ALBUTEROL SULFATE 2.5; .5 MG/3ML; MG/3ML
3 SOLUTION RESPIRATORY (INHALATION) EVERY 6 HOURS PRN
Qty: 100 ML | Refills: 5 | Status: SHIPPED | OUTPATIENT
Start: 2023-09-14 | End: 2024-03-28

## 2023-09-14 RX ORDER — NEBIVOLOL 5 MG/1
5 TABLET ORAL DAILY
Qty: 90 TABLET | Refills: 1 | Status: SHIPPED | OUTPATIENT
Start: 2023-09-14 | End: 2024-02-14 | Stop reason: SDUPTHER

## 2023-09-14 RX ORDER — PRAVASTATIN SODIUM 40 MG/1
40 TABLET ORAL NIGHTLY
Qty: 90 TABLET | Refills: 1 | Status: SHIPPED | OUTPATIENT
Start: 2023-09-14 | End: 2024-02-07

## 2023-09-14 RX ORDER — IPRATROPIUM BROMIDE 42 UG/1
2 SPRAY, METERED NASAL 4 TIMES DAILY
Qty: 15 ML | Refills: 0 | Status: SHIPPED | OUTPATIENT
Start: 2023-09-14

## 2023-09-14 RX ORDER — GABAPENTIN 300 MG/1
300 CAPSULE ORAL 3 TIMES DAILY
Qty: 270 CAPSULE | Refills: 1 | Status: SHIPPED | OUTPATIENT
Start: 2023-09-14 | End: 2024-02-14 | Stop reason: SDUPTHER

## 2023-09-14 RX ORDER — BUSPIRONE HYDROCHLORIDE 10 MG/1
10 TABLET ORAL 2 TIMES DAILY
Qty: 180 TABLET | Refills: 1 | Status: SHIPPED | OUTPATIENT
Start: 2023-09-14 | End: 2024-02-07

## 2023-09-14 RX ORDER — PHENYTOIN SODIUM 100 MG/1
100 CAPSULE, EXTENDED RELEASE ORAL 3 TIMES DAILY
Qty: 270 CAPSULE | Refills: 1 | Status: SHIPPED | OUTPATIENT
Start: 2023-09-14

## 2023-09-14 RX ORDER — FLUTICASONE FUROATE, UMECLIDINIUM BROMIDE AND VILANTEROL TRIFENATATE 200; 62.5; 25 UG/1; UG/1; UG/1
1 POWDER RESPIRATORY (INHALATION) DAILY
Qty: 60 EACH | Refills: 11 | Status: CANCELLED | OUTPATIENT
Start: 2023-09-14

## 2023-09-14 RX ORDER — ALBUTEROL SULFATE 90 UG/1
1-2 AEROSOL, METERED RESPIRATORY (INHALATION) EVERY 6 HOURS PRN
Qty: 54 G | Refills: 1 | Status: ON HOLD | OUTPATIENT
Start: 2023-09-14 | End: 2023-11-15 | Stop reason: HOSPADM

## 2023-09-14 RX ORDER — PAROXETINE HYDROCHLORIDE 40 MG/1
40 TABLET, FILM COATED ORAL DAILY
Qty: 90 TABLET | Refills: 1 | Status: SHIPPED | OUTPATIENT
Start: 2023-09-14

## 2023-09-14 RX ORDER — FLUTICASONE FUROATE, UMECLIDINIUM BROMIDE AND VILANTEROL TRIFENATATE 200; 62.5; 25 UG/1; UG/1; UG/1
1 POWDER RESPIRATORY (INHALATION) DAILY
Qty: 60 EACH | Refills: 11 | Status: SHIPPED | OUTPATIENT
Start: 2023-09-14

## 2023-09-14 NOTE — ASSESSMENT & PLAN NOTE
10/22/2018  Name: Hanna Lr   MRN: [id number]   YOB: 2013   Date of Visit: 10/22/2018    Dear Dr. Xena Brothers MD     I had the opportunity to see your patient, Hanna Lr, in the Pediatric [facility] Care office at [facility] in follow up. Please find my impression and suggestions below. Dr. Raleigh Cheng MD, [facility]  Pediatric Lung Care  [address] [facility], [address] [place] [address], [address] [place] [address], [address] [place] [address]  (Q) [phone]  (R) [phone]    Impression/Suggestions:  Patient Instructions   BACKGROUND:  T18/21  Previous recurrent croup - no stridor X ~ 6 months  PET, adenoidectomy (Ran)  Recurrent Strep (rapid +ve, culture -ve)  Restless sleep, no snoring  Poor growth  Sensory Issues  IMPRESSION:  Recurrent Viral Upper Respiratory Tract Infections  T21/T18  PLAN:  Flovent 44, 2 puffs, twice a day  Albuterol every 6 hours as needed  FUTURE:  FU ENT as scheduled  Follow Up Dr Aldo Quiros four months or earlier if required (repeated exacerbations, worsening cough, difficulty breathing)        Interim History:  History obtained from mother and chart review  Francisco J was last seen by MA NP in May  Since that time Francisco J has had recurrent episodes of barky cough - but no stridor - no steroids  Also recurrent Strep (presents as vomit, rash, tung thrusting), no fever  Rapid strep +, culture negative - occurs monthly  On abx by PCP for same currently    ENT did replace PET and remove adenoids, no tonsillectomy as yet. Not enlarged, no snoring, no recurrent culture strep  Concerns re feeding  As reported by mother    Long discussion with mother regarding expected course of RURTI givent tonsils, T21, immune maturation, developmental progress. As expected croup natural history. Cost, benefit of Tonsillectomy, future need for PSG (not indicated currently)      BACKGROUND:  No specialty comments available. Review of Systems:  A comprehensive review of systems was negative except for that written in the HPI.   Medical Blood pressure is controlled. Current medical regimen is effective;   Will adjust according to results and monitor.   History:  Past Medical History:   Diagnosis Date    GERD (gastroesophageal reflux disease)     silent reflux    Ill-defined condition     croup recurrent, feeding difficulties, per mother \"stridors when he cries\"    Ill-defined condition     nonverbal, nonambulatory, unable to hold bottle,    Ill-defined condition     reactive bronchial airway    Otitis media     Premature infant     Trisomy 25     Trisomy 21          Allergies:  Patient has no known allergies. No Known Allergies    Medications:   Current Outpatient Medications   Medication Sig    cyproheptadine (PERIACTIN) 4 mg tablet TAKE 3/4 TAB, CRUSHED AND IN APPLESAUCE, BY MOUTH TWICE A DAY    budesonide (PULMICORT) 0.5 mg/2 mL nbsp 2 mL by Nebulization route two (2) times a day.  lansoprazole (PREVACID 24HR) 15 mg capsule Take 1/2 capsule by mouth twice a day    famotidine (PEPCID) 40 mg/5 mL (8 mg/mL) suspension GIVE 1ML BY MOUTH ONE TIME DAILY AT MIDDAY    fluticasone (FLOVENT HFA) 44 mcg/actuation inhaler Take 2 Puffs by inhalation two (2) times a day.  levothyroxine (SYNTHROID) 25 mcg tablet Take  by mouth Daily (before breakfast). Recently incrased  baker    3 montsn    sulfamethoxazole/trimethoprim (BACTRIM PO) Take 10 mg by mouth.  ondansetron (ZOFRAN ODT) 4 mg disintegrating tablet Take 1 Tab by mouth every eight (8) hours as needed for Nausea.  albuterol-ipratropium (DUONEB) 2.5 mg-0.5 mg/3 ml nebu 3 mL by Nebulization route every four (4) hours as needed.  albuterol (PROVENTIL VENTOLIN) 2.5 mg /3 mL (0.083 %) nebulizer solution Take 1 vial via neb every 4 hours as needed    albuterol (PROVENTIL HFA, VENTOLIN HFA, PROAIR HFA) 90 mcg/actuation inhaler Take 2 puffs every 4 hours as needed for cough and wheeze with spacer     No current facility-administered medications for this visit. Allergies:  Patient has no known allergies.    Medical History:  Past Medical History:   Diagnosis Date    GERD (gastroesophageal reflux disease)     silent reflux    Ill-defined condition     croup recurrent, feeding difficulties, per mother \"stridors when he cries\"    Ill-defined condition     nonverbal, nonambulatory, unable to hold bottle,    Ill-defined condition     reactive bronchial airway    Otitis media     Premature infant     Trisomy 25     Trisomy 24         Family History: No interval change. Environment: No interval change. Physical Exam:  Visit Vitals  Pulse 124   Temp 97.9 °F (36.6 °C) (Axillary)   Resp 23   Ht (!) 3' 2.47\" (0.977 m)   Wt 29 lb 12.2 oz (13.5 kg)   SpO2 96%   BMI 14.14 kg/m²     Physical Exam   Constitutional: He appears well-developed and well-nourished. He is active. HENT:   Head: Normocephalic. Nose: No rhinorrhea, nasal discharge or congestion. Mouth/Throat: Mucous membranes are moist. Dentition is normal. No pharynx swelling or pharynx erythema. Eyes: Conjunctivae are normal.   Neck: Normal range of motion. Neck supple. No neck adenopathy. Cardiovascular: Normal rate, regular rhythm, S1 normal and S2 normal. Exam reveals no gallop, no S3 and no S4. Pulses are palpable. No murmur heard. Pulmonary/Chest: Effort normal and breath sounds normal. There is normal air entry. No accessory muscle usage, stridor or grunting. No respiratory distress. Air movement is not decreased. No transmitted upper airway sounds. He has no decreased breath sounds. He has no wheezes. He has no rhonchi. He has no rales. He exhibits no retraction. Abdominal: Soft. Bowel sounds are normal. There is no hepatosplenomegaly. There is no tenderness. Musculoskeletal: Normal range of motion. Neurological: He is alert and oriented for age. Skin: Skin is warm and dry. Capillary refill takes less than 3 seconds.        Investigations:  Pulmonary Function Testing:   Spirometry reviewed: none

## 2023-09-14 NOTE — PROGRESS NOTES
Subjective     Patient ID: Radha Martínez is a 73 y.o. female.    Chief Complaint: Follow-up    Pt is here for medication refill and lab work today. Is having Knee surgery in October but has to be cleared from Cardiology  Dr. Hoff in [place] and Pulmonology Dr. Flowers before clearance form me.    Follow-up  This is a chronic problem. The current episode started more than 1 year ago. The problem occurs constantly. The problem has been unchanged. Associated symptoms include arthralgias, congestion, coughing and headaches. Pertinent negatives include no abdominal pain, change in bowel habit, chest pain, fatigue, nausea, rash, urinary symptoms, vomiting or weakness. Nothing aggravates the symptoms. She has tried nothing for the symptoms. The treatment provided moderate relief.     Review of Systems   Constitutional:  Negative for activity change, appetite change and fatigue.   HENT:  Positive for nasal congestion. Negative for ear pain, hearing loss and trouble swallowing.    Eyes:  Negative for discharge, itching and visual disturbance.   Respiratory:  Positive for cough. Negative for chest tightness and shortness of breath.         Pt has COPD and CHF    Cardiovascular:  Negative for chest pain and palpitations.   Gastrointestinal:  Negative for abdominal pain, change in bowel habit, nausea, vomiting and change in bowel habit.   Endocrine: Negative for polydipsia, polyphagia and polyuria.   Genitourinary:  Negative for difficulty urinating and dysuria.   Musculoskeletal:  Positive for arthralgias.        Will need Right TKR in October   Integumentary:  Negative for rash.   Neurological:  Positive for headaches. Negative for dizziness and weakness.        But takes Tylenol with this and it helps sometimes   Psychiatric/Behavioral:  The patient is not nervous/anxious.           Objective     Physical Exam  Vitals and nursing note reviewed.   Constitutional:       Appearance: Normal appearance. She is not  ill-appearing.   HENT:      Head: Normocephalic.      Right Ear: Tympanic membrane, ear canal and external ear normal.      Left Ear: Tympanic membrane, ear canal and external ear normal.      Nose: Nose normal.      Mouth/Throat:      Mouth: Mucous membranes are moist.   Eyes:      General: No scleral icterus.     Extraocular Movements: Extraocular movements intact.      Conjunctiva/sclera: Conjunctivae normal.      Pupils: Pupils are equal, round, and reactive to light.   Neck:      Vascular: No carotid bruit.   Cardiovascular:      Rate and Rhythm: Normal rate and regular rhythm.      Heart sounds: Normal heart sounds.   Pulmonary:      Effort: Pulmonary effort is normal.      Breath sounds: Normal breath sounds. No wheezing, rhonchi or rales.   Abdominal:      Palpations: Abdomen is soft.   Musculoskeletal:         General: Normal range of motion.      Cervical back: Normal range of motion and neck supple. No tenderness.   Skin:     General: Skin is warm and dry.      Capillary Refill: Capillary refill takes less than 2 seconds.   Neurological:      General: No focal deficit present.      Mental Status: She is alert and oriented to person, place, and time.   Psychiatric:         Mood and Affect: Mood normal.         Behavior: Behavior normal.            Assessment and Plan     1. Congestive heart failure, unspecified HF chronicity, unspecified heart failure type  Assessment & Plan:  sympt under control continue current med regimen    Orders:  -     NT-Pro Natriuretic Peptide; Future; Expected date: 09/14/2023    2. Asthma with acute exacerbation, unspecified asthma severity, unspecified whether persistent  Assessment & Plan:  Asthma is controlled. Current medical regimen is effective;   Will adjust according to results and monitor.    Orders:  -     albuterol (PROVENTIL/VENTOLIN HFA) 90 mcg/actuation inhaler; Inhale 1-2 puffs into the lungs every 6 (six) hours as needed for Wheezing.  Dispense: 54 g; Refill:  1  -     albuterol-ipratropium (DUO-NEB) 2.5 mg-0.5 mg/3 mL nebulizer solution; Take 3 mLs by nebulization every 6 (six) hours as needed for Wheezing. Rescue  Dispense: 100 mL; Refill: 5    3. Bronchitis  Assessment & Plan:  Continue meds as prescribed. symptoms controlled at this visit    Orders:  -     albuterol-ipratropium (DUO-NEB) 2.5 mg-0.5 mg/3 mL nebulizer solution; Take 3 mLs by nebulization every 6 (six) hours as needed for Wheezing. Rescue  Dispense: 100 mL; Refill: 5  -     NT-Pro Natriuretic Peptide; Future; Expected date: 09/14/2023    4. Cough, unspecified type    5. Anxiety  Assessment & Plan:  Anxiety is controlled. Current medical regimen is effective;   Will adjust according to results and monitor.    Orders:  -     busPIRone (BUSPAR) 10 MG tablet; Take 1 tablet (10 mg total) by mouth 2 (two) times daily.  Dispense: 180 tablet; Refill: 1    6. Moderate persistent asthma without complication  Assessment & Plan:  Asthma is controlled. Current medical regimen is effective;   Will adjust according to results and monitor.    Orders:  -     fluticasone-umeclidin-vilanter (TRELEGY ELLIPTA) 200-62.5-25 mcg inhaler; Inhale 1 puff into the lungs once daily.  Dispense: 60 each; Refill: 11    7. Chronic diastolic heart failure  Assessment & Plan:  Will cont to monitor labs. Symptoms are controlled at this time    Orders:  -     furosemide (LASIX) 40 MG tablet; Take 1 tablet (40 mg total) by mouth 2 (two) times daily.  Dispense: 180 tablet; Refill: 1  -     Comprehensive Metabolic Panel; Future; Expected date: 09/14/2023  -     CBC Auto Differential; Future; Expected date: 09/14/2023    8. Chronic low back pain with bilateral sciatica, unspecified back pain laterality  Assessment & Plan:  Pt denies any pain today. Will cont to monitor    Orders:  -     gabapentin (NEURONTIN) 300 MG capsule; Take 1 capsule (300 mg total) by mouth 3 (three) times daily.  Dispense: 270 capsule; Refill: 1    9. Moderate persistent  asthma with acute exacerbation  Assessment & Plan:  Asthma is controlled. Current medical regimen is effective;   Will adjust according to results and monitor.    Orders:  -     ipratropium (ATROVENT) 42 mcg (0.06 %) nasal spray; 2 sprays by Each Nostril route 4 (four) times daily.  Dispense: 15 mL; Refill: 0    10. Allergy, sequela  Assessment & Plan:  allergies is controlled. Current medical regimen is effective;   Will adjust according to results and monitor.    Orders:  -     montelukast (SINGULAIR) 10 mg tablet; Take 1 tablet (10 mg total) by mouth once daily.  Dispense: 90 tablet; Refill: 1    11. Hypertension, unspecified type  Assessment & Plan:  Blood pressure is controlled. Current medical regimen is effective;   Will adjust according to results and monitor.    Orders:  -     nebivoloL (BYSTOLIC) 5 MG Tab; Take 1 tablet (5 mg total) by mouth once daily.  Dispense: 90 tablet; Refill: 1  -     Comprehensive Metabolic Panel; Future; Expected date: 09/14/2023  -     CBC Auto Differential; Future; Expected date: 09/14/2023    12. Depression, unspecified depression type  Assessment & Plan:  Depression is controlled. Current medical regimen is effective;   Will adjust according to results and monitor.    Orders:  -     paroxetine (PAXIL) 40 MG tablet; Take 1 tablet (40 mg total) by mouth once daily.  Dispense: 90 tablet; Refill: 1    13. Seizure  Assessment & Plan:  Continue current medicatio  As symptoms are controlled.    Orders:  -     phenytoin (DILANTIN) 100 MG ER capsule; Take 1 capsule (100 mg total) by mouth 3 (three) times daily.  Dispense: 270 capsule; Refill: 1  -     Phenytoin Level, Free; Future; Expected date: 09/14/2023    14. Hyperlipidemia, unspecified hyperlipidemia type  Assessment & Plan:  Hyperlipidemia is controlled. Current medical regimen is effective;   Will adjust according to results and monitor.    Orders:  -     pravastatin (PRAVACHOL) 40 MG tablet; Take 1 tablet (40 mg total) by  mouth every evening.  Dispense: 90 tablet; Refill: 1  -     Comprehensive Metabolic Panel; Future; Expected date: 09/14/2023  -     Lipid Panel; Future; Expected date: 09/14/2023  -     CBC Auto Differential; Future; Expected date: 09/14/2023               Follow up in about 3 months (around 12/14/2023).

## 2023-09-14 NOTE — ASSESSMENT & PLAN NOTE
Hyperlipidemia is controlled. Current medical regimen is effective;   Will adjust according to results and monitor.

## 2023-09-14 NOTE — ASSESSMENT & PLAN NOTE
Depression is controlled. Current medical regimen is effective;   Will adjust according to results and monitor.

## 2023-09-14 NOTE — ASSESSMENT & PLAN NOTE
Anxiety is controlled. Current medical regimen is effective;   Will adjust according to results and monitor.

## 2023-09-14 NOTE — ASSESSMENT & PLAN NOTE
allergies is controlled. Current medical regimen is effective;   Will adjust according to results and monitor.

## 2023-09-15 NOTE — PROGRESS NOTES
CMP is good. Watch intake of processed sugars and high carb foods  Cholesterol is a little high 222 make sure she is taking her pravastatin.   Heart failure lab BNP is coming down compared to the last lab value  Cbc is ok  Dil level has not come in yet but will call with that information when it does

## 2023-09-16 LAB — PHENYTOIN FREE SERPL-MCNC: 0.8 MCG/ML (ref 1–2)

## 2023-09-29 DIAGNOSIS — Z01.818 PRE-OPERATIVE CLEARANCE: ICD-10-CM

## 2023-09-29 DIAGNOSIS — Z51.81 MONITORING FOR ANTICOAGULANT USE: ICD-10-CM

## 2023-09-29 DIAGNOSIS — Z01.812 PRE-PROCEDURE LAB EXAM: ICD-10-CM

## 2023-09-29 DIAGNOSIS — Z79.01 MONITORING FOR ANTICOAGULANT USE: ICD-10-CM

## 2023-09-29 DIAGNOSIS — M17.11 PRIMARY OSTEOARTHRITIS OF RIGHT KNEE: Primary | ICD-10-CM

## 2023-09-29 DIAGNOSIS — Z01.810 PREOP CARDIOVASCULAR EXAM: ICD-10-CM

## 2023-09-29 DIAGNOSIS — Z01.811 PREOP RESPIRATORY EXAM: ICD-10-CM

## 2023-10-17 PROCEDURE — 85610 PROTHROMBIN TIME: CPT | Performed by: ORTHOPAEDIC SURGERY

## 2023-10-17 PROCEDURE — 85730 THROMBOPLASTIN TIME PARTIAL: CPT | Performed by: ORTHOPAEDIC SURGERY

## 2023-10-20 ENCOUNTER — OFFICE VISIT (OUTPATIENT)
Dept: FAMILY MEDICINE | Facility: CLINIC | Age: 73
End: 2023-10-20
Payer: MEDICARE

## 2023-10-20 VITALS
HEART RATE: 73 BPM | BODY MASS INDEX: 38.07 KG/M2 | OXYGEN SATURATION: 98 % | TEMPERATURE: 98 F | WEIGHT: 223 LBS | RESPIRATION RATE: 19 BRPM | HEIGHT: 64 IN

## 2023-10-20 DIAGNOSIS — Z01.811 PRE-OP CHEST EXAM: ICD-10-CM

## 2023-10-20 DIAGNOSIS — Z01.818 PRE-OPERATIVE CLEARANCE: Primary | ICD-10-CM

## 2023-10-20 PROBLEM — I48.11 LONGSTANDING PERSISTENT ATRIAL FIBRILLATION: Status: ACTIVE | Noted: 2023-10-20

## 2023-10-20 PROBLEM — N39.0 URINARY TRACT INFECTION WITH HEMATURIA: Status: ACTIVE | Noted: 2023-10-20

## 2023-10-20 PROBLEM — R31.9 URINARY TRACT INFECTION WITH HEMATURIA: Status: ACTIVE | Noted: 2023-10-20

## 2023-10-20 LAB
BILIRUB SERPL-MCNC: NEGATIVE MG/DL
BLOOD URINE, POC: NORMAL
COLOR, POC UA: YELLOW
EKG 12-LEAD: NORMAL
GLUCOSE UR QL STRIP: NEGATIVE
KETONES UR QL STRIP: NEGATIVE
LEUKOCYTE ESTERASE URINE, POC: NORMAL
NITRITE, POC UA: NEGATIVE
PH, POC UA: 6
PR INTERVAL: 132
PROTEIN, POC: NEGATIVE
PRT AXES: NORMAL
QRS DURATION: 85
QT/QTC: NORMAL
SPECIFIC GRAVITY, POC UA: 1.01
UROBILINOGEN, POC UA: 0.2
VENTRICULAR RATE: 69

## 2023-10-20 PROCEDURE — 93010 POCT EKG 12-LEAD: ICD-10-PCS | Mod: ,,,

## 2023-10-20 PROCEDURE — 3008F PR BODY MASS INDEX (BMI) DOCUMENTED: ICD-10-PCS | Mod: ,,,

## 2023-10-20 PROCEDURE — 99213 OFFICE O/P EST LOW 20 MIN: CPT | Mod: ,,,

## 2023-10-20 PROCEDURE — 3288F PR FALLS RISK ASSESSMENT DOCUMENTED: ICD-10-PCS | Mod: ,,,

## 2023-10-20 PROCEDURE — 1159F MED LIST DOCD IN RCRD: CPT | Mod: ,,,

## 2023-10-20 PROCEDURE — 1101F PR PT FALLS ASSESS DOC 0-1 FALLS W/OUT INJ PAST YR: ICD-10-PCS | Mod: ,,,

## 2023-10-20 PROCEDURE — 81003 URINALYSIS AUTO W/O SCOPE: CPT | Mod: QW,,,

## 2023-10-20 PROCEDURE — 93005 ELECTROCARDIOGRAM TRACING: CPT | Mod: ,,,

## 2023-10-20 PROCEDURE — 1160F RVW MEDS BY RX/DR IN RCRD: CPT | Mod: ,,,

## 2023-10-20 PROCEDURE — 81003 POCT URINALYSIS W/O SCOPE: ICD-10-PCS | Mod: QW,,,

## 2023-10-20 PROCEDURE — 1160F PR REVIEW ALL MEDS BY PRESCRIBER/CLIN PHARMACIST DOCUMENTED: ICD-10-PCS | Mod: ,,,

## 2023-10-20 PROCEDURE — 1159F PR MEDICATION LIST DOCUMENTED IN MEDICAL RECORD: ICD-10-PCS | Mod: ,,,

## 2023-10-20 PROCEDURE — 1101F PT FALLS ASSESS-DOCD LE1/YR: CPT | Mod: ,,,

## 2023-10-20 PROCEDURE — 1126F PR PAIN SEVERITY QUANTIFIED, NO PAIN PRESENT: ICD-10-PCS | Mod: ,,,

## 2023-10-20 PROCEDURE — 93005 PR ELECTROCARDIOGRAM, TRACING: ICD-10-PCS | Mod: ,,,

## 2023-10-20 PROCEDURE — 1126F AMNT PAIN NOTED NONE PRSNT: CPT | Mod: ,,,

## 2023-10-20 PROCEDURE — 3288F FALL RISK ASSESSMENT DOCD: CPT | Mod: ,,,

## 2023-10-20 PROCEDURE — 3008F BODY MASS INDEX DOCD: CPT | Mod: ,,,

## 2023-10-20 PROCEDURE — 99213 PR OFFICE/OUTPT VISIT, EST, LEVL III, 20-29 MIN: ICD-10-PCS | Mod: ,,,

## 2023-10-20 PROCEDURE — 93010 ELECTROCARDIOGRAM REPORT: CPT | Mod: ,,,

## 2023-10-20 RX ORDER — SULFAMETHOXAZOLE AND TRIMETHOPRIM 800; 160 MG/1; MG/1
1 TABLET ORAL 2 TIMES DAILY
Qty: 10 TABLET | Refills: 0 | Status: SHIPPED | OUTPATIENT
Start: 2023-10-20 | End: 2023-10-20 | Stop reason: CLARIF

## 2023-10-20 NOTE — PROGRESS NOTES
Subjective     Patient ID: Radha Martínez is a 73 y.o. female.    Chief Complaint: Pre-op Exam (Surgery Clearance for surgery on 10/24/2023)      Pt here for surgical clearance EKG and CXR. Pt is doing well denies chest pain at this visit. EKG shows normal sinus rhythm.        Review of Systems   Constitutional:  Negative for activity change, appetite change and fatigue.   HENT:  Negative for nasal congestion, sinus pressure/congestion and trouble swallowing.    Eyes:  Negative for pain and visual disturbance.   Respiratory:  Negative for chest tightness, shortness of breath and wheezing.    Cardiovascular:  Negative for chest pain and palpitations.   Gastrointestinal:  Negative for change in bowel habit.   Genitourinary:  Negative for difficulty urinating, dysuria, flank pain, frequency and urgency.   Musculoskeletal:  Positive for arthralgias and gait problem. Negative for myalgias.        Tenderness to right knee.  Pt does use a rollator for ambulation.    Integumentary:  Negative for rash and wound.   Neurological:  Negative for weakness and headaches.   Psychiatric/Behavioral:  The patient is not nervous/anxious.             Objective     Physical Exam  Vitals and nursing note reviewed.   Constitutional:       Appearance: Normal appearance. She is not ill-appearing.   HENT:      Head: Normocephalic and atraumatic.      Nose: Nose normal.      Mouth/Throat:      Mouth: Mucous membranes are moist.      Pharynx: Oropharynx is clear.   Eyes:      Extraocular Movements: Extraocular movements intact.      Conjunctiva/sclera: Conjunctivae normal.      Pupils: Pupils are equal, round, and reactive to light.   Cardiovascular:      Rate and Rhythm: Normal rate and regular rhythm.      Pulses: Normal pulses.      Heart sounds: Normal heart sounds.   Pulmonary:      Effort: Pulmonary effort is normal. No respiratory distress.      Breath sounds: Normal breath sounds. No wheezing.   Chest:      Chest wall: No tenderness.    Abdominal:      General: Bowel sounds are normal.      Palpations: Abdomen is soft.      Tenderness: There is no abdominal tenderness.   Musculoskeletal:         General: Tenderness present.      Cervical back: Normal range of motion and neck supple. No tenderness.      Comments: Tenderness to right knee. Pt is scheduled for RTKR with Dr. Castillo 10/24/2023.   Skin:     General: Skin is warm and dry.      Capillary Refill: Capillary refill takes less than 2 seconds.   Neurological:      General: No focal deficit present.      Mental Status: She is alert and oriented to person, place, and time.   Psychiatric:         Mood and Affect: Mood normal.         Behavior: Behavior normal.         Thought Content: Thought content normal.         Judgment: Judgment normal.              Assessment and Plan     1. Pre-operative clearance  -     Cancel: CBC Auto Differential; Future; Expected date: 10/20/2023  -     Cancel: Basic Metabolic Panel; Future; Expected date: 10/20/2023  -     Cancel: PT and PTT; Future; Expected date: 10/20/2023  -     Cancel: Protime-INR; Future; Expected date: 10/20/2023  -     Cancel: X-Ray Chest PA And Lateral; Future; Expected date: 10/20/2023  -     POCT URINALYSIS W/O SCOPE  -     X-Ray Chest PA And Lateral; Future; Expected date: 10/20/2023    2. Pre-op chest exam  Assessment & Plan:  EKG and CXR performed at clinic today. Will call patient with cxr results once radiologist reads.    Orders:  -     POCT EKG 12-LEAD (Manually Resulted by Ordering Provider)    Other orders  -     Discontinue: sulfamethoxazole-trimethoprim 800-160mg (BACTRIM DS) 800-160 mg Tab; Take 1 tablet by mouth 2 (two) times daily.  Dispense: 10 tablet; Refill: 0      Pt is cleared for surgery at this visit.   Lungs CTA, heart rate is regular rhythm.  EKG show NSR         Follow up if symptoms worsen or fail to improve.

## 2023-10-23 ENCOUNTER — TELEPHONE (OUTPATIENT)
Dept: ORTHOPEDICS | Facility: CLINIC | Age: 73
End: 2023-10-23
Payer: MEDICARE

## 2023-10-23 NOTE — TELEPHONE ENCOUNTER
----- Message from Dana Byrd sent at 10/23/2023  9:04 AM CDT -----  Regarding: RES SURGERY  Pts sister  over weekend and she needs to cancel surgery but wants another date please. [phone].

## 2023-10-23 NOTE — TELEPHONE ENCOUNTER
10/23/23 @ 5794 attempted to call no answer unable to leave a voice msg mailbox not set up yet  10/23/23 @ 5324 called and spoke with pt. Surgery moved to nov. 2

## 2023-10-30 ENCOUNTER — HOSPITAL ENCOUNTER (OUTPATIENT)
Dept: RADIOLOGY | Facility: HOSPITAL | Age: 73
Discharge: HOME | End: 2023-10-30
Attending: ORTHOPAEDIC SURGERY
Payer: MEDICARE

## 2023-10-30 ENCOUNTER — OFFICE VISIT (OUTPATIENT)
Dept: ORTHOPEDICS | Facility: CLINIC | Age: 73
End: 2023-10-30
Payer: MEDICARE

## 2023-10-30 DIAGNOSIS — Z01.811 PRE-OP CHEST EXAM: ICD-10-CM

## 2023-10-30 DIAGNOSIS — Z01.811 PRE-OP CHEST EXAM: Primary | ICD-10-CM

## 2023-10-30 DIAGNOSIS — M17.11 PRIMARY OSTEOARTHRITIS OF RIGHT KNEE: Primary | ICD-10-CM

## 2023-10-30 PROCEDURE — 99214 OFFICE O/P EST MOD 30 MIN: CPT | Mod: S$PBB,,, | Performed by: ORTHOPAEDIC SURGERY

## 2023-10-30 PROCEDURE — 1160F RVW MEDS BY RX/DR IN RCRD: CPT | Mod: CPTII,,, | Performed by: ORTHOPAEDIC SURGERY

## 2023-10-30 PROCEDURE — 71046 X-RAY EXAM CHEST 2 VIEWS: CPT | Mod: TC

## 2023-10-30 PROCEDURE — 99212 OFFICE O/P EST SF 10 MIN: CPT | Mod: PBBFAC | Performed by: ORTHOPAEDIC SURGERY

## 2023-10-30 PROCEDURE — 71046 X-RAY EXAM CHEST 2 VIEWS: CPT | Mod: 26,,, | Performed by: RADIOLOGY

## 2023-10-30 PROCEDURE — 1159F PR MEDICATION LIST DOCUMENTED IN MEDICAL RECORD: ICD-10-PCS | Mod: CPTII,,, | Performed by: ORTHOPAEDIC SURGERY

## 2023-10-30 PROCEDURE — 1159F MED LIST DOCD IN RCRD: CPT | Mod: CPTII,,, | Performed by: ORTHOPAEDIC SURGERY

## 2023-10-30 PROCEDURE — 71046 XR CHEST PA AND LATERAL: ICD-10-PCS | Mod: 26,,, | Performed by: RADIOLOGY

## 2023-10-30 PROCEDURE — 1160F PR REVIEW ALL MEDS BY PRESCRIBER/CLIN PHARMACIST DOCUMENTED: ICD-10-PCS | Mod: CPTII,,, | Performed by: ORTHOPAEDIC SURGERY

## 2023-10-30 PROCEDURE — 99214 PR OFFICE/OUTPT VISIT, EST, LEVL IV, 30-39 MIN: ICD-10-PCS | Mod: S$PBB,,, | Performed by: ORTHOPAEDIC SURGERY

## 2023-10-30 NOTE — PROGRESS NOTES
CLINIC NOTE       No chief complaint on file.       Radha Martínez is a 73 y.o. female seen today for recheck of her left knee.  She is known to me having undergone right total knee replacement arthroplasty 06/12/2018.  She is done well aspect her left knee since that time.  She is had progressive right knee pain and radiographic evidence of tricompartmental DJD.  She has complete obliteration of the medial joint space with genu varum.  She is not experienced any significant improvement with her last corticosteroid injection.  She is requested we proceed with total knee replacement arthroplasty.  She is seeing Ariadne BURDICK for her medical needs.  She is not on any blood thinners.    X-rays right knee 09/06/2023 shows moderate generalized osteopenia.  There is severe tricompartmental DJD with complete obliteration of medial joint space in genu varum.  Past Medical History:   Diagnosis Date    Asthma     CHF (congestive heart failure)     COPD (chronic obstructive pulmonary disease)     WILLSON (dyspnea on exertion)     GERD (gastroesophageal reflux disease)     HTN (hypertension)     Hyperlipidemia     Osteoporosis     Oxygen dependent     02 2L    Seizure disorder      Family History   Problem Relation Age of Onset    Diabetes Mother     Heart disease Mother     Hypertension Mother     Cancer Father     Diabetes Sister     Heart disease Sister     Hypertension Sister     Diabetes Brother      Current Outpatient Medications on File Prior to Visit   Medication Sig Dispense Refill    albuterol (PROVENTIL/VENTOLIN HFA) 90 mcg/actuation inhaler Inhale 1-2 puffs into the lungs every 6 (six) hours as needed for Wheezing. 54 g 1    albuterol-ipratropium (DUO-NEB) 2.5 mg-0.5 mg/3 mL nebulizer solution Take 3 mLs by nebulization every 6 (six) hours as needed for Wheezing. Rescue 100 mL 5    busPIRone (BUSPAR) 10 MG tablet Take 1 tablet (10 mg total) by mouth 2 (two) times daily. 180 tablet 1    calcium  carbonate/vitamin D3 (CALCARB 600 WITH VITAMIN D ORAL) Take by mouth.      clopidogreL (PLAVIX) 75 mg tablet TAKE 1 TABLET ONE TIME DAILY 90 tablet 0    fluticasone propionate (FLONASE) 50 mcg/actuation nasal spray USE 1 SPRAY IN EACH NOSTRIL EVERY DAY 32 g 2    fluticasone-umeclidin-vilanter (TRELEGY ELLIPTA) 200-62.5-25 mcg inhaler Inhale 1 puff into the lungs once daily. 60 each 11    furosemide (LASIX) 40 MG tablet Take 1 tablet (40 mg total) by mouth 2 (two) times daily. 180 tablet 1    gabapentin (NEURONTIN) 300 MG capsule Take 1 capsule (300 mg total) by mouth 3 (three) times daily. 270 capsule 1    ipratropium (ATROVENT) 42 mcg (0.06 %) nasal spray 2 sprays by Each Nostril route 4 (four) times daily. 15 mL 0    loratadine (CLARITIN) 10 mg tablet TAKE 1 TABLET EVERY DAY 90 tablet 1    meclizine (ANTIVERT) 12.5 mg tablet Take 1 tablet (12.5 mg total) by mouth 2 (two) times daily as needed for Dizziness. 60 tablet 0    montelukast (SINGULAIR) 10 mg tablet Take 1 tablet (10 mg total) by mouth once daily. 90 tablet 1    nebivoloL (BYSTOLIC) 5 MG Tab Take 1 tablet (5 mg total) by mouth once daily. 90 tablet 1    pantoprazole (PROTONIX) 40 MG tablet TAKE 1 TABLET ONE TIME DAILY 90 tablet 1    paroxetine (PAXIL) 40 MG tablet Take 1 tablet (40 mg total) by mouth once daily. 90 tablet 1    phenytoin (DILANTIN) 100 MG ER capsule Take 1 capsule (100 mg total) by mouth 3 (three) times daily. 270 capsule 1    pravastatin (PRAVACHOL) 40 MG tablet Take 1 tablet (40 mg total) by mouth every evening. 90 tablet 1     No current facility-administered medications on file prior to visit.       ROS     There were no vitals filed for this visit.    Past Surgical History:   Procedure Laterality Date    CARDIOVERSION N/A 04/08/2022    Procedure: Cardioversion;  Surgeon: Adal Chung MD;  Location: [facility] CATH LAB;  Service: Cardiology;  Laterality: N/A;    COLONOSCOPY      EYE SURGERY      REVISION OF TOTAL KNEE REPLACEMENT        TUBAL LIGATION          Review of patient's allergies indicates:  No Known Allergies     Ortho Exam     Radiographic Examination:    Technique:    Findings:    Impression:   See Above    Assessment and Plan  Patient Active Problem List    Diagnosis Date Noted    Urinary tract infection with hematuria 10/20/2023    Longstanding persistent atrial fibrillation 10/20/2023    Pre-operative clearance 10/20/2023    Congestive heart failure 09/14/2023    Allergies 09/14/2023    Chronic low back pain with bilateral sciatica 09/14/2023    Anxiety 09/14/2023    Bronchitis 09/14/2023    Primary osteoarthritis of right knee 09/07/2023    History of total knee arthroplasty, left 09/07/2023    Pre-op chest exam 08/14/2023    Asymptomatic menopausal state 08/14/2023    BMI 36.0-36.9,adult 08/14/2023    Cough 08/14/2023    PAF (paroxysmal atrial fibrillation) 01/26/2023    PACO on CPAP 11/07/2022    HTN (hypertension) 11/07/2022    Severe obesity (BMI 35.0-39.9) with comorbidity 09/01/2022    Depression 09/01/2022    Angina pectoris 09/01/2022    Chronic kidney disease, stage 3a 09/01/2022    Dyspnea 04/07/2022    HLD (hyperlipidemia) 04/07/2022    Seizure 04/07/2022    COPD exacerbation 12/19/2021    Chronic diastolic heart failure 09/08/2021    Asthma     GERD (gastroesophageal reflux disease)     Osteoporosis     Impression severe end-stage DJD-right knee  Plan:  Computer navigated Right total knee replacement arthroplasty.  Discussed overnight stay in the hospital.  The potential benefits risks of surgery outlined to include but not limited to bleeding, infection, damage to blood vessels and nerves, need for further surgery, other risks complications including even death the patient wished to proceed.      Jayy Castillo M.D.

## 2023-11-01 PROBLEM — M17.11 PRIMARY OSTEOARTHRITIS OF RIGHT KNEE: Chronic | Status: ACTIVE | Noted: 2023-09-07

## 2023-11-01 NOTE — H&P
[facility] - Orthopedic Periop Services  Orthopedics  H&P    Patient Name: Radha Martínez  MRN: [id number]  Admission Date: (Not on file)  Primary Care Provider: Jayy Castillo MD    Patient information was obtained from patient and ER records.     Subjective:     Principal Problem:Primary osteoarthritis of right knee    Chief Complaint: No chief complaint on file.       HPI:   Chief complaint: Right knee pain   History:   Radha Martínez is a 73 y.o. female seen for recheck of her left knee.  She is known to me having undergone right total knee replacement arthroplasty 06/12/2018.  She is done well aspect her left knee since that time.  She is had progressive right knee pain and radiographic evidence of tricompartmental DJD.  She has complete obliteration of the medial joint space with genu varum.  She is not experienced any significant improvement with her last corticosteroid injection.  She is requested we proceed with total knee replacement arthroplasty.  She is seeing Ariadne BURDICK for her medical needs.  She is not on any blood thinners.    X-rays right knee 09/06/2023 shows moderate generalized osteopenia.  There is severe tricompartmental DJD with complete obliteration of medial joint space in genu   Impression:  Severe DJD-right knee  Plan:  Computer navigated right total knee replacement arthroplasty.  Procedure was shown in detail using models and actual components.  The potential benefits and risks of surgery outlined to include but not limited to bleeding, infection, damage to blood vessels nerves, need for further surgery, other risks and complications including even death the patient wished to proceed.  varum.              Past Medical History:   Diagnosis Date    Asthma     CHF (congestive heart failure)     COPD (chronic obstructive pulmonary disease)     WILLSON (dyspnea on exertion)     GERD (gastroesophageal reflux disease)     HTN (hypertension)     Hyperlipidemia     Osteoporosis      Oxygen dependent     02 2L    Seizure disorder        Past Surgical History:   Procedure Laterality Date    CARDIOVERSION N/A 04/08/2022    Procedure: Cardioversion;  Surgeon: Adal Chung MD;  Location: [facility] CATH LAB;  Service: Cardiology;  Laterality: N/A;    COLONOSCOPY      EYE SURGERY      REVISION OF TOTAL KNEE REPLACEMENT       TUBAL LIGATION         Review of patient's allergies indicates:  No Known Allergies    No current facility-administered medications for this encounter.     Current Outpatient Medications   Medication Sig    albuterol (PROVENTIL/VENTOLIN HFA) 90 mcg/actuation inhaler Inhale 1-2 puffs into the lungs every 6 (six) hours as needed for Wheezing.    albuterol-ipratropium (DUO-NEB) 2.5 mg-0.5 mg/3 mL nebulizer solution Take 3 mLs by nebulization every 6 (six) hours as needed for Wheezing. Rescue    busPIRone (BUSPAR) 10 MG tablet Take 1 tablet (10 mg total) by mouth 2 (two) times daily.    calcium carbonate/vitamin D3 (CALCARB 600 WITH VITAMIN D ORAL) Take by mouth.    clopidogreL (PLAVIX) 75 mg tablet TAKE 1 TABLET ONE TIME DAILY    fluticasone propionate (FLONASE) 50 mcg/actuation nasal spray USE 1 SPRAY IN EACH NOSTRIL EVERY DAY    fluticasone-umeclidin-vilanter (TRELEGY ELLIPTA) 200-62.5-25 mcg inhaler Inhale 1 puff into the lungs once daily.    furosemide (LASIX) 40 MG tablet Take 1 tablet (40 mg total) by mouth 2 (two) times daily.    gabapentin (NEURONTIN) 300 MG capsule Take 1 capsule (300 mg total) by mouth 3 (three) times daily.    ipratropium (ATROVENT) 42 mcg (0.06 %) nasal spray 2 sprays by Each Nostril route 4 (four) times daily.    loratadine (CLARITIN) 10 mg tablet TAKE 1 TABLET EVERY DAY    meclizine (ANTIVERT) 12.5 mg tablet Take 1 tablet (12.5 mg total) by mouth 2 (two) times daily as needed for Dizziness.    montelukast (SINGULAIR) 10 mg tablet Take 1 tablet (10 mg total) by mouth once daily.    nebivoloL (BYSTOLIC) 5 MG Tab Take 1  tablet (5 mg total) by mouth once daily.    pantoprazole (PROTONIX) 40 MG tablet TAKE 1 TABLET ONE TIME DAILY    paroxetine (PAXIL) 40 MG tablet Take 1 tablet (40 mg total) by mouth once daily.    phenytoin (DILANTIN) 100 MG ER capsule Take 1 capsule (100 mg total) by mouth 3 (three) times daily.    pravastatin (PRAVACHOL) 40 MG tablet Take 1 tablet (40 mg total) by mouth every evening.     Family History       Problem Relation (Age of Onset)    Cancer Father    Diabetes Mother, Sister, Brother    Heart disease Mother, Sister    Hypertension Mother, Sister          Tobacco Use    Smoking status: Never     Passive exposure: Never    Smokeless tobacco: Never   Substance and Sexual Activity    Alcohol use: Never    Drug use: Never    Sexual activity: Not Currently     Review of Systems   Constitutional: Negative.     Objective:     Vital Signs (Most Recent):    Vital Signs (24h Range):  BP: ()/()   Arterial Line BP: ()/()            There is no height or weight on file to calculate BMI.    No intake or output data in the 24 hours ending 11/01/23 1720     General    Vitals reviewed.  Constitutional: She is oriented to person, place, and time. She appears well-developed and well-nourished.   HENT:   Head: Normocephalic and atraumatic.   Eyes: EOM are normal. Pupils are equal, round, and reactive to light.   Neck: Neck supple.   Cardiovascular:  Normal rate, regular rhythm and normal heart sounds.            Pulmonary/Chest: Effort normal and breath sounds normal.   Abdominal: Soft. Bowel sounds are normal.   Neurological: She is alert and oriented to person, place, and time.   Psychiatric: She has a normal mood and affect. Her behavior is normal.     General Musculoskeletal Exam   Gait: antalgic       Right Knee Exam     Inspection   Effusion: present    Tenderness   The patient is tender to palpation of the medial joint line.    Crepitus   The patient has crepitus of the medial joint line.    Range of Motion    Extension:  abnormal   Flexion:  abnormal     Tests   Ligament Examination   Lachman: normal (-1 to 2mm)   PCL-Posterior Drawer: normal (0 to 2mm)     MCL - Valgus: normal (0 to 2mm)  LCL - Varus: normal  Pivot Shift: normal (Equal)    Other   Sensation: normal    Left Knee Exam   Left knee exam is normal.Right Shoulder Exam   Right shoulder exam is normal.    Left Shoulder Exam   Left shoulder exam is normal.       Vascular Exam     Right Pulses  Dorsalis Pedis:      2+  Posterior Tibial:      2+           Significant Labs: All pertinent labs within the past 24 hours have been reviewed.    Significant Imaging: I have reviewed all pertinent imaging results/findings.    Assessment/Plan:     No notes have been filed under this hospital service.  Service: Orthopedic Surgery      N/A  Family history is reviewed and has not changed     Jayy Castillo MD  Orthopedics  [facility] - Orthopedic Periop Services

## 2023-11-01 NOTE — HPI
Chief complaint: Right knee pain   History:   Radha Martínez is a 73 y.o. female seen for recheck of her left knee.  She is known to me having undergone right total knee replacement arthroplasty 06/12/2018.  She is done well aspect her left knee since that time.  She is had progressive right knee pain and radiographic evidence of tricompartmental DJD.  She has complete obliteration of the medial joint space with genu varum.  She is not experienced any significant improvement with her last corticosteroid injection.  She is requested we proceed with total knee replacement arthroplasty.  She is seeing Ariadne BURDICK for her medical needs.  She is not on any blood thinners.    X-rays right knee 09/06/2023 shows moderate generalized osteopenia.  There is severe tricompartmental DJD with complete obliteration of medial joint space in genu   Impression:  Severe DJD-right knee  Plan:  Computer navigated right total knee replacement arthroplasty.  Procedure was shown in detail using models and actual components.  The potential benefits and risks of surgery outlined to include but not limited to bleeding, infection, damage to blood vessels nerves, need for further surgery, other risks and complications including even death the patient wished to proceed.  varum.

## 2023-11-01 NOTE — SUBJECTIVE & OBJECTIVE
Past Medical History:   Diagnosis Date    Asthma     CHF (congestive heart failure)     COPD (chronic obstructive pulmonary disease)     WILLSON (dyspnea on exertion)     GERD (gastroesophageal reflux disease)     HTN (hypertension)     Hyperlipidemia     Osteoporosis     Oxygen dependent     02 2L    Seizure disorder        Past Surgical History:   Procedure Laterality Date    CARDIOVERSION N/A 04/08/2022    Procedure: Cardioversion;  Surgeon: Adal Chung MD;  Location: [facility] CATH LAB;  Service: Cardiology;  Laterality: N/A;    COLONOSCOPY      EYE SURGERY      REVISION OF TOTAL KNEE REPLACEMENT       TUBAL LIGATION         Review of patient's allergies indicates:  No Known Allergies    No current facility-administered medications for this encounter.     Current Outpatient Medications   Medication Sig    albuterol (PROVENTIL/VENTOLIN HFA) 90 mcg/actuation inhaler Inhale 1-2 puffs into the lungs every 6 (six) hours as needed for Wheezing.    albuterol-ipratropium (DUO-NEB) 2.5 mg-0.5 mg/3 mL nebulizer solution Take 3 mLs by nebulization every 6 (six) hours as needed for Wheezing. Rescue    busPIRone (BUSPAR) 10 MG tablet Take 1 tablet (10 mg total) by mouth 2 (two) times daily.    calcium carbonate/vitamin D3 (CALCARB 600 WITH VITAMIN D ORAL) Take by mouth.    clopidogreL (PLAVIX) 75 mg tablet TAKE 1 TABLET ONE TIME DAILY    fluticasone propionate (FLONASE) 50 mcg/actuation nasal spray USE 1 SPRAY IN EACH NOSTRIL EVERY DAY    fluticasone-umeclidin-vilanter (TRELEGY ELLIPTA) 200-62.5-25 mcg inhaler Inhale 1 puff into the lungs once daily.    furosemide (LASIX) 40 MG tablet Take 1 tablet (40 mg total) by mouth 2 (two) times daily.    gabapentin (NEURONTIN) 300 MG capsule Take 1 capsule (300 mg total) by mouth 3 (three) times daily.    ipratropium (ATROVENT) 42 mcg (0.06 %) nasal spray 2 sprays by Each Nostril route 4 (four) times daily.    loratadine (CLARITIN) 10 mg tablet TAKE 1 TABLET EVERY DAY    meclizine  (ANTIVERT) 12.5 mg tablet Take 1 tablet (12.5 mg total) by mouth 2 (two) times daily as needed for Dizziness.    montelukast (SINGULAIR) 10 mg tablet Take 1 tablet (10 mg total) by mouth once daily.    nebivoloL (BYSTOLIC) 5 MG Tab Take 1 tablet (5 mg total) by mouth once daily.    pantoprazole (PROTONIX) 40 MG tablet TAKE 1 TABLET ONE TIME DAILY    paroxetine (PAXIL) 40 MG tablet Take 1 tablet (40 mg total) by mouth once daily.    phenytoin (DILANTIN) 100 MG ER capsule Take 1 capsule (100 mg total) by mouth 3 (three) times daily.    pravastatin (PRAVACHOL) 40 MG tablet Take 1 tablet (40 mg total) by mouth every evening.     Family History       Problem Relation (Age of Onset)    Cancer Father    Diabetes Mother, Sister, Brother    Heart disease Mother, Sister    Hypertension Mother, Sister          Tobacco Use    Smoking status: Never     Passive exposure: Never    Smokeless tobacco: Never   Substance and Sexual Activity    Alcohol use: Never    Drug use: Never    Sexual activity: Not Currently     Review of Systems   Constitutional: Negative.     Objective:     Vital Signs (Most Recent):    Vital Signs (24h Range):  BP: ()/()   Arterial Line BP: ()/()            There is no height or weight on file to calculate BMI.    No intake or output data in the 24 hours ending 11/01/23 1720     General    Vitals reviewed.  Constitutional: She is oriented to person, place, and time. She appears well-developed and well-nourished.   HENT:   Head: Normocephalic and atraumatic.   Eyes: EOM are normal. Pupils are equal, round, and reactive to light.   Neck: Neck supple.   Cardiovascular:  Normal rate, regular rhythm and normal heart sounds.            Pulmonary/Chest: Effort normal and breath sounds normal.   Abdominal: Soft. Bowel sounds are normal.   Neurological: She is alert and oriented to person, place, and time.   Psychiatric: She has a normal mood and affect. Her behavior is normal.     General Musculoskeletal Exam   Gait:  antalgic       Right Knee Exam     Inspection   Effusion: present    Tenderness   The patient is tender to palpation of the medial joint line.    Crepitus   The patient has crepitus of the medial joint line.    Range of Motion   Extension:  abnormal   Flexion:  abnormal     Tests   Ligament Examination   Lachman: normal (-1 to 2mm)   PCL-Posterior Drawer: normal (0 to 2mm)     MCL - Valgus: normal (0 to 2mm)  LCL - Varus: normal  Pivot Shift: normal (Equal)    Other   Sensation: normal    Left Knee Exam   Left knee exam is normal.Right Shoulder Exam   Right shoulder exam is normal.    Left Shoulder Exam   Left shoulder exam is normal.       Vascular Exam     Right Pulses  Dorsalis Pedis:      2+  Posterior Tibial:      2+           Significant Labs: All pertinent labs within the past 24 hours have been reviewed.    Significant Imaging: I have reviewed all pertinent imaging results/findings.   Breathing spontaneous and unlabored. Breath sounds clear and equal bilaterally with regular rhythm.

## 2023-11-02 ENCOUNTER — ANESTHESIA (OUTPATIENT)
Dept: SURGERY | Facility: HOSPITAL | Age: 73
End: 2023-11-02
Payer: MEDICARE

## 2023-11-02 ENCOUNTER — HOSPITAL ENCOUNTER (OUTPATIENT)
Facility: HOSPITAL | Age: 73
Discharge: HOME HEALTH | End: 2023-11-03
Attending: ORTHOPAEDIC SURGERY | Admitting: ORTHOPAEDIC SURGERY
Payer: MEDICARE

## 2023-11-02 ENCOUNTER — ANESTHESIA EVENT (OUTPATIENT)
Dept: SURGERY | Facility: HOSPITAL | Age: 73
End: 2023-11-02
Payer: MEDICARE

## 2023-11-02 DIAGNOSIS — M17.11 PRIMARY OSTEOARTHRITIS OF RIGHT KNEE: Primary | ICD-10-CM

## 2023-11-02 LAB
ANION GAP SERPL CALCULATED.3IONS-SCNC: 11 MMOL/L (ref 7–16)
BASOPHILS # BLD AUTO: 0.02 K/UL (ref 0–0.2)
BASOPHILS NFR BLD AUTO: 0.5 % (ref 0–1)
BUN SERPL-MCNC: 17 MG/DL (ref 7–18)
BUN/CREAT SERPL: 16 (ref 6–20)
CALCIUM SERPL-MCNC: 8.8 MG/DL (ref 8.5–10.1)
CHLORIDE SERPL-SCNC: 105 MMOL/L (ref 98–107)
CO2 SERPL-SCNC: 27 MMOL/L (ref 21–32)
CREAT SERPL-MCNC: 1.05 MG/DL (ref 0.55–1.02)
DIFFERENTIAL METHOD BLD: ABNORMAL
EGFR (NO RACE VARIABLE) (RUSH/TITUS): 57 ML/MIN/1.73M2
EOSINOPHIL # BLD AUTO: 0.03 K/UL (ref 0–0.5)
EOSINOPHIL NFR BLD AUTO: 0.8 % (ref 1–4)
ERYTHROCYTE [DISTWIDTH] IN BLOOD BY AUTOMATED COUNT: 13.5 % (ref 11.5–14.5)
GLUCOSE SERPL-MCNC: 154 MG/DL (ref 74–106)
HCT VFR BLD AUTO: 35.5 % (ref 38–47)
HGB BLD-MCNC: 11.4 G/DL (ref 12–16)
IMM GRANULOCYTES # BLD AUTO: 0.01 K/UL (ref 0–0.04)
IMM GRANULOCYTES NFR BLD: 0.3 % (ref 0–0.4)
LYMPHOCYTES # BLD AUTO: 1.02 K/UL (ref 1–4.8)
LYMPHOCYTES NFR BLD AUTO: 25.5 % (ref 27–41)
MCH RBC QN AUTO: 28.6 PG (ref 27–31)
MCHC RBC AUTO-ENTMCNC: 32.1 G/DL (ref 32–36)
MCV RBC AUTO: 89 FL (ref 80–96)
MONOCYTES # BLD AUTO: 0.2 K/UL (ref 0–0.8)
MONOCYTES NFR BLD AUTO: 5 % (ref 2–6)
MPC BLD CALC-MCNC: 10.9 FL (ref 9.4–12.4)
NEUTROPHILS # BLD AUTO: 2.72 K/UL (ref 1.8–7.7)
NEUTROPHILS NFR BLD AUTO: 67.9 % (ref 53–65)
NRBC # BLD AUTO: 0 X10E3/UL
NRBC, AUTO (.00): 0 %
PLATELET # BLD AUTO: 205 K/UL (ref 150–400)
POTASSIUM SERPL-SCNC: 4.2 MMOL/L (ref 3.5–5.1)
RBC # BLD AUTO: 3.99 M/UL (ref 4.2–5.4)
SODIUM SERPL-SCNC: 139 MMOL/L (ref 136–145)
WBC # BLD AUTO: 4 K/UL (ref 4.5–11)

## 2023-11-02 PROCEDURE — 27000716 HC OXISENSOR PROBE, ANY SIZE: Performed by: ANESTHESIOLOGY

## 2023-11-02 PROCEDURE — C1713 ANCHOR/SCREW BN/BN,TIS/BN: HCPCS | Performed by: ORTHOPAEDIC SURGERY

## 2023-11-02 PROCEDURE — 63600175 PHARM REV CODE 636 W HCPCS: Performed by: HOSPITALIST

## 2023-11-02 PROCEDURE — 37000008 HC ANESTHESIA 1ST 15 MINUTES: Performed by: ORTHOPAEDIC SURGERY

## 2023-11-02 PROCEDURE — 27200750 HC INSULATED NEEDLE/ STIMUPLEX: Performed by: ANESTHESIOLOGY

## 2023-11-02 PROCEDURE — 97161 PT EVAL LOW COMPLEX 20 MIN: CPT

## 2023-11-02 PROCEDURE — D9220A PRA ANESTHESIA: ICD-10-PCS | Mod: CRNA,,, | Performed by: NURSE ANESTHETIST, CERTIFIED REGISTERED

## 2023-11-02 PROCEDURE — D9220A PRA ANESTHESIA: Mod: ANES,,, | Performed by: ANESTHESIOLOGY

## 2023-11-02 PROCEDURE — C1769 GUIDE WIRE: HCPCS | Performed by: ORTHOPAEDIC SURGERY

## 2023-11-02 PROCEDURE — 64447 PERIPHERAL BLOCK: ICD-10-PCS | Mod: 59,RT,, | Performed by: ANESTHESIOLOGY

## 2023-11-02 PROCEDURE — 99900035 HC TECH TIME PER 15 MIN (STAT)

## 2023-11-02 PROCEDURE — 97165 OT EVAL LOW COMPLEX 30 MIN: CPT

## 2023-11-02 PROCEDURE — 36000713 HC OR TIME LEV V EA ADD 15 MIN: Performed by: ORTHOPAEDIC SURGERY

## 2023-11-02 PROCEDURE — 99222 PR INITIAL HOSPITAL CARE,LEVL II: ICD-10-PCS | Mod: ,,, | Performed by: HOSPITALIST

## 2023-11-02 PROCEDURE — 25000003 PHARM REV CODE 250: Performed by: ORTHOPAEDIC SURGERY

## 2023-11-02 PROCEDURE — 80048 BASIC METABOLIC PNL TOTAL CA: CPT | Performed by: ORTHOPAEDIC SURGERY

## 2023-11-02 PROCEDURE — D9220A PRA ANESTHESIA: Mod: CRNA,,, | Performed by: NURSE ANESTHETIST, CERTIFIED REGISTERED

## 2023-11-02 PROCEDURE — 27447 TOTAL KNEE ARTHROPLASTY: CPT | Mod: RT,,, | Performed by: ORTHOPAEDIC SURGERY

## 2023-11-02 PROCEDURE — 37000009 HC ANESTHESIA EA ADD 15 MINS: Performed by: ORTHOPAEDIC SURGERY

## 2023-11-02 PROCEDURE — 27000655: Performed by: ANESTHESIOLOGY

## 2023-11-02 PROCEDURE — 27000177 HC AIRWAY, LARYNGEAL MASK: Performed by: ANESTHESIOLOGY

## 2023-11-02 PROCEDURE — 64447 NJX AA&/STRD FEMORAL NRV IMG: CPT | Mod: 59,RT,, | Performed by: ANESTHESIOLOGY

## 2023-11-02 PROCEDURE — 63600175 PHARM REV CODE 636 W HCPCS: Performed by: ANESTHESIOLOGY

## 2023-11-02 PROCEDURE — 63600175 PHARM REV CODE 636 W HCPCS

## 2023-11-02 PROCEDURE — 63600175 PHARM REV CODE 636 W HCPCS: Performed by: ORTHOPAEDIC SURGERY

## 2023-11-02 PROCEDURE — 36000712 HC OR TIME LEV V 1ST 15 MIN: Performed by: ORTHOPAEDIC SURGERY

## 2023-11-02 PROCEDURE — 27000510 HC BLANKET BAIR HUGGER ANY SIZE: Performed by: ANESTHESIOLOGY

## 2023-11-02 PROCEDURE — 27201960 HC SPINAL TRAY: Performed by: ANESTHESIOLOGY

## 2023-11-02 PROCEDURE — 0055T PR COMPUTER-ASSIST MUSCSKEL NAVIG, ORTHO PROC, CT/MRI: ICD-10-PCS | Mod: ,,, | Performed by: ORTHOPAEDIC SURGERY

## 2023-11-02 PROCEDURE — C1776 JOINT DEVICE (IMPLANTABLE): HCPCS | Performed by: ORTHOPAEDIC SURGERY

## 2023-11-02 PROCEDURE — 27447 PR TOTAL KNEE ARTHROPLASTY: ICD-10-PCS | Mod: RT,,, | Performed by: ORTHOPAEDIC SURGERY

## 2023-11-02 PROCEDURE — 63600175 PHARM REV CODE 636 W HCPCS: Performed by: NURSE ANESTHETIST, CERTIFIED REGISTERED

## 2023-11-02 PROCEDURE — 27201423 OPTIME MED/SURG SUP & DEVICES STERILE SUPPLY: Performed by: ORTHOPAEDIC SURGERY

## 2023-11-02 PROCEDURE — 0055T BONE SRGRY CMPTR CT/MRI IMAG: CPT | Mod: ,,, | Performed by: ORTHOPAEDIC SURGERY

## 2023-11-02 PROCEDURE — 99222 1ST HOSP IP/OBS MODERATE 55: CPT | Mod: ,,, | Performed by: HOSPITALIST

## 2023-11-02 PROCEDURE — 85025 COMPLETE CBC W/AUTO DIFF WBC: CPT | Performed by: ORTHOPAEDIC SURGERY

## 2023-11-02 PROCEDURE — D9220A PRA ANESTHESIA: ICD-10-PCS | Mod: ANES,,, | Performed by: ANESTHESIOLOGY

## 2023-11-02 PROCEDURE — 25000003 PHARM REV CODE 250: Performed by: HOSPITALIST

## 2023-11-02 PROCEDURE — 25000003 PHARM REV CODE 250: Performed by: NURSE ANESTHETIST, CERTIFIED REGISTERED

## 2023-11-02 PROCEDURE — 71000033 HC RECOVERY, INTIAL HOUR: Performed by: ORTHOPAEDIC SURGERY

## 2023-11-02 DEVICE — CRUCIATE RETAINING FEMORAL
Type: IMPLANTABLE DEVICE | Site: KNEE | Status: FUNCTIONAL
Brand: TRIATHLON

## 2023-11-02 DEVICE — TIBIAL BEARING INSERT - CS
Type: IMPLANTABLE DEVICE | Site: KNEE | Status: FUNCTIONAL
Brand: TRIATHLON

## 2023-11-02 DEVICE — TIBIAL COMPONENT
Type: IMPLANTABLE DEVICE | Site: KNEE | Status: FUNCTIONAL
Brand: TRIATHLON

## 2023-11-02 DEVICE — PATELLA
Type: IMPLANTABLE DEVICE | Site: KNEE | Status: FUNCTIONAL
Brand: TRIATHLON

## 2023-11-02 DEVICE — SPEEDSET FULL DOSE ANTIBIOTIC BONE CEMENT, 10 PACK CATALOG NUMBER IS 6192-1-010
Type: IMPLANTABLE DEVICE | Site: KNEE | Status: FUNCTIONAL
Brand: SIMPLEX

## 2023-11-02 RX ORDER — EPHEDRINE SULFATE 50 MG/ML
INJECTION, SOLUTION INTRAVENOUS
Status: DISCONTINUED | OUTPATIENT
Start: 2023-11-02 | End: 2023-11-02

## 2023-11-02 RX ORDER — SODIUM CHLORIDE, SODIUM LACTATE, POTASSIUM CHLORIDE, CALCIUM CHLORIDE 600; 310; 30; 20 MG/100ML; MG/100ML; MG/100ML; MG/100ML
INJECTION, SOLUTION INTRAVENOUS CONTINUOUS
Status: DISCONTINUED | OUTPATIENT
Start: 2023-11-02 | End: 2023-11-03 | Stop reason: HOSPADM

## 2023-11-02 RX ORDER — CLOPIDOGREL BISULFATE 75 MG/1
75 TABLET ORAL DAILY
Status: DISCONTINUED | OUTPATIENT
Start: 2023-11-03 | End: 2023-11-03 | Stop reason: HOSPADM

## 2023-11-02 RX ORDER — EPINEPHRINE 1 MG/ML
INJECTION, SOLUTION, CONCENTRATE INTRAVENOUS
Status: DISCONTINUED | OUTPATIENT
Start: 2023-11-02 | End: 2023-11-02

## 2023-11-02 RX ORDER — BISACODYL 10 MG
10 SUPPOSITORY, RECTAL RECTAL DAILY PRN
Status: DISCONTINUED | OUTPATIENT
Start: 2023-11-02 | End: 2023-11-03 | Stop reason: HOSPADM

## 2023-11-02 RX ORDER — LIDOCAINE HYDROCHLORIDE 10 MG/ML
1 INJECTION INFILTRATION; PERINEURAL ONCE
Status: DISCONTINUED | OUTPATIENT
Start: 2023-11-02 | End: 2023-11-02

## 2023-11-02 RX ORDER — CEFAZOLIN SODIUM 1 G/3ML
INJECTION, POWDER, FOR SOLUTION INTRAMUSCULAR; INTRAVENOUS
Status: DISCONTINUED | OUTPATIENT
Start: 2023-11-02 | End: 2023-11-02

## 2023-11-02 RX ORDER — ONDANSETRON 2 MG/ML
INJECTION INTRAMUSCULAR; INTRAVENOUS
Status: DISCONTINUED | OUTPATIENT
Start: 2023-11-02 | End: 2023-11-02

## 2023-11-02 RX ORDER — OXYCODONE HYDROCHLORIDE 5 MG/1
5 TABLET ORAL
Status: DISCONTINUED | OUTPATIENT
Start: 2023-11-02 | End: 2023-11-02 | Stop reason: HOSPADM

## 2023-11-02 RX ORDER — MEPERIDINE HYDROCHLORIDE 25 MG/ML
25 INJECTION INTRAMUSCULAR; INTRAVENOUS; SUBCUTANEOUS EVERY 10 MIN PRN
Status: DISCONTINUED | OUTPATIENT
Start: 2023-11-02 | End: 2023-11-02 | Stop reason: HOSPADM

## 2023-11-02 RX ORDER — ONDANSETRON 2 MG/ML
4 INJECTION INTRAMUSCULAR; INTRAVENOUS EVERY 8 HOURS PRN
Status: DISCONTINUED | OUTPATIENT
Start: 2023-11-02 | End: 2023-11-03 | Stop reason: HOSPADM

## 2023-11-02 RX ORDER — KETOROLAC TROMETHAMINE 30 MG/ML
INJECTION, SOLUTION INTRAMUSCULAR; INTRAVENOUS
Status: DISCONTINUED | OUTPATIENT
Start: 2023-11-02 | End: 2023-11-02

## 2023-11-02 RX ORDER — SODIUM CHLORIDE, SODIUM LACTATE, POTASSIUM CHLORIDE, CALCIUM CHLORIDE 600; 310; 30; 20 MG/100ML; MG/100ML; MG/100ML; MG/100ML
INJECTION, SOLUTION INTRAVENOUS CONTINUOUS
Status: DISCONTINUED | OUTPATIENT
Start: 2023-11-02 | End: 2023-11-02

## 2023-11-02 RX ORDER — HYDROCODONE BITARTRATE AND ACETAMINOPHEN 10; 325 MG/1; MG/1
1 TABLET ORAL EVERY 4 HOURS PRN
Status: DISCONTINUED | OUTPATIENT
Start: 2023-11-02 | End: 2023-11-03 | Stop reason: HOSPADM

## 2023-11-02 RX ORDER — BUPIVACAINE HYDROCHLORIDE 7.5 MG/ML
INJECTION, SOLUTION EPIDURAL; RETROBULBAR
Status: COMPLETED | OUTPATIENT
Start: 2023-11-02 | End: 2023-11-02

## 2023-11-02 RX ORDER — ROPIVACAINE HYDROCHLORIDE 7.5 MG/ML
INJECTION, SOLUTION EPIDURAL; PERINEURAL
Status: COMPLETED | OUTPATIENT
Start: 2023-11-02 | End: 2023-11-02

## 2023-11-02 RX ORDER — SODIUM CHLORIDE, SODIUM LACTATE, POTASSIUM CHLORIDE, CALCIUM CHLORIDE 600; 310; 30; 20 MG/100ML; MG/100ML; MG/100ML; MG/100ML
125 INJECTION, SOLUTION INTRAVENOUS CONTINUOUS
Status: DISCONTINUED | OUTPATIENT
Start: 2023-11-02 | End: 2023-11-03 | Stop reason: HOSPADM

## 2023-11-02 RX ORDER — DEXAMETHASONE SODIUM PHOSPHATE 4 MG/ML
INJECTION, SOLUTION INTRA-ARTICULAR; INTRALESIONAL; INTRAMUSCULAR; INTRAVENOUS; SOFT TISSUE
Status: DISCONTINUED | OUTPATIENT
Start: 2023-11-02 | End: 2023-11-02

## 2023-11-02 RX ORDER — TRANEXAMIC ACID 100 MG/ML
INJECTION, SOLUTION INTRAVENOUS
Status: DISCONTINUED | OUTPATIENT
Start: 2023-11-02 | End: 2023-11-02

## 2023-11-02 RX ORDER — FENTANYL CITRATE 50 UG/ML
INJECTION, SOLUTION INTRAMUSCULAR; INTRAVENOUS
Status: DISCONTINUED | OUTPATIENT
Start: 2023-11-02 | End: 2023-11-02

## 2023-11-02 RX ORDER — MORPHINE SULFATE 10 MG/ML
4 INJECTION INTRAMUSCULAR; INTRAVENOUS; SUBCUTANEOUS EVERY 5 MIN PRN
Status: DISCONTINUED | OUTPATIENT
Start: 2023-11-02 | End: 2023-11-02 | Stop reason: HOSPADM

## 2023-11-02 RX ORDER — HYDROMORPHONE HYDROCHLORIDE 2 MG/ML
0.5 INJECTION, SOLUTION INTRAMUSCULAR; INTRAVENOUS; SUBCUTANEOUS EVERY 5 MIN PRN
Status: DISCONTINUED | OUTPATIENT
Start: 2023-11-02 | End: 2023-11-02 | Stop reason: HOSPADM

## 2023-11-02 RX ORDER — MORPHINE SULFATE 4 MG/ML
4 INJECTION, SOLUTION INTRAMUSCULAR; INTRAVENOUS EVERY 4 HOURS PRN
Status: DISCONTINUED | OUTPATIENT
Start: 2023-11-02 | End: 2023-11-03 | Stop reason: HOSPADM

## 2023-11-02 RX ORDER — DIPHENHYDRAMINE HYDROCHLORIDE 50 MG/ML
25 INJECTION INTRAMUSCULAR; INTRAVENOUS EVERY 6 HOURS PRN
Status: DISCONTINUED | OUTPATIENT
Start: 2023-11-02 | End: 2023-11-02 | Stop reason: HOSPADM

## 2023-11-02 RX ORDER — PHENYTOIN SODIUM 100 MG/1
100 CAPSULE, EXTENDED RELEASE ORAL 3 TIMES DAILY
Status: DISCONTINUED | OUTPATIENT
Start: 2023-11-03 | End: 2023-11-03 | Stop reason: HOSPADM

## 2023-11-02 RX ORDER — MIDAZOLAM HYDROCHLORIDE 1 MG/ML
INJECTION INTRAMUSCULAR; INTRAVENOUS
Status: DISCONTINUED | OUTPATIENT
Start: 2023-11-02 | End: 2023-11-02

## 2023-11-02 RX ORDER — LIDOCAINE HYDROCHLORIDE 20 MG/ML
INJECTION, SOLUTION EPIDURAL; INFILTRATION; INTRACAUDAL; PERINEURAL
Status: DISCONTINUED | OUTPATIENT
Start: 2023-11-02 | End: 2023-11-02

## 2023-11-02 RX ORDER — PROPOFOL 10 MG/ML
VIAL (ML) INTRAVENOUS
Status: DISCONTINUED | OUTPATIENT
Start: 2023-11-02 | End: 2023-11-02

## 2023-11-02 RX ORDER — ONDANSETRON 2 MG/ML
4 INJECTION INTRAMUSCULAR; INTRAVENOUS DAILY PRN
Status: DISCONTINUED | OUTPATIENT
Start: 2023-11-02 | End: 2023-11-02 | Stop reason: HOSPADM

## 2023-11-02 RX ADMIN — EPHEDRINE SULFATE 20 MG: 50 INJECTION INTRAVENOUS at 11:11

## 2023-11-02 RX ADMIN — EPHEDRINE SULFATE 10 MG: 50 INJECTION INTRAVENOUS at 12:11

## 2023-11-02 RX ADMIN — ONDANSETRON 4 MG: 2 INJECTION INTRAMUSCULAR; INTRAVENOUS at 11:11

## 2023-11-02 RX ADMIN — Medication 60 MG: at 11:11

## 2023-11-02 RX ADMIN — HYDROCODONE BITARTRATE AND ACETAMINOPHEN 1 TABLET: 10; 325 TABLET ORAL at 09:11

## 2023-11-02 RX ADMIN — SODIUM CHLORIDE, POTASSIUM CHLORIDE, SODIUM LACTATE AND CALCIUM CHLORIDE: 600; 310; 30; 20 INJECTION, SOLUTION INTRAVENOUS at 03:11

## 2023-11-02 RX ADMIN — VANCOMYCIN HYDROCHLORIDE 1000 MG: 1 INJECTION, POWDER, LYOPHILIZED, FOR SOLUTION INTRAVENOUS at 11:11

## 2023-11-02 RX ADMIN — DEXAMETHASONE SODIUM PHOSPHATE 8 MG: 4 INJECTION, SOLUTION INTRA-ARTICULAR; INTRALESIONAL; INTRAMUSCULAR; INTRAVENOUS; SOFT TISSUE at 11:11

## 2023-11-02 RX ADMIN — LIDOCAINE HYDROCHLORIDE 20 MG: 20 INJECTION, SOLUTION INTRAVENOUS at 11:11

## 2023-11-02 RX ADMIN — CEFAZOLIN 2 G: 2 INJECTION, POWDER, FOR SOLUTION INTRAMUSCULAR; INTRAVENOUS at 10:11

## 2023-11-02 RX ADMIN — MIDAZOLAM 1 MG: 1 INJECTION INTRAMUSCULAR; INTRAVENOUS at 11:11

## 2023-11-02 RX ADMIN — SODIUM CHLORIDE, POTASSIUM CHLORIDE, SODIUM LACTATE AND CALCIUM CHLORIDE 125 ML/HR: 600; 310; 30; 20 INJECTION, SOLUTION INTRAVENOUS at 02:11

## 2023-11-02 RX ADMIN — Medication 30 MG: at 11:11

## 2023-11-02 RX ADMIN — BUPIVACAINE HYDROCHLORIDE 1.25 ML: 7.5 INJECTION, SOLUTION EPIDURAL; RETROBULBAR at 11:11

## 2023-11-02 RX ADMIN — FENTANYL CITRATE 50 MCG: 50 INJECTION INTRAMUSCULAR; INTRAVENOUS at 11:11

## 2023-11-02 RX ADMIN — SODIUM CHLORIDE, POTASSIUM CHLORIDE, SODIUM LACTATE AND CALCIUM CHLORIDE: 600; 310; 30; 20 INJECTION, SOLUTION INTRAVENOUS at 11:11

## 2023-11-02 RX ADMIN — TRANEXAMIC ACID 1000 MG: 100 INJECTION, SOLUTION INTRAVENOUS at 01:11

## 2023-11-02 RX ADMIN — ROPIVACAINE HYDROCHLORIDE 30 ML: 7.5 INJECTION, SOLUTION EPIDURAL; PERINEURAL at 11:11

## 2023-11-02 RX ADMIN — CEFAZOLIN 2 G: 1 INJECTION, POWDER, FOR SOLUTION INTRAMUSCULAR; INTRAVENOUS; PARENTERAL at 11:11

## 2023-11-02 RX ADMIN — KETOROLAC TROMETHAMINE 60 MG: 30 INJECTION, SOLUTION INTRAMUSCULAR at 11:11

## 2023-11-02 RX ADMIN — MORPHINE SULFATE 4 MG: 4 INJECTION, SOLUTION INTRAMUSCULAR; INTRAVENOUS at 05:11

## 2023-11-02 RX ADMIN — EPINEPHRINE 0.01 MCG: 1 INJECTION, SOLUTION, CONCENTRATE INTRAVENOUS at 11:11

## 2023-11-02 RX ADMIN — TRANEXAMIC ACID 1000 MG: 100 INJECTION, SOLUTION INTRAVENOUS at 11:11

## 2023-11-02 NOTE — OP NOTE
[facility] - Orthopedic Periop Services  Surgery Department  Operative Note    SUMMARY     Date of Procedure: 11/2/2023     Procedure: Procedure(s) (LRB):  ARTHROPLASTY, KNEE, TOTAL, USING COMPUTER-ASSISTED NAVIGATION (Right)     Surgeon(s) and Role:     * Jayy Castillo MD - Primary    Assisting Surgeon: None    Pre-Operative Diagnosis: Primary osteoarthritis of right knee [M17.11]    Post-Operative Diagnosis: Post-Op Diagnosis Codes:     * Primary osteoarthritis of right knee [M17.11]    Anesthesia:  General  Technical Procedures Used:  Computer navigated right TKR           DEPARTMENT OF ORTHOPEDIC SURGERY                OPERATIVE REPORT       NAME:  Radha Martínez  MRN: [id number]   DATE OF SURGERY:  11/2/2023      PREOPERATIVE DIAGNOSIS:  Primary osteoarthritis of right knee [M17.11]       POSTOPERATIVE DIAGNOSIS: Primary osteoarthritis of right knee [M17.11]       PROCEDURE:   Computer navigated right total knee replacement arthroplasty      ANESTHESIA:  General      PROCEDURE IN DETAIL:  The patient was taken to the operating room and placed in supine position.  After an adequate level of general/regional block anesthesia been achieved (see Anesthesia note) patient's right lower extremity is covered Betadine and draped in sterile fashion.  The right lower extremity was elevated exsanguinated with elastic bandage.  A tourniquet in place about the right upper thighs inflated to pressure of 300 mmHg.  The operation was begun by making a skin incision over the anterior midline region of the right knee.  The incision was carried carefully through the subcutaneous layers.  Skin flaps were developed.  The joint was opened through a medial parapatellar incision.  The patella was distracted laterally and the knee was flexed.  a thorough debridement of the joint was performed including removal of the remnants of the menisci.  The ACL was resected.  The PCL was retained.  The computer tracker was attached to  the distal femur.  The hip was brought through a circumduction maneuver registering the center of hip rotation.  A distal femoral cutting guide was attached to the arch bar.  The varus/valgus and flexion/extension parameters were adjusted and the resection guide was pinned in place.  The distal femoral resection was performed with the oscillating saw.  The guides were removed.  With the knee flexed the tibia was brought forward using the PCL guide.  The tibial spines were resected with an oscillating saw.  The tibial arch bar was attached to the tibial plateau cut surface of femoral, tibial and ankle data points were obtained.  Now the proximal tibial cutting guide was attached to the arch bar and adjusted for varus/valgus and depth of her sec.  The proximal tibial resection guide was then pinned in place and the arch bar was removed.  The proximal tibial resection cut was made with an oscillating saw.  Adequate resection was performed with a spacer.  The cutting guide was removed.  the gap space was tensioned using the Kelly guide and varus/valgus alignment was confirmed.  the measured flexion and extension gaps were set on the resection guide and drill holes made for the 4 in 1 cutting block.  The resection guide was attached and pinned in place.  The anterior, posterior, anterior chamfer and posterior chamfer cuts were made with an oscillating saw.  The guide was removed.  The femoral and tibial trials were put in place.  a tibial spacer was engaged with the base plate.  The knee was brought through good stable range of motion.  Rotation of the tibial tray was marked.  The distal femoral peg holes were made with the drill.  Attention was turned to the patella.  The patella was everted and measured with a caliper.  The articular surface of the patella was resected with a patellar mill leaving 14 mm of bone.  peg holes were drilled for the polyethylene patellar button.  Trial component was inserted and he was  again brought to a good stable range of motion.  The trial components were removed.  The knee joint was thoroughly irrigated with antibiotic solution.  The cut tibial surface was again brought anteriorly and a porous coated triathlon Calvert size 4 tibia porous-coated tibial component was inserted with a size 9 mm thick polyethylene tibial insert.  Now the size 4 porous-coated femoral component was implanted.  Knee was brought to good stable range of motion.  Attention was turned back to the patella.  The cut surface was irrigated with antibiotic solution.  A 35 mm diameter by 10  mm thick patellar polyethylene button was cemented in place with methylmethacrylate.  The cement was allowed to harden and the excess was removed.  The tourniquet was then let down and hemostasis was achieved with Bovie electrocautery.  The knee was again irrigated with antibiotic solution.  The quadriceps and medial retinacular tissues were reapproximated with interrupted 1. Vicryl suture.  Subcutaneous tissue was approximated with 2 O Vicryl suture.  Skin margin approximated with stainless steel staples.  Wounds were dressed sterilely and a knee immobilizer was applied.  Patient taken to recovery room in satisfactory condition.  Estimated blood loss 50 cc        Jayy Castillo MD           Description of the Findings of the Procedure:  Computer navigated right TKR    Significant Surgical Tasks Conducted by the Assistant(s), if Applicable:  None    Complications: No    Estimated Blood Loss (EBL): 50 mL           Implants:   Implant Name Type Inv. Item Serial No.  Lot No. LRB No. Used Action   GUIDEPIN 3.20MM 4 PHUONG SQUARE - [id number]  GUIDEPIN 3.20MM 4 PHUONG SQUARE  [facility] [id number] Right 1 Implanted and Explanted   GUIDESCREW 6 PHUONG HEXAGONAL 3.20MM - [id number]  GUIDESCREW 6 PHUONG HEXAGONAL 3.20MM  [facility] [id number] Right 1 Implanted and Explanted   BASEPLATE TIBIAL TRIATHLON SZ - [id number]   BASEPLATE TIBIAL TRIATHLON SZ  [employer] FELICIA. [id number] Right 1 Implanted   INSERT TIBIAL CS SIZE 4 9MM - [id number]  INSERT TIBIAL CS SIZE 4 9MM  [employer] FELICIA. [id number] Right 1 Implanted   COMP FEM CR BEAD SZ 4 RIGHT - [id number]  COMP FEM CR BEAD SZ 4 RIGHT  [employer] FELICIA. [id number] Right 1 Implanted   PATELLA TRIATHLON ASYM 31X9MM - [id number]  PATELLA TRIATHLON ASYM 31X9MM  [employer] FELICIA. [id number] Right 1 Implanted   CEMENT BONE SPEED SET - [id number] Cement CEMENT BONE SPEED SET  [employer] FELICIA. [id number] Right 1 Implanted       Specimens:   Specimen (24h ago, onward)      None                    Condition: Good    Disposition: PACU - hemodynamically stable.    Attestation: I was present and scrubbed for the entire procedure.

## 2023-11-02 NOTE — PROGRESS NOTES
1344 RECEIVED TORR ASLEEP,EASILY AROUSED. COLOR PINK. RESP. UNLABORED. O2 VIA FM. DRESSING RIGHT KNEE D/I. IMMOBILIZER RIGHT KNEE. HEMOVAC DRAIN RIGHT KNEE COMPRESSED P/D SCANT DARK RED DRAINAGE. RIGHT FOOT,WARM, PINK, +PEDAL PULSE. IV INFUSING WELL LEFT AC 22G. CATH. SPINAL LEVEL APPROX. T 12. NO MOVEMENT LOWER EXTREMITIES. NO C/O PAIN. SEE FLOW SHEET.    1400 LAB AND X-RAYS DONE, CARLO. WELL.

## 2023-11-02 NOTE — ANESTHESIA PROCEDURE NOTES
Peripheral Block    Patient location during procedure: OR   Block not for primary anesthetic.  Reason for block: at surgeon's request and post-op pain management   Post-op Pain Location: RT TKA, P OP PAIN   Start time: 11/2/2023 11:20 AM  Timeout: 11/2/2023 11:19 AM   End time: 11/2/2023 11:27 AM    Staffing  Authorizing Provider: Je Martínez MD  Performing Provider: Je Martínez MD    Staffing  Performed by: Je Martínez MD  Authorized by: Je Martínez MD    Preanesthetic Checklist  Completed: patient identified, IV checked, site marked, risks and benefits discussed, surgical consent, monitors and equipment checked, pre-op evaluation and timeout performed  Peripheral Block  Patient position: supine  Prep: ChloraPrep  Patient monitoring: heart rate, cardiac monitor, continuous pulse ox, continuous capnometry and frequent blood pressure checks  Block type: adductor canal  Laterality: right  Injection technique: single shot  Needle  Needle type: Tuohy   Needle gauge: 20 G  Needle length: 4 in  Needle localization: ultrasound guidance   -ultrasound image captured on disc.  Assessment  Injection assessment: negative aspiration, negative parasthesia and local visualized surrounding nerve  Paresthesia pain: none  Heart rate change: no  Slow fractionated injection: yes  Pain Tolerance: comfortable throughout block and no complaints  Medications:    Medications: ROPIvacaine (NAROPIN) injection 0.75% - Perineural   30 mL - 11/2/2023 11:25:00 AM

## 2023-11-02 NOTE — ANESTHESIA PROCEDURE NOTES
Spinal    Diagnosis: RT TKA  Patient location during procedure: OR  Start time: 11/2/2023 11:15 AM  Timeout: 11/2/2023 11:14 AM  End time: 11/2/2023 11:19 AM    Staffing  Authorizing Provider: Je Martínez MD  Performing Provider: Je Martínez MD    Staffing  Performed by: Je Martínez MD  Authorized by: Je Martínez MD    Preanesthetic Checklist  Completed: patient identified, IV checked, risks and benefits discussed, surgical consent, monitors and equipment checked, pre-op evaluation and timeout performed  Spinal Block  Patient position: sitting  Prep: Betadine  Patient monitoring: heart rate, continuous pulse ox, continuous capnometry and frequent blood pressure checks  Approach: midline  Location: L3-4  Injection technique: single shot  CSF Fluid: clear free-flowing CSF  Needle  Needle type: Quincke   Needle gauge: 22 G  Needle length: 4 in  Needle localization: anatomical landmarks  Assessment  Sensory level: T8   Dermatomal levels determined by alcohol wipe  Ease of block: easy  Patient's tolerance of the procedure: comfortable throughout block and no complaints  Medications:    Medications: BUPivacaine (pf) (MARCAINE) injection 0.75% - Intraspinal   1.25 mL - 11/2/2023 11:15:00 AM

## 2023-11-02 NOTE — CONSULTS
Consult acknowledged for dc planning. Initial dcp is [facility]. If pt is unable to obtain precert at dc, pt to dc home with [facility] and rw from the medical store. Precert to start after therapy evals are complete. Ss following.

## 2023-11-02 NOTE — INTERVAL H&P NOTE
The patient has been examined and the H&P has been reviewed:    I concur with the findings and no changes have occurred since H&P was written.    Surgery risks, benefits and alternative options discussed and understood by patient/family.          Active Hospital Problems    Diagnosis  POA    *Primary osteoarthritis of right knee [M17.11]  Yes     Chronic      Resolved Hospital Problems   No resolved problems to display.

## 2023-11-02 NOTE — PLAN OF CARE
[facility] - Orthopedic Periop Services  Initial Discharge Assessment       Primary Care Provider: Jayy Castillo MD    Admission Diagnosis: Primary osteoarthritis of right knee [M17.11]    Admission Date: 11/2/2023  Expected Discharge Date:     Transition of Care Barriers: Mobility    Payor: HUMANA MANAGED MEDICARE / Plan: HUMANA MEDICARE PPO / Product Type: Medicare Advantage /     Extended Emergency Contact Information  Primary Emergency Contact: Shubham Martínez  Address: [address] [place] [address] [facility]  Home Phone: [phone]  Work Phone: [phone]  Mobile Phone: [phone]  Relation: Spouse  Preferred language: English   needed? No    Discharge Plan A: Skilled Nursing Facility  Discharge Plan B: Home with family, Home Health      [facility] - [place] [id number]  Phone: [phone] Fax: [phone]    [facility] - [place] [id number] [facility]  [address] [id number]  Phone: [phone] Fax: [phone]    [facility] [address] [place] [address]  Phone: [phone] Fax: [phone]      Initial Assessment (most recent)       Adult Discharge Assessment - 11/02/23 1035          Discharge Assessment    Assessment Type Discharge Planning Assessment     Source of Information patient     Communicated ALEX with patient/caregiver Yes     People in Home spouse     Facility Arrived From: home     Do you expect to return to your current living situation? No     Do you have help at home or someone to help you manage your care at home? Yes     Prior to hospitilization cognitive status: Unable to Assess     Current cognitive status: Alert/Oriented     Home Accessibility wheelchair accessible     Home Layout Able to live on 1st floor     Equipment Currently Used at Home rollator     Readmission  within 30 days? No     Patient currently being followed by outpatient case management? No     Do you currently have service(s) that help you manage your care at home? No     Do you take prescription medications? Yes     Do you have prescription coverage? Yes     Do you have any problems affording any of your prescribed medications? No     Is the patient taking medications as prescribed? yes     Are you on dialysis? No     Do you take coumadin? No     DME Needed Upon Discharge  walker, rolling     Discharge Plan discussed with: Patient     Transition of Care Barriers Mobility     Discharge Plan A Skilled Nursing Facility     Discharge Plan B Home with family;Home Health        Physical Activity    On average, how many days per week do you engage in moderate to strenuous exercise (like a brisk walk)? 0 days     On average, how many minutes do you engage in exercise at this level? 0 min        Financial Resource Strain    How hard is it for you to pay for the very basics like food, housing, medical care, and heating? Not hard at all        Housing Stability    In the last 12 months, was there a time when you were not able to pay the mortgage or rent on time? No     In the last 12 months, how many places have you lived? 1     In the last 12 months, was there a time when you did not have a steady place to sleep or slept in a shelter (including now)? No        Transportation Needs    In the past 12 months, has lack of transportation kept you from medical appointments or from getting medications? No     In the past 12 months, has lack of transportation kept you from meetings, work, or from getting things needed for daily living? No        Food Insecurity    Within the past 12 months, you worried that your food would run out before you got the money to buy more. Never true     Within the past 12 months, the food you bought just didn't last and you didn't have money to get more. Never true        Stress    Do you feel stress  - tense, restless, nervous, or anxious, or unable to sleep at night because your mind is troubled all the time - these days? To some extent        Social Connections    In a typical week, how many times do you talk on the phone with family, friends, or neighbors? More than three times a week     How often do you get together with friends or relatives? More than three times a week     How often do you attend Temple or Hindu services? More than 4 times per year     Do you belong to any clubs or organizations such as Temple groups, unions, fraternal or athletic groups, or school groups? No     How often do you attend meetings of the clubs or organizations you belong to? Never     Are you , , , , never , or living with a partner?         Alcohol Use    Q1: How often do you have a drink containing alcohol? Never     Q2: How many drinks containing alcohol do you have on a typical day when you are drinking? Patient does not drink     Q3: How often do you have six or more drinks on one occasion? Never        OTHER    Name(s) of People in Home Shubham Martínez (Spouse)   [phone]                   Sw spoke with pt on this morning pre-op. Pta, pt lived home with Shubham Martínez (Spouse) [phone]. No , has rollator for home use. Dcp is [facility]. If pt is unable to obtain insurance precert, dcp is home with [facility] health and rw from [facility]. Ss following for dc needs and recommendations.

## 2023-11-02 NOTE — ANESTHESIA POSTPROCEDURE EVALUATION
Anesthesia Post Evaluation    Patient: Radha Martínez    Procedure(s) Performed: Procedure(s) (LRB):  ARTHROPLASTY, KNEE, TOTAL, USING COMPUTER-ASSISTED NAVIGATION (Right)    Final Anesthesia Type: general      Patient location during evaluation: PACU  Patient participation: Yes- Able to Participate  Level of consciousness: awake and sedated  Post-procedure vital signs: reviewed and stable  Pain management: adequate  Airway patency: patent    PONV status at discharge: No PONV  Anesthetic complications: no      Cardiovascular status: blood pressure returned to baseline  Respiratory status: unassisted  Hydration status: euvolemic  Follow-up not needed.          Vitals Value Taken Time   /46 11/02/23 1436   Temp 36.4 °C (97.6 °F) 11/02/23 1349   Pulse 67 11/02/23 1438   Resp 16 11/02/23 1438   SpO2 99 % 11/02/23 1438   Vitals shown include unvalidated device data.      No case tracking events are documented in the log.      Pain/Kristie Score: Kristie Score: 7 (11/2/2023  2:00 PM)

## 2023-11-02 NOTE — OP NOTE
[facility] - [facility]  Surgery Department  Operative Note    SUMMARY     Date of Procedure: 11/2/2023     Procedure: Procedure(s) (LRB):  ARTHROPLASTY, KNEE, TOTAL, USING COMPUTER-ASSISTED NAVIGATION (Right)     Surgeon(s) and Role:     * Jayy Castillo MD - Primary    Assisting Surgeon: None    Pre-Operative Diagnosis: Primary osteoarthritis of right knee [M17.11]    Post-Operative Diagnosis: Post-Op Diagnosis Codes:     * Primary osteoarthritis of right knee [M17.11]    Anesthesia:  General    Technical Procedures Used: .dap    Description of the Findings of the Procedure:  Severe DJD-right knee    Significant Surgical Tasks Conducted by the Assistant(s), if Applicable:  None    Complications: No    Estimated Blood Loss (EBL): 50 mL           Implants:   Implant Name Type Inv. Item Serial No.  Lot No. LRB No. Used Action   GUIDEPIN 3.20MM 4 PHUONG SQUARE - [id number]  GUIDEPIN 3.20MM 4 PHUONG SQUARE  [facility] ([facility]) [id number] Right 1 Implanted and Explanted   GUIDESCREW 6 PHUONG HEXAGONAL 3.20MM - [id number]  GUIDESCREW 6 PHUONG HEXAGONAL 3.20MM  [facility] ([facility]) [id number] Right 1 Implanted and Explanted   BASEPLATE TIBIAL TRIATHLON SZ - [id number]  BASEPLATE TIBIAL TRIATHLON SZ  [employer] FEL[employer]. [id number] Right 1 Implanted   INSERT TIBIAL CS SIZE 4 9MM - [id number]  INSERT TIBIAL CS SIZE 4 9MM  [employer] FELIC[employer]. [id number] Right 1 Implanted   COMP FEM CR BEAD SZ 4 RIGHT - [id number]  COMP FEM CR BEAD SZ 4 RIGHT  [employer] FELICIA. [id number] Right 1 Implanted   PATELLA TRIATHLON ASYM 31X9MM - [id number]  PATELLA TRIATHLON ASYM 31X9MM  [employer] FELICIA. [id number] Right 1 Implanted   CEMENT BONE SPEED SET - [id number] Cement CEMENT BONE SPEED SET  [employer] FELICIA. [id number] Right 1 Implanted       Specimens:   Specimen (24h ago, onward)      None                    Condition: Good    Disposition: PACU - hemodynamically stable.    Attestation: I was present and scrubbed for  the entire procedure.

## 2023-11-02 NOTE — DISCHARGE INSTRUCTIONS
ANKLE: Pumps        Point toes down, then up. Repeat 30 times, 2 sessions per day        Quad Set        With other leg bent, foot flat, slowly tighten muscles on right thigh of straight leg while counting out loud to 5.  Repeat 30 times, 2 sessions per day.          Hip Abduction / Adduction: with Extended Knee (Supine)        Bring right leg out to side and return. Keep knee straight.  Repeat 30 times, 2 sessions per day.         HIP / KNEE: Flexion, Heel Slides - Supine        Slide right heel up toward buttocks, keeping leg in straight line. Repeat 30 times, 2 sessions per day.  Use towel or pillowcase under heel as needed.       Straight Leg Raise        Bend left leg. Raise right leg 8-12 inches with knee locked. Exhale and tighten thigh muscles while raising leg.   Repeat 30 times, 2 sessions per day.           KNEE: Extension, Long Arc Quads - Sitting        Raise right leg until knee is straight.  Repeat 30 times, 2 sessions per day        KNEE: Flexion / Extension - Sitting        Sit at edge of surface, foot on towel or pillowcase. Bend and straighten right knee.  Repeat 30 times, 2 sessions per day.   Use opposite leg to increase knee flexion.    *Keep dressing dry and intact, do not remove dressing, if dressing becomes wet or bloody notify home health staff.  Swingbed/Home Health will remove your drain (if you have one) on post op day #2 and change your dressing on post op day #3 and day #10, give them that special dressing we sent home with you.  *Continue incentive spirometry at least every 2 hours while awake.  *Continue white stockings remove 2 times a day for 1 hour and replace. Once dressing is changed, apply other stocking to surgery leg.   *Continue cool-jet to knee. Do not apply directly to skin.   *Take laxative of choice to have a bowel movement at least by tomorrow and then every other day.  *Increase fluids by mouth.  *Staples will be removed by home health/swingbed staff 2 weeks from  surgery prior to follow up appoinment.  *Elevate surgery leg on pillow at ankle. No pillow under knees.  *Notify swingbed/home health staff of any concerns.

## 2023-11-02 NOTE — ANESTHESIA PREPROCEDURE EVALUATION
11/02/2023  Radha Martínez is a 73 y.o., female.      Pre-op Assessment    I have reviewed the Patient Summary Reports.     I have reviewed the Nursing Notes. I have reviewed the NPO Status.   I have reviewed the Medications.     Review of Systems  Anesthesia Hx:  No problems with previous Anesthesia    Social:  No Alcohol Use, Smoker    Hematology/Oncology:  Hematology Normal   Oncology Normal     EENT/Dental:EENT/Dental Normal   Cardiovascular:   Hypertension Angina CHF WILLSON    Pulmonary:   COPD, moderate Asthma moderate Shortness of breath Sleep Apnea    Renal/:   Chronic Renal Disease    Hepatic/GI:   GERD    Musculoskeletal:   Arthritis     Neurological:   Seizures    Endocrine:  Endocrine Normal    Dermatological:  Skin Normal    Psych:  Psychiatric Normal           Physical Exam  General: Well nourished    Airway:  Mallampati: III / III  Mouth Opening: Normal  TM Distance: > 6 cm  Tongue: Normal  Neck ROM: Normal ROM    Chest/Lungs:  Clear to auscultation, Normal Respiratory Rate    Heart:  Rate: Normal  Rhythm: Regular Rhythm        Anesthesia Plan  Type of Anesthesia, risks & benefits discussed:    Anesthesia Type: Gen Supraglottic Airway, Spinal, Regional  Intra-op Monitoring Plan: Standard ASA Monitors  Post Op Pain Control Plan: multimodal analgesia  Induction:  IV  Informed Consent: Informed consent signed with the Patient and all parties understand the risks and agree with anesthesia plan.  All questions answered. Patient consented to blood products? Yes  ASA Score: 4  Day of Surgery Review of History & Physical: H&P Update referred to the surgeon/provider.I have interviewed and examined the patient. I have reviewed the patient's H&P dated: There are no significant changes. H&P completed by Anesthesiologist.    Ready For Surgery From Anesthesia Perspective.     .

## 2023-11-02 NOTE — BRIEF OP NOTE
[facility] - [facility]opedic Periop Services  Brief Operative Note    SUMMARY     Surgery Date: 11/2/2023     Surgeon(s) and Role:     * Jayy Castillo MD - Primary    Assisting Surgeon: None    Pre-op Diagnosis:  Primary osteoarthritis of right knee [M17.11]    Post-op Diagnosis:  Post-Op Diagnosis Codes:     * Primary osteoarthritis of right knee [M17.11]    Procedure(s) (LRB):  ARTHROPLASTY, KNEE, TOTAL, USING COMPUTER-ASSISTED NAVIGATION (Right)    Anesthesia:  General    Implants:  Lauro TKR  Implant Name Type Inv. Item Serial No.  Lot No. LRB No. Used Action   BASEPLATE TIBIAL TRIATHLON SZ - [id number]  BASEPLATE TIBIAL TRIATHLON SZ  [employer] FELICIA. [id number] Right 1 Implanted   INSERT TIBIAL CS SIZE 4 9MM - [id number]  INSERT TIBIAL CS SIZE 4 9MM  [employer] FELICIA. [id number] Right 1 Implanted   COMP FEM CR BEAD SZ 4 RIGHT - [id number]  COMP FEM CR BEAD SZ 4 RIGHT  [employer] FELICIA. [id number] Right 1 Implanted   PATELLA TRIATHLON ASYM 31X9MM - [id number]  PATELLA TRIATHLON ASYM 31X9MM  [employer] FELICIA. [id number] Right 1 Implanted   CEMENT BONE SPEED SET - [id number] Cement CEMENT BONE SPEED SET  [employer] FELICIA. [id number] Right 1 Implanted       Operative Findings:  Severe DJD-right knee    Estimated Blood Loss: 50 mL    Estimated Blood Loss has been documented.         Specimens:   Specimen (24h ago, onward)      None            [id number]

## 2023-11-02 NOTE — PLAN OF CARE
Problem: Physical Therapy  Goal: Physical Therapy Goal  Description: Short Term Goals to be met by: 2023    Patient will increase functional independence with mobility by performin. Supine to sit with Modified [place]  2. Sit to stand transfer with Modified [place]  3. Bed to chair transfer with Modified [place] using Rolling Walker, WBAT right LE  4. Gait  x 100 feet with Modified [place] using Rolling Walker, WBAT right LE.   5. Lower extremity exercise program x30 reps per handout, with assistance as needed  6. Knee ROM 0-90    Long Term Goals to be met by: 2024    Pt will regain full independent functional mobility to return to prior activities of daily living.   Outcome: Ongoing, Progressing     Patient participated well with PT interventions and is highly motivated to regain independent functional mobility. Patient will benefit from moderate intensity rehab at d/c to optimize rate of recovery.

## 2023-11-02 NOTE — PLAN OF CARE
Problem: Occupational Therapy  Goal: Occupational Therapy Goal  Description: STG:(in 1 week)  1.Pt will perform bathing with setup and SBA  2.Pt will perform UE dressing with independence  3.Pt will perform LE dressing with min(A)  4.Pt will transfer bed/chair/bsc with SBA with RW  5.Pt will perform standing task x 4 min with SBA with RW  6.Tolerate 15 min of tx without fatigue.      LTG: (in 4 weeks)  Restore to max I with selfcare and mobility.     Outcome: Ongoing, Progressing

## 2023-11-02 NOTE — ANESTHESIA PROCEDURE NOTES
Intubation    Date/Time: 11/2/2023 11:31 AM    Performed by: Laya Acosta CRNA  Authorized by: Je Martínez MD    Intubation:     Induction:  Intravenous    Intubated:  Postinduction    Mask Ventilation:  Not attempted    Attempts:  1    Attempted By:  CRNA    Method of Intubation:  Other (see comments)    Difficult Airway Encountered?: No      Complications:  None    Airway Device:  Supraglottic airway/LMA    Airway Device Size:  4.0    Style/Cuff Inflation:  Cuffed (inflated to minimal occlusive pressure)    Placement Verified By:  Capnometry    Complicating Factors:  None    Findings Post-Intubation:  BS equal bilateral and atraumatic/condition of teeth unchanged

## 2023-11-02 NOTE — PT/OT/SLP EVAL
"Physical Therapy Evaluation and Treatment    Patient Name: Radha Martínez   MRN: [id number]  Recent Surgery: Procedure(s) (LRB):  ARTHROPLASTY, KNEE, TOTAL, USING COMPUTER-ASSISTED NAVIGATION (Right) Day of Surgery    Recommendations:     Discharge Recommendations: Moderate Intensity Therapy   Discharge Equipment Recommendations: shower chair   Barriers to discharge:  awaiting swing bed approval    Assessment:     Radha Martínez is a 73 y.o. female admitted with a medical diagnosis of Primary osteoarthritis of right knee. She presents with the following impairments/functional limitations: weakness, impaired endurance, impaired self care skills, impaired functional mobility, gait instability, impaired balance, decreased lower extremity function, pain, decreased ROM, impaired cardiopulmonary response to activity.   Patient participated well with PT interventions and is highly motivated to regain independent functional mobility. Patient will benefit from moderate intensity rehab at d/c to optimize rate of recovery.    Rehab Prognosis: Good; patient would benefit from acute PT services to address these deficits and reach maximum level of function.    Plan:     During this hospitalization, patient to be seen BID (5x/week; daily 2x/week) to address the above listed problems via gait training, therapeutic activities, therapeutic exercises    Plan of Care Expires: 12/02/23    Subjective     Chief Complaint: right TKR  Patient Comments/Goals: "I had my other knee done quite a few years ago and I did well."  Pain/Comfort:  Pain Rating 1: 4/10  Location - Side 1: Right  Location 1: knee  Pain Addressed 1: Reposition, Distraction, Cessation of Activity  Pain Rating Post-Intervention 1: 4/10    Social History:  Living Environment: Patient lives with their spouse and sons in a single story home with ramped  Prior Level of Function: Prior to admission, patient was modified independent with ADLs using rollator for mobility  Equipment " Used at Home: walker, rolling, rollator, oxygen  DME owned (not currently used): rolling walker  Assistance Upon Discharge: family and home health vs swing bed staff    Objective:     Communicated with Naa Manuel RN prior to session. Patient found supine with oxygen, hemovac, knee immobilizer, cryotherapy, blood pressure cuff, pulse ox (continuous), bed alarm upon PT entry to room.    General Precautions: Standard, fall   Orthopedic Precautions: RLE weight bearing as tolerated   Braces: Knee immobilizer    Respiratory Status: Nasal cannula, flow 2 L/min    Exams:  RLE ROM: Deficits: hip WFL ;knee in KI; ankle WFl  RLE Strength: Deficits: hip WFL; knee not tested; ankle WFL  LLE ROM: WFL  LLE Strength: WFL  Cognitive: Patient is oriented to Person, Place, Time, Situation  Sensation:    -       Intact    Functional Mobility:  Gait belt applied - Yes  Bed Mobility  Supine to Sit: minimum assistance for LE management  Sit to Supine: minimum assistance for LE management  Transfers  Sit to Stand: minimum assistance and of 2 persons with rolling walker and with cues for hand placement, foot placement, and weight bearing precautions  Toilet Transfer: minimum assistance with rolling walker and with cues for hand placement, foot placement, and weight bearing precautions using Step Transfer  Gait  Patient ambulated 25' x 2 trials with rolling walker and contact guard assistance. Patient required cues for sequencing, stride length, upright posture, and weight bearing status to increase independence and safety. Patient required cues ~ 75% of the time.  Balance  Sitting: GOOD: Maintains balance through MODERATE excursions of active trunk movement  Standing: FAIR: Needs CONTACT GUARD during gait    Therapeutic Activities and Exercises:  Patient educated on role of acute care PT and PT POC, safety while in hospital including calling nurse for mobility, and call light usage.  Educated about weightbearing precautions and  provided cuing for adherence as appropriate during session.  Educated about importance of OOB mobility and remaining up in chair most of the day.  Illustrated HEP placed on AVS  Benefits of swing bed vs HHPT    AM-PAC 6 CLICK MOBILITY  Total Score:16    Patient left HOB elevated to mimic recliner with all lines intact, call button in reach, RN notified, and bed alarm on.    GOALS:   Multidisciplinary Problems       Physical Therapy Goals          Problem: Physical Therapy    Goal Priority Disciplines Outcome Goal Variances Interventions   Physical Therapy Goal     PT, PT/OT Ongoing, Progressing     Description: Short Term Goals to be met by: 2023    Patient will increase functional independence with mobility by performin. Supine to sit with Modified [place]  2. Sit to stand transfer with Modified [place]  3. Bed to chair transfer with Modified [place] using Rolling Walker, WBAT right LE  4. Gait  x 100 feet with Modified [place] using Rolling Walker, WBAT right LE.   5. Lower extremity exercise program x30 reps per handout, with assistance as needed  6. Knee ROM 0-90    Long Term Goals to be met by: 2024    Pt will regain full independent functional mobility to return to prior activities of daily living.                        History:     Past Medical History:   Diagnosis Date    Asthma     CHF (congestive heart failure)     COPD (chronic obstructive pulmonary disease)     WILLSON (dyspnea on exertion)     GERD (gastroesophageal reflux disease)     HTN (hypertension)     Hyperlipidemia     Osteoporosis     Oxygen dependent     02 2L    Seizure disorder        Past Surgical History:   Procedure Laterality Date    CARDIOVERSION N/A 2022    Procedure: Cardioversion;  Surgeon: Adal Chung MD;  Location: [facility] CATH LAB;  Service: Cardiology;  Laterality: N/A;    COLONOSCOPY      EYE SURGERY      REVISION OF TOTAL KNEE REPLACEMENT       TUBAL LIGATION         Time  Tracking:     PT Received On: 11/02/23  PT Start Time: 1545  PT Stop Time: 1613  PT Total Time (min): 28 min     Billable Minutes: Evaluation Low complexity    11/2/2023

## 2023-11-02 NOTE — CONSULTS
Consult acknowledged. Initial dcp is [facility]. If pt is unable to obtain insurance precert, pt will dc home with [employer] [place] [facility] and rw from [facility]. Ss following.

## 2023-11-02 NOTE — TRANSFER OF CARE
Anesthesia Transfer of Care Note    Patient: Radha Martínez    Procedure(s) Performed: Procedure(s) (LRB):  ARTHROPLASTY, KNEE, TOTAL, USING COMPUTER-ASSISTED NAVIGATION (Right)    Patient location: PACU    Anesthesia Type: general, regional and spinal    Transport from OR: Transported from OR on 6-10 L/min O2 by face mask with adequate spontaneous ventilation    Post pain: adequate analgesia    Post assessment: no apparent anesthetic complications    Post vital signs: stable    Level of consciousness: awake and alert    Nausea/Vomiting: no nausea/vomiting    Complications: none    Transfer of care protocol was followed      Last vitals:   Visit Vitals  BP (!) 119/56   Pulse 71   Temp 36.4 °C (97.6 °F)   Resp 10   Wt 99.8 kg (220 lb)   SpO2 100%   Breastfeeding No   BMI 37.76 kg/m²

## 2023-11-02 NOTE — PT/OT/SLP EVAL
Occupational Therapy Evaluation     Name: Radha Martínez  MRN: [id number]  Admitting Diagnosis: Primary osteoarthritis of right knee Day of Surgery  Recent Surgery: Procedure(s) (LRB):  ARTHROPLASTY, KNEE, TOTAL, USING COMPUTER-ASSISTED NAVIGATION (Right) Day of Surgery    Recommendations:     Discharge Recommendations: Moderate Intensity Therapy  Level of Assistance Recommended: Intermittent assistance  Discharge Equipment Recommendations: shower chair  Barriers to discharge:      Assessment:     Radha Martínez is a 73 y.o. female with a medical diagnosis of Primary osteoarthritis of right knee. She presents with performance deficits affecting function including weakness, impaired endurance, impaired self care skills, impaired functional mobility, gait instability, decreased lower extremity function, pain, orthopedic precautions. Pt is post op (R) TKR today. Pt is wanting to go to swingbed or inpt rehab at D/C.    Rehab Prognosis: Good; patient would benefit from acute OT services to address these deficits and reach maximum level of function.    Plan:     Patient to be seen 5 x/week to address the above listed problems via self-care/home management, therapeutic activities, therapeutic exercises  Plan of Care Expires: 11/09/23  Plan of Care Reviewed with: patient    Subjective     Chief Complaint: post op (R) TKR today  Patient Comments/Goals: Pt wants to go to swingbed or inpt rehab before returning home  Pain/Comfort:  Pain Rating 1: 4/10  Location - Side 1: Right  Location 1: knee  Pain Addressed 1: Reposition, Distraction, Cessation of Activity  Pain Rating Post-Intervention 1: 4/10    Patients cultural, spiritual, Holiness conflicts given the current situation: no    Social History:  Living Environment: Patient lives with their spouse and son in a single story home with ramped  Prior Level of Function: Prior to admission, patient was modified independent with ADLs using rollator for mobility.  Roles and  Routines: Patient was retired prior to admission.  Equipment Used at Home: walker, rolling, rollator, oxygen  DME owned (not currently used): rolling walker  Assistance Upon Discharge: significant other and facility staff    Objective:     Communicated with DEBBIE Manuel prior to session. Patient found HOB elevated with oxygen, peripheral IV, hemovac, cryotherapy, blood pressure cuff, pulse ox (continuous), bed alarm upon OT entry to room.    General Precautions: Standard, fall   Orthopedic Precautions: RLE weight bearing as tolerated   Braces: Knee immobilizer    Respiratory Status: Nasal cannula, flow 2 L/min    Occupational Performance    Gait belt applied - Yes    Bed Mobility:   Supine to sit from left side of bed with minimum assistance  Sit to Supine with minimum assistance on left side of bed    Functional Mobility/Transfers:  Sit <> Stand Transfer with minimum assistance with 2 person assist with rolling walker  Toilet Transfer Step Transfer technique with contact guard assistance with rolling walker and verbal cues  Functional Mobility: Pt ambulated with RW and knee immobilizer with CGA and verbal cues    Activities of Daily Living:  Grooming: stand by assistance  Toileting: minimum assistance    Cognitive/Visual Perceptual:  Cognitive/Psychosocial Skills:    -     Oriented to: Person, Place, Time, Situation  -     Follows Commands/attention: Follows one-step commands  -     Communication: clear/fluent  -     Safety awareness/insight to disability: intact  -     Mood/Affect/Coping skills/emotional control: Appropriate to situation and Cooperative    Physical Exam:  Balance:    -     Sitting: independence  -     Standing: contact guard assistance  Motor Planning: Intact  Dominant hand: Left  Upper Extremity Range of Motion:     -       Right Upper Extremity: WNL  -       Left Upper Extremity: WNL  Upper Extremity Strength:    -       Right Upper Extremity: WNL  -       Left Upper Extremity: WNL    Strength:    -       Right Upper Extremity: WNL  -       Left Upper Extremity: WNL  Fine Motor Coordination:    -       Intact  Gross motor coordination:   WFL    AMPAC 6 Click ADL:  AMPAC Total Score: 20    Treatment & Education:  Educated on the importance of mobility to maximize recovery  Educated on weight bearing status  Will continue to educate as needed  OT treatment plan    Patient clear to ambulate to/from bathroom with RN/PCT, assist x1 .    Patient left HOB elevated with all lines intact, call button in reach, and RN notified.    GOALS:   Multidisciplinary Problems       Occupational Therapy Goals          Problem: Occupational Therapy    Goal Priority Disciplines Outcome Interventions   Occupational Therapy Goal     OT, PT/OT Ongoing, Progressing    Description: STG:(in 1 week)  1.Pt will perform bathing with setup and SBA  2.Pt will perform UE dressing with independence  3.Pt will perform LE dressing with min(A)  4.Pt will transfer bed/chair/bsc with SBA with RW  5.Pt will perform standing task x 4 min with SBA with RW  6.Tolerate 15 min of tx without fatigue.      LTG: (in 4 weeks)  Restore to max I with selfcare and mobility.                          History:     Past Medical History:   Diagnosis Date    Asthma     CHF (congestive heart failure)     COPD (chronic obstructive pulmonary disease)     WILLSON (dyspnea on exertion)     GERD (gastroesophageal reflux disease)     HTN (hypertension)     Hyperlipidemia     Osteoporosis     Oxygen dependent     02 2L    Seizure disorder          Past Surgical History:   Procedure Laterality Date    CARDIOVERSION N/A 04/08/2022    Procedure: Cardioversion;  Surgeon: Adal Chung MD;  Location: [facility] CATH LAB;  Service: Cardiology;  Laterality: N/A;    COLONOSCOPY      EYE SURGERY      REVISION OF TOTAL KNEE REPLACEMENT       TUBAL LIGATION         Time Tracking:     OT Date of Treatment: 11/02/23  OT Start Time: 1546  OT Stop Time: 1613  OT Total Time  (min): 27 min    Billable Minutes: Evaluation low complexity    11/2/2023

## 2023-11-03 ENCOUNTER — HOSPITAL ENCOUNTER (INPATIENT)
Facility: HOSPITAL | Age: 73
LOS: 12 days | Discharge: HOME HEALTH | DRG: 560 | End: 2023-11-15
Attending: HOSPITALIST | Admitting: FAMILY MEDICINE
Payer: MEDICARE

## 2023-11-03 VITALS
HEIGHT: 64 IN | TEMPERATURE: 98 F | HEART RATE: 76 BPM | WEIGHT: 220 LBS | BODY MASS INDEX: 37.56 KG/M2 | OXYGEN SATURATION: 96 % | SYSTOLIC BLOOD PRESSURE: 107 MMHG | DIASTOLIC BLOOD PRESSURE: 68 MMHG | RESPIRATION RATE: 18 BRPM

## 2023-11-03 DIAGNOSIS — M54.42 CHRONIC MIDLINE LOW BACK PAIN WITH BILATERAL SCIATICA: Primary | ICD-10-CM

## 2023-11-03 DIAGNOSIS — I10 HTN (HYPERTENSION): ICD-10-CM

## 2023-11-03 DIAGNOSIS — M54.41 CHRONIC MIDLINE LOW BACK PAIN WITH BILATERAL SCIATICA: Primary | ICD-10-CM

## 2023-11-03 DIAGNOSIS — G89.29 CHRONIC MIDLINE LOW BACK PAIN WITH BILATERAL SCIATICA: Primary | ICD-10-CM

## 2023-11-03 DIAGNOSIS — Z96.651 S/P TKR (TOTAL KNEE REPLACEMENT), RIGHT: ICD-10-CM

## 2023-11-03 LAB
ALBUMIN SERPL BCP-MCNC: 3.1 G/DL (ref 3.5–5)
ALBUMIN SERPL BCP-MCNC: 3.3 G/DL (ref 3.5–5)
ALBUMIN/GLOB SERPL: 0.9 {RATIO}
ALBUMIN/GLOB SERPL: 1 {RATIO}
ALP SERPL-CCNC: 188 U/L (ref 55–142)
ALP SERPL-CCNC: 201 U/L (ref 55–142)
ALT SERPL W P-5'-P-CCNC: 20 U/L (ref 13–56)
ALT SERPL W P-5'-P-CCNC: 21 U/L (ref 13–56)
ANION GAP SERPL CALCULATED.3IONS-SCNC: 11 MMOL/L (ref 7–16)
ANION GAP SERPL CALCULATED.3IONS-SCNC: 11 MMOL/L (ref 7–16)
AST SERPL W P-5'-P-CCNC: 21 U/L (ref 15–37)
AST SERPL W P-5'-P-CCNC: 22 U/L (ref 15–37)
BACTERIA #/AREA URNS HPF: ABNORMAL /HPF
BASOPHILS # BLD AUTO: 0.02 K/UL (ref 0–0.2)
BASOPHILS # BLD AUTO: 0.02 K/UL (ref 0–0.2)
BASOPHILS NFR BLD AUTO: 0.3 % (ref 0–1)
BASOPHILS NFR BLD AUTO: 0.3 % (ref 0–1)
BILIRUB SERPL-MCNC: 0.2 MG/DL (ref ?–1.2)
BILIRUB SERPL-MCNC: 0.3 MG/DL (ref ?–1.2)
BILIRUB UR QL STRIP: NEGATIVE
BUN SERPL-MCNC: 18 MG/DL (ref 7–18)
BUN SERPL-MCNC: 19 MG/DL (ref 7–18)
BUN/CREAT SERPL: 15 (ref 6–20)
BUN/CREAT SERPL: 16 (ref 6–20)
CALCIUM SERPL-MCNC: 8.4 MG/DL (ref 8.5–10.1)
CALCIUM SERPL-MCNC: 8.4 MG/DL (ref 8.5–10.1)
CHLORIDE SERPL-SCNC: 104 MMOL/L (ref 98–107)
CHLORIDE SERPL-SCNC: 99 MMOL/L (ref 98–107)
CLARITY UR: CLEAR
CO2 SERPL-SCNC: 28 MMOL/L (ref 21–32)
CO2 SERPL-SCNC: 28 MMOL/L (ref 21–32)
COLOR UR: YELLOW
CREAT SERPL-MCNC: 1.16 MG/DL (ref 0.55–1.02)
CREAT SERPL-MCNC: 1.25 MG/DL (ref 0.55–1.02)
DIFFERENTIAL METHOD BLD: ABNORMAL
DIFFERENTIAL METHOD BLD: ABNORMAL
EGFR (NO RACE VARIABLE) (RUSH/TITUS): 46 ML/MIN/1.73M2
EGFR (NO RACE VARIABLE) (RUSH/TITUS): 50 ML/MIN/1.73M2
EOSINOPHIL # BLD AUTO: 0 K/UL (ref 0–0.5)
EOSINOPHIL # BLD AUTO: 0.06 K/UL (ref 0–0.5)
EOSINOPHIL NFR BLD AUTO: 0 % (ref 1–4)
EOSINOPHIL NFR BLD AUTO: 0.8 % (ref 1–4)
ERYTHROCYTE [DISTWIDTH] IN BLOOD BY AUTOMATED COUNT: 13.6 % (ref 11.5–14.5)
ERYTHROCYTE [DISTWIDTH] IN BLOOD BY AUTOMATED COUNT: 13.8 % (ref 11.5–14.5)
GLOBULIN SER-MCNC: 3.1 G/DL (ref 2–4)
GLOBULIN SER-MCNC: 3.8 G/DL (ref 2–4)
GLUCOSE SERPL-MCNC: 110 MG/DL (ref 74–106)
GLUCOSE SERPL-MCNC: 156 MG/DL (ref 74–106)
GLUCOSE UR STRIP-MCNC: NEGATIVE MG/DL
HCT VFR BLD AUTO: 33.1 % (ref 38–47)
HCT VFR BLD AUTO: 35.7 % (ref 38–47)
HGB BLD-MCNC: 10.7 G/DL (ref 12–16)
HGB BLD-MCNC: 11.1 G/DL (ref 12–16)
IMM GRANULOCYTES # BLD AUTO: 0.01 K/UL (ref 0–0.04)
IMM GRANULOCYTES NFR BLD: 0.1 % (ref 0–0.4)
KETONES UR STRIP-SCNC: NEGATIVE MG/DL
LEUKOCYTE ESTERASE UR QL STRIP: ABNORMAL
LYMPHOCYTES # BLD AUTO: 1.38 K/UL (ref 1–4.8)
LYMPHOCYTES # BLD AUTO: 1.65 K/UL (ref 1–4.8)
LYMPHOCYTES NFR BLD AUTO: 18.8 % (ref 27–41)
LYMPHOCYTES NFR BLD AUTO: 22.9 % (ref 27–41)
MCH RBC QN AUTO: 28.9 PG (ref 27–31)
MCH RBC QN AUTO: 29.2 PG (ref 27–31)
MCHC RBC AUTO-ENTMCNC: 31.1 G/DL (ref 32–36)
MCHC RBC AUTO-ENTMCNC: 32.3 G/DL (ref 32–36)
MCV RBC AUTO: 90.2 FL (ref 80–96)
MCV RBC AUTO: 93 FL (ref 80–96)
MONOCYTES # BLD AUTO: 0.81 K/UL (ref 0–0.8)
MONOCYTES # BLD AUTO: 0.84 K/UL (ref 0–0.8)
MONOCYTES NFR BLD AUTO: 11 % (ref 2–6)
MONOCYTES NFR BLD AUTO: 11.6 % (ref 2–6)
MPC BLD CALC-MCNC: 10.2 FL (ref 9.4–12.4)
MPC BLD CALC-MCNC: 10.9 FL (ref 9.4–12.4)
NEUTROPHILS # BLD AUTO: 4.65 K/UL (ref 1.8–7.7)
NEUTROPHILS # BLD AUTO: 5.13 K/UL (ref 1.8–7.7)
NEUTROPHILS NFR BLD AUTO: 64.4 % (ref 53–65)
NEUTROPHILS NFR BLD AUTO: 69.8 % (ref 53–65)
NITRITE UR QL STRIP: NEGATIVE
NRBC # BLD AUTO: 0 X10E3/UL
NRBC, AUTO (.00): 0 %
PH UR STRIP: 6 PH UNITS
PLATELET # BLD AUTO: 203 K/UL (ref 150–400)
PLATELET # BLD AUTO: 217 K/UL (ref 150–400)
POTASSIUM SERPL-SCNC: 4.3 MMOL/L (ref 3.5–5.1)
POTASSIUM SERPL-SCNC: 4.6 MMOL/L (ref 3.5–5.1)
PROT SERPL-MCNC: 6.2 G/DL (ref 6.4–8.2)
PROT SERPL-MCNC: 7.1 G/DL (ref 6.4–8.2)
PROT UR QL STRIP: NEGATIVE
RBC # BLD AUTO: 3.67 M/UL (ref 4.2–5.4)
RBC # BLD AUTO: 3.84 M/UL (ref 4.2–5.4)
RBC # UR STRIP: NEGATIVE /UL
RBC #/AREA URNS HPF: ABNORMAL /HPF
SODIUM SERPL-SCNC: 134 MMOL/L (ref 136–145)
SODIUM SERPL-SCNC: 138 MMOL/L (ref 136–145)
SP GR UR STRIP: 1.01
SQUAMOUS #/AREA URNS LPF: ABNORMAL /LPF
UROBILINOGEN UR STRIP-ACNC: 0.2 MG/DL
WBC # BLD AUTO: 7.22 K/UL (ref 4.5–11)
WBC # BLD AUTO: 7.35 K/UL (ref 4.5–11)
WBC #/AREA URNS HPF: ABNORMAL /HPF

## 2023-11-03 PROCEDURE — 97110 THERAPEUTIC EXERCISES: CPT

## 2023-11-03 PROCEDURE — 25000003 PHARM REV CODE 250: Performed by: REGISTERED NURSE

## 2023-11-03 PROCEDURE — 99214 OFFICE O/P EST MOD 30 MIN: CPT | Mod: ,,, | Performed by: HOSPITALIST

## 2023-11-03 PROCEDURE — 80053 COMPREHEN METABOLIC PANEL: CPT | Performed by: NURSE PRACTITIONER

## 2023-11-03 PROCEDURE — 85025 COMPLETE CBC W/AUTO DIFF WBC: CPT | Performed by: HOSPITALIST

## 2023-11-03 PROCEDURE — 25000003 PHARM REV CODE 250: Performed by: HOSPITALIST

## 2023-11-03 PROCEDURE — 93010 EKG 12-LEAD: ICD-10-PCS | Mod: ,,, | Performed by: INTERNAL MEDICINE

## 2023-11-03 PROCEDURE — 97116 GAIT TRAINING THERAPY: CPT

## 2023-11-03 PROCEDURE — 11000004 HC SNF PRIVATE

## 2023-11-03 PROCEDURE — 81001 URINALYSIS AUTO W/SCOPE: CPT | Performed by: NURSE PRACTITIONER

## 2023-11-03 PROCEDURE — 93010 ELECTROCARDIOGRAM REPORT: CPT | Mod: ,,, | Performed by: INTERNAL MEDICINE

## 2023-11-03 PROCEDURE — 87086 URINE CULTURE/COLONY COUNT: CPT | Performed by: NURSE PRACTITIONER

## 2023-11-03 PROCEDURE — 94761 N-INVAS EAR/PLS OXIMETRY MLT: CPT

## 2023-11-03 PROCEDURE — 99214 PR OFFICE/OUTPT VISIT, EST, LEVL IV, 30-39 MIN: ICD-10-PCS | Mod: ,,, | Performed by: HOSPITALIST

## 2023-11-03 PROCEDURE — 97162 PT EVAL MOD COMPLEX 30 MIN: CPT

## 2023-11-03 PROCEDURE — 85025 COMPLETE CBC W/AUTO DIFF WBC: CPT | Performed by: NURSE PRACTITIONER

## 2023-11-03 PROCEDURE — 84134 ASSAY OF PREALBUMIN: CPT | Performed by: NURSE PRACTITIONER

## 2023-11-03 PROCEDURE — 80053 COMPREHEN METABOLIC PANEL: CPT | Performed by: HOSPITALIST

## 2023-11-03 PROCEDURE — 93005 ELECTROCARDIOGRAM TRACING: CPT

## 2023-11-03 RX ORDER — TALC
6 POWDER (GRAM) TOPICAL NIGHTLY PRN
Status: DISCONTINUED | OUTPATIENT
Start: 2023-11-03 | End: 2023-11-15 | Stop reason: HOSPADM

## 2023-11-03 RX ORDER — ONDANSETRON 4 MG/1
4 TABLET, ORALLY DISINTEGRATING ORAL EVERY 8 HOURS PRN
Status: DISCONTINUED | OUTPATIENT
Start: 2023-11-03 | End: 2023-11-15 | Stop reason: HOSPADM

## 2023-11-03 RX ORDER — HYDROCODONE BITARTRATE AND ACETAMINOPHEN 10; 325 MG/1; MG/1
1 TABLET ORAL EVERY 4 HOURS PRN
Qty: 30 TABLET | Refills: 0 | Status: SHIPPED | OUTPATIENT
Start: 2023-11-03 | End: 2023-11-17

## 2023-11-03 RX ORDER — PHENYTOIN SODIUM 100 MG/1
100 CAPSULE, EXTENDED RELEASE ORAL 3 TIMES DAILY
Status: DISCONTINUED | OUTPATIENT
Start: 2023-11-04 | End: 2023-11-15 | Stop reason: HOSPADM

## 2023-11-03 RX ORDER — PANTOPRAZOLE SODIUM 40 MG/1
40 TABLET, DELAYED RELEASE ORAL DAILY
Status: DISCONTINUED | OUTPATIENT
Start: 2023-11-04 | End: 2023-11-15 | Stop reason: HOSPADM

## 2023-11-03 RX ORDER — BUSPIRONE HYDROCHLORIDE 10 MG/1
10 TABLET ORAL 2 TIMES DAILY
Status: DISCONTINUED | OUTPATIENT
Start: 2023-11-03 | End: 2023-11-15 | Stop reason: HOSPADM

## 2023-11-03 RX ORDER — METOPROLOL TARTRATE 25 MG/1
25 TABLET, FILM COATED ORAL 2 TIMES DAILY
Status: DISCONTINUED | OUTPATIENT
Start: 2023-11-03 | End: 2023-11-15 | Stop reason: HOSPADM

## 2023-11-03 RX ORDER — ACETAMINOPHEN 325 MG/1
650 TABLET ORAL EVERY 6 HOURS PRN
Status: DISCONTINUED | OUTPATIENT
Start: 2023-11-03 | End: 2023-11-15 | Stop reason: HOSPADM

## 2023-11-03 RX ORDER — FUROSEMIDE 40 MG/1
40 TABLET ORAL 2 TIMES DAILY
Status: DISCONTINUED | OUTPATIENT
Start: 2023-11-03 | End: 2023-11-15 | Stop reason: HOSPADM

## 2023-11-03 RX ORDER — FLUTICASONE PROPIONATE 50 MCG
1 SPRAY, SUSPENSION (ML) NASAL DAILY
Status: DISCONTINUED | OUTPATIENT
Start: 2023-11-04 | End: 2023-11-04

## 2023-11-03 RX ORDER — GABAPENTIN 300 MG/1
300 CAPSULE ORAL 3 TIMES DAILY
Status: DISCONTINUED | OUTPATIENT
Start: 2023-11-04 | End: 2023-11-15 | Stop reason: HOSPADM

## 2023-11-03 RX ORDER — PRAVASTATIN SODIUM 40 MG/1
40 TABLET ORAL NIGHTLY
Status: DISCONTINUED | OUTPATIENT
Start: 2023-11-03 | End: 2023-11-15 | Stop reason: HOSPADM

## 2023-11-03 RX ORDER — MUPIROCIN 20 MG/G
OINTMENT TOPICAL 2 TIMES DAILY
Status: COMPLETED | OUTPATIENT
Start: 2023-11-03 | End: 2023-11-08

## 2023-11-03 RX ORDER — FERROUS SULFATE, DRIED 160(50) MG
1 TABLET, EXTENDED RELEASE ORAL DAILY
Status: DISCONTINUED | OUTPATIENT
Start: 2023-11-04 | End: 2023-11-06

## 2023-11-03 RX ORDER — HYDROCODONE BITARTRATE AND ACETAMINOPHEN 10; 325 MG/1; MG/1
1 TABLET ORAL EVERY 6 HOURS PRN
Status: DISCONTINUED | OUTPATIENT
Start: 2023-11-03 | End: 2023-11-15 | Stop reason: HOSPADM

## 2023-11-03 RX ORDER — MECLIZINE HCL 12.5 MG 12.5 MG/1
12.5 TABLET ORAL 2 TIMES DAILY PRN
Status: DISCONTINUED | OUTPATIENT
Start: 2023-11-03 | End: 2023-11-15 | Stop reason: HOSPADM

## 2023-11-03 RX ORDER — DOCUSATE SODIUM 283 MG/5ML
1 LIQUID RECTAL DAILY PRN
Status: DISCONTINUED | OUTPATIENT
Start: 2023-11-03 | End: 2023-11-15 | Stop reason: HOSPADM

## 2023-11-03 RX ORDER — CETIRIZINE HYDROCHLORIDE 10 MG/1
10 TABLET ORAL DAILY
Status: DISCONTINUED | OUTPATIENT
Start: 2023-11-04 | End: 2023-11-15 | Stop reason: HOSPADM

## 2023-11-03 RX ORDER — POLYETHYLENE GLYCOL 3350 17 G/17G
17 POWDER, FOR SOLUTION ORAL DAILY PRN
Status: DISCONTINUED | OUTPATIENT
Start: 2023-11-03 | End: 2023-11-09

## 2023-11-03 RX ORDER — MAG HYDROX/ALUMINUM HYD/SIMETH 200-200-20
15 SUSPENSION, ORAL (FINAL DOSE FORM) ORAL EVERY 6 HOURS PRN
Status: DISCONTINUED | OUTPATIENT
Start: 2023-11-03 | End: 2023-11-15 | Stop reason: HOSPADM

## 2023-11-03 RX ORDER — PAROXETINE HYDROCHLORIDE 20 MG/1
40 TABLET, FILM COATED ORAL DAILY
Status: DISCONTINUED | OUTPATIENT
Start: 2023-11-04 | End: 2023-11-15 | Stop reason: HOSPADM

## 2023-11-03 RX ORDER — IPRATROPIUM BROMIDE AND ALBUTEROL SULFATE 2.5; .5 MG/3ML; MG/3ML
3 SOLUTION RESPIRATORY (INHALATION) EVERY 6 HOURS
Status: DISCONTINUED | OUTPATIENT
Start: 2023-11-04 | End: 2023-11-15 | Stop reason: HOSPADM

## 2023-11-03 RX ORDER — AMOXICILLIN 250 MG
1 CAPSULE ORAL 2 TIMES DAILY
Status: DISCONTINUED | OUTPATIENT
Start: 2023-11-03 | End: 2023-11-15 | Stop reason: HOSPADM

## 2023-11-03 RX ORDER — MONTELUKAST SODIUM 10 MG/1
10 TABLET ORAL DAILY
Status: DISCONTINUED | OUTPATIENT
Start: 2023-11-04 | End: 2023-11-15 | Stop reason: HOSPADM

## 2023-11-03 RX ADMIN — HYDROCODONE BITARTRATE AND ACETAMINOPHEN 1 TABLET: 10; 325 TABLET ORAL at 06:11

## 2023-11-03 RX ADMIN — CLOPIDOGREL BISULFATE 75 MG: 75 TABLET ORAL at 08:11

## 2023-11-03 RX ADMIN — APIXABAN 2.5 MG: 2.5 TABLET, FILM COATED ORAL at 08:11

## 2023-11-03 RX ADMIN — PRAVASTATIN SODIUM 40 MG: 40 TABLET ORAL at 09:11

## 2023-11-03 RX ADMIN — APIXABAN 2.5 MG: 2.5 TABLET, FILM COATED ORAL at 09:11

## 2023-11-03 RX ADMIN — PHENYTOIN SODIUM 100 MG: 100 CAPSULE ORAL at 08:11

## 2023-11-03 RX ADMIN — BUSPIRONE HYDROCHLORIDE 10 MG: 10 TABLET ORAL at 09:11

## 2023-11-03 RX ADMIN — HYDROCODONE BITARTRATE AND ACETAMINOPHEN 1 TABLET: 10; 325 TABLET ORAL at 07:11

## 2023-11-03 RX ADMIN — MUPIROCIN: 20 OINTMENT TOPICAL at 08:11

## 2023-11-03 RX ADMIN — FUROSEMIDE 40 MG: 40 TABLET ORAL at 09:11

## 2023-11-03 RX ADMIN — METOPROLOL TARTRATE 25 MG: 25 TABLET, FILM COATED ORAL at 09:11

## 2023-11-03 NOTE — ASSESSMENT & PLAN NOTE
Chronic, controlled. Latest blood pressure and vitals reviewed-     Temp:  [97.6 °F (36.4 °C)-97.7 °F (36.5 °C)]   Pulse:  [65-82]   Resp:  [10-18]   BP: (105-142)/(46-71)   SpO2:  [98 %-100 %] .   Home meds for hypertension were reviewed and noted below.   Hypertension Medications             furosemide (LASIX) 40 MG tablet Take 1 tablet (40 mg total) by mouth 2 (two) times daily.    nebivoloL (BYSTOLIC) 5 MG Tab Take 1 tablet (5 mg total) by mouth once daily.          While in the hospital, will manage blood pressure as follows; Continue home antihypertensive regimen    Will utilize p.r.n. blood pressure medication only if patient's blood pressure greater than 180/110 and she develops symptoms such as worsening chest pain or shortness of breath.

## 2023-11-03 NOTE — ASSESSMENT & PLAN NOTE
Patient's COPD is controlled currently.  Patient is currently off COPD Pathway. Continue scheduled inhalers Supplemental oxygen and monitor respiratory status closely.

## 2023-11-03 NOTE — PLAN OF CARE
[facility] - Medical Surgical Unit  Discharge Reassessment    Primary Care Provider: Jayy Castillo MD    Expected Discharge Date:     Reassessment (most recent)       Discharge Reassessment - 11/03/23 1615          Discharge Reassessment    Assessment Type Discharge Planning Assessment     Did the patient's condition or plan change since previous assessment? No     Discharge Plan discussed with: Patient     Communicated ALEX with patient/caregiver Date not available/Unable to determine     Discharge Plan A Home with family;Home Health     Transition of Care Barriers None     Why the patient remains in the hospital Requires continued medical care        Post-Acute Status    Post-Acute Authorization Home Health     Patient choice form signed by patient/caregiver List with quality metrics by geographic area provided     Discharge Delays None known at this time                   PT admitted to skilled care for PT and OT , discharge plan is home with family and home health of choice .

## 2023-11-03 NOTE — HOSPITAL COURSE
Computer-aided right total knee replacement arthroplasty under general anesthesia regional block.  Hospital course was essentially uncomplicated.  T-max during hospital stay was 98.2° F. she was treated prophylactically with IV antibiotic therapy include cefazolin vancomycin.  She would gradual improvement was able to ambulate 25 ft with the aid of a rolling walker.  Drain had total output of 260 mL.  Laboratory data at time of this dictation showed hemoglobin 11.4 and hematocrit 35.3.  She did not require blood transfusion during hospital stay.  [facility] was reviewed.  Patient was found.  Prescription Norco  mg 1 p.o. q.4 hours p.r.n. right knee pain number 30 with no refills sent to the pharmacy [facility].  She will use Eliquis 2.5 mg p.o. b.i.d. times 12 days.  When she completes her Eliquis she will begin aspirin 325 mg p.o. daily x4 weeks.  Follow-up orthopedic clinic in 2 weeks.  Requesting swing bed.  If approved we will discharge swing bed today.  If not approved we will discharge home with home physical therapy.

## 2023-11-03 NOTE — PROGRESS NOTES
[facility]  [facility]  Progress Note    Patient Name: Radha Martínez  MRN: [id number]  Patient Class: OP- Outpatient Recovery   Admission Date: 11/2/2023  Length of Stay: 0 days  Attending Physician: No att. providers found  Primary Care Provider: Jayy Castillo MD        Subjective:     Principal Problem:Primary osteoarthritis of right knee    HPI:  With squamous 73-year-old woman history of COPD/asthma, AFib status post Watchman procedure, diastolic heart failure, stage III obesity, seizures, PACO, hypertension, depression who presents with chronic right knee degenerative joint disease that is failed outpatient management.  She is now status post surgical repair of her right knee.  She has no postoperative complaints except that she has pain and requesting pain medication.      Overview/Hospital Course:  No notes on file    Interval History:     Review of Systems   Constitutional:  Negative for activity change, chills, fatigue and fever.   HENT:  Negative for congestion and sore throat.    Respiratory:  Negative for chest tightness.    Gastrointestinal:  Negative for abdominal distention, abdominal pain and diarrhea.   Endocrine: Negative for cold intolerance and heat intolerance.   Genitourinary:  Negative for dysuria.   Musculoskeletal:  Positive for arthralgias and joint swelling. Negative for back pain.        Right knee postoperative changes   Skin:  Negative for color change and rash.   Neurological:  Negative for dizziness, tremors and headaches.   Psychiatric/Behavioral:  Negative for agitation. The patient is not nervous/anxious.      Objective:     Vital Signs (Most Recent):  Temp: 97.9 °F (36.6 °C) (11/03/23 1026)  Pulse: 76 (11/03/23 1026)  Resp: 18 (11/03/23 1026)  BP: 107/68 (11/03/23 1026)  SpO2: 96 % (11/03/23 1026) Vital Signs (24h Range):  Temp:  [97 °F (36.1 °C)-98.2 °F (36.8 °C)] 98.1 °F (36.7 °C)  Pulse:  [68-92] 86  Resp:  [16-20] 18  SpO2:  [96 %-99 %] 96  %  BP: (107-156)/(51-71) 147/67     Weight: 99.8 kg (220 lb)  Body mass index is 37.76 kg/m².    Intake/Output Summary (Last 24 hours) at 11/3/2023 1705  Last data filed at 11/3/2023 0622  Gross per 24 hour   Intake 1147.5 ml   Output 260 ml   Net 887.5 ml         Physical Exam  Constitutional:       Appearance: Normal appearance.   HENT:      Head: Normocephalic and atraumatic.      Nose: Nose normal.      Mouth/Throat:      Mouth: Mucous membranes are moist.      Pharynx: Oropharynx is clear.   Eyes:      Extraocular Movements: Extraocular movements intact.      Conjunctiva/sclera: Conjunctivae normal.      Pupils: Pupils are equal, round, and reactive to light.   Cardiovascular:      Rate and Rhythm: Normal rate and regular rhythm.      Pulses: Normal pulses.      Heart sounds: Normal heart sounds.   Pulmonary:      Effort: Pulmonary effort is normal.      Breath sounds: Normal breath sounds.   Abdominal:      General: Abdomen is flat. Bowel sounds are normal.      Palpations: Abdomen is soft.   Musculoskeletal:         General: Swelling present. Normal range of motion.      Cervical back: Normal range of motion and neck supple.      Right lower leg: Edema present.      Comments: Right knee postoperative changes   Skin:     General: Skin is warm and dry.   Neurological:      General: No focal deficit present.      Mental Status: She is alert and oriented to person, place, and time.   Psychiatric:         Mood and Affect: Mood normal.         Behavior: Behavior normal.             Significant Labs: All pertinent labs within the past 24 hours have been reviewed.    Significant Imaging: I have reviewed all pertinent imaging results/findings within the past 24 hours.      Assessment/Plan:      * Primary osteoarthritis of right knee  Status post surgical repair by Orthopedics.      Eliquis 2.5 mg b.i.d. per Orthopedics.    11/3: plan to discharge to swing-bed today.  Labs and vitals relatively stable.  May monitor at  swing-bed.        PAF (paroxysmal atrial fibrillation)    Controlled and status post Watchman.  She is not on anticoagulation.    HTN (hypertension)  Chronic, controlled. Latest blood pressure and vitals reviewed-     Temp:  [97 °F (36.1 °C)-98.2 °F (36.8 °C)]   Pulse:  [68-92]   Resp:  [16-20]   BP: (107-156)/(51-71)   SpO2:  [96 %-99 %] .   Home meds for hypertension were reviewed and noted below.   Hypertension Medications             furosemide (LASIX) 40 MG tablet Take 1 tablet (40 mg total) by mouth 2 (two) times daily.    nebivoloL (BYSTOLIC) 5 MG Tab Take 1 tablet (5 mg total) by mouth once daily.          While in the hospital, will manage blood pressure as follows; Continue home antihypertensive regimen    Will utilize p.r.n. blood pressure medication only if patient's blood pressure greater than 180/110 and she develops symptoms such as worsening chest pain or shortness of breath.    Depression  Continue home depression meds.      Severe obesity (BMI 35.0-39.9) with comorbidity  Body mass index is 37.76 kg/m². Morbid obesity complicates all aspects of disease management from diagnostic modalities to treatment. Weight loss encouraged and health benefits explained to patient.         Seizure  Continue home Dilantin      HLD (hyperlipidemia)  Continue home statin      COPD exacerbation  Patient's COPD is controlled currently.  Patient is currently off COPD Pathway. Continue scheduled inhalers Supplemental oxygen and monitor respiratory status closely.     GERD (gastroesophageal reflux disease)  Continue home PPI        VTE Risk Mitigation (From admission, onward)         Ordered     IP VTE HIGH RISK PATIENT  Once         11/03/23 0731                Discharge Planning   ALEX: 11/3/2023     Code Status: Full Code   Is the patient medically ready for discharge?:     Reason for patient still in hospital (select all that apply): Treatment  Discharge Plan A: Skilled Nursing Facility   Discharge Delays: None known  at this time              Roberta Lee MD  Department of Hospital Medicine   [facility]

## 2023-11-03 NOTE — DISCHARGE SUMMARY
[facility]  Orthopedics  Discharge Summary      Patient Name: Radha Martínez  MRN: [id number]  Admission Date: 11/2/2023  Hospital Length of Stay: 0 days  Discharge Date and Time:  11/03/2023 7:35 AM  Attending Physician: Jayy Castillo MD   Discharging Provider: HEAVENLY Hooper  Primary Care Provider: Jayy Castillo MD    HPI:   Chief complaint: Right knee pain   History:   Radha Martínez is a 73 y.o. female seen for recheck of her left knee.  She is known to me having undergone right total knee replacement arthroplasty 06/12/2018.  She is done well aspect her left knee since that time.  She is had progressive right knee pain and radiographic evidence of tricompartmental DJD.  She has complete obliteration of the medial joint space with genu varum.  She is not experienced any significant improvement with her last corticosteroid injection.  She is requested we proceed with total knee replacement arthroplasty.  She is seeing Ariadne BURDICK for her medical needs.  She is not on any blood thinners.    X-rays right knee 09/06/2023 shows moderate generalized osteopenia.  There is severe tricompartmental DJD with complete obliteration of medial joint space in genu   Impression:  Severe DJD-right knee  Plan:  Computer navigated right total knee replacement arthroplasty.  Procedure was shown in detail using models and actual components.  The potential benefits and risks of surgery outlined to include but not limited to bleeding, infection, damage to blood vessels nerves, need for further surgery, other risks and complications including even death the patient wished to proceed.  varum.              Procedure(s) (LRB):  ARTHROPLASTY, KNEE, TOTAL, USING COMPUTER-ASSISTED NAVIGATION (Right)      Hospital Course:  Computer-aided right total knee replacement arthroplasty under general anesthesia regional block.  Hospital course was essentially uncomplicated.  T-max during hospital stay was  98.2° F. she was treated prophylactically with IV antibiotic therapy include cefazolin vancomycin.  She would gradual improvement was able to ambulate 25 ft with the aid of a rolling walker.  Drain had total output of 260 mL.  Laboratory data at time of this dictation showed hemoglobin 11.4 and hematocrit 35.3.  She did not require blood transfusion during hospital stay.  [facility] was reviewed.  Patient was found.  Prescription Norco  mg 1 p.o. q.4 hours p.r.n. right knee pain number 30 with no refills sent to the pharmacy [facility].  She will use Eliquis 2.5 mg p.o. b.i.d. times 12 days.  When she completes her Eliquis she will begin aspirin 325 mg p.o. daily x4 weeks.  Follow-up orthopedic clinic in 2 weeks.  Requesting swing bed.  If approved we will discharge swing bed today.  If not approved we will discharge home with home physical therapy.      Goals of Care Treatment Preferences:  Code Status: Full Code      Consults (From admission, onward)        Status Ordering Provider     Inpatient consult to Hospitalist  Once        Provider:  Shaheen Ernst DO Acknowledged JORDAN, CHARANDLE S.     IP consult case management/social work  Once        Provider:  (Not yet assigned)    Completed [facility]AROO[facility]     Inpatient consult to Social Work  Once        Provider:  (Not yet assigned)    Completed FAROOQ ZHU          Significant Diagnostic Studies: Labs: All labs within the past 24 hours have been reviewed    Pending Diagnostic Studies:     None        Final Active Diagnoses:    Diagnosis Date Noted POA    PRINCIPAL PROBLEM:  Primary osteoarthritis of right knee [M17.11] 09/07/2023 Yes     Chronic    PAF (paroxysmal atrial fibrillation) [I48.0] 01/26/2023 Yes    HTN (hypertension) [I10] 11/07/2022 Yes    Depression [F32.A] 09/01/2022 Yes    Severe obesity (BMI 35.0-39.9) with comorbidity [E66.01] 09/01/2022 Yes    Seizure [R56.9] 04/07/2022 Yes    HLD (hyperlipidemia) [E78.5] 04/07/2022  "Yes    COPD exacerbation [J44.1] 12/19/2021 Yes    GERD (gastroesophageal reflux disease) [K21.9]  Yes      Problems Resolved During this Admission:      Discharged Condition: stable    Disposition: Home-Health Care [facility] [place]    Follow Up:   Follow-up Information     Jac Lara FNP Follow up in 2 week(s).    Specialty: Emergency Medicine  Contact information:  [address]  [address]  [place] [address]  [phone]                       Patient Instructions:      WALKER FOR HOME USE     Order Specific Question Answer Comments   Type of Walker: Adult (5'4"-6'6")    With wheels? No    Height: 5' 4" (1.626 m)    Weight: 99.8 kg (220 lb)    Length of need (1-99 months): 3    Does patient have medical equipment at home? walker, rolling    Does patient have medical equipment at home? rollator    Does patient have medical equipment at home? oxygen    Please check all that apply: Patient's condition impairs ambulation.    Please check all that apply: Patient is unable to safely ambulate without equipment.      3 IN 1 COMMODE FOR HOME USE     Order Specific Question Answer Comments   Type: Standard    Height: 5' 4" (1.626 m)    Weight: 99.8 kg (220 lb)    Does patient have medical equipment at home? walker, rolling    Does patient have medical equipment at home? rollator    Does patient have medical equipment at home? oxygen    Length of need (1-99 months): 3      Referral to Home health   Referral Priority: Routine Referral Type: Home Health   Referral Reason: Specialty Services Required   Requested Specialty: Home Health Services   Number of Visits Requested: 1     Keep surgical extremity elevated   Order Comments: Elevated at ankle, no pillow under knee.  Wear ROBINSON hose, may remove twice a day for 1 hour then replace. Continue incentive spirometry every 2 hours while awake. Increase fluid intake. Continue Nozin nasal swab to nares twice a day until bottle is empty.     Ice to affected area   Order Comments: using " barrier between ice and skin (specify duration&frequency). Apply Cool Jet to operative extremity.     No driving, operating heavy equipment or signing legal documents while taking pain medication     Change dressing (specify)   Order Comments: Swingbed / Home health nurse to remove hemovac on POD #2, then change dressing on post-op day #3.  Clean with chloraprep. Apply Aquacel AG dressing. Repeat dressing change in 7 days.  Notify physician of any wound drainage. DC staples in 2 weeks from surgery and steristrip incision     Call MD for:  temperature >100.4     Call MD for:  redness, tenderness, or signs of infection (pain, swelling, redness, odor or green/yellow discharge around incision site)     Activity as tolerated     Weight bearing as tolerated   Order Comments: With walker     Medications:  Reconciled Home Medications:      Medication List      START taking these medications    HYDROcodone-acetaminophen  mg per tablet  Commonly known as: NORCO  Take 1 tablet by mouth every 4 (four) hours as needed for Pain.        CONTINUE taking these medications    albuterol 90 mcg/actuation inhaler  Commonly known as: PROVENTIL/VENTOLIN HFA  Inhale 1-2 puffs into the lungs every 6 (six) hours as needed for Wheezing.     albuterol-ipratropium 2.5 mg-0.5 mg/3 mL nebulizer solution  Commonly known as: DUO-NEB  Take 3 mLs by nebulization every 6 (six) hours as needed for Wheezing. Rescue     busPIRone 10 MG tablet  Commonly known as: BUSPAR  Take 1 tablet (10 mg total) by mouth 2 (two) times daily.     CALCARB 600 WITH VITAMIN D ORAL  Take by mouth.     clopidogreL 75 mg tablet  Commonly known as: PLAVIX  TAKE 1 TABLET ONE TIME DAILY     fluticasone propionate 50 mcg/actuation nasal spray  Commonly known as: FLONASE  USE 1 SPRAY IN EACH NOSTRIL EVERY DAY     furosemide 40 MG tablet  Commonly known as: LASIX  Take 1 tablet (40 mg total) by mouth 2 (two) times daily.     gabapentin 300 MG capsule  Commonly known as:  NEURONTIN  Take 1 capsule (300 mg total) by mouth 3 (three) times daily.     ipratropium 42 mcg (0.06 %) nasal spray  Commonly known as: ATROVENT  2 sprays by Each Nostril route 4 (four) times daily.     loratadine 10 mg tablet  Commonly known as: CLARITIN  TAKE 1 TABLET EVERY DAY     meclizine 12.5 mg tablet  Commonly known as: ANTIVERT  Take 1 tablet (12.5 mg total) by mouth 2 (two) times daily as needed for Dizziness.     montelukast 10 mg tablet  Commonly known as: SINGULAIR  Take 1 tablet (10 mg total) by mouth once daily.     nebivoloL 5 MG Tab  Commonly known as: BYSTOLIC  Take 1 tablet (5 mg total) by mouth once daily.     pantoprazole 40 MG tablet  Commonly known as: PROTONIX  TAKE 1 TABLET ONE TIME DAILY     paroxetine 40 MG tablet  Commonly known as: PAXIL  Take 1 tablet (40 mg total) by mouth once daily.     phenytoin 100 MG ER capsule  Commonly known as: DILANTIN  Take 1 capsule (100 mg total) by mouth 3 (three) times daily.     pravastatin 40 MG tablet  Commonly known as: PRAVACHOL  Take 1 tablet (40 mg total) by mouth every evening.     TRELEGY ELLIPTA 200-62.5-25 mcg inhaler  Generic drug: fluticasone-umeclidin-vilanter  Inhale 1 puff into the lungs once daily.            Jac Lara, RADHAP  Orthopedics  [facility]

## 2023-11-03 NOTE — ASSESSMENT & PLAN NOTE
Chronic, controlled. Latest blood pressure and vitals reviewed-     Temp:  [97 °F (36.1 °C)-98.2 °F (36.8 °C)]   Pulse:  [68-92]   Resp:  [16-20]   BP: (107-156)/(51-71)   SpO2:  [96 %-99 %] .   Home meds for hypertension were reviewed and noted below.   Hypertension Medications             furosemide (LASIX) 40 MG tablet Take 1 tablet (40 mg total) by mouth 2 (two) times daily.    nebivoloL (BYSTOLIC) 5 MG Tab Take 1 tablet (5 mg total) by mouth once daily.          While in the hospital, will manage blood pressure as follows; Continue home antihypertensive regimen    Will utilize p.r.n. blood pressure medication only if patient's blood pressure greater than 180/110 and she develops symptoms such as worsening chest pain or shortness of breath.

## 2023-11-03 NOTE — SUBJECTIVE & OBJECTIVE
Interval History:     Review of Systems   Constitutional:  Negative for activity change, chills, fatigue and fever.   HENT:  Negative for congestion and sore throat.    Respiratory:  Negative for chest tightness.    Gastrointestinal:  Negative for abdominal distention, abdominal pain and diarrhea.   Endocrine: Negative for cold intolerance and heat intolerance.   Genitourinary:  Negative for dysuria.   Musculoskeletal:  Positive for arthralgias and joint swelling. Negative for back pain.        Right knee postoperative changes   Skin:  Negative for color change and rash.   Neurological:  Negative for dizziness, tremors and headaches.   Psychiatric/Behavioral:  Negative for agitation. The patient is not nervous/anxious.      Objective:     Vital Signs (Most Recent):  Temp: 97.9 °F (36.6 °C) (11/03/23 1026)  Pulse: 76 (11/03/23 1026)  Resp: 18 (11/03/23 1026)  BP: 107/68 (11/03/23 1026)  SpO2: 96 % (11/03/23 1026) Vital Signs (24h Range):  Temp:  [97 °F (36.1 °C)-98.2 °F (36.8 °C)] 98.1 °F (36.7 °C)  Pulse:  [68-92] 86  Resp:  [16-20] 18  SpO2:  [96 %-99 %] 96 %  BP: (107-156)/(51-71) 147/67     Weight: 99.8 kg (220 lb)  Body mass index is 37.76 kg/m².    Intake/Output Summary (Last 24 hours) at 11/3/2023 1705  Last data filed at 11/3/2023 0622  Gross per 24 hour   Intake 1147.5 ml   Output 260 ml   Net 887.5 ml         Physical Exam  Constitutional:       Appearance: Normal appearance.   HENT:      Head: Normocephalic and atraumatic.      Nose: Nose normal.      Mouth/Throat:      Mouth: Mucous membranes are moist.      Pharynx: Oropharynx is clear.   Eyes:      Extraocular Movements: Extraocular movements intact.      Conjunctiva/sclera: Conjunctivae normal.      Pupils: Pupils are equal, round, and reactive to light.   Cardiovascular:      Rate and Rhythm: Normal rate and regular rhythm.      Pulses: Normal pulses.      Heart sounds: Normal heart sounds.   Pulmonary:      Effort: Pulmonary effort is normal.       Breath sounds: Normal breath sounds.   Abdominal:      General: Abdomen is flat. Bowel sounds are normal.      Palpations: Abdomen is soft.   Musculoskeletal:         General: Swelling present. Normal range of motion.      Cervical back: Normal range of motion and neck supple.      Right lower leg: Edema present.      Comments: Right knee postoperative changes   Skin:     General: Skin is warm and dry.   Neurological:      General: No focal deficit present.      Mental Status: She is alert and oriented to person, place, and time.   Psychiatric:         Mood and Affect: Mood normal.         Behavior: Behavior normal.             Significant Labs: All pertinent labs within the past 24 hours have been reviewed.    Significant Imaging: I have reviewed all pertinent imaging results/findings within the past 24 hours.

## 2023-11-03 NOTE — ASSESSMENT & PLAN NOTE
Body mass index is 37.76 kg/m². Morbid obesity complicates all aspects of disease management from diagnostic modalities to treatment. Weight loss encouraged and health benefits explained to patient.

## 2023-11-03 NOTE — PLAN OF CARE
Problem: Physical Therapy  Goal: Physical Therapy Goal  Description: Short Term Goals to be met by: Discharge    Patient will increase functional independence with mobility by performin. Supine to sit with Modified [place]  2. Sit to stand transfer with Modified [place]  3. Bed to chair transfer with Modified [place] using Rolling Walker, WBAT right LE  4. Gait  x 200 feet with Modified [place] using Rolling Walker, WBAT right LE.   5. Lower extremity exercise program x30 reps per handout, with assistance as needed  6. Knee ROM 0-110    Outcome: Ongoing, Progressing

## 2023-11-03 NOTE — SUBJECTIVE & OBJECTIVE
Past Medical History:   Diagnosis Date    Asthma     CHF (congestive heart failure)     COPD (chronic obstructive pulmonary disease)     WILLSON (dyspnea on exertion)     GERD (gastroesophageal reflux disease)     HTN (hypertension)     Hyperlipidemia     Osteoporosis     Oxygen dependent     02 2L    Seizure disorder        Past Surgical History:   Procedure Laterality Date    CARDIOVERSION N/A 04/08/2022    Procedure: Cardioversion;  Surgeon: Adal Chung MD;  Location[facility] CATH LAB;  Service: Cardiology;  Laterality: N/A;    COLONOSCOPY      EYE SURGERY      REVISION OF TOTAL KNEE REPLACEMENT       TUBAL LIGATION         Review of patient's allergies indicates:  No Known Allergies    No current facility-administered medications on file prior to encounter.     Current Outpatient Medications on File Prior to Encounter   Medication Sig    montelukast (SINGULAIR) 10 mg tablet Take 1 tablet (10 mg total) by mouth once daily.    nebivoloL (BYSTOLIC) 5 MG Tab Take 1 tablet (5 mg total) by mouth once daily.    pantoprazole (PROTONIX) 40 MG tablet TAKE 1 TABLET ONE TIME DAILY    paroxetine (PAXIL) 40 MG tablet Take 1 tablet (40 mg total) by mouth once daily.    phenytoin (DILANTIN) 100 MG ER capsule Take 1 capsule (100 mg total) by mouth 3 (three) times daily.    pravastatin (PRAVACHOL) 40 MG tablet Take 1 tablet (40 mg total) by mouth every evening.    albuterol (PROVENTIL/VENTOLIN HFA) 90 mcg/actuation inhaler Inhale 1-2 puffs into the lungs every 6 (six) hours as needed for Wheezing.    albuterol-ipratropium (DUO-NEB) 2.5 mg-0.5 mg/3 mL nebulizer solution Take 3 mLs by nebulization every 6 (six) hours as needed for Wheezing. Rescue    busPIRone (BUSPAR) 10 MG tablet Take 1 tablet (10 mg total) by mouth 2 (two) times daily.    calcium carbonate/vitamin D3 (CALCARB 600 WITH VITAMIN D ORAL) Take by mouth.    clopidogreL (PLAVIX) 75 mg tablet TAKE 1 TABLET ONE TIME DAILY    fluticasone propionate (FLONASE) 50  mcg/actuation nasal spray USE 1 SPRAY IN EACH NOSTRIL EVERY DAY    fluticasone-umeclidin-vilanter (TRELEGY ELLIPTA) 200-62.5-25 mcg inhaler Inhale 1 puff into the lungs once daily.    furosemide (LASIX) 40 MG tablet Take 1 tablet (40 mg total) by mouth 2 (two) times daily.    gabapentin (NEURONTIN) 300 MG capsule Take 1 capsule (300 mg total) by mouth 3 (three) times daily.    ipratropium (ATROVENT) 42 mcg (0.06 %) nasal spray 2 sprays by Each Nostril route 4 (four) times daily.    loratadine (CLARITIN) 10 mg tablet TAKE 1 TABLET EVERY DAY    meclizine (ANTIVERT) 12.5 mg tablet Take 1 tablet (12.5 mg total) by mouth 2 (two) times daily as needed for Dizziness.     Family History       Problem Relation (Age of Onset)    Cancer Father    Diabetes Mother, Sister, Brother    Heart disease Mother, Sister    Hypertension Mother, Sister          Tobacco Use    Smoking status: Never     Passive exposure: Never    Smokeless tobacco: Never   Substance and Sexual Activity    Alcohol use: Never    Drug use: Never    Sexual activity: Not Currently     Review of Systems   Constitutional:  Negative for activity change, chills, fatigue and fever.   HENT:  Negative for congestion and sore throat.    Respiratory:  Negative for chest tightness.    Gastrointestinal:  Negative for abdominal distention, abdominal pain and diarrhea.   Endocrine: Negative for cold intolerance and heat intolerance.   Genitourinary:  Negative for dysuria.   Musculoskeletal:  Positive for arthralgias and joint swelling. Negative for back pain.        Right knee postoperative changes   Skin:  Negative for color change and rash.   Neurological:  Negative for dizziness, tremors and headaches.   Psychiatric/Behavioral:  Negative for agitation. The patient is not nervous/anxious.      Objective:     Vital Signs (Most Recent):  Temp: 97.7 °F (36.5 °C) (11/02/23 1800)  Pulse: 82 (11/02/23 1800)  Resp: 18 (11/02/23 1800)  BP: (!) 142/71 (notified nurse) (11/02/23  1800)  SpO2: 99 % (11/02/23 1800) Vital Signs (24h Range):  Temp:  [97.6 °F (36.4 °C)-97.7 °F (36.5 °C)] 97.7 °F (36.5 °C)  Pulse:  [65-82] 82  Resp:  [10-18] 18  SpO2:  [98 %-100 %] 99 %  BP: (105-142)/(46-71) 142/71     Weight: 99.8 kg (220 lb)  Body mass index is 37.76 kg/m².     Physical Exam  Constitutional:       Appearance: Normal appearance.   HENT:      Head: Normocephalic and atraumatic.      Nose: Nose normal.      Mouth/Throat:      Mouth: Mucous membranes are moist.      Pharynx: Oropharynx is clear.   Eyes:      Extraocular Movements: Extraocular movements intact.      Conjunctiva/sclera: Conjunctivae normal.      Pupils: Pupils are equal, round, and reactive to light.   Cardiovascular:      Rate and Rhythm: Normal rate and regular rhythm.      Pulses: Normal pulses.      Heart sounds: Normal heart sounds.   Pulmonary:      Effort: Pulmonary effort is normal.      Breath sounds: Normal breath sounds.   Abdominal:      General: Abdomen is flat. Bowel sounds are normal.      Palpations: Abdomen is soft.   Musculoskeletal:         General: Swelling present. Normal range of motion.      Cervical back: Normal range of motion and neck supple.      Right lower leg: Edema present.      Comments: Right knee postoperative changes   Skin:     General: Skin is warm and dry.   Neurological:      General: No focal deficit present.      Mental Status: She is alert and oriented to person, place, and time.   Psychiatric:         Mood and Affect: Mood normal.         Behavior: Behavior normal.              CRANIAL NERVES     CN III, IV, VI   Pupils are equal, round, and reactive to light.       Significant Labs: All pertinent labs within the past 24 hours have been reviewed.    Significant Imaging: I have reviewed all pertinent imaging results/findings within the past 24 hours.

## 2023-11-03 NOTE — PT/OT/SLP PROGRESS
"Physical Therapy Treatment    Patient Name:  Radha Martínez   MRN:  [id number]    Recommendations:     Discharge Recommendations: Moderate Intensity Therapy  Discharge Equipment Recommendations: shower chair  Barriers to discharge: None    Assessment:     Radha Martínez is a 73 y.o. female admitted with a medical diagnosis of Primary osteoarthritis of right knee.  She presents with the following impairments/functional limitations: weakness, impaired endurance, impaired self care skills, impaired functional mobility, gait instability, impaired balance, decreased lower extremity function, pain, decreased ROM, impaired cardiopulmonary response to activity .    Patient participated well with PT interventions and demonstrated improving fluidity of gait as trial progressed. Patient highly motivated and would benefit from swing bed but demonstrates safe mobility for d/c home if insurance does not approve swing bed.    Rehab Prognosis: Good; patient would benefit from acute skilled PT services to address these deficits and reach maximum level of function.    Recent Surgery: Procedure(s) (LRB):  ARTHROPLASTY, KNEE, TOTAL, USING COMPUTER-ASSISTED NAVIGATION (Right) 1 Day Post-Op    Plan:     During this hospitalization, patient to be seen BID (5x/week; daily 2x/week) to address the identified rehab impairments via gait training, therapeutic activities, therapeutic exercises and progress toward the following goals:    Plan of Care Expires:  12/02/23    Subjective     Chief Complaint: right TKR  Patient/Family Comments/goals: "My son helped me get out of bed to the [facility] this morning."  Pain/Comfort:  Pain Rating 1: 3/10  Location - Side 1: Right  Location 1: knee  Pain Addressed 1: Pre-medicate for activity, Reposition, Distraction, Cessation of Activity  Pain Rating Post-Intervention 1: 4/10      Objective:     Communicated with Naa Manuel RN prior to session.  Patient found supine with oxygen, peripheral IV, hemovac upon PT " entry to room.     General Precautions: Standard, fall  Orthopedic Precautions: RLE weight bearing as tolerated  Braces: N/A  Respiratory Status: Nasal cannula, flow 2 L/min     Functional Mobility:  Bed Mobility:     Supine to Sit: stand by assistance  Transfers:     Sit to Stand:  stand by assistance and verbal cues with rolling walker  Bed to Chair: contact guard assistance with  rolling walker  using  Step Transfer  Gait: 60' using RW, CGA, step to pattern, slow reginald, short step lengths, improving fluidity of motion as trial progressed. Patient expressed satisfaction with gait quality at end of trial      AM-PAC 6 CLICK MOBILITY  Turning over in bed (including adjusting bedclothes, sheets and blankets)?: 3  Sitting down on and standing up from a chair with arms (e.g., wheelchair, bedside commode, etc.): 3  Moving from lying on back to sitting on the side of the bed?: 3  Moving to and from a bed to a chair (including a wheelchair)?: 3  Need to walk in hospital room?: 3  Climbing 3-5 steps with a railing?: 3  Basic Mobility Total Score: 18       Treatment & Education:  Right lower extremity exercise x 30 reps: ankle pumps, Quad sets, glut sets, long arc quads, heel slides, hip abduction/adduction, and straight leg raises with active assist ROM, verbal cues, and tactile cues     Seated knee flexion stretch with prolonged hold    Right knee ROM: 5-74    Gait per above     Patient left up in chair with all lines intact, call button in reach, Naa Manuel, DEBBIE notified, and spouse present..    GOALS:   Multidisciplinary Problems       Physical Therapy Goals          Problem: Physical Therapy    Goal Priority Disciplines Outcome Goal Variances Interventions   Physical Therapy Goal     PT, PT/OT Ongoing, Progressing     Description: Short Term Goals to be met by: 2023    Patient will increase functional independence with mobility by performin. Supine to sit with Modified [place]  2. Sit to stand  transfer with Modified [place]  3. Bed to chair transfer with Modified [place] using Rolling Walker, WBAT right LE  4. Gait  x 100 feet with Modified [place] using Rolling Walker, WBAT right LE.   5. Lower extremity exercise program x30 reps per handout, with assistance as needed  6. Knee ROM 0-90    Long Term Goals to be met by: 1/2/2024    Pt will regain full independent functional mobility to return to prior activities of daily living.                        Time Tracking:     PT Received On: 11/03/23  PT Start Time: 0758     PT Stop Time: 0830  PT Total Time (min): 32 min     Billable Minutes: Gait Training 15 minutes and Therapeutic Exercise 15 minutes    Treatment Type: Treatment  PT/PTA: PT     Number of PTA visits since last PT visit: 0     11/03/2023

## 2023-11-03 NOTE — PLAN OF CARE
Pt has been approved for swingbed placement at [facility] on today. Facility cut off is 1700. Sw to take packet to floor at this time. Ss following.

## 2023-11-03 NOTE — ASSESSMENT & PLAN NOTE
Status post surgical repair by Orthopedics.      Eliquis 2.5 mg b.i.d. per Orthopedics.    11/3: plan to discharge to swing-bed today.  Labs and vitals relatively stable.  May monitor at swing-bed.

## 2023-11-03 NOTE — PT/OT/SLP PROGRESS
Occupational Therapy   Treatment    Name: Radha Martínez  MRN: [id number]  Admitting Diagnosis:  Primary osteoarthritis of right knee  1 Day Post-Op    Recommendations:     Discharge Recommendations: Moderate Intensity Therapy  Discharge Equipment Recommendations:  shower chair  Barriers to discharge:       Assessment:     Radha Martínez is a 73 y.o. female with a medical diagnosis of Primary osteoarthritis of right knee.  She presents with the following performance deficits affecting function are weakness, impaired endurance, impaired self care skills, impaired functional mobility, gait instability, decreased lower extremity function, pain, orthopedic precautions.     Rehab Prognosis:  Good; patient would benefit from acute skilled OT services to address these deficits and reach maximum level of function.       Plan:     Patient to be seen 5 x/week to address the above listed problems via self-care/home management, therapeutic activities, therapeutic exercises  Plan of Care Expires: 11/09/23  Plan of Care Reviewed with: patient    Subjective     Chief Complaint: R knee pain  Patient/Family Comments/goals: return home  Pain/Comfort:   8/10 R knee    Objective:     Communicated with: RN prior to session.  Patient found up in chair with   upon OT entry to room.    General Precautions: Standard, fall    Orthopedic Precautions:RLE weight bearing as tolerated  Braces: Knee immobilizer  Respiratory Status: Room air     Occupational Performance:     Bed Mobility:         Functional Mobility/Transfers:  Patient completed Sit <> Stand Transfer with minimum assistance  with  rolling walker   Functional Mobility: NT    Treatment & Education:  Patient completed the following exercises to work on UB strength in order to complete ADLs, bed mobility, and transfers with less assistance.  -Bicep curls: 2 sets of 10 with 2# dumbbell  -Shoulder flexion: 2 sets of 10 with 2# dumbbell  -Shoulder horizontal abduction: 2 sets of 10 with  green theraband  -Rows: 2 sets of 15 with green Theraband  -Hand helper: 1 set x 25 each hand     Increased time/effort needed to complete each exercise.       Patient left up in chair with all lines intact and call button in reach    GOALS:   Multidisciplinary Problems       Occupational Therapy Goals          Problem: Occupational Therapy    Goal Priority Disciplines Outcome Interventions   Occupational Therapy Goal     OT, PT/OT Ongoing, Progressing    Description: STG:(in 1 week)  1.Pt will perform bathing with setup and SBA  2.Pt will perform UE dressing with independence  3.Pt will perform LE dressing with min(A)  4.Pt will transfer bed/chair/bsc with SBA with RW  5.Pt will perform standing task x 4 min with SBA with RW  6.Tolerate 15 min of tx without fatigue.      LTG: (in 4 weeks)  Restore to max I with selfcare and mobility.                          Time Tracking:     OT Date of Treatment: 11/03/23  OT Start Time: 1059  OT Stop Time: 1122  OT Total Time (min): 23 min    Billable Minutes:Therapeutic Exercise 23           OT    11/3/2023

## 2023-11-03 NOTE — H&P
[facility]  [facility] Medicine  History & Physical    Patient Name: Radha Martínez  MRN: [id number]  Patient Class: OP- Outpatient Recovery  Admission Date: 11/2/2023  Attending Physician: Jayy Castillo MD   Primary Care Provider: Jayy Castillo MD         Patient information was obtained from patient, past medical records and ER records.     Subjective:     Principal Problem:Primary osteoarthritis of right knee    Chief Complaint:   Chief Complaint   Patient presents with    Knee Pain        HPI: With squamous 73-year-old woman history of COPD/asthma, AFib status post Watchman procedure, diastolic heart failure, stage III obesity, seizures, PACO, hypertension, depression who presents with chronic right knee degenerative joint disease that is failed outpatient management.  She is now status post surgical repair of her right knee.  She has no postoperative complaints except that she has pain and requesting pain medication.      Past Medical History:   Diagnosis Date    Asthma     CHF (congestive heart failure)     COPD (chronic obstructive pulmonary disease)     WILLSON (dyspnea on exertion)     GERD (gastroesophageal reflux disease)     HTN (hypertension)     Hyperlipidemia     Osteoporosis     Oxygen dependent     02 2L    Seizure disorder        Past Surgical History:   Procedure Laterality Date    CARDIOVERSION N/A 04/08/2022    Procedure: Cardioversion;  Surgeon: Adal Chung MD;  Location: [facility] CATH LAB;  Service: Cardiology;  Laterality: N/A;    COLONOSCOPY      EYE SURGERY      REVISION OF TOTAL KNEE REPLACEMENT       TUBAL LIGATION         Review of patient's allergies indicates:  No Known Allergies    No current facility-administered medications on file prior to encounter.     Current Outpatient Medications on File Prior to Encounter   Medication Sig    montelukast (SINGULAIR) 10 mg tablet Take 1 tablet (10 mg total) by mouth once daily.    nebivoloL  (BYSTOLIC) 5 MG Tab Take 1 tablet (5 mg total) by mouth once daily.    pantoprazole (PROTONIX) 40 MG tablet TAKE 1 TABLET ONE TIME DAILY    paroxetine (PAXIL) 40 MG tablet Take 1 tablet (40 mg total) by mouth once daily.    phenytoin (DILANTIN) 100 MG ER capsule Take 1 capsule (100 mg total) by mouth 3 (three) times daily.    pravastatin (PRAVACHOL) 40 MG tablet Take 1 tablet (40 mg total) by mouth every evening.    albuterol (PROVENTIL/VENTOLIN HFA) 90 mcg/actuation inhaler Inhale 1-2 puffs into the lungs every 6 (six) hours as needed for Wheezing.    albuterol-ipratropium (DUO-NEB) 2.5 mg-0.5 mg/3 mL nebulizer solution Take 3 mLs by nebulization every 6 (six) hours as needed for Wheezing. Rescue    busPIRone (BUSPAR) 10 MG tablet Take 1 tablet (10 mg total) by mouth 2 (two) times daily.    calcium carbonate/vitamin D3 (CALCARB 600 WITH VITAMIN D ORAL) Take by mouth.    clopidogreL (PLAVIX) 75 mg tablet TAKE 1 TABLET ONE TIME DAILY    fluticasone propionate (FLONASE) 50 mcg/actuation nasal spray USE 1 SPRAY IN EACH NOSTRIL EVERY DAY    fluticasone-umeclidin-vilanter (TRELEGY ELLIPTA) 200-62.5-25 mcg inhaler Inhale 1 puff into the lungs once daily.    furosemide (LASIX) 40 MG tablet Take 1 tablet (40 mg total) by mouth 2 (two) times daily.    gabapentin (NEURONTIN) 300 MG capsule Take 1 capsule (300 mg total) by mouth 3 (three) times daily.    ipratropium (ATROVENT) 42 mcg (0.06 %) nasal spray 2 sprays by Each Nostril route 4 (four) times daily.    loratadine (CLARITIN) 10 mg tablet TAKE 1 TABLET EVERY DAY    meclizine (ANTIVERT) 12.5 mg tablet Take 1 tablet (12.5 mg total) by mouth 2 (two) times daily as needed for Dizziness.     Family History       Problem Relation (Age of Onset)    Cancer Father    Diabetes Mother, Sister, Brother    Heart disease Mother, Sister    Hypertension Mother, Sister          Tobacco Use    Smoking status: Never     Passive exposure: Never    Smokeless tobacco: Never    Substance and Sexual Activity    Alcohol use: Never    Drug use: Never    Sexual activity: Not Currently     Review of Systems   Constitutional:  Negative for activity change, chills, fatigue and fever.   HENT:  Negative for congestion and sore throat.    Respiratory:  Negative for chest tightness.    Gastrointestinal:  Negative for abdominal distention, abdominal pain and diarrhea.   Endocrine: Negative for cold intolerance and heat intolerance.   Genitourinary:  Negative for dysuria.   Musculoskeletal:  Positive for arthralgias and joint swelling. Negative for back pain.        Right knee postoperative changes   Skin:  Negative for color change and rash.   Neurological:  Negative for dizziness, tremors and headaches.   Psychiatric/Behavioral:  Negative for agitation. The patient is not nervous/anxious.      Objective:     Vital Signs (Most Recent):  Temp: 97.7 °F (36.5 °C) (11/02/23 1800)  Pulse: 82 (11/02/23 1800)  Resp: 18 (11/02/23 1800)  BP: (!) 142/71 (notified nurse) (11/02/23 1800)  SpO2: 99 % (11/02/23 1800) Vital Signs (24h Range):  Temp:  [97.6 °F (36.4 °C)-97.7 °F (36.5 °C)] 97.7 °F (36.5 °C)  Pulse:  [65-82] 82  Resp:  [10-18] 18  SpO2:  [98 %-100 %] 99 %  BP: (105-142)/(46-71) 142/71     Weight: 99.8 kg (220 lb)  Body mass index is 37.76 kg/m².     Physical Exam  Constitutional:       Appearance: Normal appearance.   HENT:      Head: Normocephalic and atraumatic.      Nose: Nose normal.      Mouth/Throat:      Mouth: Mucous membranes are moist.      Pharynx: Oropharynx is clear.   Eyes:      Extraocular Movements: Extraocular movements intact.      Conjunctiva/sclera: Conjunctivae normal.      Pupils: Pupils are equal, round, and reactive to light.   Cardiovascular:      Rate and Rhythm: Normal rate and regular rhythm.      Pulses: Normal pulses.      Heart sounds: Normal heart sounds.   Pulmonary:      Effort: Pulmonary effort is normal.      Breath sounds: Normal breath sounds.   Abdominal:       General: Abdomen is flat. Bowel sounds are normal.      Palpations: Abdomen is soft.   Musculoskeletal:         General: Swelling present. Normal range of motion.      Cervical back: Normal range of motion and neck supple.      Right lower leg: Edema present.      Comments: Right knee postoperative changes   Skin:     General: Skin is warm and dry.   Neurological:      General: No focal deficit present.      Mental Status: She is alert and oriented to person, place, and time.   Psychiatric:         Mood and Affect: Mood normal.         Behavior: Behavior normal.              CRANIAL NERVES     CN III, IV, VI   Pupils are equal, round, and reactive to light.       Significant Labs: All pertinent labs within the past 24 hours have been reviewed.    Significant Imaging: I have reviewed all pertinent imaging results/findings within the past 24 hours.    Assessment/Plan:     * Primary osteoarthritis of right knee  Status post surgical repair by Orthopedics.      Eliquis 2.5 mg b.i.d. per Orthopedics.      PAF (paroxysmal atrial fibrillation)    Controlled and status post Watchman.  She is not on anticoagulation.    HTN (hypertension)  Chronic, controlled. Latest blood pressure and vitals reviewed-     Temp:  [97.6 °F (36.4 °C)-97.7 °F (36.5 °C)]   Pulse:  [65-82]   Resp:  [10-18]   BP: (105-142)/(46-71)   SpO2:  [98 %-100 %] .   Home meds for hypertension were reviewed and noted below.   Hypertension Medications             furosemide (LASIX) 40 MG tablet Take 1 tablet (40 mg total) by mouth 2 (two) times daily.    nebivoloL (BYSTOLIC) 5 MG Tab Take 1 tablet (5 mg total) by mouth once daily.          While in the hospital, will manage blood pressure as follows; Continue home antihypertensive regimen    Will utilize p.r.n. blood pressure medication only if patient's blood pressure greater than 180/110 and she develops symptoms such as worsening chest pain or shortness of breath.    Depression  Continue home depression  meds.      Severe obesity (BMI 35.0-39.9) with comorbidity  Body mass index is 37.76 kg/m². Morbid obesity complicates all aspects of disease management from diagnostic modalities to treatment. Weight loss encouraged and health benefits explained to patient.         Seizure  Continue home Dilantin      HLD (hyperlipidemia)  Continue home statin      COPD exacerbation  Patient's COPD is controlled currently.  Patient is currently off COPD Pathway. Continue scheduled inhalers Supplemental oxygen and monitor respiratory status closely.     GERD (gastroesophageal reflux disease)  Continue home PPI        VTE Risk Mitigation (From admission, onward)         Ordered     apixaban tablet 2.5 mg  2 times daily         11/02/23 1503     IP VTE HIGH RISK PATIENT  Once         11/02/23 1503     Place ROBINSON hose  Until discontinued         11/02/23 1503     Place ROBINSON hose  Until discontinued         11/02/23 0904     Place sequential compression device  Until discontinued         11/02/23 0904                               Roberta Lee MD  Department of Hospital Medicine  [facility]    N/A  Family history is reviewed and has not changed   Pertinent information:

## 2023-11-03 NOTE — PLAN OF CARE
Problem: Adult Inpatient Plan of Care  Goal: Plan of Care Review  Outcome: Ongoing, Progressing     Problem: Fall Injury Risk  Goal: Absence of Fall and Fall-Related Injury  Outcome: Ongoing, Progressing     Problem: Pain Acute  Goal: Acceptable Pain Control and Functional Ability  Outcome: Ongoing, Progressing     Problem: Infection  Goal: Absence of Infection Signs and Symptoms  Outcome: Ongoing, Progressing

## 2023-11-03 NOTE — PLAN OF CARE
[facility]  Discharge Final Note    Primary Care Provider: Jayy Castillo MD    Expected Discharge Date: 11/3/2023    Final Discharge Note (most recent)       Final Note - 11/03/23 1352          Final Note    Assessment Type Final Discharge Note     Anticipated Discharge Disposition Swing Bed     What phone number can be called within the next 1-3 days to see how you are doing after discharge? [phone]        Post-Acute Status    Post-Acute Authorization Placement     Post-Acute Placement Status Set-up Complete/Auth obtained     Patient choice form signed by patient/caregiver List with quality metrics by geographic area provided;List from CMS Compare;List from System Post-Acute Care     Discharge Delays None known at this time                      Follow-up providers       Jac Lara FNP   Specialty: Emergency Medicine    [address]  [address]  [facility] [address]   Phone: [phone]       Next Steps: Follow up in 2 week(s)    Instructions: Please follow up on November 16 at 2:45              After-discharge care                Destination       [facility]   Service: Skilled Nursing    [address]  [place] [address]   Phone: [phone]                             Pt to dc to [facility]ing[facility] on this day. No further dc needs noted.

## 2023-11-04 PROCEDURE — 99900035 HC TECH TIME PER 15 MIN (STAT)

## 2023-11-04 PROCEDURE — 25000003 PHARM REV CODE 250: Performed by: NURSE PRACTITIONER

## 2023-11-04 PROCEDURE — 94640 AIRWAY INHALATION TREATMENT: CPT

## 2023-11-04 PROCEDURE — 25000003 PHARM REV CODE 250: Performed by: REGISTERED NURSE

## 2023-11-04 PROCEDURE — 80186 ASSAY OF PHENYTOIN FREE: CPT | Mod: 90 | Performed by: REGISTERED NURSE

## 2023-11-04 PROCEDURE — 99900031 HC PATIENT EDUCATION (STAT)

## 2023-11-04 PROCEDURE — 25000242 PHARM REV CODE 250 ALT 637 W/ HCPCS: Performed by: REGISTERED NURSE

## 2023-11-04 PROCEDURE — 94761 N-INVAS EAR/PLS OXIMETRY MLT: CPT

## 2023-11-04 PROCEDURE — 11000004 HC SNF PRIVATE

## 2023-11-04 PROCEDURE — 25000242 PHARM REV CODE 250 ALT 637 W/ HCPCS: Performed by: NURSE PRACTITIONER

## 2023-11-04 RX ORDER — CLOPIDOGREL BISULFATE 75 MG/1
75 TABLET ORAL DAILY
Status: DISCONTINUED | OUTPATIENT
Start: 2023-11-04 | End: 2023-11-15 | Stop reason: HOSPADM

## 2023-11-04 RX ORDER — FLUTICASONE PROPIONATE 50 MCG
2 SPRAY, SUSPENSION (ML) NASAL DAILY
Status: DISCONTINUED | OUTPATIENT
Start: 2023-11-04 | End: 2023-11-15 | Stop reason: HOSPADM

## 2023-11-04 RX ADMIN — BUSPIRONE HYDROCHLORIDE 10 MG: 10 TABLET ORAL at 08:11

## 2023-11-04 RX ADMIN — FUROSEMIDE 40 MG: 40 TABLET ORAL at 09:11

## 2023-11-04 RX ADMIN — HYDROCODONE BITARTRATE AND ACETAMINOPHEN 1 TABLET: 10; 325 TABLET ORAL at 01:11

## 2023-11-04 RX ADMIN — PANTOPRAZOLE SODIUM 40 MG: 40 TABLET, DELAYED RELEASE ORAL at 09:11

## 2023-11-04 RX ADMIN — PHENYTOIN SODIUM 100 MG: 100 CAPSULE ORAL at 03:11

## 2023-11-04 RX ADMIN — IPRATROPIUM BROMIDE AND ALBUTEROL SULFATE 3 ML: 2.5; .5 SOLUTION RESPIRATORY (INHALATION) at 07:11

## 2023-11-04 RX ADMIN — BUSPIRONE HYDROCHLORIDE 10 MG: 10 TABLET ORAL at 09:11

## 2023-11-04 RX ADMIN — GABAPENTIN 300 MG: 300 CAPSULE ORAL at 09:11

## 2023-11-04 RX ADMIN — APIXABAN 2.5 MG: 2.5 TABLET, FILM COATED ORAL at 10:11

## 2023-11-04 RX ADMIN — PAROXETINE HYDROCHLORIDE 40 MG: 20 TABLET, FILM COATED ORAL at 09:11

## 2023-11-04 RX ADMIN — ACETAMINOPHEN 650 MG: 325 TABLET ORAL at 12:11

## 2023-11-04 RX ADMIN — METOPROLOL TARTRATE 25 MG: 25 TABLET, FILM COATED ORAL at 09:11

## 2023-11-04 RX ADMIN — APIXABAN 2.5 MG: 2.5 TABLET, FILM COATED ORAL at 08:11

## 2023-11-04 RX ADMIN — HYDROCODONE BITARTRATE AND ACETAMINOPHEN 1 TABLET: 10; 325 TABLET ORAL at 07:11

## 2023-11-04 RX ADMIN — IPRATROPIUM BROMIDE AND ALBUTEROL SULFATE 3 ML: 2.5; .5 SOLUTION RESPIRATORY (INHALATION) at 01:11

## 2023-11-04 RX ADMIN — MUPIROCIN: 20 OINTMENT TOPICAL at 08:11

## 2023-11-04 RX ADMIN — MUPIROCIN: 20 OINTMENT TOPICAL at 09:11

## 2023-11-04 RX ADMIN — IPRATROPIUM BROMIDE AND ALBUTEROL SULFATE 3 ML: 2.5; .5 SOLUTION RESPIRATORY (INHALATION) at 12:11

## 2023-11-04 RX ADMIN — GABAPENTIN 300 MG: 300 CAPSULE ORAL at 03:11

## 2023-11-04 RX ADMIN — PHENYTOIN SODIUM 100 MG: 100 CAPSULE ORAL at 09:11

## 2023-11-04 RX ADMIN — FLUTICASONE PROPIONATE 100 MCG: 50 SPRAY, METERED NASAL at 09:11

## 2023-11-04 RX ADMIN — PRAVASTATIN SODIUM 40 MG: 40 TABLET ORAL at 08:11

## 2023-11-04 RX ADMIN — CLOPIDOGREL BISULFATE 75 MG: 75 TABLET ORAL at 10:11

## 2023-11-04 RX ADMIN — FUROSEMIDE 40 MG: 40 TABLET ORAL at 08:11

## 2023-11-04 RX ADMIN — PHENYTOIN SODIUM 100 MG: 100 CAPSULE ORAL at 08:11

## 2023-11-04 RX ADMIN — CETIRIZINE HYDROCHLORIDE 10 MG: 10 TABLET, FILM COATED ORAL at 09:11

## 2023-11-04 RX ADMIN — MONTELUKAST SODIUM 10 MG: 10 TABLET, COATED ORAL at 09:11

## 2023-11-04 RX ADMIN — GABAPENTIN 300 MG: 300 CAPSULE ORAL at 08:11

## 2023-11-04 RX ADMIN — SENNOSIDES AND DOCUSATE SODIUM 1 TABLET: 8.6; 5 TABLET ORAL at 08:11

## 2023-11-04 RX ADMIN — METOPROLOL TARTRATE 25 MG: 25 TABLET, FILM COATED ORAL at 08:11

## 2023-11-04 RX ADMIN — SENNOSIDES AND DOCUSATE SODIUM 1 TABLET: 8.6; 5 TABLET ORAL at 09:11

## 2023-11-04 NOTE — NURSING
Meds reviewed. Noticed patient was not on clopidogrel 75 mg daily as prior to this hospitalization. Notified Dana Johnson and was instructed to order clopidogrel 75mg daily along with the eliquis 2.5 mg.

## 2023-11-04 NOTE — NURSING
100 cc bright red blood emptied from accordian drain. No sutures noted. Accordian drain taken out from right knee. Patient tolerated procedure well.

## 2023-11-04 NOTE — HPI
"Radha"Esther Martínez is a 72 yo WF that presents to [facility] Swing Bed service in [place], [place] from [facility] in [place], [place] after elective total right knee replacement performed on 11-2-23 by Dr. Jayy Castillo.    Mrs. Martínez has radiology studies that reveal generalized moderate osteopenia, severe tricompartmental DJD of right knee with complete obliteration of medial joint space in genu varum, and progressive pain.  With her last corticosteroid injection, there was no improvement of her pain.  She had a total left knee replacement 6- that was successful in relieving her pain, and she desired to have the same procedure performed on her right knee.  Patient was treated prophylactically with IV cefazolin and vancomycin.  Mrs. Martínez requested swing bed placement for post-op PT/OT evaluation and treatment.  She has a history of COPD and uses home oxygen at 2L NC prn.    Wound care instructions include applying Cool Jet to operative extremity, hemovac to be removed on post op day #2 with a dressing change on post op day #3, repeat dressing change in 7 days, and staple removal in 2 weeks with steri strip application.  Detailed wound care instructions in miscellaneous nursing orders.    Dr. Castillo's discharge instructions included administration of Eliquis 2.5 mg po BID for 12 days then begin aspirin 325 mg po daily.    Full Code    PMH:  Paroxysmal atrial fibrillation, HTN, depression, asthma, CHF, dyspnea on exertion, osteoporosis, oxygen dependent, severe obesity with comorbidity, seizure, HLD, COPD, and GERD    PSH:  Left total knee replacement, cardioversion, colonoscopy, eye surgery, revision of total knee replacement, and tubal ligation    Right knee replacement 3 days ago with minimal pain except when ambulating.  "

## 2023-11-04 NOTE — PROGRESS NOTES
"[facility]  Hospital Medicine  Progress Note    Patient Name: Radha Martínez  MRN: [id number]  Patient Class: IP- Swing   Admission Date: 11/3/2023  Length of Stay: 1 days  Attending Physician: Sander Banks DO  Primary Care Provider: Jayy Castillo MD        Subjective:     Principal Problem:S/P TKR (total knee replacement), right        HPI:  Radha Martínez (Marie) is a 74 yo WF that presents to [facility] service in [place], [place] from [facility] in [place], [place] after elective total right knee replacement performed on 11-2-23 by Dr. Jayy Castillo.    Mrs. Martínez has radiology studies that reveal generalized moderate osteopenia, severe tricompartmental DJD of right knee with complete obliteration of medial joint space in genu varum, and progressive pain.  With her last corticosteroid injection, there was no improvement of her pain.  She had a total left knee replacement 6- that was successful in relieving her pain, and she desired to have the same procedure performed on her right knee.  Patient was treated prophylactically with IV cefazolin and vancomycin.  Mrs. Martínez requested swing bed placement for post-op PT/OT evaluation and treatment.  She has a history of COPD and uses home oxygen at 2L NC prn.    Wound care instructions include applying Cool Jet to operative extremity, hemovac to be removed on post op day #2 with a dressing change on post op day #3, repeat dressing change in 7 days, and staple removal in 2 weeks with steri strip application.  Detailed wound care instructions in miscellaneous nursing orders.    Dr. Castillo's discharge instructions included administration of Eliquis 2.5 mg po BID for 12 days then begin aspirin 325 mg po daily.    Full Code    PMH:  Paroxysmal atrial fibrillation, HTN, depression, asthma, CHF, dyspnea on exertion, osteoporosis, oxygen dependent, severe obesity with comorbidity, " seizure, HLD, COPD, and GERD    PSH:  Left total knee replacement, cardioversion, colonoscopy, eye surgery, revision of total knee replacement, and tubal ligation      Overview/Hospital Course:  See HPI      Interval History: See HPI    Review of Systems   Constitutional:  Positive for activity change. Negative for fever.   Respiratory:  Positive for cough (chronic, hx of asthma and COPD) and shortness of breath (chronic, hx of asthma and COPD). Negative for chest tightness.    Cardiovascular:  Negative for chest pain and palpitations.   Gastrointestinal:  Negative for constipation, nausea and vomiting.   Genitourinary:  Negative for difficulty urinating and dysuria.   Musculoskeletal:  Positive for gait problem (s/p total right knee replacement).   Skin:  Positive for wound (surgical covered by dressing).   Neurological:  Negative for weakness and numbness.   All other systems reviewed and are negative.    Objective:     Vital Signs (Most Recent):  Temp: 98.9 °F (37.2 °C) (11/03/23 1918)  Pulse: 72 (11/04/23 0009)  Resp: 19 (11/04/23 0103)  BP: (!) 135/59 (11/03/23 1918)  SpO2: 99 % (11/04/23 0009) Vital Signs (24h Range):  Temp:  [97 °F (36.1 °C)-98.9 °F (37.2 °C)] 98.9 °F (37.2 °C)  Pulse:  [68-92] 72  Resp:  [16-20] 19  SpO2:  [96 %-99 %] 99 %  BP: (107-147)/(56-68) 135/59     Weight: 103 kg (227 lb)  Body mass index is 38.96 kg/m².    Intake/Output Summary (Last 24 hours) at 11/4/2023 0408  Last data filed at 11/3/2023 1920  Gross per 24 hour   Intake --   Output 125 ml   Net -125 ml         Physical Exam  Constitutional:       General: She is not in acute distress.     Appearance: Normal appearance. She is obese. She is not ill-appearing, toxic-appearing or diaphoretic.   HENT:      Head: Normocephalic and atraumatic.      Right Ear: Tympanic membrane, ear canal and external ear normal.      Left Ear: Tympanic membrane, ear canal and external ear normal.      Nose: Nose normal.      Mouth/Throat:      Mouth:  Mucous membranes are moist.      Pharynx: No posterior oropharyngeal erythema.   Eyes:      Extraocular Movements: Extraocular movements intact.      Pupils: Pupils are equal, round, and reactive to light.   Cardiovascular:      Rate and Rhythm: Normal rate and regular rhythm.      Pulses: Normal pulses.      Heart sounds: Normal heart sounds.   Pulmonary:      Effort: Pulmonary effort is normal.      Breath sounds: Normal breath sounds.   Abdominal:      General: Bowel sounds are normal. There is no distension.      Palpations: Abdomen is soft.   Musculoskeletal:      Cervical back: Normal range of motion and neck supple.      Left lower leg: Normal.      Comments: There is a surgical dressing noted to right thigh down to right ankle.  Hemovac with minimal amount of bloody drainage noted.  Pedal pulses intact bilaterally.   Skin:     General: Skin is warm and dry.      Capillary Refill: Capillary refill takes less than 2 seconds.   Neurological:      Mental Status: She is alert and oriented to person, place, and time.      Sensory: No sensory deficit.   Psychiatric:         Mood and Affect: Mood normal.         Behavior: Behavior normal.         Thought Content: Thought content normal.         Judgment: Judgment normal.             Significant Labs: All pertinent labs within the past 24 hours have been reviewed.    Significant Imaging: I have reviewed all pertinent imaging results/findings within the past 24 hours.      Assessment/Plan:      * S/P TKR (total knee replacement), right  -PT/OT evaluation and treatment      Asthma  -DuoNeb every 6 hours  -Incentive spirometry every 2 hours while awake      VTE Risk Mitigation (From admission, onward)         Ordered     apixaban tablet 2.5 mg  2 times daily         11/04/23 0256     IP VTE HIGH RISK PATIENT  Once         11/03/23 1558     Place ROBINSON hose  Until discontinued         11/03/23 1558     Place sequential compression device  Until discontinued          11/03/23 1558                Discharge Planning   ALEX:      Code Status: Full Code   Is the patient medically ready for discharge?:     Reason for patient still in hospital (select all that apply): PT / OT recommendations  Discharge Plan A: Home with family, Home Health   Discharge Delays: None known at this time      I have discussed the above patients case, lab, radiology, and med rec with Dr. Lalita Downey, Hospitalist at [facility], who agrees with the above plan of care.        Opal Beckham, NP-C  Department of Hospital Medicine   [facility]

## 2023-11-04 NOTE — HOSPITAL COURSE
11--patient with they knee replacement on right side in his done well.  She is well almost 300 ft.  She is had take care of herself.  Hemoglobin on discharge was 10.1 which is slightly improved with normal electrolytes with creatinine only minimally elevated at 1.03.  Physical exam today is unremarkable.  She does have a slight cough and so will place her on Zithromax Dosepak for home.  Diagnosis for that would be a bronchitis.  Follow-up with primary care doctor in 1 week and Orthopedics tomorrow.  Ambulate with physical and occupational therapy using a walker.  No uneven surfaces for ambulation.  Resume her home medications.  Patient discharged in stable condition.

## 2023-11-04 NOTE — SUBJECTIVE & OBJECTIVE
Interval History: See HPI    Review of Systems   Constitutional:  Positive for activity change. Negative for fever.   Respiratory:  Positive for cough (chronic, hx of asthma and COPD) and shortness of breath (chronic, hx of asthma and COPD). Negative for chest tightness.    Cardiovascular:  Negative for chest pain and palpitations.   Gastrointestinal:  Negative for constipation, nausea and vomiting.   Genitourinary:  Negative for difficulty urinating and dysuria.   Musculoskeletal:  Positive for gait problem (s/p total right knee replacement).   Skin:  Positive for wound (surgical covered by dressing).   Neurological:  Negative for weakness and numbness.   All other systems reviewed and are negative.    Objective:     Vital Signs (Most Recent):  Temp: 98.9 °F (37.2 °C) (11/03/23 1918)  Pulse: 72 (11/04/23 0009)  Resp: 19 (11/04/23 0103)  BP: (!) 135/59 (11/03/23 1918)  SpO2: 99 % (11/04/23 0009) Vital Signs (24h Range):  Temp:  [97 °F (36.1 °C)-98.9 °F (37.2 °C)] 98.9 °F (37.2 °C)  Pulse:  [68-92] 72  Resp:  [16-20] 19  SpO2:  [96 %-99 %] 99 %  BP: (107-147)/(56-68) 135/59     Weight: 103 kg (227 lb)  Body mass index is 38.96 kg/m².    Intake/Output Summary (Last 24 hours) at 11/4/2023 0408  Last data filed at 11/3/2023 1920  Gross per 24 hour   Intake --   Output 125 ml   Net -125 ml         Physical Exam  Constitutional:       General: She is not in acute distress.     Appearance: Normal appearance. She is obese. She is not ill-appearing, toxic-appearing or diaphoretic.   HENT:      Head: Normocephalic and atraumatic.      Right Ear: Tympanic membrane, ear canal and external ear normal.      Left Ear: Tympanic membrane, ear canal and external ear normal.      Nose: Nose normal.      Mouth/Throat:      Mouth: Mucous membranes are moist.      Pharynx: No posterior oropharyngeal erythema.   Eyes:      Extraocular Movements: Extraocular movements intact.      Pupils: Pupils are equal, round, and reactive to light.    Cardiovascular:      Rate and Rhythm: Normal rate and regular rhythm.      Pulses: Normal pulses.      Heart sounds: Normal heart sounds.   Pulmonary:      Effort: Pulmonary effort is normal.      Breath sounds: Normal breath sounds.   Abdominal:      General: Bowel sounds are normal. There is no distension.      Palpations: Abdomen is soft.   Musculoskeletal:      Cervical back: Normal range of motion and neck supple.      Left lower leg: Normal.      Comments: There is a surgical dressing noted to right thigh down to right ankle.  Hemovac with minimal amount of bloody drainage noted.  Pedal pulses intact bilaterally.   Skin:     General: Skin is warm and dry.      Capillary Refill: Capillary refill takes less than 2 seconds.   Neurological:      Mental Status: She is alert and oriented to person, place, and time.      Sensory: No sensory deficit.   Psychiatric:         Mood and Affect: Mood normal.         Behavior: Behavior normal.         Thought Content: Thought content normal.         Judgment: Judgment normal.             Significant Labs: All pertinent labs within the past 24 hours have been reviewed.    Significant Imaging: I have reviewed all pertinent imaging results/findings within the past 24 hours.

## 2023-11-05 LAB — PREALB SERPL NEPH-MCNC: 21 MG/DL (ref 20–40)

## 2023-11-05 PROCEDURE — 25000003 PHARM REV CODE 250: Performed by: REGISTERED NURSE

## 2023-11-05 PROCEDURE — 27201920 HC DRESSING, AQUACEL AG

## 2023-11-05 PROCEDURE — 25000242 PHARM REV CODE 250 ALT 637 W/ HCPCS: Performed by: REGISTERED NURSE

## 2023-11-05 PROCEDURE — 25000003 PHARM REV CODE 250: Performed by: NURSE PRACTITIONER

## 2023-11-05 PROCEDURE — 11000004 HC SNF PRIVATE

## 2023-11-05 PROCEDURE — 94640 AIRWAY INHALATION TREATMENT: CPT

## 2023-11-05 PROCEDURE — 99900035 HC TECH TIME PER 15 MIN (STAT)

## 2023-11-05 PROCEDURE — 94761 N-INVAS EAR/PLS OXIMETRY MLT: CPT

## 2023-11-05 RX ADMIN — CLOPIDOGREL BISULFATE 75 MG: 75 TABLET ORAL at 08:11

## 2023-11-05 RX ADMIN — MONTELUKAST SODIUM 10 MG: 10 TABLET, COATED ORAL at 08:11

## 2023-11-05 RX ADMIN — FUROSEMIDE 40 MG: 40 TABLET ORAL at 08:11

## 2023-11-05 RX ADMIN — APIXABAN 2.5 MG: 2.5 TABLET, FILM COATED ORAL at 08:11

## 2023-11-05 RX ADMIN — PHENYTOIN SODIUM 100 MG: 100 CAPSULE ORAL at 08:11

## 2023-11-05 RX ADMIN — METOPROLOL TARTRATE 25 MG: 25 TABLET, FILM COATED ORAL at 08:11

## 2023-11-05 RX ADMIN — SENNOSIDES AND DOCUSATE SODIUM 1 TABLET: 8.6; 5 TABLET ORAL at 08:11

## 2023-11-05 RX ADMIN — CETIRIZINE HYDROCHLORIDE 10 MG: 10 TABLET, FILM COATED ORAL at 08:11

## 2023-11-05 RX ADMIN — PANTOPRAZOLE SODIUM 40 MG: 40 TABLET, DELAYED RELEASE ORAL at 08:11

## 2023-11-05 RX ADMIN — GABAPENTIN 300 MG: 300 CAPSULE ORAL at 02:11

## 2023-11-05 RX ADMIN — FLUTICASONE PROPIONATE 100 MCG: 50 SPRAY, METERED NASAL at 08:11

## 2023-11-05 RX ADMIN — BUSPIRONE HYDROCHLORIDE 10 MG: 10 TABLET ORAL at 08:11

## 2023-11-05 RX ADMIN — IPRATROPIUM BROMIDE AND ALBUTEROL SULFATE 3 ML: 2.5; .5 SOLUTION RESPIRATORY (INHALATION) at 06:11

## 2023-11-05 RX ADMIN — HYDROCODONE BITARTRATE AND ACETAMINOPHEN 1 TABLET: 10; 325 TABLET ORAL at 12:11

## 2023-11-05 RX ADMIN — MUPIROCIN: 20 OINTMENT TOPICAL at 08:11

## 2023-11-05 RX ADMIN — POLYETHYLENE GLYCOL 3350 17 G: 17 POWDER, FOR SOLUTION ORAL at 08:11

## 2023-11-05 RX ADMIN — PAROXETINE HYDROCHLORIDE 40 MG: 20 TABLET, FILM COATED ORAL at 08:11

## 2023-11-05 RX ADMIN — HYDROCODONE BITARTRATE AND ACETAMINOPHEN 1 TABLET: 10; 325 TABLET ORAL at 08:11

## 2023-11-05 RX ADMIN — GABAPENTIN 300 MG: 300 CAPSULE ORAL at 08:11

## 2023-11-05 RX ADMIN — PRAVASTATIN SODIUM 40 MG: 40 TABLET ORAL at 08:11

## 2023-11-05 RX ADMIN — IPRATROPIUM BROMIDE AND ALBUTEROL SULFATE 3 ML: 2.5; .5 SOLUTION RESPIRATORY (INHALATION) at 12:11

## 2023-11-05 RX ADMIN — IPRATROPIUM BROMIDE AND ALBUTEROL SULFATE 3 ML: 2.5; .5 SOLUTION RESPIRATORY (INHALATION) at 07:11

## 2023-11-05 RX ADMIN — HYDROCODONE BITARTRATE AND ACETAMINOPHEN 1 TABLET: 10; 325 TABLET ORAL at 05:11

## 2023-11-05 RX ADMIN — PHENYTOIN SODIUM 100 MG: 100 CAPSULE ORAL at 02:11

## 2023-11-05 RX ADMIN — IPRATROPIUM BROMIDE AND ALBUTEROL SULFATE 3 ML: 2.5; .5 SOLUTION RESPIRATORY (INHALATION) at 01:11

## 2023-11-06 LAB — UA COMPLETE W REFLEX CULTURE PNL UR: NO GROWTH

## 2023-11-06 PROCEDURE — 25000003 PHARM REV CODE 250: Performed by: NURSE PRACTITIONER

## 2023-11-06 PROCEDURE — 25000242 PHARM REV CODE 250 ALT 637 W/ HCPCS: Performed by: REGISTERED NURSE

## 2023-11-06 PROCEDURE — 97166 OT EVAL MOD COMPLEX 45 MIN: CPT

## 2023-11-06 PROCEDURE — 94640 AIRWAY INHALATION TREATMENT: CPT

## 2023-11-06 PROCEDURE — 94761 N-INVAS EAR/PLS OXIMETRY MLT: CPT

## 2023-11-06 PROCEDURE — 97110 THERAPEUTIC EXERCISES: CPT | Mod: CQ

## 2023-11-06 PROCEDURE — 11000004 HC SNF PRIVATE

## 2023-11-06 PROCEDURE — 25000003 PHARM REV CODE 250: Performed by: REGISTERED NURSE

## 2023-11-06 PROCEDURE — 97116 GAIT TRAINING THERAPY: CPT | Mod: CQ

## 2023-11-06 PROCEDURE — 25000003 PHARM REV CODE 250: Performed by: HOSPITALIST

## 2023-11-06 PROCEDURE — 99900035 HC TECH TIME PER 15 MIN (STAT)

## 2023-11-06 RX ORDER — MULTIVITAMIN
1 TABLET ORAL DAILY
Status: DISCONTINUED | OUTPATIENT
Start: 2023-11-06 | End: 2023-11-15 | Stop reason: HOSPADM

## 2023-11-06 RX ORDER — ASPIRIN 325 MG
325 TABLET ORAL DAILY
Status: DISCONTINUED | OUTPATIENT
Start: 2023-11-16 | End: 2023-11-15 | Stop reason: HOSPADM

## 2023-11-06 RX ADMIN — HYDROCODONE BITARTRATE AND ACETAMINOPHEN 1 TABLET: 10; 325 TABLET ORAL at 08:11

## 2023-11-06 RX ADMIN — GABAPENTIN 300 MG: 300 CAPSULE ORAL at 08:11

## 2023-11-06 RX ADMIN — CETIRIZINE HYDROCHLORIDE 10 MG: 10 TABLET, FILM COATED ORAL at 08:11

## 2023-11-06 RX ADMIN — FUROSEMIDE 40 MG: 40 TABLET ORAL at 08:11

## 2023-11-06 RX ADMIN — APIXABAN 2.5 MG: 2.5 TABLET, FILM COATED ORAL at 08:11

## 2023-11-06 RX ADMIN — BUSPIRONE HYDROCHLORIDE 10 MG: 10 TABLET ORAL at 08:11

## 2023-11-06 RX ADMIN — GABAPENTIN 300 MG: 300 CAPSULE ORAL at 02:11

## 2023-11-06 RX ADMIN — SENNOSIDES AND DOCUSATE SODIUM 1 TABLET: 8.6; 5 TABLET ORAL at 08:11

## 2023-11-06 RX ADMIN — IPRATROPIUM BROMIDE AND ALBUTEROL SULFATE 3 ML: 2.5; .5 SOLUTION RESPIRATORY (INHALATION) at 07:11

## 2023-11-06 RX ADMIN — CALCIUM CARBONATE 600 MG (1,500 MG)-VITAMIN D3 400 UNIT TABLET 1 TABLET: at 01:11

## 2023-11-06 RX ADMIN — PHENYTOIN SODIUM 100 MG: 100 CAPSULE ORAL at 09:11

## 2023-11-06 RX ADMIN — MUPIROCIN: 20 OINTMENT TOPICAL at 08:11

## 2023-11-06 RX ADMIN — IPRATROPIUM BROMIDE AND ALBUTEROL SULFATE 3 ML: 2.5; .5 SOLUTION RESPIRATORY (INHALATION) at 12:11

## 2023-11-06 RX ADMIN — METOPROLOL TARTRATE 25 MG: 25 TABLET, FILM COATED ORAL at 08:11

## 2023-11-06 RX ADMIN — PRAVASTATIN SODIUM 40 MG: 40 TABLET ORAL at 08:11

## 2023-11-06 RX ADMIN — PANTOPRAZOLE SODIUM 40 MG: 40 TABLET, DELAYED RELEASE ORAL at 08:11

## 2023-11-06 RX ADMIN — PHENYTOIN SODIUM 100 MG: 100 CAPSULE ORAL at 08:11

## 2023-11-06 RX ADMIN — PAROXETINE HYDROCHLORIDE 40 MG: 20 TABLET, FILM COATED ORAL at 08:11

## 2023-11-06 RX ADMIN — IPRATROPIUM BROMIDE AND ALBUTEROL SULFATE 3 ML: 2.5; .5 SOLUTION RESPIRATORY (INHALATION) at 01:11

## 2023-11-06 RX ADMIN — FLUTICASONE PROPIONATE 100 MCG: 50 SPRAY, METERED NASAL at 09:11

## 2023-11-06 RX ADMIN — MONTELUKAST SODIUM 10 MG: 10 TABLET, COATED ORAL at 08:11

## 2023-11-06 RX ADMIN — PHENYTOIN SODIUM 100 MG: 100 CAPSULE ORAL at 02:11

## 2023-11-06 RX ADMIN — CLOPIDOGREL BISULFATE 75 MG: 75 TABLET ORAL at 08:11

## 2023-11-06 NOTE — PLAN OF CARE
History to tobacco use.   Resume home bronchodilator.      [facility]  Discharge Reassessment    Primary Care Provider: Jayy Castillo MD    Expected Discharge Date:     Reassessment (most recent)       Discharge Reassessment - 11/06/23 1105          Discharge Reassessment    Assessment Type Discharge Planning Brief Assessment     Did the patient's condition or plan change since previous assessment? No     Discharge Plan discussed with: Patient     Communicated ALEX with patient/caregiver Date not available/Unable to determine     Discharge Plan A Home with family;Home Health     DME Needed Upon Discharge  none     Transition of Care Barriers None     Why the patient remains in the hospital Requires continued medical care        Post-Acute Status    Post-Acute Authorization Home Health     Patient choice form signed by patient/caregiver List with quality metrics by geographic area provided     Discharge Delays None known at this time                 CONT TO FOLLOW FOR SKILLED SERVICES AND INVITED TO SWINGBED MEETING ON TUESDAY DISCHARGE PLAN IS HOME WITH HOME HEALTH

## 2023-11-06 NOTE — PLAN OF CARE
Care plan reviewed  Problem: Fall Injury Risk  Goal: Absence of Fall and Fall-Related Injury  Outcome: Ongoing, Progressing     Problem: Pain Acute  Goal: Acceptable Pain Control and Functional Ability  Outcome: Ongoing, Progressing     Problem: Skin Injury Risk Increased  Goal: Skin Health and Integrity  Outcome: Ongoing, Progressing

## 2023-11-06 NOTE — PT/OT/SLP PROGRESS
Physical Therapy Treatment    Patient Name:  Radha Martínez   MRN:  [id number]    Recommendations:     Discharge Recommendations: Low Intensity Therapy  Discharge Equipment Recommendations: shower chair, bedside commode  Barriers to discharge: Decreased caregiver support    Assessment:     Radha Martínez is a 73 y.o. female admitted with a medical diagnosis of S/P TKR (total knee replacement), right.  She presents with the following impairments/functional limitations: weakness, impaired endurance, impaired functional mobility, impaired self care skills .    Case conference held with PT, to discuss pt POC and Tx.     Rehab Prognosis: Good; patient would benefit from acute skilled PT services to address these deficits and reach maximum level of function.    Recent Surgery: * No surgery found *      Plan:     During this hospitalization, patient to be seen BID to address the identified rehab impairments via gait training, therapeutic exercises and progress toward the following goals:    Plan of Care Expires:  12/21/23    Subjective     Chief Complaint: pain in R knee  Patient/Family Comments/goals: increased BLE strengthening and endurance  Pain/Comfort:  Pain Rating 1: 9/10  Location - Side 1: Right  Location 1: knee      Objective:     Communicated with pt prior to session.  Patient found supine with   upon PT entry to room.     General Precautions: Standard, fall  Orthopedic Precautions: RLE weight bearing as tolerated  Braces: N/A  Respiratory Status: Room air     Functional Mobility:  Bed Mobility:     Supine to Sit: minimum assistance  Transfers:     Sit to Stand:  minimum assistance with rolling walker  Bed to Chair: minimum assistance with  rolling walker  using  Step Transfer  Toilet Transfer: minimum assistance with  rolling walker  using  Step Transfer  Gait: Pt ambulated x 40 ft, x 10 ft min/CGA with FWW with x 1 rest break      AM-PAC 6 CLICK MOBILITY  Turning over in bed (including adjusting bedclothes,  sheets and blankets)?: 3  Sitting down on and standing up from a chair with arms (e.g., wheelchair, bedside commode, etc.): 3  Moving from lying on back to sitting on the side of the bed?: 3  Moving to and from a bed to a chair (including a wheelchair)?: 3  Need to walk in hospital room?: 2  Climbing 3-5 steps with a railing?: 1  Basic Mobility Total Score: 15       Treatment & Education:  Pt performed supine/sit Cuca, sit/stand x 3 Cuca, bed/BSC Cuca with FWW, BSC/ольга chair Cuca with FWW  Pt completed x 10 min seated NuStep to promote BLE strength and endurance. PTA cues for full BLE knee flexion and extension.     Patient left up in chair with all lines intact and call button in reach..    GOALS:   Multidisciplinary Problems       Physical Therapy Goals          Problem: Physical Therapy    Goal Priority Disciplines Outcome Goal Variances Interventions   Physical Therapy Goal     PT, PT/OT Ongoing, Progressing     Description: Short Term Goals to be met by: Discharge    Patient will increase functional independence with mobility by performin. Supine to sit with Modified [place]  2. Sit to stand transfer with Modified [place]  3. Bed to chair transfer with Modified [place] using Rolling Walker, WBAT right LE  4. Gait  x 200 feet with Modified [place] using Rolling Walker, WBAT right LE.   5. Lower extremity exercise program x30 reps per handout, with assistance as needed  6. Knee ROM 0-110                         Time Tracking:     PT Received On: 23  PT Start Time: 0840     PT Stop Time: 0915  PT Total Time (min): 35 min     Billable Minutes: Gait Training 15 and Therapeutic Exercise 20    Treatment Type: Treatment  PT/PTA: PTA     Number of PTA visits since last PT visit: 2023

## 2023-11-06 NOTE — ASSESSMENT & PLAN NOTE
Chronic, controlled. Latest blood pressure and vitals reviewed-     Temp:  [99.4 °F (37.4 °C)-99.8 °F (37.7 °C)]   Pulse:  [77-89]   Resp:  [15-20]   BP: (111-115)/(48-71)   SpO2:  [93 %-98 %] .   Home meds for hypertension were reviewed and noted below.   Hypertension Medications             furosemide (LASIX) 40 MG tablet Take 1 tablet (40 mg total) by mouth 2 (two) times daily.    nebivoloL (BYSTOLIC) 5 MG Tab Take 1 tablet (5 mg total) by mouth once daily.          While in the hospital, will manage blood pressure as follows; Continue home antihypertensive regimen    Will utilize p.r.n. blood pressure medication only if patient's blood pressure greater than 160/100 and she develops symptoms such as worsening chest pain or shortness of breath.

## 2023-11-06 NOTE — SUBJECTIVE & OBJECTIVE
Past Medical History:   Diagnosis Date    Asthma     CHF (congestive heart failure)     COPD (chronic obstructive pulmonary disease)     WILLSON (dyspnea on exertion)     GERD (gastroesophageal reflux disease)     HTN (hypertension)     Hyperlipidemia     Osteoporosis     Oxygen dependent     02 2L    Seizure disorder        Past Surgical History:   Procedure Laterality Date    ARTHROPLASTY, KNEE, TOTAL, USING COMPUTER-ASSISTED NAVIGATION Right 11/2/2023    Procedure: ARTHROPLASTY, KNEE, TOTAL, USING COMPUTER-ASSISTED NAVIGATION;  Surgeon: Jayy Castillo MD;  Location: [facility] ORTHO OR;  Service: Orthopedics;  Laterality: Right;    CARDIOVERSION N/A 04/08/2022    Procedure: Cardioversion;  Surgeon: Adal Chung MD;  Location: [facility] CATH LAB;  Service: Cardiology;  Laterality: N/A;    COLONOSCOPY      EYE SURGERY      REVISION OF TOTAL KNEE REPLACEMENT       TUBAL LIGATION         Review of patient's allergies indicates:  No Known Allergies    No current facility-administered medications on file prior to encounter.     Current Outpatient Medications on File Prior to Encounter   Medication Sig    albuterol (PROVENTIL/VENTOLIN HFA) 90 mcg/actuation inhaler Inhale 1-2 puffs into the lungs every 6 (six) hours as needed for Wheezing.    albuterol-ipratropium (DUO-NEB) 2.5 mg-0.5 mg/3 mL nebulizer solution Take 3 mLs by nebulization every 6 (six) hours as needed for Wheezing. Rescue    busPIRone (BUSPAR) 10 MG tablet Take 1 tablet (10 mg total) by mouth 2 (two) times daily.    calcium carbonate/vitamin D3 (CALCARB 600 WITH VITAMIN D ORAL) Take by mouth.    clopidogreL (PLAVIX) 75 mg tablet TAKE 1 TABLET ONE TIME DAILY    fluticasone propionate (FLONASE) 50 mcg/actuation nasal spray USE 1 SPRAY IN EACH NOSTRIL EVERY DAY (Patient taking differently: 1 spray Daily.)    fluticasone-umeclidin-vilanter (TRELEGY ELLIPTA) 200-62.5-25 mcg inhaler Inhale 1 puff into the lungs once daily.    furosemide (LASIX) 40 MG  tablet Take 1 tablet (40 mg total) by mouth 2 (two) times daily.    gabapentin (NEURONTIN) 300 MG capsule Take 1 capsule (300 mg total) by mouth 3 (three) times daily.    HYDROcodone-acetaminophen (NORCO)  mg per tablet Take 1 tablet by mouth every 4 (four) hours as needed for Pain.    ipratropium (ATROVENT) 42 mcg (0.06 %) nasal spray 2 sprays by Each Nostril route 4 (four) times daily.    loratadine (CLARITIN) 10 mg tablet TAKE 1 TABLET EVERY DAY    meclizine (ANTIVERT) 12.5 mg tablet Take 1 tablet (12.5 mg total) by mouth 2 (two) times daily as needed for Dizziness.    montelukast (SINGULAIR) 10 mg tablet Take 1 tablet (10 mg total) by mouth once daily.    nebivoloL (BYSTOLIC) 5 MG Tab Take 1 tablet (5 mg total) by mouth once daily.    pantoprazole (PROTONIX) 40 MG tablet TAKE 1 TABLET ONE TIME DAILY    paroxetine (PAXIL) 40 MG tablet Take 1 tablet (40 mg total) by mouth once daily.    phenytoin (DILANTIN) 100 MG ER capsule Take 1 capsule (100 mg total) by mouth 3 (three) times daily.    pravastatin (PRAVACHOL) 40 MG tablet Take 1 tablet (40 mg total) by mouth every evening.     Family History       Problem Relation (Age of Onset)    Cancer Father    Diabetes Mother, Sister, Brother    Heart disease Mother, Sister    Hypertension Mother, Sister          Tobacco Use    Smoking status: Never     Passive exposure: Never    Smokeless tobacco: Never   Substance and Sexual Activity    Alcohol use: Never    Drug use: Never    Sexual activity: Not Currently     Review of Systems   Constitutional:  Positive for activity change. Negative for appetite change, chills, diaphoresis, fatigue, fever and unexpected weight change.   HENT:  Negative for congestion, dental problem, ear pain, facial swelling, hearing loss, mouth sores, postnasal drip, rhinorrhea, sinus pressure, sinus pain and trouble swallowing.    Eyes:  Negative for photophobia, discharge, redness and itching.   Respiratory:  Positive for shortness of  breath. Negative for apnea, cough, chest tightness and wheezing.    Cardiovascular:  Positive for leg swelling. Negative for chest pain.   Gastrointestinal:  Negative for abdominal distention, abdominal pain, blood in stool, constipation, diarrhea, nausea and vomiting.   Endocrine: Negative for cold intolerance, heat intolerance, polydipsia, polyphagia and polyuria.   Genitourinary:  Negative for decreased urine volume, difficulty urinating, dysuria, enuresis, flank pain, frequency, hematuria, pelvic pain and urgency.   Musculoskeletal:  Positive for joint swelling. Negative for arthralgias, back pain, gait problem, myalgias, neck pain and neck stiffness.   Skin:  Positive for wound. Negative for color change and rash.   Allergic/Immunologic: Negative for environmental allergies, food allergies and immunocompromised state.   Neurological:  Positive for weakness. Negative for dizziness, tremors, seizures, syncope, facial asymmetry, speech difficulty, light-headedness, numbness and headaches.   Hematological:  Negative for adenopathy. Does not bruise/bleed easily.   Psychiatric/Behavioral:  Negative for agitation, behavioral problems, confusion, decreased concentration, dysphoric mood, hallucinations, self-injury, sleep disturbance and suicidal ideas. The patient is not nervous/anxious and is not hyperactive.      Objective:     Vital Signs (Most Recent):  Temp: 99.4 °F (37.4 °C) (11/06/23 0710)  Pulse: 87 (11/06/23 0710)  Resp: 18 (11/06/23 0830)  BP: 111/71 (11/06/23 0710)  SpO2: 95 % (11/06/23 0710) Vital Signs (24h Range):  Temp:  [99.4 °F (37.4 °C)-99.8 °F (37.7 °C)] 99.4 °F (37.4 °C)  Pulse:  [77-89] 87  Resp:  [15-20] 18  SpO2:  [93 %-98 %] 95 %  BP: (111-115)/(48-71) 111/71     Weight: 100.2 kg (221 lb)  Body mass index is 37.93 kg/m².     Physical Exam  Vitals reviewed.   Constitutional:       Appearance: Normal appearance. She is normal weight.   HENT:      Head: Normocephalic and atraumatic.      Nose: Nose  normal.      Mouth/Throat:      Mouth: Mucous membranes are moist.      Pharynx: Oropharynx is clear. No oropharyngeal exudate or posterior oropharyngeal erythema.   Eyes:      General: No scleral icterus.     Extraocular Movements: Extraocular movements intact.      Conjunctiva/sclera: Conjunctivae normal.      Pupils: Pupils are equal, round, and reactive to light.   Neck:      Vascular: No carotid bruit.   Cardiovascular:      Rate and Rhythm: Normal rate and regular rhythm.      Pulses: Normal pulses.      Heart sounds: Normal heart sounds. No murmur heard.     No gallop.   Pulmonary:      Effort: Pulmonary effort is normal.      Breath sounds: Normal breath sounds. No wheezing.   Abdominal:      General: Abdomen is flat. Bowel sounds are normal.      Palpations: Abdomen is soft.      Tenderness: There is no abdominal tenderness.   Musculoskeletal:         General: Tenderness present. Normal range of motion.      Cervical back: Normal range of motion and neck supple.      Right lower leg: No edema.      Left lower leg: No edema.      Comments: Right knee noted place with minimal drainage.  No erythema or tenderness.  Some range of motion.  Neurovascular is intact.   Lymphadenopathy:      Cervical: No cervical adenopathy.   Skin:     General: Skin is warm and dry.      Capillary Refill: Capillary refill takes less than 2 seconds.      Coloration: Skin is not jaundiced.      Findings: No lesion.   Neurological:      General: No focal deficit present.      Mental Status: She is alert and oriented to person, place, and time. Mental status is at baseline.      Cranial Nerves: No cranial nerve deficit.      Sensory: No sensory deficit.      Motor: No weakness.      Coordination: Coordination normal.      Gait: Gait normal.      Deep Tendon Reflexes: Reflexes normal.   Psychiatric:         Mood and Affect: Mood normal.         Behavior: Behavior normal.         Thought Content: Thought content normal.         Judgment:  "Judgment normal.              CRANIAL NERVES     CN III, IV, VI   Pupils are equal, round, and reactive to light.       Significant Labs: All pertinent labs within the past 24 hours have been reviewed.  BMP: No results for input(s): "GLU", "NA", "K", "CL", "CO2", "BUN", "CREATININE", "CALCIUM", "MG" in the last 48 hours.  CBC: No results for input(s): "WBC", "HGB", "HCT", "PLT" in the last 48 hours.  CMP: No results for input(s): "NA", "K", "CL", "CO2", "GLU", "BUN", "CREATININE", "CALCIUM", "PROT", "ALBUMIN", "BILITOT", "ALKPHOS", "AST", "ALT", "ANIONGAP", "EGFRNONAA" in the last 48 hours.    Invalid input(s): "ESTGFAFRICA"    Significant Imaging: I have reviewed all pertinent imaging results/findings within the past 24 hours.  "

## 2023-11-06 NOTE — H&P
"[facility] - [facility] Surgical Unit  Hospital Medicine  History & Physical    Patient Name: Radha Martínez  MRN: [id number]  Patient Class: IP- Swing  Admission Date: 11/3/2023  Attending Physician: Sander Banks DO   Primary Care Provider: Jayy Castillo MD         Patient information was obtained from patient and ER records.     Subjective:     Principal Problem:S/P TKR (total knee replacement), right    Chief Complaint:   Chief Complaint   Patient presents with    knee replacement        HPI: Radha Martínez (Marie) is a 72 yo WF that presents to [facility] service in [place], [place] from [facility] in [place], [place] after elective total right knee replacement performed on 11-2-23 by Dr. Jayy Castillo.    Mrs. Martínez has radiology studies that reveal generalized moderate osteopenia, severe tricompartmental DJD of right knee with complete obliteration of medial joint space in genu varum, and progressive pain.  With her last corticosteroid injection, there was no improvement of her pain.  She had a total left knee replacement 6- that was successful in relieving her pain, and she desired to have the same procedure performed on her right knee.  Patient was treated prophylactically with IV cefazolin and vancomycin.  Mrs. Martínez requested swing bed placement for post-op PT/OT evaluation and treatment.  She has a history of COPD and uses home oxygen at 2L NC prn.    Wound care instructions include applying Cool Jet to operative extremity, hemovac to be removed on post op day #2 with a dressing change on post op day #3, repeat dressing change in 7 days, and staple removal in 2 weeks with steri strip application.  Detailed wound care instructions in miscellaneous nursing orders.    Dr. Castillo's discharge instructions included administration of Eliquis 2.5 mg po BID for 12 days then begin aspirin 325 mg po daily.    Full Code    PMH:  Paroxysmal atrial " fibrillation, HTN, depression, asthma, CHF, dyspnea on exertion, osteoporosis, oxygen dependent, severe obesity with comorbidity, seizure, HLD, COPD, and GERD    PSH:  Left total knee replacement, cardioversion, colonoscopy, eye surgery, revision of total knee replacement, and tubal ligation    Right knee replacement 3 days ago with minimal pain except when ambulating.      Past Medical History:   Diagnosis Date    Asthma     CHF (congestive heart failure)     COPD (chronic obstructive pulmonary disease)     WILLSON (dyspnea on exertion)     GERD (gastroesophageal reflux disease)     HTN (hypertension)     Hyperlipidemia     Osteoporosis     Oxygen dependent     02 2L    Seizure disorder        Past Surgical History:   Procedure Laterality Date    ARTHROPLASTY, KNEE, TOTAL, USING COMPUTER-ASSISTED NAVIGATION Right 11/2/2023    Procedure: ARTHROPLASTY, KNEE, TOTAL, USING COMPUTER-ASSISTED NAVIGATION;  Surgeon: Jayy Castillo MD;  Location: [facility] ORTHO OR;  Service: Orthopedics;  Laterality: Right;    CARDIOVERSION N/A 04/08/2022    Procedure: Cardioversion;  Surgeon: Adal Chung MD;  Location: [facility] CATH LAB;  Service: Cardiology;  Laterality: N/A;    COLONOSCOPY      EYE SURGERY      REVISION OF TOTAL KNEE REPLACEMENT       TUBAL LIGATION         Review of patient's allergies indicates:  No Known Allergies    No current facility-administered medications on file prior to encounter.     Current Outpatient Medications on File Prior to Encounter   Medication Sig    albuterol (PROVENTIL/VENTOLIN HFA) 90 mcg/actuation inhaler Inhale 1-2 puffs into the lungs every 6 (six) hours as needed for Wheezing.    albuterol-ipratropium (DUO-NEB) 2.5 mg-0.5 mg/3 mL nebulizer solution Take 3 mLs by nebulization every 6 (six) hours as needed for Wheezing. Rescue    busPIRone (BUSPAR) 10 MG tablet Take 1 tablet (10 mg total) by mouth 2 (two) times daily.    calcium carbonate/vitamin D3 (CALCARB 600  WITH VITAMIN D ORAL) Take by mouth.    clopidogreL (PLAVIX) 75 mg tablet TAKE 1 TABLET ONE TIME DAILY    fluticasone propionate (FLONASE) 50 mcg/actuation nasal spray USE 1 SPRAY IN EACH NOSTRIL EVERY DAY (Patient taking differently: 1 spray Daily.)    fluticasone-umeclidin-vilanter (TRELEGY ELLIPTA) 200-62.5-25 mcg inhaler Inhale 1 puff into the lungs once daily.    furosemide (LASIX) 40 MG tablet Take 1 tablet (40 mg total) by mouth 2 (two) times daily.    gabapentin (NEURONTIN) 300 MG capsule Take 1 capsule (300 mg total) by mouth 3 (three) times daily.    HYDROcodone-acetaminophen (NORCO)  mg per tablet Take 1 tablet by mouth every 4 (four) hours as needed for Pain.    ipratropium (ATROVENT) 42 mcg (0.06 %) nasal spray 2 sprays by Each Nostril route 4 (four) times daily.    loratadine (CLARITIN) 10 mg tablet TAKE 1 TABLET EVERY DAY    meclizine (ANTIVERT) 12.5 mg tablet Take 1 tablet (12.5 mg total) by mouth 2 (two) times daily as needed for Dizziness.    montelukast (SINGULAIR) 10 mg tablet Take 1 tablet (10 mg total) by mouth once daily.    nebivoloL (BYSTOLIC) 5 MG Tab Take 1 tablet (5 mg total) by mouth once daily.    pantoprazole (PROTONIX) 40 MG tablet TAKE 1 TABLET ONE TIME DAILY    paroxetine (PAXIL) 40 MG tablet Take 1 tablet (40 mg total) by mouth once daily.    phenytoin (DILANTIN) 100 MG ER capsule Take 1 capsule (100 mg total) by mouth 3 (three) times daily.    pravastatin (PRAVACHOL) 40 MG tablet Take 1 tablet (40 mg total) by mouth every evening.     Family History       Problem Relation (Age of Onset)    Cancer Father    Diabetes Mother, Sister, Brother    Heart disease Mother, Sister    Hypertension Mother, Sister          Tobacco Use    Smoking status: Never     Passive exposure: Never    Smokeless tobacco: Never   Substance and Sexual Activity    Alcohol use: Never    Drug use: Never    Sexual activity: Not Currently     Review of Systems   Constitutional:  Positive  for activity change. Negative for appetite change, chills, diaphoresis, fatigue, fever and unexpected weight change.   HENT:  Negative for congestion, dental problem, ear pain, facial swelling, hearing loss, mouth sores, postnasal drip, rhinorrhea, sinus pressure, sinus pain and trouble swallowing.    Eyes:  Negative for photophobia, discharge, redness and itching.   Respiratory:  Positive for shortness of breath. Negative for apnea, cough, chest tightness and wheezing.    Cardiovascular:  Positive for leg swelling. Negative for chest pain.   Gastrointestinal:  Negative for abdominal distention, abdominal pain, blood in stool, constipation, diarrhea, nausea and vomiting.   Endocrine: Negative for cold intolerance, heat intolerance, polydipsia, polyphagia and polyuria.   Genitourinary:  Negative for decreased urine volume, difficulty urinating, dysuria, enuresis, flank pain, frequency, hematuria, pelvic pain and urgency.   Musculoskeletal:  Positive for joint swelling. Negative for arthralgias, back pain, gait problem, myalgias, neck pain and neck stiffness.   Skin:  Positive for wound. Negative for color change and rash.   Allergic/Immunologic: Negative for environmental allergies, food allergies and immunocompromised state.   Neurological:  Positive for weakness. Negative for dizziness, tremors, seizures, syncope, facial asymmetry, speech difficulty, light-headedness, numbness and headaches.   Hematological:  Negative for adenopathy. Does not bruise/bleed easily.   Psychiatric/Behavioral:  Negative for agitation, behavioral problems, confusion, decreased concentration, dysphoric mood, hallucinations, self-injury, sleep disturbance and suicidal ideas. The patient is not nervous/anxious and is not hyperactive.      Objective:     Vital Signs (Most Recent):  Temp: 99.4 °F (37.4 °C) (11/06/23 0710)  Pulse: 87 (11/06/23 0710)  Resp: 18 (11/06/23 0830)  BP: 111/71 (11/06/23 0710)  SpO2: 95 % (11/06/23 0710) Vital Signs  (24h Range):  Temp:  [99.4 °F (37.4 °C)-99.8 °F (37.7 °C)] 99.4 °F (37.4 °C)  Pulse:  [77-89] 87  Resp:  [15-20] 18  SpO2:  [93 %-98 %] 95 %  BP: (111-115)/(48-71) 111/71     Weight: 100.2 kg (221 lb)  Body mass index is 37.93 kg/m².     Physical Exam  Vitals reviewed.   Constitutional:       Appearance: Normal appearance. She is normal weight.   HENT:      Head: Normocephalic and atraumatic.      Nose: Nose normal.      Mouth/Throat:      Mouth: Mucous membranes are moist.      Pharynx: Oropharynx is clear. No oropharyngeal exudate or posterior oropharyngeal erythema.   Eyes:      General: No scleral icterus.     Extraocular Movements: Extraocular movements intact.      Conjunctiva/sclera: Conjunctivae normal.      Pupils: Pupils are equal, round, and reactive to light.   Neck:      Vascular: No carotid bruit.   Cardiovascular:      Rate and Rhythm: Normal rate and regular rhythm.      Pulses: Normal pulses.      Heart sounds: Normal heart sounds. No murmur heard.     No gallop.   Pulmonary:      Effort: Pulmonary effort is normal.      Breath sounds: Normal breath sounds. No wheezing.   Abdominal:      General: Abdomen is flat. Bowel sounds are normal.      Palpations: Abdomen is soft.      Tenderness: There is no abdominal tenderness.   Musculoskeletal:         General: Tenderness present. Normal range of motion.      Cervical back: Normal range of motion and neck supple.      Right lower leg: No edema.      Left lower leg: No edema.      Comments: Right knee noted place with minimal drainage.  No erythema or tenderness.  Some range of motion.  Neurovascular is intact.   Lymphadenopathy:      Cervical: No cervical adenopathy.   Skin:     General: Skin is warm and dry.      Capillary Refill: Capillary refill takes less than 2 seconds.      Coloration: Skin is not jaundiced.      Findings: No lesion.   Neurological:      General: No focal deficit present.      Mental Status: She is alert and oriented to person,  "place, and time. Mental status is at baseline.      Cranial Nerves: No cranial nerve deficit.      Sensory: No sensory deficit.      Motor: No weakness.      Coordination: Coordination normal.      Gait: Gait normal.      Deep Tendon Reflexes: Reflexes normal.   Psychiatric:         Mood and Affect: Mood normal.         Behavior: Behavior normal.         Thought Content: Thought content normal.         Judgment: Judgment normal.              CRANIAL NERVES     CN III, IV, VI   Pupils are equal, round, and reactive to light.       Significant Labs: All pertinent labs within the past 24 hours have been reviewed.  BMP: No results for input(s): "GLU", "NA", "K", "CL", "CO2", "BUN", "CREATININE", "CALCIUM", "MG" in the last 48 hours.  CBC: No results for input(s): "WBC", "HGB", "HCT", "PLT" in the last 48 hours.  CMP: No results for input(s): "NA", "K", "CL", "CO2", "GLU", "BUN", "CREATININE", "CALCIUM", "PROT", "ALBUMIN", "BILITOT", "ALKPHOS", "AST", "ALT", "ANIONGAP", "EGFRNONAA" in the last 48 hours.    Invalid input(s): "ESTGFAFRICA"    Significant Imaging: I have reviewed all pertinent imaging results/findings within the past 24 hours.    Assessment/Plan:     * S/P TKR (total knee replacement), right  -PT/OT evaluation and treatment  Patient has tolerated the procedure well.  Minimal to no pain resting only when she stands.  Pain medicines 1 hour prior to PTOT.  Expected rehab time 2 weeks      HTN (hypertension)  Chronic, controlled. Latest blood pressure and vitals reviewed-     Temp:  [99.4 °F (37.4 °C)-99.8 °F (37.7 °C)]   Pulse:  [77-89]   Resp:  [15-20]   BP: (111-115)/(48-71)   SpO2:  [93 %-98 %] .   Home meds for hypertension were reviewed and noted below.   Hypertension Medications             furosemide (LASIX) 40 MG tablet Take 1 tablet (40 mg total) by mouth 2 (two) times daily.    nebivoloL (BYSTOLIC) 5 MG Tab Take 1 tablet (5 mg total) by mouth once daily.          While in the hospital, will manage " blood pressure as follows; Continue home antihypertensive regimen    Will utilize p.r.n. blood pressure medication only if patient's blood pressure greater than 160/100 and she develops symptoms such as worsening chest pain or shortness of breath.    PACO on CPAP  CPAP in-hospital if patient has available.      Chronic kidney disease, stage 3a  Creatine stable for now. BMP reviewed- noted Estimated Creatinine Clearance: 46.8 mL/min (A) (based on SCr of 1.25 mg/dL (H)). according to latest data. Based on current GFR, CKD stage is stage 3 - GFR 30-59.  Monitor UOP and serial BMP and adjust therapy as needed. Renally dose meds. Avoid nephrotoxic medications and procedures.    Chronic diastolic heart failure  No evidence of heart failure at this time.  Maintain her current medicines.      Asthma  -DuoNeb every 6 hours  -Incentive spirometry every 4 hours        VTE Risk Mitigation (From admission, onward)         Ordered     apixaban tablet 2.5 mg  2 times daily         11/04/23 0256     IP VTE HIGH RISK PATIENT  Once         11/03/23 1558     Place ROBINSON hose  Until discontinued         11/03/23 1558     Place sequential compression device  Until discontinued         11/03/23 1558                               Nick Valera DO  Department of Hospital Medicine  [facility]    COMPLETED  Family history is reviewed and has not changed   Pertinent information:

## 2023-11-06 NOTE — ASSESSMENT & PLAN NOTE
-PT/OT evaluation and treatment  Patient has tolerated the procedure well.  Minimal to no pain resting only when she stands.  Pain medicines 1 hour prior to PTOT.  Expected rehab time 2 weeks

## 2023-11-06 NOTE — PT/OT/SLP EVAL
Occupational Therapy   Evaluation    Name: Radha Martínez  MRN: [id number]  Admitting Diagnosis: S/P TKR (total knee replacement), right  Recent Surgery: * No surgery found *      Recommendations:     Discharge Recommendations: Low Intensity Therapy  Discharge Equipment Recommendations:  bedside commode  Barriers to discharge:  None    Assessment:     Radha Martínez is a 73 y.o. female with a medical diagnosis of S/P TKR (total knee replacement), right.  She presents with the following performance deficits affecting function: weakness, impaired endurance, impaired sensation, impaired self care skills, impaired functional mobility, pain.      Rehab Prognosis: Good; patient would benefit from acute skilled OT services to address these deficits and reach maximum level of function.       Plan:     Patient to be seen 5 x/week to address the above listed problems via self-care/home management, therapeutic activities, therapeutic exercises, neuromuscular re-education  Plan of Care Expires: 11/24/23  Plan of Care Reviewed with: patient, son    Subjective     Chief Complaint: knee pain  Patient/Family Comments/goals: pt agreeable to OT tx    Occupational Profile:  Living Environment: lives with spouse and son  Previous level of function: independent  Equipment Used at Home: walker, rolling  Assistance upon Discharge: family and home health to assist    Pain/Comfort:   9/10 in R knee    Patients cultural, spiritual, Scientology conflicts given the current situation: no    Objective:     Communicated with: RN prior to session.  Patient found up in chair with   upon OT entry to room.    General Precautions: Standard,    Orthopedic Precautions: RLE weight bearing as tolerated  Braces:    Respiratory Status: Room air    Occupational Performance:    Bed Mobility:    Rolling Left:  minimum assistance  Rolling Right: minimum assistance  Supine to Sit: minimum assistance  Sit to Supine: minimum assistance    Functional  Mobility/Transfers:  Sit to Stand:  minimum assistance with rolling walker  Bed to Chair: minimum assistance with  rolling walker  using  Stand Pivot    Activities of Daily Living:  Grooming: supervision    Bathing: moderate assistance LB and back  Upper Body Dressing: stand by assistance    Lower Body Dressing: moderate assistance    Toileting: moderate assistance for hygiene    Physical Exam:  Upper Extremity Range of Motion:     -       Right Upper Extremity: WFL  -       Left Upper Extremity: WFL  Upper Extremity Strength:    -       Right Upper Extremity: 4-/5  -       Left Upper Extremity: 4-/5   Strength:    -       Right Upper Extremity: WFL  -       Left Upper Extremity: WFL    AMPAC 6 Click ADL:  AMPAC Total Score: 17    Treatment & Education:  Pt educated on OT role/POC.   Importance of OOB activity with staff assistance.  Importance of sitting up in the chair throughout the day as tolerated, especially for meals   Safety during functional t/f and mobility  Importance of assisting with self-care activities      OT met face to face and performed supervision visit with MICHEAL Stevens to discuss patient's diagnosis and POC including goals, intervention strategies and D/C planning.     Patient left up in chair with all lines intact, call button in reach, and son present    GOALS:   Multidisciplinary Problems       Occupational Therapy Goals          Problem: Occupational Therapy    Goal Priority Disciplines Outcome Interventions   Occupational Therapy Goal     OT, PT/OT Ongoing, Progressing    Description: STG:  Pt will perform grooming with I  Pt will bathe self Mod I with setup  Pt will perform UB dressing with I  Pt will perform LB dressing with Min A  Pt will transfer toilet/BSC with Mod I  Pt will perform standing task x 3-4 min with CGA    LTG:  Pt will achieve max level of independence with self care.                        History:     Past Medical History:   Diagnosis Date    Asthma     CHF  (congestive heart failure)     COPD (chronic obstructive pulmonary disease)     WILLSON (dyspnea on exertion)     GERD (gastroesophageal reflux disease)     HTN (hypertension)     Hyperlipidemia     Osteoporosis     Oxygen dependent     02 2L    Seizure disorder          Past Surgical History:   Procedure Laterality Date    ARTHROPLASTY, KNEE, TOTAL, USING COMPUTER-ASSISTED NAVIGATION Right 11/2/2023    Procedure: ARTHROPLASTY, KNEE, TOTAL, USING COMPUTER-ASSISTED NAVIGATION;  Surgeon: Jayy Castillo MD;  Location: [facility] ORTHO OR;  Service: Orthopedics;  Laterality: Right;    CARDIOVERSION N/A 04/08/2022    Procedure: Cardioversion;  Surgeon: Adal Chung MD;  Location: [facility] CATH LAB;  Service: Cardiology;  Laterality: N/A;    COLONOSCOPY      EYE SURGERY      REVISION OF TOTAL KNEE REPLACEMENT       TUBAL LIGATION         Time Tracking:     OT Date of Treatment: 11/06/23  OT Start Time: 0932  OT Stop Time: 0950  OT Total Time (min): 18 min    Billable Minutes:Evaluation 18    11/6/2023

## 2023-11-06 NOTE — ASSESSMENT & PLAN NOTE
Creatine stable for now. BMP reviewed- noted Estimated Creatinine Clearance: 46.8 mL/min (A) (based on SCr of 1.25 mg/dL (H)). according to latest data. Based on current GFR, CKD stage is stage 3 - GFR 30-59.  Monitor UOP and serial BMP and adjust therapy as needed. Renally dose meds. Avoid nephrotoxic medications and procedures.

## 2023-11-06 NOTE — PLAN OF CARE
Problem: Occupational Therapy  Goal: Occupational Therapy Goal  Description: STG:  Pt will perform grooming with I  Pt will bathe self Mod I with setup  Pt will perform UB dressing with I  Pt will perform LB dressing with Min A  Pt will transfer toilet/BSC with Mod I  Pt will perform standing task x 3-4 min with CGA    LTG:  Pt will achieve max level of independence with self care.   Outcome: Ongoing, Progressing

## 2023-11-07 LAB — PHENYTOIN FREE SERPL-MCNC: <0.8 MCG/ML (ref 1–2)

## 2023-11-07 PROCEDURE — 97530 THERAPEUTIC ACTIVITIES: CPT | Mod: CO

## 2023-11-07 PROCEDURE — 25000003 PHARM REV CODE 250: Performed by: FAMILY MEDICINE

## 2023-11-07 PROCEDURE — 99900035 HC TECH TIME PER 15 MIN (STAT)

## 2023-11-07 PROCEDURE — 97116 GAIT TRAINING THERAPY: CPT

## 2023-11-07 PROCEDURE — 25000003 PHARM REV CODE 250: Performed by: HOSPITALIST

## 2023-11-07 PROCEDURE — 94640 AIRWAY INHALATION TREATMENT: CPT

## 2023-11-07 PROCEDURE — 97110 THERAPEUTIC EXERCISES: CPT

## 2023-11-07 PROCEDURE — 25000242 PHARM REV CODE 250 ALT 637 W/ HCPCS: Performed by: REGISTERED NURSE

## 2023-11-07 PROCEDURE — 94761 N-INVAS EAR/PLS OXIMETRY MLT: CPT

## 2023-11-07 PROCEDURE — 97110 THERAPEUTIC EXERCISES: CPT | Mod: CO

## 2023-11-07 PROCEDURE — 11000004 HC SNF PRIVATE

## 2023-11-07 PROCEDURE — 25000003 PHARM REV CODE 250: Performed by: REGISTERED NURSE

## 2023-11-07 PROCEDURE — 97530 THERAPEUTIC ACTIVITIES: CPT

## 2023-11-07 PROCEDURE — 25000003 PHARM REV CODE 250: Performed by: NURSE PRACTITIONER

## 2023-11-07 RX ORDER — BACLOFEN 10 MG/1
10 TABLET ORAL NIGHTLY
Status: DISCONTINUED | OUTPATIENT
Start: 2023-11-07 | End: 2023-11-15 | Stop reason: HOSPADM

## 2023-11-07 RX ADMIN — APIXABAN 2.5 MG: 2.5 TABLET, FILM COATED ORAL at 08:11

## 2023-11-07 RX ADMIN — PHENYTOIN SODIUM 100 MG: 100 CAPSULE ORAL at 03:11

## 2023-11-07 RX ADMIN — MUPIROCIN: 20 OINTMENT TOPICAL at 08:11

## 2023-11-07 RX ADMIN — PRAVASTATIN SODIUM 40 MG: 40 TABLET ORAL at 09:11

## 2023-11-07 RX ADMIN — APIXABAN 2.5 MG: 2.5 TABLET, FILM COATED ORAL at 09:11

## 2023-11-07 RX ADMIN — CLOPIDOGREL BISULFATE 75 MG: 75 TABLET ORAL at 08:11

## 2023-11-07 RX ADMIN — IPRATROPIUM BROMIDE AND ALBUTEROL SULFATE 3 ML: 2.5; .5 SOLUTION RESPIRATORY (INHALATION) at 07:11

## 2023-11-07 RX ADMIN — SENNOSIDES AND DOCUSATE SODIUM 1 TABLET: 8.6; 5 TABLET ORAL at 09:11

## 2023-11-07 RX ADMIN — GABAPENTIN 300 MG: 300 CAPSULE ORAL at 09:11

## 2023-11-07 RX ADMIN — MONTELUKAST SODIUM 10 MG: 10 TABLET, COATED ORAL at 08:11

## 2023-11-07 RX ADMIN — PANTOPRAZOLE SODIUM 40 MG: 40 TABLET, DELAYED RELEASE ORAL at 08:11

## 2023-11-07 RX ADMIN — FUROSEMIDE 40 MG: 40 TABLET ORAL at 09:11

## 2023-11-07 RX ADMIN — FUROSEMIDE 40 MG: 40 TABLET ORAL at 08:11

## 2023-11-07 RX ADMIN — BACLOFEN 10 MG: 10 TABLET ORAL at 09:11

## 2023-11-07 RX ADMIN — SENNOSIDES AND DOCUSATE SODIUM 1 TABLET: 8.6; 5 TABLET ORAL at 08:11

## 2023-11-07 RX ADMIN — BUSPIRONE HYDROCHLORIDE 10 MG: 10 TABLET ORAL at 09:11

## 2023-11-07 RX ADMIN — IPRATROPIUM BROMIDE AND ALBUTEROL SULFATE 3 ML: 2.5; .5 SOLUTION RESPIRATORY (INHALATION) at 12:11

## 2023-11-07 RX ADMIN — HYDROCODONE BITARTRATE AND ACETAMINOPHEN 1 TABLET: 10; 325 TABLET ORAL at 07:11

## 2023-11-07 RX ADMIN — PAROXETINE HYDROCHLORIDE 40 MG: 20 TABLET, FILM COATED ORAL at 08:11

## 2023-11-07 RX ADMIN — CETIRIZINE HYDROCHLORIDE 10 MG: 10 TABLET, FILM COATED ORAL at 08:11

## 2023-11-07 RX ADMIN — GABAPENTIN 300 MG: 300 CAPSULE ORAL at 03:11

## 2023-11-07 RX ADMIN — IPRATROPIUM BROMIDE AND ALBUTEROL SULFATE 3 ML: 2.5; .5 SOLUTION RESPIRATORY (INHALATION) at 01:11

## 2023-11-07 RX ADMIN — FLUTICASONE PROPIONATE 100 MCG: 50 SPRAY, METERED NASAL at 08:11

## 2023-11-07 RX ADMIN — GABAPENTIN 300 MG: 300 CAPSULE ORAL at 08:11

## 2023-11-07 RX ADMIN — CALCIUM CARBONATE 600 MG (1,500 MG)-VITAMIN D3 400 UNIT TABLET 1 TABLET: at 08:11

## 2023-11-07 RX ADMIN — MUPIROCIN: 20 OINTMENT TOPICAL at 09:11

## 2023-11-07 RX ADMIN — HYDROCODONE BITARTRATE AND ACETAMINOPHEN 1 TABLET: 10; 325 TABLET ORAL at 08:11

## 2023-11-07 RX ADMIN — METOPROLOL TARTRATE 25 MG: 25 TABLET, FILM COATED ORAL at 09:11

## 2023-11-07 RX ADMIN — PHENYTOIN SODIUM 100 MG: 100 CAPSULE ORAL at 08:11

## 2023-11-07 RX ADMIN — METOPROLOL TARTRATE 25 MG: 25 TABLET, FILM COATED ORAL at 08:11

## 2023-11-07 RX ADMIN — PHENYTOIN SODIUM 100 MG: 100 CAPSULE ORAL at 09:11

## 2023-11-07 RX ADMIN — BUSPIRONE HYDROCHLORIDE 10 MG: 10 TABLET ORAL at 08:11

## 2023-11-07 NOTE — PT/OT/SLP PROGRESS
Occupational Therapy   Treatment    Name: Radha Martínez  MRN: [id number]  Admitting Diagnosis:  S/P TKR (total knee replacement), right       Recommendations:     Discharge Recommendations: Low Intensity Therapy  Discharge Equipment Recommendations:  bedside commode  Barriers to discharge:       Assessment:     Radha Martínez is a 73 y.o. female with a medical diagnosis of S/P TKR (total knee replacement), right.  She presents with the following Performance deficits affecting function are weakness, decreased upper extremity function, impaired endurance, impaired balance, decreased safety awareness, impaired self care skills, impaired functional mobility, decreased coordination.   Case conference regarding plan of care and goals completed with Praveen Lynn prior to treatment.   Rehab Prognosis:  Good and Fair; patient would benefit from acute skilled OT services to address these deficits and reach maximum level of function.       Plan:     Patient to be seen 5 x/week to address the above listed problems via self-care/home management, therapeutic activities, therapeutic exercises  Plan of Care Expires: 11/24/23  Plan of Care Reviewed with: patient, son    Subjective   Patient was sitting up in her recliner chair in therapy gym after PT session upon BURTON arrival. Patient stated she was feeling okay with some pain in her knee but was agreeable to participate in todays session.   Patient/Family Comments/goals: to increase UB strength and endurance in order to return home  Pain/Comfort:  Pain Rating 1: 5/10  Location - Side 1: Right  Location 1: knee    Objective:     Communicated with: nursing staff prior to session.  Patient found up in chair with peripheral IV upon OT entry to room.    General Precautions: Standard, fall    Orthopedic Precautions:RLE weight bearing as tolerated  Braces: N/A  Respiratory Status: Room air    Therapeutic exercises:  Patient completed the following exercises to work on UB strength in order  to complete ADLs, bed mobility, and transfers with less assistance.    -Bicep curls: 3 sets of 10 with 2# dumbbell  -Chest press: 3 sets of 10 with 2# dumbbell  -Shoulder flex: 3 sets of 10 with 2# dumbbell  -Internal/External rotation: 3 sets of 10 with 2# dumbbell  -Triceps: 3 sets of 10 with green Theraband  -Rows: 3 sets of 10 with green Theraband    Increased time/effort needed to complete each exercise.     Therapeutic activities:  Patient completed 10 minutes on UBE ergometer to work on UB strength and endurance in order to complete ADLs and transfers w/ less A.   Pt completed 8 sit to stand transfers with contact guard to work on tricep strength, stand tolerance and endurance in preparation to complete ADLs and transfers from different surfaces such as BSC, chair, etc. with less assistance. Pt stood 1 minutes with each stand attempt before fatiguing. Increased time/effort needed.     Patient left up in chair with call button in reach and RN notified    GOALS:   Multidisciplinary Problems       Occupational Therapy Goals          Problem: Occupational Therapy    Goal Priority Disciplines Outcome Interventions   Occupational Therapy Goal     OT, PT/OT Ongoing, Progressing    Description: STG:  Pt will perform grooming with I  Pt will bathe self Mod I with setup  Pt will perform UB dressing with I  Pt will perform LB dressing with Min A  Pt will transfer toilet/BSC with Mod I  Pt will perform standing task x 3-4 min with CGA    LTG:  Pt will achieve max level of independence with self care.                        Time Tracking:     OT Date of Treatment: 11/07/23  OT Start Time: 0900  OT Stop Time: 0945  OT Total Time (min): 45 min    Billable Minutes:Therapeutic Activity 20 minutes  Therapeutic Exercise 25 minutes    OT/BETTYE: BETTYE     Number of BETTYE visits since last OT visit: 1    BETTYE Stevens  11/7/2023  2:59 PM

## 2023-11-07 NOTE — PT/OT/SLP PROGRESS
[facility]  Physical Therapy Treatment    Patient Name:  Radha Martínez   MRN:  [id number]    Recommendations:     Discharge Recommendations: Low Intensity Therapy  Discharge Equipment Recommendations: shower chair, bedside commode  Barriers to discharge: Decreased caregiver support    Assessment:     Radha Martínez is a 73 y.o. female admitted with a medical diagnosis of S/P TKR (total knee replacement), right.  She presents with the following impairments/functional limitations: weakness, impaired endurance, impaired self care skills .    Rehab Prognosis: Good; patient would benefit from acute skilled PT services to address these deficits and reach maximum level of function.    Recent Surgery: * No surgery found *      Plan:     During this hospitalization, patient to be seen BID to address the identified rehab impairments via therapeutic exercises and progress toward the following goals:    Plan of Care Expires:  12/21/23    Subjective     Chief Complaint: no pain  Patient/Family Comments/goals: increased BLE strengthening and endurance  Pain/Comfort:         Objective:     Communicated with pt prior to session.  Patient found up in chair with peripheral IV, oxygen, hemovac upon PT entry to room.     General Precautions: Standard, fall  Orthopedic Precautions: RLE weight bearing as tolerated  Braces: N/A  Respiratory Status: Room air     Functional Mobility:  Transfers:     Sit to Stand:  contact guard assistance with rolling walker  Bed to Chair: contact guard assistance with  rolling walker  using  Step Transfer      AM-PAC 6 CLICK MOBILITY      Treatment & Education:  Therapist facilitated ambulation on level surfaces with use of front wheeled walker with CGA for 69 feet with therapist providing cues for both sequencing and safety.     GOALS:   Multidisciplinary Problems       Physical Therapy Goals          Problem: Physical Therapy    Goal Priority Disciplines Outcome Goal Variances Interventions   Physical  Therapy Goal     PT, PT/OT Ongoing, Progressing     Description: Short Term Goals to be met by: Discharge    Patient will increase functional independence with mobility by performin. Supine to sit with Modified [place]  2. Sit to stand transfer with Modified [place]  3. Bed to chair transfer with Modified [place] using Rolling Walker, WBAT right LE  4. Gait  x 200 feet with Modified [place] using Rolling Walker, WBAT right LE.   5. Lower extremity exercise program x30 reps per handout, with assistance as needed  6. Knee ROM 0-110                         Time Tracking:     PT Received On: 23  PT Start Time: 1310     PT Stop Time: 1330  PT Total Time (min): 20 min       2023

## 2023-11-07 NOTE — PT/OT/SLP PROGRESS
[facility]  Physical Therapy Treatment    Patient Name:  Radha Martínez   MRN:  [id number]    Recommendations:     Discharge Recommendations: Low Intensity Therapy  Discharge Equipment Recommendations: shower chair, bedside commode  Barriers to discharge: Decreased caregiver support    Assessment:     Radha Martínez is a 73 y.o. female admitted with a medical diagnosis of S/P TKR (total knee replacement), right.  She presents with the following impairments/functional limitations: weakness, impaired endurance, impaired self care skills .    Rehab Prognosis: Good; patient would benefit from acute skilled PT services to address these deficits and reach maximum level of function.    Recent Surgery: * No surgery found *      Plan:     During this hospitalization, patient to be seen BID to address the identified rehab impairments via therapeutic exercises and progress toward the following goals:    Plan of Care Expires:  12/21/23    Subjective     Chief Complaint: no pain  Patient/Family Comments/goals: increased BLE strengthening and endurance  Pain/Comfort:         Objective:     Communicated with pt prior to session.  Patient found up in chair with peripheral IV, oxygen, hemovac upon PT entry to room.     General Precautions: Standard, fall  Orthopedic Precautions: RLE weight bearing as tolerated  Braces: N/A  Respiratory Status: Room air     Functional Mobility:  Transfers:     Sit to Stand:  contact guard assistance with rolling walker  Bed to Chair: contact guard assistance with  rolling walker  using  Step Transfer      AM-PAC 6 CLICK MOBILITY      Treatment & Education:  Pt performed the following RLE ex 2x10 to promote increased R knee flexion and extension:  Heel slides  SLRs  Hip ADD/Hip ABD  Quad sets  SAQs  Therapist facilitated transfers sit<>stand for 7 trials with therapist providing cues for sequencing and safety.  Patient left supine with call button in reach..    GOALS:   Multidisciplinary Problems        Physical Therapy Goals          Problem: Physical Therapy    Goal Priority Disciplines Outcome Goal Variances Interventions   Physical Therapy Goal     PT, PT/OT Ongoing, Progressing     Description: Short Term Goals to be met by: Discharge    Patient will increase functional independence with mobility by performin. Supine to sit with Modified [place]  2. Sit to stand transfer with Modified [place]  3. Bed to chair transfer with Modified [place] using Rolling Walker, WBAT right LE  4. Gait  x 200 feet with Modified [place] using Rolling Walker, WBAT right LE.   5. Lower extremity exercise program x30 reps per handout, with assistance as needed  6. Knee ROM 0-110                         Time Tracking:     PT Received On: 23  PT Start Time: 820     PT Stop Time: 900  PT Total Time (min): 40 min       2023

## 2023-11-07 NOTE — PLAN OF CARE
Care plan reviewed  Problem: Fall Injury Risk  Goal: Absence of Fall and Fall-Related Injury  Outcome: Ongoing, Progressing     Problem: Pain Acute  Goal: Acceptable Pain Control and Functional Ability  Outcome: Ongoing, Progressing

## 2023-11-07 NOTE — PLAN OF CARE
PRN only because patient stated that she wore oxygen at home PRN     Problem: Gas Exchange Impaired  Goal: Optimal Gas Exchange  Outcome: Ongoing, Progressing

## 2023-11-07 NOTE — PLAN OF CARE
Problem: Adult Inpatient Plan of Care  Goal: Patient-Specific Goal (Individualized)  Outcome: Ongoing, Progressing     Problem: Fall Injury Risk  Goal: Absence of Fall and Fall-Related Injury  Outcome: Ongoing, Progressing     Problem: Infection  Goal: Absence of Infection Signs and Symptoms  Outcome: Ongoing, Progressing     Problem: Pain Acute  Goal: Acceptable Pain Control and Functional Ability  Outcome: Ongoing, Progressing

## 2023-11-07 NOTE — PROGRESS NOTES
"[facility] - Medical Surgical Unit  Adult Nutrition  Progress Note    SUMMARY       Recommendations    Recommendation/Intervention: 1. Biotene spray at bedside prn 2. F/U for intake support  Goals: Meet EEN >75%  Nutrition Goal Status: new    Assessment and Plan    No new Assessment & Plan notes have been filed under this hospital service since the last note was generated.  Service: Nutrition       Malnutrition Assessment  Malnutrition Level:  (Based on assessment this pt is not malnourished.)                                    Reason for Assessment    Reason For Assessment:  (swing bed pt)  Diagnosis:  (S/P T TKR)  Relevant Medical History: GERD, HLD, Depression, Asthma, CHF, COPDDJD, A fib, HTN  Interdisciplinary Rounds: attended  General Information Comments: RDN visited pt this a.m. and she c/o dry mouth. She uses Biotene at home.  Meal intake ~70% BSS 17, Last BM 11/5. Pain at time limits her intake.  No problems noted with healing.  Nutrition Risk Screen    Nutrition Risk Screen: no indicators present    Nutrition/Diet History    Patient Reported Diet/Restrictions/Preferences: general  Spiritual, Cultural Beliefs, Scientologist Practices, Values that Affect Care: no  Food Allergies: NKFA  Factors Affecting Nutritional Intake: dry mouth    Anthropometrics    Temp: 99 °F (37.2 °C)  Height: 5' 4" (162.6 cm)  Height (inches): 64 in  Weight Method: Standard Scale  Weight: 99.3 kg (219 lb)  Weight (lb): 219 lb  Ideal Body Weight (IBW), Female: 120 lb  % Ideal Body Weight, Female (lb): 182.5 %  BMI (Calculated): 37.6  BMI Grade: 35 - 39.9 - obesity - grade II       Lab/Procedures/Meds    Pertinent Labs Reviewed: reviewed  Pertinent Labs Comments: BUN 19, crt 1.25, PAB 21, Alb 3.3  Pertinent Medications Reviewed: reviewed  Pertinent Medications Comments: Protonix, Paxil, Buspar, Ca+D, Lasix, Dilantin, Pravachol    Physical Findings/Assessment         Estimated/Assessed Needs    Weight Used For Calorie " Calculations: 65.8 kg (145 lb)  Energy Calorie Requirements (kcal): 2199-6815  Energy Need Method: Kcal/kg  Protein Requirements: 66-73  Weight Used For Protein Calculations: 65.8 kg (145 lb)     Estimated Fluid Requirement Method: other (see comments) (1980)  RDA Method (mL): 1644         Nutrition Prescription Ordered    Current Diet Order: Regular    Evaluation of Received Nutrient/Fluid Intake    Energy Calories Required: meeting needs  Protein Required: meeting needs  Fluid Required: meeting needs  Tolerance: tolerating  % Intake of Estimated Energy Needs: 75 - 100 %  % Meal Intake: 75 - 100 %    Nutrition Risk    Level of Risk/Frequency of Follow-up: moderate     Monitor and Evaluation    Food and Nutrient Intake: food and beverage intake  Anthropometric Measurements: weight  Biochemical Data, Medical Tests and Procedures: electrolyte and renal panel  Nutrition-Focused Physical Findings: overall appearance, skin     Nutrition Follow-Up    RD Follow-up?: Yes

## 2023-11-08 PROCEDURE — 25000003 PHARM REV CODE 250: Performed by: HOSPITALIST

## 2023-11-08 PROCEDURE — 97530 THERAPEUTIC ACTIVITIES: CPT

## 2023-11-08 PROCEDURE — 25000003 PHARM REV CODE 250: Performed by: NURSE PRACTITIONER

## 2023-11-08 PROCEDURE — 97530 THERAPEUTIC ACTIVITIES: CPT | Mod: CQ

## 2023-11-08 PROCEDURE — 94761 N-INVAS EAR/PLS OXIMETRY MLT: CPT

## 2023-11-08 PROCEDURE — 97116 GAIT TRAINING THERAPY: CPT | Mod: CQ

## 2023-11-08 PROCEDURE — 25000003 PHARM REV CODE 250: Performed by: FAMILY MEDICINE

## 2023-11-08 PROCEDURE — 99900035 HC TECH TIME PER 15 MIN (STAT)

## 2023-11-08 PROCEDURE — 97110 THERAPEUTIC EXERCISES: CPT | Mod: CQ

## 2023-11-08 PROCEDURE — 97110 THERAPEUTIC EXERCISES: CPT

## 2023-11-08 PROCEDURE — 25000242 PHARM REV CODE 250 ALT 637 W/ HCPCS: Performed by: REGISTERED NURSE

## 2023-11-08 PROCEDURE — 94640 AIRWAY INHALATION TREATMENT: CPT

## 2023-11-08 PROCEDURE — 11000004 HC SNF PRIVATE

## 2023-11-08 PROCEDURE — 25000003 PHARM REV CODE 250: Performed by: REGISTERED NURSE

## 2023-11-08 RX ADMIN — POLYETHYLENE GLYCOL 3350 17 G: 17 POWDER, FOR SOLUTION ORAL at 09:11

## 2023-11-08 RX ADMIN — METOPROLOL TARTRATE 25 MG: 25 TABLET, FILM COATED ORAL at 09:11

## 2023-11-08 RX ADMIN — IPRATROPIUM BROMIDE AND ALBUTEROL SULFATE 3 ML: 2.5; .5 SOLUTION RESPIRATORY (INHALATION) at 12:11

## 2023-11-08 RX ADMIN — PANTOPRAZOLE SODIUM 40 MG: 40 TABLET, DELAYED RELEASE ORAL at 09:11

## 2023-11-08 RX ADMIN — FUROSEMIDE 40 MG: 40 TABLET ORAL at 09:11

## 2023-11-08 RX ADMIN — GABAPENTIN 300 MG: 300 CAPSULE ORAL at 09:11

## 2023-11-08 RX ADMIN — FLUTICASONE PROPIONATE 100 MCG: 50 SPRAY, METERED NASAL at 09:11

## 2023-11-08 RX ADMIN — PHENYTOIN SODIUM 100 MG: 100 CAPSULE ORAL at 09:11

## 2023-11-08 RX ADMIN — BUSPIRONE HYDROCHLORIDE 10 MG: 10 TABLET ORAL at 09:11

## 2023-11-08 RX ADMIN — SENNOSIDES AND DOCUSATE SODIUM 1 TABLET: 8.6; 5 TABLET ORAL at 09:11

## 2023-11-08 RX ADMIN — HYDROCODONE BITARTRATE AND ACETAMINOPHEN 1 TABLET: 10; 325 TABLET ORAL at 07:11

## 2023-11-08 RX ADMIN — BACLOFEN 10 MG: 10 TABLET ORAL at 09:11

## 2023-11-08 RX ADMIN — IPRATROPIUM BROMIDE AND ALBUTEROL SULFATE 3 ML: 2.5; .5 SOLUTION RESPIRATORY (INHALATION) at 06:11

## 2023-11-08 RX ADMIN — GABAPENTIN 300 MG: 300 CAPSULE ORAL at 03:11

## 2023-11-08 RX ADMIN — MUPIROCIN: 20 OINTMENT TOPICAL at 09:11

## 2023-11-08 RX ADMIN — APIXABAN 2.5 MG: 2.5 TABLET, FILM COATED ORAL at 09:11

## 2023-11-08 RX ADMIN — CLOPIDOGREL BISULFATE 75 MG: 75 TABLET ORAL at 09:11

## 2023-11-08 RX ADMIN — HYDROCODONE BITARTRATE AND ACETAMINOPHEN 1 TABLET: 10; 325 TABLET ORAL at 05:11

## 2023-11-08 RX ADMIN — PHENYTOIN SODIUM 100 MG: 100 CAPSULE ORAL at 03:11

## 2023-11-08 RX ADMIN — PRAVASTATIN SODIUM 40 MG: 40 TABLET ORAL at 09:11

## 2023-11-08 RX ADMIN — MONTELUKAST SODIUM 10 MG: 10 TABLET, COATED ORAL at 09:11

## 2023-11-08 RX ADMIN — CALCIUM CARBONATE 600 MG (1,500 MG)-VITAMIN D3 400 UNIT TABLET 1 TABLET: at 09:11

## 2023-11-08 RX ADMIN — PAROXETINE HYDROCHLORIDE 40 MG: 20 TABLET, FILM COATED ORAL at 09:11

## 2023-11-08 RX ADMIN — CETIRIZINE HYDROCHLORIDE 10 MG: 10 TABLET, FILM COATED ORAL at 09:11

## 2023-11-08 NOTE — PT/OT/SLP PROGRESS
Physical Therapy Treatment    Patient Name:  Radha Martínez   MRN:  [id number]    Recommendations:     Discharge Recommendations: Low Intensity Therapy  Discharge Equipment Recommendations: shower chair, bedside commode  Barriers to discharge: Decreased caregiver support    Assessment:     Radha Martínez is a 73 y.o. female admitted with a medical diagnosis of S/P TKR (total knee replacement), right.  She presents with the following impairments/functional limitations: weakness, impaired endurance .    Pt progressed to 200 ft SBA with FWW with no rest breaks needed today.      Rehab Prognosis: Good; patient would benefit from acute skilled PT services to address these deficits and reach maximum level of function.    Recent Surgery: * No surgery found *      Plan:     During this hospitalization, patient to be seen BID to address the identified rehab impairments via gait training, therapeutic exercises and progress toward the following goals:    Plan of Care Expires:  12/21/23    Subjective     Chief Complaint: pain in R knee  Patient/Family Comments/goals: increased BLE strengthening and endurance  Pain/Comfort:  Pain Rating 1: 4/10  Location - Side 1: Right  Location 1: knee      Objective:     Communicated with pt prior to session.  Patient found supine with   upon PT entry to room.     General Precautions: Standard, fall  Orthopedic Precautions: RLE weight bearing as tolerated  Braces: N/A  Respiratory Status: Room air     Functional Mobility:  Bed Mobility:     Supine to Sit: minimum assistance  Transfers:     Sit to Stand:  contact guard assistance with rolling walker  Bed to Chair: contact guard assistance with  rolling walker  using  Step Transfer  Gait:   Pt progressed to 200 ft SBA with FWW with no rest breaks needed today.      AM-PAC 6 CLICK MOBILITY  Turning over in bed (including adjusting bedclothes, sheets and blankets)?: 4  Sitting down on and standing up from a chair with arms (e.g., wheelchair,  bedside commode, etc.): 4  Moving from lying on back to sitting on the side of the bed?: 3  Moving to and from a bed to a chair (including a wheelchair)?: 4  Need to walk in hospital room?: 3  Climbing 3-5 steps with a railing?: 1  Basic Mobility Total Score: 19       Treatment & Education:  Pt competed x 10 min seated NuStep to promote increased R knee ROM, PTA cues for sitting posture, cues for full knee flexion and extension in R knee    Patient left up in chair with  OT present..    GOALS:   Multidisciplinary Problems       Physical Therapy Goals          Problem: Physical Therapy    Goal Priority Disciplines Outcome Goal Variances Interventions   Physical Therapy Goal     PT, PT/OT Ongoing, Progressing     Description: Short Term Goals to be met by: Discharge    Patient will increase functional independence with mobility by performin. Supine to sit with Modified [place]  2. Sit to stand transfer with Modified [place]  3. Bed to chair transfer with Modified [place] using Rolling Walker, WBAT right LE  4. Gait  x 200 feet with Modified [place] using Rolling Walker, WBAT right LE.   5. Lower extremity exercise program x30 reps per handout, with assistance as needed  6. Knee ROM 0-110                         Time Tracking:     PT Received On: 23  PT Start Time: 0815     PT Stop Time: 0845  PT Total Time (min): 30 min     Billable Minutes: Gait Training 20 and Therapeutic Exercise 10    Treatment Type: Treatment  PT/PTA: PTA     Number of PTA visits since last PT visit: 2023

## 2023-11-08 NOTE — PT/OT/SLP PROGRESS
Physical Therapy Treatment    Patient Name:  Radha Martínez   MRN:  [id number]    Recommendations:     Discharge Recommendations: Low Intensity Therapy  Discharge Equipment Recommendations: shower chair, bedside commode  Barriers to discharge: Decreased caregiver support    Assessment:     Radha Martínez is a 73 y.o. female admitted with a medical diagnosis of S/P TKR (total knee replacement), right.  She presents with the following impairments/functional limitations: weakness, impaired endurance .    Rehab Prognosis: Good; patient would benefit from acute skilled PT services to address these deficits and reach maximum level of function.    Recent Surgery: * No surgery found *      Plan:     During this hospitalization, patient to be seen BID to address the identified rehab impairments via therapeutic exercises, therapeutic activities and progress toward the following goals:    Plan of Care Expires:  12/21/23    Subjective     Chief Complaint: pain in R knee  Patient/Family Comments/goals: increased R knee flexion, extension and strengthening  Pain/Comfort:  Pain Rating 1: 4/10  Location - Side 1: Right  Location 1: knee      Objective:     Communicated with pt prior to session.  Patient found up in chair with   upon PT entry to room.     General Precautions: Standard, fall  Orthopedic Precautions: RLE weight bearing as tolerated  Braces: N/A  Respiratory Status: Room air     Functional Mobility:  Bed Mobility:     Scooting: minimum assistance  Sit to Supine: minimum assistance  Transfers:     Sit to Stand:  contact guard assistance with rolling walker  Bed to Chair: contact guard assistance with  rolling walker  using  Step Transfer      AM-PAC 6 CLICK MOBILITY  Turning over in bed (including adjusting bedclothes, sheets and blankets)?: 4  Sitting down on and standing up from a chair with arms (e.g., wheelchair, bedside commode, etc.): 4  Moving from lying on back to sitting on the side of the bed?: 3  Moving to and  from a bed to a chair (including a wheelchair)?: 4  Need to walk in hospital room?: 3  Climbing 3-5 steps with a railing?: 1  Basic Mobility Total Score: 19       Treatment & Education:  Pt performed sit/stand x 3 CGA with FWW, chair/BSC/chair CGA with FWW, pt sit/supine Cuca, supine scooting Cuca  Pt performed the following RLE exercises 2x10:  Heel slides  SAQs  Quad sets  Hip ADD/ABD  SLRs   AP  Manual knee flexion    Patient left supine with call button in reach..    GOALS:   Multidisciplinary Problems       Physical Therapy Goals          Problem: Physical Therapy    Goal Priority Disciplines Outcome Goal Variances Interventions   Physical Therapy Goal     PT, PT/OT Ongoing, Progressing     Description: Short Term Goals to be met by: Discharge    Patient will increase functional independence with mobility by performin. Supine to sit with Modified [place]  2. Sit to stand transfer with Modified [place]  3. Bed to chair transfer with Modified [place] using Rolling Walker, WBAT right LE  4. Gait  x 200 feet with Modified [place] using Rolling Walker, WBAT right LE.   5. Lower extremity exercise program x30 reps per handout, with assistance as needed  6. Knee ROM 0-110                         Time Tracking:     PT Received On: 23  PT Start Time: 1215     PT Stop Time: 1245  PT Total Time (min): 30 min     Billable Minutes: Therapeutic Activity 10 and Therapeutic Exercise 20    Treatment Type: Treatment  PT/PTA: PTA     Number of PTA visits since last PT visit: 2     2023

## 2023-11-08 NOTE — PT/OT/SLP PROGRESS
Occupational Therapy   Treatment    Name: Radha Martínez  MRN: [id number]  Admitting Diagnosis:  S/P TKR (total knee replacement), right       Recommendations:     Discharge Recommendations: Low Intensity Therapy  Discharge Equipment Recommendations:  bedside commode  Barriers to discharge:  None    Assessment:     Radha Martínez is a 73 y.o. female with a medical diagnosis of S/P TKR (total knee replacement), right.  She presents with the following Performance deficits affecting function are weakness, decreased upper extremity function, impaired endurance, impaired balance, decreased safety awareness, impaired self care skills, impaired functional mobility, decreased coordination. Patient is improving in all areas of ADL and UB strength/endurance.    Rehab Prognosis:  Good and Fair; patient would benefit from acute skilled OT services to address these deficits and reach maximum level of function.       Plan:     Patient to be seen 5 x/week to address the above listed problems via self-care/home management, therapeutic activities, therapeutic exercises  Plan of Care Expires: 11/24/23  Plan of Care Reviewed with: patient, son    Subjective   Patient was agreeable to participate in todays session.   Patient/Family Comments/goals: to increase UB strength and endurance in order to return home  Pain/Comfort:       Objective:     Communicated with: nursing staff prior to session.  Patient found up in chair with PTA  upon OT entry to room.    General Precautions: Standard, fall    Orthopedic Precautions:RLE weight bearing as tolerated  Braces: N/A  Respiratory Status: Room air    Therapeutic exercises:  Patient completed the following exercises to work on UB strength in order to complete ADLs, bed mobility, and transfers with less assistance.  -Bicep curls: 3 sets of 10 with 3# dumbbell  -Shoulder flexion: 3 sets of 10 with 3# dumbbell  -Internal/External rotation: 3 sets of 10 with 2# dumbbell  -Shoulder horizontal  abduction: 3 sets of 10 with green Theraband  -Rows: 2 sets of 15 with green Theraband    Increased time/effort needed to complete each exercise.     Therapeutic activities:  While standing, pt engaged in standing tolerance activity placing 1,2,4,6,8# x 5-8 each graded clothes pins and shoulder ROM arc completed L<>R side with each hand, to increase overall tolerance and static/dynamic stand balance in order to complete ADLs standing at sink with less assistance. Pt stood 5-6 minutes total with Contact Guard Assistance using RW before requiring a rest break.          Patient left up in chair with call button in reach and RN notified    GOALS:   Multidisciplinary Problems       Occupational Therapy Goals          Problem: Occupational Therapy    Goal Priority Disciplines Outcome Interventions   Occupational Therapy Goal     OT, PT/OT Ongoing, Progressing    Description: STG:  Pt will perform grooming with I  Pt will bathe self Mod I with setup  Pt will perform UB dressing with I  Pt will perform LB dressing with Min A  Pt will transfer toilet/BSC with Mod I  Pt will perform standing task x 3-4 min with CGA    LTG:  Pt will achieve max level of independence with self care.                        Time Tracking:     OT Date of Treatment: 11/08/23  OT Start Time: 0848  OT Stop Time: 0931  OT Total Time (min): 43 min    Billable Minutes:Therapeutic Activity 14  Therapeutic Exercise 29    OT/BETTYE: OT     Number of BETTYE visits since last OT visit: 1    Praveen Lynn OT  11/8/2023

## 2023-11-08 NOTE — PLAN OF CARE
Problem: Adult Inpatient Plan of Care  Goal: Patient-Specific Goal (Individualized)  Outcome: Ongoing, Progressing     Problem: Fall Injury Risk  Goal: Absence of Fall and Fall-Related Injury  Outcome: Ongoing, Progressing     Problem: Pain Acute  Goal: Acceptable Pain Control and Functional Ability  Outcome: Ongoing, Progressing     Problem: Infection  Goal: Absence of Infection Signs and Symptoms  Outcome: Ongoing, Progressing

## 2023-11-09 LAB — PHENYTOIN SERPL-MCNC: 3.7 ΜG/ML (ref 10–20)

## 2023-11-09 PROCEDURE — 25000003 PHARM REV CODE 250: Performed by: HOSPITALIST

## 2023-11-09 PROCEDURE — 25000003 PHARM REV CODE 250: Performed by: REGISTERED NURSE

## 2023-11-09 PROCEDURE — 25000003 PHARM REV CODE 250: Performed by: NURSE PRACTITIONER

## 2023-11-09 PROCEDURE — 25000242 PHARM REV CODE 250 ALT 637 W/ HCPCS: Performed by: REGISTERED NURSE

## 2023-11-09 PROCEDURE — 25000003 PHARM REV CODE 250: Performed by: FAMILY MEDICINE

## 2023-11-09 PROCEDURE — 11000004 HC SNF PRIVATE

## 2023-11-09 PROCEDURE — 94640 AIRWAY INHALATION TREATMENT: CPT

## 2023-11-09 PROCEDURE — 99900035 HC TECH TIME PER 15 MIN (STAT)

## 2023-11-09 PROCEDURE — 97116 GAIT TRAINING THERAPY: CPT

## 2023-11-09 PROCEDURE — 80185 ASSAY OF PHENYTOIN TOTAL: CPT

## 2023-11-09 PROCEDURE — 99308 SBSQ NF CARE LOW MDM 20: CPT | Mod: ,,, | Performed by: FAMILY MEDICINE

## 2023-11-09 PROCEDURE — 97110 THERAPEUTIC EXERCISES: CPT

## 2023-11-09 PROCEDURE — 97110 THERAPEUTIC EXERCISES: CPT | Mod: CO

## 2023-11-09 PROCEDURE — 99308 PR NURSING FAC CARE, SUBSEQ, MINOR COMPLIC: ICD-10-PCS | Mod: ,,, | Performed by: FAMILY MEDICINE

## 2023-11-09 PROCEDURE — 94761 N-INVAS EAR/PLS OXIMETRY MLT: CPT

## 2023-11-09 PROCEDURE — 97530 THERAPEUTIC ACTIVITIES: CPT | Mod: CO

## 2023-11-09 RX ORDER — POLYETHYLENE GLYCOL 3350 17 G/17G
17 POWDER, FOR SOLUTION ORAL DAILY
Status: DISCONTINUED | OUTPATIENT
Start: 2023-11-10 | End: 2023-11-15 | Stop reason: HOSPADM

## 2023-11-09 RX ADMIN — CETIRIZINE HYDROCHLORIDE 10 MG: 10 TABLET, FILM COATED ORAL at 08:11

## 2023-11-09 RX ADMIN — GABAPENTIN 300 MG: 300 CAPSULE ORAL at 08:11

## 2023-11-09 RX ADMIN — IPRATROPIUM BROMIDE AND ALBUTEROL SULFATE 3 ML: 2.5; .5 SOLUTION RESPIRATORY (INHALATION) at 07:11

## 2023-11-09 RX ADMIN — APIXABAN 2.5 MG: 2.5 TABLET, FILM COATED ORAL at 08:11

## 2023-11-09 RX ADMIN — HYDROCODONE BITARTRATE AND ACETAMINOPHEN 1 TABLET: 10; 325 TABLET ORAL at 08:11

## 2023-11-09 RX ADMIN — PHENYTOIN SODIUM 100 MG: 100 CAPSULE ORAL at 08:11

## 2023-11-09 RX ADMIN — CLOPIDOGREL BISULFATE 75 MG: 75 TABLET ORAL at 08:11

## 2023-11-09 RX ADMIN — FUROSEMIDE 40 MG: 40 TABLET ORAL at 08:11

## 2023-11-09 RX ADMIN — IPRATROPIUM BROMIDE AND ALBUTEROL SULFATE 3 ML: 2.5; .5 SOLUTION RESPIRATORY (INHALATION) at 06:11

## 2023-11-09 RX ADMIN — PAROXETINE HYDROCHLORIDE 40 MG: 20 TABLET, FILM COATED ORAL at 08:11

## 2023-11-09 RX ADMIN — MONTELUKAST SODIUM 10 MG: 10 TABLET, COATED ORAL at 08:11

## 2023-11-09 RX ADMIN — SENNOSIDES AND DOCUSATE SODIUM 1 TABLET: 8.6; 5 TABLET ORAL at 08:11

## 2023-11-09 RX ADMIN — METOPROLOL TARTRATE 25 MG: 25 TABLET, FILM COATED ORAL at 08:11

## 2023-11-09 RX ADMIN — BUSPIRONE HYDROCHLORIDE 10 MG: 10 TABLET ORAL at 08:11

## 2023-11-09 RX ADMIN — FLUTICASONE PROPIONATE 100 MCG: 50 SPRAY, METERED NASAL at 09:11

## 2023-11-09 RX ADMIN — PRAVASTATIN SODIUM 40 MG: 40 TABLET ORAL at 08:11

## 2023-11-09 RX ADMIN — IPRATROPIUM BROMIDE AND ALBUTEROL SULFATE 3 ML: 2.5; .5 SOLUTION RESPIRATORY (INHALATION) at 12:11

## 2023-11-09 RX ADMIN — BACLOFEN 10 MG: 10 TABLET ORAL at 08:11

## 2023-11-09 RX ADMIN — GABAPENTIN 300 MG: 300 CAPSULE ORAL at 04:11

## 2023-11-09 RX ADMIN — HYDROCODONE BITARTRATE AND ACETAMINOPHEN 1 TABLET: 10; 325 TABLET ORAL at 07:11

## 2023-11-09 RX ADMIN — PHENYTOIN SODIUM 100 MG: 100 CAPSULE ORAL at 04:11

## 2023-11-09 RX ADMIN — POLYETHYLENE GLYCOL 3350 17 G: 17 POWDER, FOR SOLUTION ORAL at 08:11

## 2023-11-09 RX ADMIN — PANTOPRAZOLE SODIUM 40 MG: 40 TABLET, DELAYED RELEASE ORAL at 08:11

## 2023-11-09 RX ADMIN — CALCIUM CARBONATE 600 MG (1,500 MG)-VITAMIN D3 400 UNIT TABLET 1 TABLET: at 09:11

## 2023-11-09 NOTE — SUBJECTIVE & OBJECTIVE
Interval History: Patient continuing therapy. No distress noted today. Will get Dilantin level today.    Review of Systems   Constitutional:  Positive for activity change. Negative for appetite change, chills, diaphoresis, fatigue, fever and unexpected weight change.   HENT:  Negative for congestion, dental problem, ear pain, facial swelling, hearing loss, mouth sores, postnasal drip, rhinorrhea, sinus pressure, sinus pain and trouble swallowing.    Eyes:  Negative for photophobia, discharge, redness and itching.   Respiratory:  Negative for apnea, cough, chest tightness, shortness of breath and wheezing.    Cardiovascular:  Positive for leg swelling. Negative for chest pain.   Gastrointestinal:  Negative for abdominal distention, abdominal pain, blood in stool, constipation, diarrhea, nausea and vomiting.   Endocrine: Negative for cold intolerance, heat intolerance, polydipsia, polyphagia and polyuria.   Genitourinary:  Negative for decreased urine volume, difficulty urinating, dysuria, enuresis, flank pain, frequency, hematuria, pelvic pain and urgency.   Musculoskeletal:  Positive for joint swelling. Negative for arthralgias, back pain, gait problem, myalgias, neck pain and neck stiffness.   Skin:  Positive for wound. Negative for color change and rash.   Allergic/Immunologic: Negative for environmental allergies, food allergies and immunocompromised state.   Neurological:  Positive for weakness. Negative for dizziness, tremors, seizures, syncope, facial asymmetry, speech difficulty, light-headedness, numbness and headaches.   Hematological:  Negative for adenopathy. Does not bruise/bleed easily.   Psychiatric/Behavioral:  Negative for agitation, behavioral problems, confusion, decreased concentration, dysphoric mood, hallucinations, self-injury, sleep disturbance and suicidal ideas. The patient is not nervous/anxious and is not hyperactive.      Objective:     Vital Signs (Most Recent):  Temp: 98.3 °F (36.8 °C)  (11/09/23 0800)  Pulse: 77 (11/09/23 0800)  Resp: 18 (11/09/23 0800)  BP: 136/78 (11/09/23 0800)  SpO2: 96 % (11/09/23 0800) Vital Signs (24h Range):  Temp:  [98.2 °F (36.8 °C)-98.3 °F (36.8 °C)] 98.3 °F (36.8 °C)  Pulse:  [71-85] 77  Resp:  [16-20] 18  SpO2:  [93 %-98 %] 96 %  BP: (136-144)/(78-95) 136/78     Weight: 100.5 kg (221 lb 9.6 oz)  Body mass index is 38.04 kg/m².    Intake/Output Summary (Last 24 hours) at 11/9/2023 0955  Last data filed at 11/9/2023 0600  Gross per 24 hour   Intake 680 ml   Output --   Net 680 ml         Physical Exam  Vitals reviewed.   Constitutional:       Appearance: Normal appearance. She is normal weight.   HENT:      Head: Normocephalic and atraumatic.      Nose: Nose normal.      Mouth/Throat:      Mouth: Mucous membranes are moist.      Pharynx: Oropharynx is clear. No oropharyngeal exudate or posterior oropharyngeal erythema.   Eyes:      General: No scleral icterus.     Extraocular Movements: Extraocular movements intact.      Conjunctiva/sclera: Conjunctivae normal.      Pupils: Pupils are equal, round, and reactive to light.   Neck:      Vascular: No carotid bruit.   Cardiovascular:      Rate and Rhythm: Normal rate and regular rhythm.      Pulses: Normal pulses.      Heart sounds: Normal heart sounds. No murmur heard.     No gallop.   Pulmonary:      Effort: Pulmonary effort is normal.      Breath sounds: Normal breath sounds. No wheezing.   Abdominal:      General: Abdomen is flat. Bowel sounds are normal.      Palpations: Abdomen is soft.      Tenderness: There is no abdominal tenderness.   Musculoskeletal:         General: Tenderness present. Normal range of motion.      Cervical back: Normal range of motion and neck supple.      Right lower leg: No edema.      Left lower leg: No edema.      Comments: Right knee noted place with minimal drainage.  No erythema or tenderness.  Some range of motion.  Neurovascular is intact.   Lymphadenopathy:      Cervical: No cervical  "adenopathy.   Skin:     General: Skin is warm and dry.      Capillary Refill: Capillary refill takes less than 2 seconds.      Coloration: Skin is not jaundiced.      Findings: No lesion.   Neurological:      General: No focal deficit present.      Mental Status: She is alert and oriented to person, place, and time. Mental status is at baseline.      Cranial Nerves: No cranial nerve deficit.      Sensory: No sensory deficit.      Motor: No weakness.      Coordination: Coordination normal.      Gait: Gait normal.      Deep Tendon Reflexes: Reflexes normal.   Psychiatric:         Mood and Affect: Mood normal.         Behavior: Behavior normal.         Thought Content: Thought content normal.         Judgment: Judgment normal.             Significant Labs: All pertinent labs within the past 24 hours have been reviewed.  BMP: No results for input(s): "GLU", "NA", "K", "CL", "CO2", "BUN", "CREATININE", "CALCIUM", "MG" in the last 48 hours.  CBC: No results for input(s): "WBC", "HGB", "HCT", "PLT" in the last 48 hours.    Significant Imaging: I have reviewed all pertinent imaging results/findings within the past 24 hours.  "

## 2023-11-09 NOTE — CARE UPDATE
[facility] - Medical Surgical Unit - Swing Bed   Interdisciplinary Team Meeting    Patient: Radha Martínez   Today's Date: 11/9/2023   Estimated D/C Date:         Physician: Nick Valera DO Nurse Practitioner:  louise   Pharmacy:  [facility] Unit Director: Annabelle Fletcher   : Nano Velazquez Physical/Occupational Therapy: Patrick Crocker   Speech Therapy:  Regi Talbot Activity Therapy: louise   Nursing: Nano Velazquez  Respiratory: Missy Lomeli Dietary: Mauricio Holder  Other: na     Nurse  New Symptoms/Problems: na  Last Bowel Movement: 11/05/23   Urine: incontinent  Ann: No  Bowel: continent   Constipated: No  Diarrhea: No   Isolation: No  Wound Care: No  Wound Location/Tx: surgical incision knee  Cognition:  wnl  Aspiration Precautions: No  Comment(s): na    Respiratory   O2 Device: Room Air  O2 Flow: na  SpO2: 93%  Neb Tx: No  Comment(s): na     Dietary  Nutrition: Regular  Comment(s): 70% intake     Speech Therapy  Speech/Swallowing: No current speech or swallowing issues  Comment(s): na    Physical Therapy  Gait/Assistive Device: 50 ft with one restbreak and RW cga ELOS: Plan to DC     Transfers: Minimal Assistance  Bed Mobility: Minimal Assistance Range of Motion/Restrictions: WBAT  Comment(s): NA     Occupational Therapy  Eating/Grooming: Independent Toileting: Independent   Bathing: Moderate Assistance Dressing (Upper Body): Independent   Dressing (Lower Body): Moderate Assistance Comment(s): NA     Pharmacy  Medication Changes (see all MD orders in chart): Yes  Labs Reviewed: Yes  New Lab Orders: No  Comment(s): BACOFLEN 10 MG @      Tx Plan/Recommendations reviewed with family and/or patient on (date) 11/9/23.  Additional family Conference/Training: NA  D/C Plan/Recommendations:  HOEM WITH FAMILY AND HOMEHEALTH    ALEX:   Comment(s): CONT SKILLED SERVICES

## 2023-11-09 NOTE — PT/OT/SLP PROGRESS
Occupational Therapy   Treatment    Name: Radha Martínez  MRN: [id number]  Admitting Diagnosis:  S/P TKR (total knee replacement), right       Recommendations:     Discharge Recommendations: Low Intensity Therapy  Discharge Equipment Recommendations:  bedside commode  Barriers to discharge:       Assessment:     Radha Martínez is a 73 y.o. female with a medical diagnosis of S/P TKR (total knee replacement), right.  She presents with the following Performance deficits affecting function are weakness, impaired endurance, impaired balance, decreased safety awareness, impaired self care skills.     Rehab Prognosis:  Good; patient would benefit from acute skilled OT services to address these deficits and reach maximum level of function.       Plan:     Patient to be seen 5 x/week to address the above listed problems via self-care/home management, therapeutic activities, therapeutic exercises  Plan of Care Expires: 11/24/23  Plan of Care Reviewed with: patient, son    Subjective   Patient was laying supine in bed upon BURTON arrival. Patient stated she was feeling fine with some complaints of pain in her knee but was agreeable to participate in todays session.   Chief Complaint: pain in right knee  Patient/Family Comments/goals: to increase UB strength and endurance in order to return home.   Pain/Comfort:  Pain Rating 1: 5/10  Location - Side 1: Right  Location 1: knee    Objective:     Communicated with: nursing staff prior to session.  Patient found HOB elevated with peripheral IV upon OT entry to room.    General Precautions: Standard, fall    Orthopedic Precautions:RLE weight bearing as tolerated  Braces: N/A  Respiratory Status: Room air     Occupational Performance:     Bed Mobility:    Patient completed Rolling/Turning to Left with  stand by assistance  Patient completed Rolling/Turning to Right with stand by assistance  Patient completed Scooting/Bridging with stand by assistance  Patient completed Supine to Sit with  stand by assistance     Functional Mobility/Transfers:  Patient completed Sit <> Stand Transfer with contact guard assistance  with  rolling walker   Patient completed Bed <> Chair Transfer using Step Transfer technique with contact guard assistance with rolling walker    Therapeutic exercises:  Patient completed the following exercises to work on UB strength in order to complete ADLs, bed mobility, and transfers with less assistance.    -Bicep curls: 3 sets of 10 with 2# dumbbell  -Chest press: 3 sets of 10 with 2# dumbbell  -Shoulder flex: 3 sets of 10 with 2# dumbbell  -Internal/External rotation: 3 sets of 10 with 2# dumbbell    Increased time/effort needed to complete each exercise.     Therapeutic activities:  Patient completed 10 minutes on UBE ergometer to work on UB strength and endurance in order to complete ADLs and transfers w/ less A.   While standing, pt instructed in several balance activities requiring pt to reach in all planes and moving in/out of midline to work on dynamic standing balance and endurance in order to complete ADLs standing at sink and being able to reach in cabinets with less assistance. Pt stood 11:43 minutes with Contact Guard Assistance.    Patient left up in chair with  PT present    GOALS:   Multidisciplinary Problems       Occupational Therapy Goals          Problem: Occupational Therapy    Goal Priority Disciplines Outcome Interventions   Occupational Therapy Goal     OT, PT/OT Ongoing, Progressing    Description: STG:  Pt will perform grooming with I  Pt will bathe self Mod I with setup  Pt will perform UB dressing with I  Pt will perform LB dressing with Min A  Pt will transfer toilet/BSC with Mod I  Pt will perform standing task x 3-4 min with CGA    LTG:  Pt will achieve max level of independence with self care.                        Time Tracking:     OT Date of Treatment: 11/09/23  OT Start Time: 0900  OT Stop Time: 0945  OT Total Time (min): 45 min    Billable  Minutes:Therapeutic Activity 25 minutes  Therapeutic Exercise 20 minutes    OT/BETTYE: BETTYE     Number of BETTYE visits since last OT visit: 1    BETTYE Stevens  11/9/2023  12:31 PM

## 2023-11-09 NOTE — PT/OT/SLP PROGRESS
[facility]  Physical Therapy Treatment    Patient Name:  Radha Martínez   MRN:  [id number]    Recommendations:     Discharge Recommendations: Low Intensity Therapy  Discharge Equipment Recommendations: shower chair, bedside commode  Barriers to discharge: Decreased caregiver support    Assessment:     Radha Martínez is a 73 y.o. female admitted with a medical diagnosis of S/P TKR (total knee replacement), right.  She presents with the following impairments/functional limitations: weakness, impaired endurance .    Rehab Prognosis: Good; patient would benefit from acute skilled PT services to address these deficits and reach maximum level of function.    Recent Surgery: * No surgery found *      Plan:     During this hospitalization, patient to be seen BID to address the identified rehab impairments via therapeutic exercises, therapeutic activities and progress toward the following goals:    Plan of Care Expires:  12/21/23    Subjective     Chief Complaint: no pain  Patient/Family Comments/goals: increased BLE strengthening and endurance  Pain/Comfort:         Objective:     Communicated with pt prior to session.  Patient found up in chair with peripheral IV, oxygen, hemovac upon PT entry to room.     General Precautions: Standard, fall  Orthopedic Precautions: RLE weight bearing as tolerated  Braces: N/A  Respiratory Status: Room air     Functional Mobility:  Transfers:     Sit to Stand:  contact guard assistance with rolling walker  Bed to Chair: contact guard assistance with  rolling walker  using  Step Transfer      AM-PAC 6 CLICK MOBILITY      Treatment & Education:  Therapist facilitated ambulation on level surfaces with use of front wheeled walker with CGA for 95 feet with therapist providing cues for both sequencing and safety.     GOALS:   Multidisciplinary Problems       Physical Therapy Goals          Problem: Physical Therapy    Goal Priority Disciplines Outcome Goal Variances Interventions   Physical  Therapy Goal     PT, PT/OT Ongoing, Progressing     Description: Short Term Goals to be met by: Discharge    Patient will increase functional independence with mobility by performin. Supine to sit with Modified [place]  2. Sit to stand transfer with Modified [place]  3. Bed to chair transfer with Modified [place] using Rolling Walker, WBAT right LE  4. Gait  x 200 feet with Modified [place] using Rolling Walker, WBAT right LE.   5. Lower extremity exercise program x30 reps per handout, with assistance as needed  6. Knee ROM 0-110                         Time Tracking:     PT Received On: 23  PT Start Time: 1200     PT Stop Time: 1210  PT Total Time (min): 10 min       2023

## 2023-11-09 NOTE — PROGRESS NOTES
"[facility] Surgical Unit  Hospital Medicine  Progress Note    Patient Name: Radha Martínez  MRN: [id number]  Patient Class: IP- Swing   Admission Date: 11/3/2023  Length of Stay: 6 days  Attending Physician: Sander Banks DO  Primary Care Provider: Jayy Castillo MD        Subjective:     Principal Problem:S/P TKR (total knee replacement), right        HPI:  Radha Martínez (Marie) is a 72 yo WF that presents to [facility] Swing Bed service in [place], [place] from [facility] in [place], [place] after elective total right knee replacement performed on 11-2-23 by Dr. Jayy Castillo.    Mrs. Martínez has radiology studies that reveal generalized moderate osteopenia, severe tricompartmental DJD of right knee with complete obliteration of medial joint space in genu varum, and progressive pain.  With her last corticosteroid injection, there was no improvement of her pain.  She had a total left knee replacement 6- that was successful in relieving her pain, and she desired to have the same procedure performed on her right knee.  Patient was treated prophylactically with IV cefazolin and vancomycin.  Mrs. Martínez requested swing bed placement for post-op PT/OT evaluation and treatment.  She has a history of COPD and uses home oxygen at 2L NC prn.    Wound care instructions include applying Cool Jet to operative extremity, hemovac to be removed on post op day #2 with a dressing change on post op day #3, repeat dressing change in 7 days, and staple removal in 2 weeks with steri strip application.  Detailed wound care instructions in miscellaneous nursing orders.    Dr. Castillo's discharge instructions included administration of Eliquis 2.5 mg po BID for 12 days then begin aspirin 325 mg po daily.    Full Code    PMH:  Paroxysmal atrial fibrillation, HTN, depression, asthma, CHF, dyspnea on exertion, osteoporosis, oxygen dependent, severe obesity with comorbidity, " seizure, HLD, COPD, and GERD    PSH:  Left total knee replacement, cardioversion, colonoscopy, eye surgery, revision of total knee replacement, and tubal ligation    Right knee replacement 3 days ago with minimal pain except when ambulating.      Overview/Hospital Course:  See HPI      Interval History: Patient continuing therapy. No distress noted today. Will get Dilantin level today.    Review of Systems   Constitutional:  Positive for activity change. Negative for appetite change, chills, diaphoresis, fatigue, fever and unexpected weight change.   HENT:  Negative for congestion, dental problem, ear pain, facial swelling, hearing loss, mouth sores, postnasal drip, rhinorrhea, sinus pressure, sinus pain and trouble swallowing.    Eyes:  Negative for photophobia, discharge, redness and itching.   Respiratory:  Negative for apnea, cough, chest tightness, shortness of breath and wheezing.    Cardiovascular:  Positive for leg swelling. Negative for chest pain.   Gastrointestinal:  Negative for abdominal distention, abdominal pain, blood in stool, constipation, diarrhea, nausea and vomiting.   Endocrine: Negative for cold intolerance, heat intolerance, polydipsia, polyphagia and polyuria.   Genitourinary:  Negative for decreased urine volume, difficulty urinating, dysuria, enuresis, flank pain, frequency, hematuria, pelvic pain and urgency.   Musculoskeletal:  Positive for joint swelling. Negative for arthralgias, back pain, gait problem, myalgias, neck pain and neck stiffness.   Skin:  Positive for wound. Negative for color change and rash.   Allergic/Immunologic: Negative for environmental allergies, food allergies and immunocompromised state.   Neurological:  Positive for weakness. Negative for dizziness, tremors, seizures, syncope, facial asymmetry, speech difficulty, light-headedness, numbness and headaches.   Hematological:  Negative for adenopathy. Does not bruise/bleed easily.   Psychiatric/Behavioral:  Negative  for agitation, behavioral problems, confusion, decreased concentration, dysphoric mood, hallucinations, self-injury, sleep disturbance and suicidal ideas. The patient is not nervous/anxious and is not hyperactive.      Objective:     Vital Signs (Most Recent):  Temp: 98.3 °F (36.8 °C) (11/09/23 0800)  Pulse: 77 (11/09/23 0800)  Resp: 18 (11/09/23 0800)  BP: 136/78 (11/09/23 0800)  SpO2: 96 % (11/09/23 0800) Vital Signs (24h Range):  Temp:  [98.2 °F (36.8 °C)-98.3 °F (36.8 °C)] 98.3 °F (36.8 °C)  Pulse:  [71-85] 77  Resp:  [16-20] 18  SpO2:  [93 %-98 %] 96 %  BP: (136-144)/(78-95) 136/78     Weight: 100.5 kg (221 lb 9.6 oz)  Body mass index is 38.04 kg/m².    Intake/Output Summary (Last 24 hours) at 11/9/2023 0955  Last data filed at 11/9/2023 0600  Gross per 24 hour   Intake 680 ml   Output --   Net 680 ml         Physical Exam  Vitals reviewed.   Constitutional:       Appearance: Normal appearance. She is normal weight.   HENT:      Head: Normocephalic and atraumatic.      Nose: Nose normal.      Mouth/Throat:      Mouth: Mucous membranes are moist.      Pharynx: Oropharynx is clear. No oropharyngeal exudate or posterior oropharyngeal erythema.   Eyes:      General: No scleral icterus.     Extraocular Movements: Extraocular movements intact.      Conjunctiva/sclera: Conjunctivae normal.      Pupils: Pupils are equal, round, and reactive to light.   Neck:      Vascular: No carotid bruit.   Cardiovascular:      Rate and Rhythm: Normal rate and regular rhythm.      Pulses: Normal pulses.      Heart sounds: Normal heart sounds. No murmur heard.     No gallop.   Pulmonary:      Effort: Pulmonary effort is normal.      Breath sounds: Normal breath sounds. No wheezing.   Abdominal:      General: Abdomen is flat. Bowel sounds are normal.      Palpations: Abdomen is soft.      Tenderness: There is no abdominal tenderness.   Musculoskeletal:         General: Tenderness present. Normal range of motion.      Cervical back:  "Normal range of motion and neck supple.      Right lower leg: No edema.      Left lower leg: No edema.      Comments: Right knee noted place with minimal drainage.  No erythema or tenderness.  Some range of motion.  Neurovascular is intact.   Lymphadenopathy:      Cervical: No cervical adenopathy.   Skin:     General: Skin is warm and dry.      Capillary Refill: Capillary refill takes less than 2 seconds.      Coloration: Skin is not jaundiced.      Findings: No lesion.   Neurological:      General: No focal deficit present.      Mental Status: She is alert and oriented to person, place, and time. Mental status is at baseline.      Cranial Nerves: No cranial nerve deficit.      Sensory: No sensory deficit.      Motor: No weakness.      Coordination: Coordination normal.      Gait: Gait normal.      Deep Tendon Reflexes: Reflexes normal.   Psychiatric:         Mood and Affect: Mood normal.         Behavior: Behavior normal.         Thought Content: Thought content normal.         Judgment: Judgment normal.             Significant Labs: All pertinent labs within the past 24 hours have been reviewed.  BMP: No results for input(s): "GLU", "NA", "K", "CL", "CO2", "BUN", "CREATININE", "CALCIUM", "MG" in the last 48 hours.  CBC: No results for input(s): "WBC", "HGB", "HCT", "PLT" in the last 48 hours.    Significant Imaging: I have reviewed all pertinent imaging results/findings within the past 24 hours.      Assessment/Plan:      * S/P TKR (total knee replacement), right  -PT/OT evaluation and treatment  Patient has tolerated the procedure well.  Minimal to no pain resting only when she stands.  Pain medicines 1 hour prior to PTOT.  Expected rehab time 2 weeks      HTN (hypertension)  Chronic, controlled. Latest blood pressure and vitals reviewed-     Temp:  [98.2 °F (36.8 °C)-98.3 °F (36.8 °C)]   Pulse:  [71-85]   Resp:  [16-20]   BP: (136-144)/(78-95)   SpO2:  [93 %-98 %] .   Home meds for hypertension were reviewed and " noted below.   Hypertension Medications             furosemide (LASIX) 40 MG tablet Take 1 tablet (40 mg total) by mouth 2 (two) times daily.    nebivoloL (BYSTOLIC) 5 MG Tab Take 1 tablet (5 mg total) by mouth once daily.          While in the hospital, will manage blood pressure as follows; Continue home antihypertensive regimen    Will utilize p.r.n. blood pressure medication only if patient's blood pressure greater than 160/100 and she develops symptoms such as worsening chest pain or shortness of breath.    PACO on CPAP  CPAP in-hospital if patient has available.      Chronic kidney disease, stage 3a  Creatine stable for now. BMP reviewed- noted Estimated Creatinine Clearance: 46.8 mL/min (A) (based on SCr of 1.25 mg/dL (H)). according to latest data. Based on current GFR, CKD stage is stage 3 - GFR 30-59.  Monitor UOP and serial BMP and adjust therapy as needed. Renally dose meds. Avoid nephrotoxic medications and procedures.    Chronic diastolic heart failure  No evidence of heart failure at this time.  Maintain her current medicines.      Asthma  -DuoNeb every 6 hours  -Incentive spirometry every 4 hours        VTE Risk Mitigation (From admission, onward)         Ordered     apixaban tablet 2.5 mg  2 times daily         11/04/23 0256     IP VTE HIGH RISK PATIENT  Once         11/03/23 1558     Place ROBINSON hose  Until discontinued         11/03/23 1558     Place sequential compression device  Until discontinued         11/03/23 1558                Discharge Planning   ALEX:      Code Status: Full Code   Is the patient medically ready for discharge?:     Reason for patient still in hospital (select all that apply): Treatment  Discharge Plan A: Home with family, Home Health   Discharge Delays: None known at this time              Aaron Luz MD  Department of Hospital Medicine   [facility]

## 2023-11-09 NOTE — PT/OT/SLP PROGRESS
[facility]  Physical Therapy Treatment    Patient Name:  Radha Martínez   MRN:  [id number]    Recommendations:     Discharge Recommendations: Low Intensity Therapy  Discharge Equipment Recommendations: shower chair, bedside commode  Barriers to discharge: Decreased caregiver support    Assessment:     Radha Martínez is a 73 y.o. female admitted with a medical diagnosis of S/P TKR (total knee replacement), right.  She presents with the following impairments/functional limitations: weakness, impaired endurance .    Rehab Prognosis: Good; patient would benefit from acute skilled PT services to address these deficits and reach maximum level of function.    Recent Surgery: * No surgery found *      Plan:     During this hospitalization, patient to be seen BID to address the identified rehab impairments via therapeutic exercises, therapeutic activities and progress toward the following goals:    Plan of Care Expires:  12/21/23    Subjective     Chief Complaint: no pain  Patient/Family Comments/goals: increased BLE strengthening and endurance  Pain/Comfort:         Objective:     Communicated with pt prior to session.  Patient found up in chair with peripheral IV, oxygen, hemovac upon PT entry to room.     General Precautions: Standard, fall  Orthopedic Precautions: RLE weight bearing as tolerated  Braces: N/A  Respiratory Status: Room air     Functional Mobility:  Transfers:     Sit to Stand:  contact guard assistance with rolling walker  Bed to Chair: contact guard assistance with  rolling walker  using  Step Transfer      AM-PAC 6 CLICK MOBILITY      Treatment & Education:  Pt performed the following RLE ex 2x10 to promote increased R knee flexion and extension:  Heel slides  SLRs  Hip ADD/Hip ABD  Quad sets  SAQs  Therapist facilitated transfers sit<>stand for 7 trials with therapist providing cues for sequencing and safety.  Patient left supine with call button in reach..    GOALS:   Multidisciplinary Problems        Physical Therapy Goals          Problem: Physical Therapy    Goal Priority Disciplines Outcome Goal Variances Interventions   Physical Therapy Goal     PT, PT/OT Ongoing, Progressing     Description: Short Term Goals to be met by: Discharge    Patient will increase functional independence with mobility by performin. Supine to sit with Modified [place]  2. Sit to stand transfer with Modified [place]  3. Bed to chair transfer with Modified [place] using Rolling Walker, WBAT right LE  4. Gait  x 200 feet with Modified [place] using Rolling Walker, WBAT right LE.   5. Lower extremity exercise program x30 reps per handout, with assistance as needed  6. Knee ROM 0-110                         Time Tracking:     PT Received On: 23  PT Start Time: 45     PT Stop Time: 1020  PT Total Time (min): 35 min       2023

## 2023-11-09 NOTE — ASSESSMENT & PLAN NOTE
Chronic, controlled. Latest blood pressure and vitals reviewed-     Temp:  [98.2 °F (36.8 °C)-98.3 °F (36.8 °C)]   Pulse:  [71-85]   Resp:  [16-20]   BP: (136-144)/(78-95)   SpO2:  [93 %-98 %] .   Home meds for hypertension were reviewed and noted below.   Hypertension Medications             furosemide (LASIX) 40 MG tablet Take 1 tablet (40 mg total) by mouth 2 (two) times daily.    nebivoloL (BYSTOLIC) 5 MG Tab Take 1 tablet (5 mg total) by mouth once daily.          While in the hospital, will manage blood pressure as follows; Continue home antihypertensive regimen    Will utilize p.r.n. blood pressure medication only if patient's blood pressure greater than 160/100 and she develops symptoms such as worsening chest pain or shortness of breath.

## 2023-11-09 NOTE — PLAN OF CARE
Weekly Swing Bed Note    Patient's o2 sat today has been from 93% - 96% on room air.  Patient is pulling 1500ml on her incentive.  Tolerating her treatments well. BBS clear.

## 2023-11-09 NOTE — PLAN OF CARE
Patient is on room, but does wear oxygen at home PRN     Problem: Gas Exchange Impaired  Goal: Optimal Gas Exchange  Outcome: Ongoing, Progressing

## 2023-11-10 LAB
ANION GAP SERPL CALCULATED.3IONS-SCNC: 12 MMOL/L (ref 7–16)
BASOPHILS # BLD AUTO: 0.02 K/UL (ref 0–0.2)
BASOPHILS NFR BLD AUTO: 0.4 % (ref 0–1)
BUN SERPL-MCNC: 15 MG/DL (ref 7–18)
BUN/CREAT SERPL: 15 (ref 6–20)
CALCIUM SERPL-MCNC: 7.7 MG/DL (ref 8.5–10.1)
CHLORIDE SERPL-SCNC: 104 MMOL/L (ref 98–107)
CO2 SERPL-SCNC: 30 MMOL/L (ref 21–32)
CREAT SERPL-MCNC: 1.03 MG/DL (ref 0.55–1.02)
DIFFERENTIAL METHOD BLD: ABNORMAL
EGFR (NO RACE VARIABLE) (RUSH/TITUS): 58 ML/MIN/1.73M2
EOSINOPHIL # BLD AUTO: 0.14 K/UL (ref 0–0.5)
EOSINOPHIL NFR BLD AUTO: 2.7 % (ref 1–4)
ERYTHROCYTE [DISTWIDTH] IN BLOOD BY AUTOMATED COUNT: 13.6 % (ref 11.5–14.5)
GLUCOSE SERPL-MCNC: 123 MG/DL (ref 74–106)
HCT VFR BLD AUTO: 30.1 % (ref 38–47)
HGB BLD-MCNC: 9.2 G/DL (ref 12–16)
LYMPHOCYTES # BLD AUTO: 1.53 K/UL (ref 1–4.8)
LYMPHOCYTES NFR BLD AUTO: 29.5 % (ref 27–41)
MCH RBC QN AUTO: 28.8 PG (ref 27–31)
MCHC RBC AUTO-ENTMCNC: 30.6 G/DL (ref 32–36)
MCV RBC AUTO: 94.1 FL (ref 80–96)
MONOCYTES # BLD AUTO: 0.57 K/UL (ref 0–0.8)
MONOCYTES NFR BLD AUTO: 11 % (ref 2–6)
MPC BLD CALC-MCNC: 10 FL (ref 9.4–12.4)
NEUTROPHILS # BLD AUTO: 2.92 K/UL (ref 1.8–7.7)
NEUTROPHILS NFR BLD AUTO: 56.4 % (ref 53–65)
PLATELET # BLD AUTO: 308 K/UL (ref 150–400)
POTASSIUM SERPL-SCNC: 3.8 MMOL/L (ref 3.5–5.1)
RBC # BLD AUTO: 3.2 M/UL (ref 4.2–5.4)
SODIUM SERPL-SCNC: 142 MMOL/L (ref 136–145)
WBC # BLD AUTO: 5.18 K/UL (ref 4.5–11)

## 2023-11-10 PROCEDURE — 25000003 PHARM REV CODE 250: Performed by: REGISTERED NURSE

## 2023-11-10 PROCEDURE — 25000003 PHARM REV CODE 250: Performed by: FAMILY MEDICINE

## 2023-11-10 PROCEDURE — 97530 THERAPEUTIC ACTIVITIES: CPT | Mod: CO

## 2023-11-10 PROCEDURE — 97110 THERAPEUTIC EXERCISES: CPT | Mod: CO

## 2023-11-10 PROCEDURE — 85025 COMPLETE CBC W/AUTO DIFF WBC: CPT | Performed by: NURSE PRACTITIONER

## 2023-11-10 PROCEDURE — 25000003 PHARM REV CODE 250: Performed by: NURSE PRACTITIONER

## 2023-11-10 PROCEDURE — 94761 N-INVAS EAR/PLS OXIMETRY MLT: CPT

## 2023-11-10 PROCEDURE — 80048 BASIC METABOLIC PNL TOTAL CA: CPT | Performed by: NURSE PRACTITIONER

## 2023-11-10 PROCEDURE — 94640 AIRWAY INHALATION TREATMENT: CPT

## 2023-11-10 PROCEDURE — 97116 GAIT TRAINING THERAPY: CPT | Mod: CQ

## 2023-11-10 PROCEDURE — 25000003 PHARM REV CODE 250: Performed by: HOSPITALIST

## 2023-11-10 PROCEDURE — 97530 THERAPEUTIC ACTIVITIES: CPT | Mod: CQ

## 2023-11-10 PROCEDURE — 11000004 HC SNF PRIVATE

## 2023-11-10 PROCEDURE — 25000242 PHARM REV CODE 250 ALT 637 W/ HCPCS: Performed by: REGISTERED NURSE

## 2023-11-10 PROCEDURE — 97110 THERAPEUTIC EXERCISES: CPT | Mod: CQ

## 2023-11-10 PROCEDURE — 99900035 HC TECH TIME PER 15 MIN (STAT)

## 2023-11-10 RX ADMIN — CETIRIZINE HYDROCHLORIDE 10 MG: 10 TABLET, FILM COATED ORAL at 08:11

## 2023-11-10 RX ADMIN — FLUTICASONE PROPIONATE 100 MCG: 50 SPRAY, METERED NASAL at 08:11

## 2023-11-10 RX ADMIN — IPRATROPIUM BROMIDE AND ALBUTEROL SULFATE 3 ML: 2.5; .5 SOLUTION RESPIRATORY (INHALATION) at 12:11

## 2023-11-10 RX ADMIN — GABAPENTIN 300 MG: 300 CAPSULE ORAL at 02:11

## 2023-11-10 RX ADMIN — HYDROCODONE BITARTRATE AND ACETAMINOPHEN 1 TABLET: 10; 325 TABLET ORAL at 08:11

## 2023-11-10 RX ADMIN — PHENYTOIN SODIUM 100 MG: 100 CAPSULE ORAL at 08:11

## 2023-11-10 RX ADMIN — APIXABAN 2.5 MG: 2.5 TABLET, FILM COATED ORAL at 08:11

## 2023-11-10 RX ADMIN — GABAPENTIN 300 MG: 300 CAPSULE ORAL at 08:11

## 2023-11-10 RX ADMIN — MONTELUKAST SODIUM 10 MG: 10 TABLET, COATED ORAL at 08:11

## 2023-11-10 RX ADMIN — PHENYTOIN SODIUM 100 MG: 100 CAPSULE ORAL at 02:11

## 2023-11-10 RX ADMIN — METOPROLOL TARTRATE 25 MG: 25 TABLET, FILM COATED ORAL at 08:11

## 2023-11-10 RX ADMIN — PAROXETINE HYDROCHLORIDE 40 MG: 20 TABLET, FILM COATED ORAL at 08:11

## 2023-11-10 RX ADMIN — BUSPIRONE HYDROCHLORIDE 10 MG: 10 TABLET ORAL at 08:11

## 2023-11-10 RX ADMIN — BACLOFEN 10 MG: 10 TABLET ORAL at 08:11

## 2023-11-10 RX ADMIN — IPRATROPIUM BROMIDE AND ALBUTEROL SULFATE 3 ML: 2.5; .5 SOLUTION RESPIRATORY (INHALATION) at 07:11

## 2023-11-10 RX ADMIN — FUROSEMIDE 40 MG: 40 TABLET ORAL at 08:11

## 2023-11-10 RX ADMIN — CALCIUM CARBONATE 600 MG (1,500 MG)-VITAMIN D3 400 UNIT TABLET 1 TABLET: at 08:11

## 2023-11-10 RX ADMIN — PRAVASTATIN SODIUM 40 MG: 40 TABLET ORAL at 08:11

## 2023-11-10 RX ADMIN — SENNOSIDES AND DOCUSATE SODIUM 1 TABLET: 8.6; 5 TABLET ORAL at 08:11

## 2023-11-10 RX ADMIN — CLOPIDOGREL BISULFATE 75 MG: 75 TABLET ORAL at 08:11

## 2023-11-10 RX ADMIN — POLYETHYLENE GLYCOL 3350 17 G: 17 POWDER, FOR SOLUTION ORAL at 08:11

## 2023-11-10 RX ADMIN — PANTOPRAZOLE SODIUM 40 MG: 40 TABLET, DELAYED RELEASE ORAL at 08:11

## 2023-11-10 NOTE — PT/OT/SLP PROGRESS
Physical Therapy Treatment    Patient Name:  Radha Martínez   MRN:  [id number]    Recommendations:     Discharge Recommendations: Low Intensity Therapy  Discharge Equipment Recommendations: shower chair, bedside commode  Barriers to discharge: Decreased caregiver support    Assessment:     Radha Martínez is a 73 y.o. female admitted with a medical diagnosis of S/P TKR (total knee replacement), right.  She presents with the following impairments/functional limitations: weakness, impaired endurance .         Rehab Prognosis: Good; patient would benefit from acute skilled PT services to address these deficits and reach maximum level of function.    Recent Surgery: * No surgery found *      Plan:     During this hospitalization, patient to be seen BID to address the identified rehab impairments via therapeutic exercises and progress toward the following goals:    Plan of Care Expires:  12/21/23    Subjective     Chief Complaint: pain in R knee  Patient/Family Comments/goals: increased BLE strengthening and endurance  Pain/Comfort:  Pain Rating 1: 6/10  Location - Side 1: Right  Location 1: knee      Objective:     Communicated with pt prior to session.  Patient found supine with   upon PT entry to room.     General Precautions: Standard, fall  Orthopedic Precautions: RLE weight bearing as tolerated  Braces: N/A  Respiratory Status: Room air     Functional Mobility:  Bed Mobility:     Supine to Sit: minimum assistance  Transfers:     Sit to Stand:  contact guard assistance with rolling walker  Bed to Chair: contact guard assistance with  rolling walker  using  Step Transfer  Gait:        AM-PAC 6 CLICK MOBILITY          Treatment & Education:  Pt competed x 5 min LE bike to promote increased R knee ROM    Pt performed 3x10 of the following:  LAQs  Marches  Heel slides  Quad sets    Pt performed supine/sit SBA, sit/stand x 3 SBA with FWW, bed/chair SBA with FWW     Patient left up in chair with chair locked, call bell in  hand.    GOALS:   Multidisciplinary Problems       Physical Therapy Goals          Problem: Physical Therapy    Goal Priority Disciplines Outcome Goal Variances Interventions   Physical Therapy Goal     PT, PT/OT Ongoing, Progressing     Description: Short Term Goals to be met by: Discharge    Patient will increase functional independence with mobility by performin. Supine to sit with Modified [place]  2. Sit to stand transfer with Modified [place]  3. Bed to chair transfer with Modified [place] using Rolling Walker, WBAT right LE  4. Gait  x 200 feet with Modified [place] using Rolling Walker, WBAT right LE.   5. Lower extremity exercise program x30 reps per handout, with assistance as needed  6. Knee ROM 0-110                         Time Tracking:     PT Received On: 11/10/23  PT Start Time: 1200     PT Stop Time: 1225  PT Total Time (min): 25 min     Billable Minutes: Therapeutic Exercise 25    Treatment Type: Treatment  PT/PTA: PTA     Number of PTA visits since last PT visit: 2     11/10/2023

## 2023-11-10 NOTE — PLAN OF CARE
Care plan reviewed  Problem: Fall Injury Risk  Goal: Absence of Fall and Fall-Related Injury  Outcome: Ongoing, Progressing     Problem: Skin Injury Risk Increased  Goal: Skin Health and Integrity  Outcome: Ongoing, Progressing

## 2023-11-10 NOTE — PT/OT/SLP PROGRESS
Occupational Therapy   Treatment    Name: Radha Martínez  MRN: [id number]  Admitting Diagnosis:  S/P TKR (total knee replacement), right       Recommendations:     Discharge Recommendations: Low Intensity Therapy  Discharge Equipment Recommendations:  bedside commode  Barriers to discharge:       Assessment:     Radha Martínez is a 73 y.o. female with a medical diagnosis of S/P TKR (total knee replacement), right.  She presents with the following Performance deficits affecting function are weakness, impaired endurance, impaired balance, decreased upper extremity function, decreased safety awareness, impaired self care skills.     Rehab Prognosis:  Good; patient would benefit from acute skilled OT services to address these deficits and reach maximum level of function.       Plan:     Patient to be seen 5 x/week to address the above listed problems via self-care/home management, therapeutic activities, therapeutic exercises  Plan of Care Expires: 11/24/23  Plan of Care Reviewed with: patient, son    Subjective   Patient was sitting up in her recliner chair in therapy gym after PT session upon [facility] arrival. Patient stated she was feeling okay but was having some pain in her knee but was agreeable to participate in todays session.   Chief Complaint: pain in her knee  Patient/Family Comments/goals: to increase UB strength and endurance in order to return home.   Pain/Comfort:  Pain Rating 1: 6/10  Location - Side 1: Right  Location 1: knee    Objective:     Communicated with: nursing staff prior to session.  Patient found up in chair with peripheral IV upon OT entry to room.    General Precautions: Standard, fall    Orthopedic Precautions:RLE weight bearing as tolerated  Braces: N/A  Respiratory Status: Room air    Therapeutic exercises:  Patient completed the following exercises to work on UB strength in order to complete ADLs, bed mobility, and transfers with less assistance.    -Bicep curls: 3 sets of 10 with 2#  dumbbell  -Chest press: 3 sets of 10 with 2# dumbbell  -Shoulder flex: 3 sets of 10 with 2# dumbbell  -Internal/External rotation: 3 sets of 10 with 2# dumbbell  -Triceps: 3 sets of 10 with green Theraband  -Rows: 3 sets of 10 with green Theraband    Increased time/effort needed to complete each exercise.     Therapeutic activities:  Patient completed 10 minutes on UBE ergometer to work on UB strength and endurance in order to complete ADLs and transfers w/ less A.   Pt completed 8 sit to stand transfers with standy by assistance to work on tricep strength, stand tolerance and endurance in preparation to complete ADLs and transfers from different surfaces such as BSC, chair, etc. with less assistance. Increased time/effort needed.   While sitting in chair, pt reached for cones at various heights across midline from B[facility] using R and L sides 20 reps to work on functional reach and core strength in order to assist with ADLs such as lower body dressing and being able to reach an item off the floor with less assistance. Increased time/effort.     Patient left up in chair with call button in reach, RN notified, and son/ present    GOALS:   Multidisciplinary Problems       Occupational Therapy Goals          Problem: Occupational Therapy    Goal Priority Disciplines Outcome Interventions   Occupational Therapy Goal     OT, PT/OT Ongoing, Progressing    Description: STG:  Pt will perform grooming with I  Pt will bathe self Mod I with setup  Pt will perform UB dressing with I  Pt will perform LB dressing with Min A  Pt will transfer toilet/BSC with Mod I  Pt will perform standing task x 3-4 min with CGA    LTG:  Pt will achieve max level of independence with self care.                        Time Tracking:     OT Date of Treatment: 11/10/23  OT Start Time: 0943  OT Stop Time: 1025  OT Total Time (min): 42 min    Billable Minutes:Therapeutic Activity 18 minutes  Therapeutic Exercise 24 minutes    OT/BETTYE: BETTYE     Number  of BETTYE visits since last OT visit: 2    BETTYE Stevens  11/10/2023  2:34 PM

## 2023-11-10 NOTE — PT/OT/SLP PROGRESS
Physical Therapy Treatment    Patient Name:  Radha Martínez   MRN:  [id number]    Recommendations:     Discharge Recommendations: Low Intensity Therapy  Discharge Equipment Recommendations: shower chair, bedside commode  Barriers to discharge: Decreased caregiver support    Assessment:     Radha Martínez is a 73 y.o. female admitted with a medical diagnosis of S/P TKR (total knee replacement), right.  She presents with the following impairments/functional limitations: weakness, impaired endurance .         Rehab Prognosis: Good; patient would benefit from acute skilled PT services to address these deficits and reach maximum level of function.    Recent Surgery: * No surgery found *      Plan:     During this hospitalization, patient to be seen BID to address the identified rehab impairments via gait training, therapeutic activities, therapeutic exercises and progress toward the following goals:    Plan of Care Expires:  12/21/23    Subjective     Chief Complaint: pain in R knee  Patient/Family Comments/goals: increased BLE strengthening and endurance  Pain/Comfort:  Pain Rating 1: 6/10  Location - Side 1: Right  Location 1: knee      Objective:     Communicated with pt prior to session.  Patient found supine with   upon PT entry to room.     General Precautions: Standard, fall  Orthopedic Precautions: RLE weight bearing as tolerated  Braces: N/A  Respiratory Status: Room air     Functional Mobility:  Bed Mobility:     Supine to Sit: minimum assistance  Transfers:     Sit to Stand:  contact guard assistance with rolling walker  Bed to Chair: contact guard assistance with  rolling walker  using  Step Transfer  Gait:   Pt progressed to 200 ft SBA with FWW with x 1 rest breaks needed today.      AM-PAC 6 CLICK MOBILITY          Treatment & Education:  Pt competed x 10 min seated NuStep to promote increased R knee ROM, PTA cues for sitting posture, cues for full knee flexion and extension in R knee  Pt performed 3x10 of  the following with 2#  LAQs  Marches  HS curls with TB    Pt performed supine/sit SBA, sit/stand x 3 SBA with FWW, bed/chair SBA with FWW     Patient left up in chair with  OT present..    GOALS:   Multidisciplinary Problems       Physical Therapy Goals          Problem: Physical Therapy    Goal Priority Disciplines Outcome Goal Variances Interventions   Physical Therapy Goal     PT, PT/OT Ongoing, Progressing     Description: Short Term Goals to be met by: Discharge    Patient will increase functional independence with mobility by performin. Supine to sit with Modified [place]  2. Sit to stand transfer with Modified [place]  3. Bed to chair transfer with Modified [place] using Rolling Walker, WBAT right LE  4. Gait  x 200 feet with Modified [place] using Rolling Walker, WBAT right LE.   5. Lower extremity exercise program x30 reps per handout, with assistance as needed  6. Knee ROM 0-110                         Time Tracking:     PT Received On: 11/10/23  PT Start Time: 0900     PT Stop Time: 0943  PT Total Time (min): 43 min     Billable Minutes: Gait Training 15 and Therapeutic Exercise 20 Therapeutic Activity 8    Treatment Type: Treatment  PT/PTA: PTA     Number of PTA visits since last PT visit: 1     11/10/2023

## 2023-11-10 NOTE — PLAN OF CARE
Patient is on room air, but patient stated that she did wear oxygen at home PRN     Problem: Gas Exchange Impaired  Goal: Optimal Gas Exchange  Outcome: Ongoing, Progressing

## 2023-11-11 PROCEDURE — 94640 AIRWAY INHALATION TREATMENT: CPT

## 2023-11-11 PROCEDURE — 25000003 PHARM REV CODE 250: Performed by: REGISTERED NURSE

## 2023-11-11 PROCEDURE — 25000003 PHARM REV CODE 250: Performed by: HOSPITALIST

## 2023-11-11 PROCEDURE — 25000003 PHARM REV CODE 250: Performed by: FAMILY MEDICINE

## 2023-11-11 PROCEDURE — 25000242 PHARM REV CODE 250 ALT 637 W/ HCPCS: Performed by: REGISTERED NURSE

## 2023-11-11 PROCEDURE — 11000004 HC SNF PRIVATE

## 2023-11-11 PROCEDURE — 99900035 HC TECH TIME PER 15 MIN (STAT)

## 2023-11-11 PROCEDURE — 25000003 PHARM REV CODE 250: Performed by: NURSE PRACTITIONER

## 2023-11-11 PROCEDURE — 94761 N-INVAS EAR/PLS OXIMETRY MLT: CPT

## 2023-11-11 RX ADMIN — SENNOSIDES AND DOCUSATE SODIUM 1 TABLET: 8.6; 5 TABLET ORAL at 08:11

## 2023-11-11 RX ADMIN — FLUTICASONE PROPIONATE 100 MCG: 50 SPRAY, METERED NASAL at 08:11

## 2023-11-11 RX ADMIN — APIXABAN 2.5 MG: 2.5 TABLET, FILM COATED ORAL at 08:11

## 2023-11-11 RX ADMIN — BACLOFEN 10 MG: 10 TABLET ORAL at 08:11

## 2023-11-11 RX ADMIN — CALCIUM CARBONATE 600 MG (1,500 MG)-VITAMIN D3 400 UNIT TABLET 1 TABLET: at 08:11

## 2023-11-11 RX ADMIN — MONTELUKAST SODIUM 10 MG: 10 TABLET, COATED ORAL at 08:11

## 2023-11-11 RX ADMIN — PHENYTOIN SODIUM 100 MG: 100 CAPSULE ORAL at 08:11

## 2023-11-11 RX ADMIN — IPRATROPIUM BROMIDE AND ALBUTEROL SULFATE 3 ML: 2.5; .5 SOLUTION RESPIRATORY (INHALATION) at 07:11

## 2023-11-11 RX ADMIN — FUROSEMIDE 40 MG: 40 TABLET ORAL at 08:11

## 2023-11-11 RX ADMIN — METOPROLOL TARTRATE 25 MG: 25 TABLET, FILM COATED ORAL at 08:11

## 2023-11-11 RX ADMIN — IPRATROPIUM BROMIDE AND ALBUTEROL SULFATE 3 ML: 2.5; .5 SOLUTION RESPIRATORY (INHALATION) at 06:11

## 2023-11-11 RX ADMIN — CLOPIDOGREL BISULFATE 75 MG: 75 TABLET ORAL at 08:11

## 2023-11-11 RX ADMIN — PRAVASTATIN SODIUM 40 MG: 40 TABLET ORAL at 08:11

## 2023-11-11 RX ADMIN — IPRATROPIUM BROMIDE AND ALBUTEROL SULFATE 3 ML: 2.5; .5 SOLUTION RESPIRATORY (INHALATION) at 12:11

## 2023-11-11 RX ADMIN — IPRATROPIUM BROMIDE AND ALBUTEROL SULFATE 3 ML: 2.5; .5 SOLUTION RESPIRATORY (INHALATION) at 01:11

## 2023-11-11 RX ADMIN — HYDROCODONE BITARTRATE AND ACETAMINOPHEN 1 TABLET: 10; 325 TABLET ORAL at 08:11

## 2023-11-11 RX ADMIN — BUSPIRONE HYDROCHLORIDE 10 MG: 10 TABLET ORAL at 08:11

## 2023-11-11 RX ADMIN — GABAPENTIN 300 MG: 300 CAPSULE ORAL at 08:11

## 2023-11-11 RX ADMIN — HYDROCODONE BITARTRATE AND ACETAMINOPHEN 1 TABLET: 10; 325 TABLET ORAL at 03:11

## 2023-11-11 RX ADMIN — PAROXETINE HYDROCHLORIDE 40 MG: 20 TABLET, FILM COATED ORAL at 08:11

## 2023-11-11 RX ADMIN — POLYETHYLENE GLYCOL 3350 17 G: 17 POWDER, FOR SOLUTION ORAL at 08:11

## 2023-11-11 RX ADMIN — PHENYTOIN SODIUM 100 MG: 100 CAPSULE ORAL at 02:11

## 2023-11-11 RX ADMIN — CETIRIZINE HYDROCHLORIDE 10 MG: 10 TABLET, FILM COATED ORAL at 08:11

## 2023-11-11 RX ADMIN — GABAPENTIN 300 MG: 300 CAPSULE ORAL at 02:11

## 2023-11-11 RX ADMIN — PANTOPRAZOLE SODIUM 40 MG: 40 TABLET, DELAYED RELEASE ORAL at 08:11

## 2023-11-11 NOTE — PLAN OF CARE
Care plan reviewed  Problem: Pain Acute  Goal: Acceptable Pain Control and Functional Ability  Outcome: Ongoing, Progressing

## 2023-11-11 NOTE — PLAN OF CARE
Problem: Adult Inpatient Plan of Care  Goal: Plan of Care Review  Outcome: Ongoing, Progressing     Problem: Adult Inpatient Plan of Care  Goal: Absence of Hospital-Acquired Illness or Injury  Outcome: Ongoing, Progressing     Problem: Adult Inpatient Plan of Care  Goal: Optimal Comfort and Wellbeing  Outcome: Ongoing, Progressing     Problem: Skin Injury Risk Increased  Goal: Skin Health and Integrity  Outcome: Ongoing, Progressing

## 2023-11-12 PROCEDURE — 25000003 PHARM REV CODE 250: Performed by: REGISTERED NURSE

## 2023-11-12 PROCEDURE — 25000003 PHARM REV CODE 250: Performed by: HOSPITALIST

## 2023-11-12 PROCEDURE — 25000242 PHARM REV CODE 250 ALT 637 W/ HCPCS: Performed by: REGISTERED NURSE

## 2023-11-12 PROCEDURE — 25000003 PHARM REV CODE 250: Performed by: NURSE PRACTITIONER

## 2023-11-12 PROCEDURE — 99900035 HC TECH TIME PER 15 MIN (STAT)

## 2023-11-12 PROCEDURE — 94640 AIRWAY INHALATION TREATMENT: CPT

## 2023-11-12 PROCEDURE — 25000003 PHARM REV CODE 250: Performed by: FAMILY MEDICINE

## 2023-11-12 PROCEDURE — 11000004 HC SNF PRIVATE

## 2023-11-12 PROCEDURE — 94761 N-INVAS EAR/PLS OXIMETRY MLT: CPT

## 2023-11-12 RX ADMIN — SENNOSIDES AND DOCUSATE SODIUM 1 TABLET: 8.6; 5 TABLET ORAL at 08:11

## 2023-11-12 RX ADMIN — Medication 6 MG: at 08:11

## 2023-11-12 RX ADMIN — POLYETHYLENE GLYCOL 3350 17 G: 17 POWDER, FOR SOLUTION ORAL at 08:11

## 2023-11-12 RX ADMIN — PHENYTOIN SODIUM 100 MG: 100 CAPSULE ORAL at 03:11

## 2023-11-12 RX ADMIN — GABAPENTIN 300 MG: 300 CAPSULE ORAL at 08:11

## 2023-11-12 RX ADMIN — IPRATROPIUM BROMIDE AND ALBUTEROL SULFATE 3 ML: 2.5; .5 SOLUTION RESPIRATORY (INHALATION) at 07:11

## 2023-11-12 RX ADMIN — BACLOFEN 10 MG: 10 TABLET ORAL at 08:11

## 2023-11-12 RX ADMIN — GABAPENTIN 300 MG: 300 CAPSULE ORAL at 03:11

## 2023-11-12 RX ADMIN — CLOPIDOGREL BISULFATE 75 MG: 75 TABLET ORAL at 08:11

## 2023-11-12 RX ADMIN — METOPROLOL TARTRATE 25 MG: 25 TABLET, FILM COATED ORAL at 08:11

## 2023-11-12 RX ADMIN — FUROSEMIDE 40 MG: 40 TABLET ORAL at 08:11

## 2023-11-12 RX ADMIN — IPRATROPIUM BROMIDE AND ALBUTEROL SULFATE 3 ML: 2.5; .5 SOLUTION RESPIRATORY (INHALATION) at 01:11

## 2023-11-12 RX ADMIN — MONTELUKAST SODIUM 10 MG: 10 TABLET, COATED ORAL at 08:11

## 2023-11-12 RX ADMIN — PRAVASTATIN SODIUM 40 MG: 40 TABLET ORAL at 08:11

## 2023-11-12 RX ADMIN — PHENYTOIN SODIUM 100 MG: 100 CAPSULE ORAL at 08:11

## 2023-11-12 RX ADMIN — BUSPIRONE HYDROCHLORIDE 10 MG: 10 TABLET ORAL at 08:11

## 2023-11-12 RX ADMIN — HYDROCODONE BITARTRATE AND ACETAMINOPHEN 1 TABLET: 10; 325 TABLET ORAL at 08:11

## 2023-11-12 RX ADMIN — CETIRIZINE HYDROCHLORIDE 10 MG: 10 TABLET, FILM COATED ORAL at 08:11

## 2023-11-12 RX ADMIN — PAROXETINE HYDROCHLORIDE 40 MG: 20 TABLET, FILM COATED ORAL at 08:11

## 2023-11-12 RX ADMIN — CALCIUM CARBONATE 600 MG (1,500 MG)-VITAMIN D3 400 UNIT TABLET 1 TABLET: at 08:11

## 2023-11-12 RX ADMIN — APIXABAN 2.5 MG: 2.5 TABLET, FILM COATED ORAL at 08:11

## 2023-11-12 RX ADMIN — FLUTICASONE PROPIONATE 100 MCG: 50 SPRAY, METERED NASAL at 08:11

## 2023-11-12 RX ADMIN — FUROSEMIDE 40 MG: 40 TABLET ORAL at 05:11

## 2023-11-12 RX ADMIN — PANTOPRAZOLE SODIUM 40 MG: 40 TABLET, DELAYED RELEASE ORAL at 08:11

## 2023-11-12 RX ADMIN — HYDROCODONE BITARTRATE AND ACETAMINOPHEN 1 TABLET: 10; 325 TABLET ORAL at 05:11

## 2023-11-12 RX ADMIN — IPRATROPIUM BROMIDE AND ALBUTEROL SULFATE 3 ML: 2.5; .5 SOLUTION RESPIRATORY (INHALATION) at 12:11

## 2023-11-12 NOTE — PLAN OF CARE
Problem: Adult Inpatient Plan of Care  Goal: Plan of Care Review  Outcome: Ongoing, Progressing     Problem: Adult Inpatient Plan of Care  Goal: Absence of Hospital-Acquired Illness or Injury  Outcome: Ongoing, Progressing     Problem: Adult Inpatient Plan of Care  Goal: Optimal Comfort and Wellbeing  Outcome: Ongoing, Progressing     Problem: Fall Injury Risk  Goal: Absence of Fall and Fall-Related Injury  Outcome: Ongoing, Progressing     Problem: Pain Acute  Goal: Acceptable Pain Control and Functional Ability  Outcome: Ongoing, Progressing

## 2023-11-12 NOTE — PLAN OF CARE
Problem: Breathing Pattern Ineffective  Goal: Effective Breathing Pattern  Outcome: Ongoing, Progressing     Problem: Gas Exchange Impaired  Goal: Optimal Gas Exchange  Outcome: Ongoing, Progressing      BP stable at 126/72  Continue current regimen

## 2023-11-13 PROCEDURE — 97116 GAIT TRAINING THERAPY: CPT

## 2023-11-13 PROCEDURE — 25000003 PHARM REV CODE 250: Performed by: NURSE PRACTITIONER

## 2023-11-13 PROCEDURE — 11000004 HC SNF PRIVATE

## 2023-11-13 PROCEDURE — 94640 AIRWAY INHALATION TREATMENT: CPT

## 2023-11-13 PROCEDURE — 25000003 PHARM REV CODE 250: Performed by: HOSPITALIST

## 2023-11-13 PROCEDURE — 25000003 PHARM REV CODE 250: Performed by: REGISTERED NURSE

## 2023-11-13 PROCEDURE — 25000003 PHARM REV CODE 250: Performed by: FAMILY MEDICINE

## 2023-11-13 PROCEDURE — 25000242 PHARM REV CODE 250 ALT 637 W/ HCPCS: Performed by: REGISTERED NURSE

## 2023-11-13 PROCEDURE — 97110 THERAPEUTIC EXERCISES: CPT

## 2023-11-13 PROCEDURE — 97110 THERAPEUTIC EXERCISES: CPT | Mod: CO

## 2023-11-13 PROCEDURE — 94761 N-INVAS EAR/PLS OXIMETRY MLT: CPT

## 2023-11-13 PROCEDURE — 99900035 HC TECH TIME PER 15 MIN (STAT)

## 2023-11-13 PROCEDURE — 97535 SELF CARE MNGMENT TRAINING: CPT | Mod: CO

## 2023-11-13 PROCEDURE — 97530 THERAPEUTIC ACTIVITIES: CPT | Mod: CO

## 2023-11-13 RX ADMIN — GABAPENTIN 300 MG: 300 CAPSULE ORAL at 08:11

## 2023-11-13 RX ADMIN — GABAPENTIN 300 MG: 300 CAPSULE ORAL at 02:11

## 2023-11-13 RX ADMIN — CLOPIDOGREL BISULFATE 75 MG: 75 TABLET ORAL at 08:11

## 2023-11-13 RX ADMIN — FUROSEMIDE 40 MG: 40 TABLET ORAL at 08:11

## 2023-11-13 RX ADMIN — APIXABAN 2.5 MG: 2.5 TABLET, FILM COATED ORAL at 08:11

## 2023-11-13 RX ADMIN — HYDROCODONE BITARTRATE AND ACETAMINOPHEN 1 TABLET: 10; 325 TABLET ORAL at 12:11

## 2023-11-13 RX ADMIN — PANTOPRAZOLE SODIUM 40 MG: 40 TABLET, DELAYED RELEASE ORAL at 08:11

## 2023-11-13 RX ADMIN — POLYETHYLENE GLYCOL 3350 17 G: 17 POWDER, FOR SOLUTION ORAL at 08:11

## 2023-11-13 RX ADMIN — MONTELUKAST SODIUM 10 MG: 10 TABLET, COATED ORAL at 08:11

## 2023-11-13 RX ADMIN — SENNOSIDES AND DOCUSATE SODIUM 1 TABLET: 8.6; 5 TABLET ORAL at 08:11

## 2023-11-13 RX ADMIN — BUSPIRONE HYDROCHLORIDE 10 MG: 10 TABLET ORAL at 08:11

## 2023-11-13 RX ADMIN — HYDROCODONE BITARTRATE AND ACETAMINOPHEN 1 TABLET: 10; 325 TABLET ORAL at 08:11

## 2023-11-13 RX ADMIN — HYDROCODONE BITARTRATE AND ACETAMINOPHEN 1 TABLET: 10; 325 TABLET ORAL at 02:11

## 2023-11-13 RX ADMIN — PHENYTOIN SODIUM 100 MG: 100 CAPSULE ORAL at 02:11

## 2023-11-13 RX ADMIN — IPRATROPIUM BROMIDE AND ALBUTEROL SULFATE 3 ML: 2.5; .5 SOLUTION RESPIRATORY (INHALATION) at 01:11

## 2023-11-13 RX ADMIN — METOPROLOL TARTRATE 25 MG: 25 TABLET, FILM COATED ORAL at 08:11

## 2023-11-13 RX ADMIN — CETIRIZINE HYDROCHLORIDE 10 MG: 10 TABLET, FILM COATED ORAL at 08:11

## 2023-11-13 RX ADMIN — IPRATROPIUM BROMIDE AND ALBUTEROL SULFATE 3 ML: 2.5; .5 SOLUTION RESPIRATORY (INHALATION) at 07:11

## 2023-11-13 RX ADMIN — CALCIUM CARBONATE 600 MG (1,500 MG)-VITAMIN D3 400 UNIT TABLET 1 TABLET: at 08:11

## 2023-11-13 RX ADMIN — BACLOFEN 10 MG: 10 TABLET ORAL at 08:11

## 2023-11-13 RX ADMIN — IPRATROPIUM BROMIDE AND ALBUTEROL SULFATE 3 ML: 2.5; .5 SOLUTION RESPIRATORY (INHALATION) at 12:11

## 2023-11-13 RX ADMIN — PRAVASTATIN SODIUM 40 MG: 40 TABLET ORAL at 08:11

## 2023-11-13 RX ADMIN — PAROXETINE HYDROCHLORIDE 40 MG: 20 TABLET, FILM COATED ORAL at 08:11

## 2023-11-13 RX ADMIN — PHENYTOIN SODIUM 100 MG: 100 CAPSULE ORAL at 08:11

## 2023-11-13 RX ADMIN — FUROSEMIDE 40 MG: 40 TABLET ORAL at 04:11

## 2023-11-13 RX ADMIN — FLUTICASONE PROPIONATE 100 MCG: 50 SPRAY, METERED NASAL at 08:11

## 2023-11-13 NOTE — PLAN OF CARE
[facility] - Medical Surgical Unit  Discharge Reassessment    Primary Care Provider: Jayy Castillo MD    Expected Discharge Date:     Reassessment (most recent)       Discharge Reassessment - 11/13/23 1055          Discharge Reassessment    Assessment Type Discharge Planning Assessment     Did the patient's condition or plan change since previous assessment? No     Discharge Plan discussed with: Patient     Communicated ALEX with patient/caregiver Date not available/Unable to determine     Discharge Plan A Home with family;Home Health     DME Needed Upon Discharge  walker, rolling     Transition of Care Barriers None     Why the patient remains in the hospital Requires continued medical care        Post-Acute Status    Post-Acute Authorization Home Health     Home Health Status Pending medical clearance/testing     Patient choice form signed by patient/caregiver List with quality metrics by geographic area provided     Discharge Delays None known at this time                     Cont skileld care and discharge plan is home with family and home health

## 2023-11-13 NOTE — PT/OT/SLP PROGRESS
[facility]  Physical Therapy Treatment    Patient Name:  Radha Martínez   MRN:  [id number]    Recommendations:     Discharge Recommendations: Low Intensity Therapy  Discharge Equipment Recommendations: shower chair, bedside commode  Barriers to discharge: Decreased caregiver support    Assessment:     Radha Martínez is a 73 y.o. female admitted with a medical diagnosis of S/P TKR (total knee replacement), right.  She presents with the following impairments/functional limitations: weakness, impaired endurance .    Rehab Prognosis: Good; patient would benefit from acute skilled PT services to address these deficits and reach maximum level of function.    Recent Surgery: * No surgery found *      Plan:     During this hospitalization, patient to be seen BID to address the identified rehab impairments via therapeutic exercises and progress toward the following goals:    Plan of Care Expires:  12/21/23    Subjective     Chief Complaint: no pain  Patient/Family Comments/goals: increased BLE strengthening and endurance  Pain/Comfort:         Objective:     Communicated with pt prior to session.  Patient found up in chair with peripheral IV, oxygen, hemovac upon PT entry to room.     General Precautions: Standard, fall  Orthopedic Precautions: RLE weight bearing as tolerated  Braces: N/A  Respiratory Status: Room air     Functional Mobility:  Transfers:     Sit to Stand:  contact guard assistance with rolling walker  Bed to Chair: contact guard assistance with  rolling walker  using  Step Transfer      AM-PAC 6 CLICK MOBILITY      Treatment & Education:  Therapist facilitated ambulation on level surfaces with use of front wheeled walker with CGA for 130 feet with therapist providing cues for both sequencing and safety.     GOALS:   Multidisciplinary Problems       Physical Therapy Goals          Problem: Physical Therapy    Goal Priority Disciplines Outcome Goal Variances Interventions   Physical Therapy Goal     PT,  PT/OT Ongoing, Progressing     Description: Short Term Goals to be met by: Discharge    Patient will increase functional independence with mobility by performin. Supine to sit with Modified [place]  2. Sit to stand transfer with Modified [place]  3. Bed to chair transfer with Modified [place] using Rolling Walker, WBAT right LE  4. Gait  x 200 feet with Modified [place] using Rolling Walker, WBAT right LE.   5. Lower extremity exercise program x30 reps per handout, with assistance as needed  6. Knee ROM 0-110                         Time Tracking:     PT Received On: 23  PT Start Time: 1420     PT Stop Time: 1445  PT Total Time (min): 25 min       2023

## 2023-11-13 NOTE — PT/OT/SLP PROGRESS
Occupational Therapy   Treatment    Name: Radha Martínez  MRN: [id number]  Admitting Diagnosis:  S/P TKR (total knee replacement), right       Recommendations:     Discharge Recommendations: Low Intensity Therapy  Discharge Equipment Recommendations:  bedside commode  Barriers to discharge:       Assessment:     Radha Martínez is a 73 y.o. female with a medical diagnosis of S/P TKR (total knee replacement), right.  She presents with the following Performance deficits affecting function are weakness, impaired endurance, impaired balance, decreased safety awareness.     Rehab Prognosis:  Good; patient would benefit from acute skilled OT services to address these deficits and reach maximum level of function.       Plan:     Patient to be seen 5 x/week to address the above listed problems via self-care/home management, therapeutic activities, therapeutic exercises  Plan of Care Expires: 11/24/23  Plan of Care Reviewed with: patient, son    Subjective   Patient was sitting up in her recliner chair in her room upon [facility] arrival. Patient stated she was feeling okay but was having some pain in her knee but was agreeable to participate in todays session.   Chief Complaint: pain in knee  Patient/Family Comments/goals: to increase UB strength and endurance in order to return home  Pain/Comfort:  Pain Rating 1: 5/10  Location - Side 1: Right  Location 1: knee    Objective:     Communicated with: nursing staff prior to session.  Patient found up in chair with peripheral IV upon OT entry to room.    General Precautions: Standard, fall    Orthopedic Precautions:RLE weight bearing as tolerated  Braces: N/A  Respiratory Status: Room air    Activities of daily living:  Patient was educated on assistive devices that includes reacher, sock aid in order to complete LB dressing with less assistance. Patient demonstrated each piece of equipment to don socks and pants with contact guard. Patient had no questions or concerns after completion  of lower body dressing.     [facility] 6 Click ADL: 22    Therapeutic activities:  Patient completed 10 minutes on UBE ergometer to work on UB strength and endurance in order to complete ADLs and transfers w/ less A.   While standing, pt instructed in several balance activities requiring pt to reach in all planes and moving in/out of midline to work on dynamic standing balance and endurance in order to complete ADLs standing at sink and being able to reach in cabinets with less assistance. Pt stood 12:04 minutes with Stand-by Assistance.  Pt completed 6 sit to stand transfers with standy by assistance to work on tricep strength, stand tolerance and endurance in preparation to complete ADLs and transfers from different surfaces such as BSC, chair, etc. with less assistance. Increased time/effort needed.     Therapeutic exercises:  Patient completed the following exercises to work on UB strength in order to complete ADLs, bed mobility, and transfers with less assistance.    -Bicep curls: 3 sets of 10 with 2# dumbbell  -Chest press: 3 sets of 10 with 2# dumbbell  -Shoulder flex: 3 sets of 10 with 2# dumbbell  -Internal/External rotation: 3 sets of 10 with 2# dumbbell  -Calibrated hand  tool: 3 sets of 10 with 20# resistance    Increased time/effort needed to complete each exercise.     Patient left up in chair with  PT present    GOALS:   Multidisciplinary Problems       Occupational Therapy Goals          Problem: Occupational Therapy    Goal Priority Disciplines Outcome Interventions   Occupational Therapy Goal     OT, PT/OT Ongoing, Progressing    Description: STG:  Pt will perform grooming with I  Pt will bathe self Mod I with setup  Pt will perform UB dressing with I  Pt will perform LB dressing with Min A  Pt will transfer toilet/BSC with Mod I  Pt will perform standing task x 3-4 min with CGA    LTG:  Pt will achieve max level of independence with self care.                        Time Tracking:     OT Date of  Treatment: 11/13/23  OT Start Time: 0925  OT Stop Time: 1018  OT Total Time (min): 53 min    Billable Minutes:Self Care/Home Management 8 minutes  Therapeutic Activity 25 minutes  Therapeutic Exercise 20 minutes    OT/BETTYE: BETTYE     Number of BETTYE visits since last OT visit: 3    BETTYE Stevens  11/13/2023  12:59 PM

## 2023-11-13 NOTE — PLAN OF CARE
Problem: Adult Inpatient Plan of Care  Goal: Plan of Care Review  Outcome: Ongoing, Progressing     Problem: Adult Inpatient Plan of Care  Goal: Absence of Hospital-Acquired Illness or Injury  Outcome: Ongoing, Progressing     Problem: Adult Inpatient Plan of Care  Goal: Optimal Comfort and Wellbeing  Outcome: Ongoing, Progressing     Problem: Fall Injury Risk  Goal: Absence of Fall and Fall-Related Injury  Outcome: Ongoing, Progressing     Problem: Skin Injury Risk Increased  Goal: Skin Health and Integrity  Outcome: Ongoing, Progressing

## 2023-11-13 NOTE — PT/OT/SLP PROGRESS
[facility]  Physical Therapy Treatment    Patient Name:  Radha Martínez   MRN:  [id number]    Recommendations:     Discharge Recommendations: Low Intensity Therapy  Discharge Equipment Recommendations: shower chair, bedside commode  Barriers to discharge: Decreased caregiver support    Assessment:     Radha Martínez is a 73 y.o. female admitted with a medical diagnosis of S/P TKR (total knee replacement), right.  She presents with the following impairments/functional limitations: weakness, impaired endurance .    Rehab Prognosis: Good; patient would benefit from acute skilled PT services to address these deficits and reach maximum level of function.    Recent Surgery: * No surgery found *      Plan:     During this hospitalization, patient to be seen BID to address the identified rehab impairments via therapeutic exercises and progress toward the following goals:    Plan of Care Expires:  12/21/23    Subjective     Chief Complaint: no pain  Patient/Family Comments/goals: increased BLE strengthening and endurance  Pain/Comfort:         Objective:     Communicated with pt prior to session.  Patient found up in chair with peripheral IV, oxygen, hemovac upon PT entry to room.     General Precautions: Standard, fall  Orthopedic Precautions: RLE weight bearing as tolerated  Braces: N/A  Respiratory Status: Room air     Functional Mobility:  Transfers:     Sit to Stand:  contact guard assistance with rolling walker  Bed to Chair: contact guard assistance with  rolling walker  using  Step Transfer      AM-PAC 6 CLICK MOBILITY      Treatment & Education:  Pt performed the following RLE ex 2x10 to promote increased R knee flexion and extension:  Heel slides  SLRs  Hip ADD/Hip ABD  Quad sets  SAQs  Therapist facilitated transfers sit<>stand for 7 trials with therapist providing cues for sequencing and safety.  Patient left supine with call button in reach..    GOALS:   Multidisciplinary Problems       Physical Therapy Goals           Problem: Physical Therapy    Goal Priority Disciplines Outcome Goal Variances Interventions   Physical Therapy Goal     PT, PT/OT Ongoing, Progressing     Description: Short Term Goals to be met by: Discharge    Patient will increase functional independence with mobility by performin. Supine to sit with Modified [place]  2. Sit to stand transfer with Modified [place]  3. Bed to chair transfer with Modified [place] using Rolling Walker, WBAT right LE  4. Gait  x 200 feet with Modified [place] using Rolling Walker, WBAT right LE.   5. Lower extremity exercise program x30 reps per handout, with assistance as needed  6. Knee ROM 0-110                         Time Tracking:     PT Received On: 23  PT Start Time: 1020     PT Stop Time: 1058  PT Total Time (min): 38 min       2023

## 2023-11-13 NOTE — PLAN OF CARE
Patient wears home oxygen PRN     Problem: Gas Exchange Impaired  Goal: Optimal Gas Exchange  Outcome: Ongoing, Progressing

## 2023-11-14 PROCEDURE — 25000242 PHARM REV CODE 250 ALT 637 W/ HCPCS: Performed by: REGISTERED NURSE

## 2023-11-14 PROCEDURE — 11000004 HC SNF PRIVATE

## 2023-11-14 PROCEDURE — 97116 GAIT TRAINING THERAPY: CPT | Mod: CQ

## 2023-11-14 PROCEDURE — 25000003 PHARM REV CODE 250: Performed by: NURSE PRACTITIONER

## 2023-11-14 PROCEDURE — 25000003 PHARM REV CODE 250: Performed by: REGISTERED NURSE

## 2023-11-14 PROCEDURE — 94640 AIRWAY INHALATION TREATMENT: CPT

## 2023-11-14 PROCEDURE — 25000003 PHARM REV CODE 250: Performed by: HOSPITALIST

## 2023-11-14 PROCEDURE — 99900035 HC TECH TIME PER 15 MIN (STAT)

## 2023-11-14 PROCEDURE — 25000003 PHARM REV CODE 250: Performed by: FAMILY MEDICINE

## 2023-11-14 PROCEDURE — 97110 THERAPEUTIC EXERCISES: CPT | Mod: CQ

## 2023-11-14 PROCEDURE — 94761 N-INVAS EAR/PLS OXIMETRY MLT: CPT

## 2023-11-14 PROCEDURE — 97530 THERAPEUTIC ACTIVITIES: CPT | Mod: CO

## 2023-11-14 PROCEDURE — 97110 THERAPEUTIC EXERCISES: CPT | Mod: CO

## 2023-11-14 RX ADMIN — BACLOFEN 10 MG: 10 TABLET ORAL at 08:11

## 2023-11-14 RX ADMIN — CALCIUM CARBONATE 600 MG (1,500 MG)-VITAMIN D3 400 UNIT TABLET 1 TABLET: at 08:11

## 2023-11-14 RX ADMIN — METOPROLOL TARTRATE 25 MG: 25 TABLET, FILM COATED ORAL at 08:11

## 2023-11-14 RX ADMIN — MONTELUKAST SODIUM 10 MG: 10 TABLET, COATED ORAL at 08:11

## 2023-11-14 RX ADMIN — PANTOPRAZOLE SODIUM 40 MG: 40 TABLET, DELAYED RELEASE ORAL at 08:11

## 2023-11-14 RX ADMIN — IPRATROPIUM BROMIDE AND ALBUTEROL SULFATE 3 ML: 2.5; .5 SOLUTION RESPIRATORY (INHALATION) at 07:11

## 2023-11-14 RX ADMIN — HYDROCODONE BITARTRATE AND ACETAMINOPHEN 1 TABLET: 10; 325 TABLET ORAL at 08:11

## 2023-11-14 RX ADMIN — FUROSEMIDE 40 MG: 40 TABLET ORAL at 08:11

## 2023-11-14 RX ADMIN — GABAPENTIN 300 MG: 300 CAPSULE ORAL at 08:11

## 2023-11-14 RX ADMIN — SENNOSIDES AND DOCUSATE SODIUM 1 TABLET: 8.6; 5 TABLET ORAL at 08:11

## 2023-11-14 RX ADMIN — IPRATROPIUM BROMIDE AND ALBUTEROL SULFATE 3 ML: 2.5; .5 SOLUTION RESPIRATORY (INHALATION) at 01:11

## 2023-11-14 RX ADMIN — HYDROCODONE BITARTRATE AND ACETAMINOPHEN 1 TABLET: 10; 325 TABLET ORAL at 03:11

## 2023-11-14 RX ADMIN — APIXABAN 2.5 MG: 2.5 TABLET, FILM COATED ORAL at 08:11

## 2023-11-14 RX ADMIN — BUSPIRONE HYDROCHLORIDE 10 MG: 10 TABLET ORAL at 08:11

## 2023-11-14 RX ADMIN — PHENYTOIN SODIUM 100 MG: 100 CAPSULE ORAL at 08:11

## 2023-11-14 RX ADMIN — GABAPENTIN 300 MG: 300 CAPSULE ORAL at 03:11

## 2023-11-14 RX ADMIN — CLOPIDOGREL BISULFATE 75 MG: 75 TABLET ORAL at 08:11

## 2023-11-14 RX ADMIN — FUROSEMIDE 40 MG: 40 TABLET ORAL at 04:11

## 2023-11-14 RX ADMIN — CETIRIZINE HYDROCHLORIDE 10 MG: 10 TABLET, FILM COATED ORAL at 08:11

## 2023-11-14 RX ADMIN — PAROXETINE HYDROCHLORIDE 40 MG: 20 TABLET, FILM COATED ORAL at 08:11

## 2023-11-14 RX ADMIN — PHENYTOIN SODIUM 100 MG: 100 CAPSULE ORAL at 03:11

## 2023-11-14 RX ADMIN — POLYETHYLENE GLYCOL 3350 17 G: 17 POWDER, FOR SOLUTION ORAL at 08:11

## 2023-11-14 RX ADMIN — FLUTICASONE PROPIONATE 100 MCG: 50 SPRAY, METERED NASAL at 08:11

## 2023-11-14 RX ADMIN — PRAVASTATIN SODIUM 40 MG: 40 TABLET ORAL at 08:11

## 2023-11-14 NOTE — PLAN OF CARE
Weekly Swing Bed Note    Patient's sat this morning was 97% on room air. BBS coarse.  Incentive volume is 2000 ml x 10 breaths.

## 2023-11-14 NOTE — PT/OT/SLP PROGRESS
Occupational Therapy   Treatment    Name: Radha Martínez  MRN: [id number]  Admitting Diagnosis:  S/P TKR (total knee replacement), right       Recommendations:     Discharge Recommendations: Low Intensity Therapy  Discharge Equipment Recommendations:  bedside commode  Barriers to discharge:       Assessment:     Radha Martínez is a 73 y.o. female with a medical diagnosis of S/P TKR (total knee replacement), right.  She presents with the following Performance deficits affecting function are weakness, impaired endurance, impaired balance, decreased safety awareness.     Rehab Prognosis:  Good; patient would benefit from acute skilled OT services to address these deficits and reach maximum level of function.       Plan:     Patient to be seen 5 x/week to address the above listed problems via self-care/home management, therapeutic activities, therapeutic exercises  Plan of Care Expires: 11/24/23  Plan of Care Reviewed with: patient, son    Subjective   Patient was sitting up in her recliner chair in therapy gym after PT session upon [facility] arrival. Patient stated she was feeling fine with minimal complaints of pain and was agreeable to participate in todays session.   Patient/Family Comments/goals: to increase UB strength and endurance in order to return home.   Pain/Comfort:  Pain Rating 1: 4/10  Location - Side 1: Right  Location 1: knee    Objective:     Communicated with: nursing staff prior to session.  Patient found up in chair with peripheral IV upon OT entry to room.    General Precautions: Standard, fall    Orthopedic Precautions:RLE weight bearing as tolerated  Braces: N/A  Respiratory Status: Room air    Therapeutic exercises:  Patient completed the following exercises to work on UB strength in order to complete ADLs, bed mobility, and transfers with less assistance.    -Bicep curls: 3 sets of 10 with 2# dumbbell  -Chest press: 3 sets of 10 with 2# dumbbell  -Shoulder flex: 3 sets of 10 with 2#  dumbbell  -Internal/External rotation: 3 sets of 10 with 2# dumbbell  -Rows: 3 sets of 10 with green [employer]aband    Increased time/effort needed to complete each exercise.     Therapeutic activities:  Patient completed 10 minutes on UBE ergometer to work on UB strength and endurance in order to complete ADLs and transfers w/ less A.   Pt completed 7 sit to stand transfers with standy by assistance to work on tricep strength, stand tolerance and endurance in preparation to complete ADLs and transfers from different surfaces such as BSC, chair, etc. with less assistance. Increased time/effort needed.   While standing, Patient stood at cabinet and reached in different planes to retrieve cones/bean bags (simulate cups and plates) and place them in cabinet to simulate retrieving dishes from  and placing them in cabinet and then remove from cabinet into  to increase standing tolerance and endurance for increased independence in self care performance and light household chores upon discharge home. Patient stood 6:42 minutes with independent with assistive device. Increased time/effort needed.    Patient left up in chair with call button in reach and RN notified    GOALS:   Multidisciplinary Problems       Occupational Therapy Goals          Problem: Occupational Therapy    Goal Priority Disciplines Outcome Interventions   Occupational Therapy Goal     OT, PT/OT Ongoing, Progressing    Description: STG:  Pt will perform grooming with I  Pt will bathe self Mod I with setup  Pt will perform UB dressing with I  Pt will perform LB dressing with Min A  Pt will transfer toilet/BSC with Mod I  Pt will perform standing task x 3-4 min with CGA    LTG:  Pt will achieve max level of independence with self care.                        Time Tracking:     OT Date of Treatment: 11/14/23  OT Start Time: 0915  OT Stop Time: 1000  OT Total Time (min): 45 min    Billable Minutes:Therapeutic Activity 25 minutes  Therapeutic  Exercise 20 minutes    OT/BETTYE: BETTYE     Number of BETTYE visits since last OT visit: 4    BETTYE Stevens  11/14/2023  10:42 AM

## 2023-11-14 NOTE — PT/OT/SLP PROGRESS
Physical Therapy Treatment    Patient Name:  Radha Martínez   MRN:  [id number]    Recommendations:     Discharge Recommendations: Low Intensity Therapy  Discharge Equipment Recommendations: shower chair, bedside commode  Barriers to discharge: Decreased caregiver support    Assessment:     Radha Martínez is a 73 y.o. female admitted with a medical diagnosis of S/P TKR (total knee replacement), right.  She presents with the following impairments/functional limitations: weakness, impaired endurance .         Rehab Prognosis: Good; patient would benefit from acute skilled PT services to address these deficits and reach maximum level of function.    Recent Surgery: * No surgery found *      Plan:     During this hospitalization, patient to be seen BID to address the identified rehab impairments via gait training and progress toward the following goals:    Plan of Care Expires:  12/21/23    Subjective     Chief Complaint: pain in R knee  Patient/Family Comments/goals: increased BLE strengthening and endurance  Pain/Comfort:  Pain Rating 1: 6/10  Location - Side 1: Right  Location 1: knee      Objective:     Communicated with pt prior to session.  Patient found supine with   upon PT entry to room.     General Precautions: Standard, fall  Orthopedic Precautions: RLE weight bearing as tolerated  Braces: N/A  Respiratory Status: Room air     Functional Mobility:  Bed Mobility:     Supine to Sit: minimum assistance  Transfers:     Sit to Stand:  contact guard assistance with rolling walker  Bed to Chair: contact guard assistance with  rolling walker  using  Step Transfer  Gait:   Pt ambulated 200+ ft  with FWW with no rest breaks needed today.      AM-PAC 6 CLICK MOBILITY          Treatment & Education:        Pt left up in chair, chair locked, call bell in hand.   GOALS:   Multidisciplinary Problems       Physical Therapy Goals          Problem: Physical Therapy    Goal Priority Disciplines Outcome Goal Variances Interventions    Physical Therapy Goal     PT, PT/OT Ongoing, Progressing     Description: Short Term Goals to be met by: Discharge    Patient will increase functional independence with mobility by performin. Supine to sit with Modified [place]  2. Sit to stand transfer with Modified [place]  3. Bed to chair transfer with Modified [place] using Rolling Walker, WBAT right LE  4. Gait  x 200 feet with Modified [place] using Rolling Walker, WBAT right LE.   5. Lower extremity exercise program x30 reps per handout, with assistance as needed  6. Knee ROM 0-110                         Time Tracking:     PT Received On: 23  PT Start Time: 1205     PT Stop Time: 1220  PT Total Time (min): 15 min     Billable Minutes: Gait training 15    Treatment Type: Treatment  PT/PTA: PTA     Number of PTA visits since last PT visit: 3     2023

## 2023-11-14 NOTE — PROGRESS NOTES
Wt: 201# wt down ~9# from initial visit.  Pt is receiving Lasix 40mg BID.  No problems noted with healing.  Meal intake ~60%.  Pt denies complaints or requests.  Biotene is working well.  Crt 1.03, Hgb 9.2, 30.1.  Last BM 11/13.  Continue to follow.

## 2023-11-14 NOTE — PT/OT/SLP PROGRESS
Physical Therapy Treatment    Patient Name:  Radha Martínez   MRN:  [id number]    Recommendations:     Discharge Recommendations: Low Intensity Therapy  Discharge Equipment Recommendations: shower chair, bedside commode  Barriers to discharge: Decreased caregiver support    Assessment:     Radha Martínez is a 73 y.o. female admitted with a medical diagnosis of S/P TKR (total knee replacement), right.  She presents with the following impairments/functional limitations: weakness, impaired endurance .         Rehab Prognosis: Good; patient would benefit from acute skilled PT services to address these deficits and reach maximum level of function.    Recent Surgery: * No surgery found *      Plan:     During this hospitalization, patient to be seen BID to address the identified rehab impairments via gait training, therapeutic exercises and progress toward the following goals:    Plan of Care Expires:  12/21/23    Subjective     Chief Complaint: pain in R knee  Patient/Family Comments/goals: increased BLE strengthening and endurance  Pain/Comfort:  Pain Rating 1: 6/10  Location - Side 1: Right  Location 1: knee      Objective:     Communicated with pt prior to session.  Patient found supine with   upon PT entry to room.     General Precautions: Standard, fall  Orthopedic Precautions: RLE weight bearing as tolerated  Braces: N/A  Respiratory Status: Room air     Functional Mobility:  Bed Mobility:     Supine to Sit: minimum assistance  Transfers:     Sit to Stand:  contact guard assistance with rolling walker  Bed to Chair: contact guard assistance with  rolling walker  using  Step Transfer  Gait:   Pt progressed to 350+ ft SBA with FWW with no rest breaks needed today.      AM-PAC 6 CLICK MOBILITY          Treatment & Education:  Pt competed x 10 min seated LE bike to promote increased R knee ROM, PTA cues for sitting posture, cues for full knee flexion and extension in R knee  Pt performed 3x10 of the following with  2#  LAQs  Marches  HS curls with TB  R HS stretches  R quad sets    Pt performed supine/sit SBA, sit/stand x 3 SBA with FWW, bed/chair SBA with FWW     Patient left up in chair with  BURTON present..    GOALS:   Multidisciplinary Problems       Physical Therapy Goals          Problem: Physical Therapy    Goal Priority Disciplines Outcome Goal Variances Interventions   Physical Therapy Goal     PT, PT/OT Ongoing, Progressing     Description: Short Term Goals to be met by: Discharge    Patient will increase functional independence with mobility by performin. Supine to sit with Modified [place]  2. Sit to stand transfer with Modified [place]  3. Bed to chair transfer with Modified [place] using Rolling Walker, WBAT right LE  4. Gait  x 200 feet with Modified [place] using Rolling Walker, WBAT right LE.   5. Lower extremity exercise program x30 reps per handout, with assistance as needed  6. Knee ROM 0-110                         Time Tracking:     PT Received On: 23  PT Start Time: 0830     PT Stop Time: 0915  PT Total Time (min): 45 min     Billable Minutes: Gait Training 15 and Therapeutic Exercise 30    Treatment Type: Treatment  PT/PTA: PTA     Number of PTA visits since last PT visit: 2     2023

## 2023-11-15 VITALS
DIASTOLIC BLOOD PRESSURE: 65 MMHG | TEMPERATURE: 98 F | HEIGHT: 64 IN | HEART RATE: 75 BPM | BODY MASS INDEX: 34.34 KG/M2 | WEIGHT: 201.13 LBS | RESPIRATION RATE: 18 BRPM | SYSTOLIC BLOOD PRESSURE: 116 MMHG | OXYGEN SATURATION: 96 %

## 2023-11-15 PROBLEM — D50.8 IRON DEFICIENCY ANEMIA SECONDARY TO INADEQUATE DIETARY IRON INTAKE: Status: ACTIVE | Noted: 2023-11-15

## 2023-11-15 LAB
BASOPHILS # BLD AUTO: 0.01 K/UL (ref 0–0.2)
BASOPHILS NFR BLD AUTO: 0.2 % (ref 0–1)
EOSINOPHIL # BLD AUTO: 0.17 K/UL (ref 0–0.5)
EOSINOPHIL NFR BLD AUTO: 3.6 % (ref 1–4)
ERYTHROCYTE [DISTWIDTH] IN BLOOD BY AUTOMATED COUNT: 13.7 % (ref 11.5–14.5)
FERRITIN SERPL-MCNC: 127 NG/ML (ref 8–252)
HCT VFR BLD AUTO: 33.2 % (ref 38–47)
HGB BLD-MCNC: 10.1 G/DL (ref 12–16)
IRON SATN MFR SERPL: 8 % (ref 14–50)
IRON SERPL-MCNC: 25 ΜG/DL (ref 50–170)
LYMPHOCYTES # BLD AUTO: 1.4 K/UL (ref 1–4.8)
LYMPHOCYTES NFR BLD AUTO: 29.5 % (ref 27–41)
MCH RBC QN AUTO: 28.7 PG (ref 27–31)
MCHC RBC AUTO-ENTMCNC: 30.4 G/DL (ref 32–36)
MCV RBC AUTO: 94.3 FL (ref 80–96)
MONOCYTES # BLD AUTO: 0.31 K/UL (ref 0–0.8)
MONOCYTES NFR BLD AUTO: 6.5 % (ref 2–6)
MPC BLD CALC-MCNC: 9.7 FL (ref 9.4–12.4)
NEUTROPHILS # BLD AUTO: 2.86 K/UL (ref 1.8–7.7)
NEUTROPHILS NFR BLD AUTO: 60.2 % (ref 53–65)
PLATELET # BLD AUTO: 440 K/UL (ref 150–400)
RBC # BLD AUTO: 3.52 M/UL (ref 4.2–5.4)
TIBC SERPL-MCNC: 296 ΜG/DL (ref 250–450)
WBC # BLD AUTO: 4.75 K/UL (ref 4.5–11)

## 2023-11-15 PROCEDURE — 83550 IRON BINDING TEST: CPT | Performed by: FAMILY MEDICINE

## 2023-11-15 PROCEDURE — 25000003 PHARM REV CODE 250: Performed by: REGISTERED NURSE

## 2023-11-15 PROCEDURE — 94761 N-INVAS EAR/PLS OXIMETRY MLT: CPT

## 2023-11-15 PROCEDURE — 99316 PR NURSING FAC DISCHRGE DAY,MORE 30 MIN: ICD-10-PCS | Mod: ,,, | Performed by: FAMILY MEDICINE

## 2023-11-15 PROCEDURE — 25000003 PHARM REV CODE 250: Performed by: FAMILY MEDICINE

## 2023-11-15 PROCEDURE — 83540 ASSAY OF IRON: CPT | Performed by: FAMILY MEDICINE

## 2023-11-15 PROCEDURE — 25000242 PHARM REV CODE 250 ALT 637 W/ HCPCS: Performed by: REGISTERED NURSE

## 2023-11-15 PROCEDURE — 25000003 PHARM REV CODE 250: Performed by: NURSE PRACTITIONER

## 2023-11-15 PROCEDURE — 85025 COMPLETE CBC W/AUTO DIFF WBC: CPT | Performed by: FAMILY MEDICINE

## 2023-11-15 PROCEDURE — 94640 AIRWAY INHALATION TREATMENT: CPT

## 2023-11-15 PROCEDURE — 99316 NF DSCHRG MGMT 30 MIN+: CPT | Mod: ,,, | Performed by: FAMILY MEDICINE

## 2023-11-15 PROCEDURE — 99900035 HC TECH TIME PER 15 MIN (STAT)

## 2023-11-15 PROCEDURE — 25000003 PHARM REV CODE 250: Performed by: HOSPITALIST

## 2023-11-15 PROCEDURE — 82728 ASSAY OF FERRITIN: CPT | Performed by: FAMILY MEDICINE

## 2023-11-15 RX ORDER — ASPIRIN 325 MG
325 TABLET ORAL DAILY
Qty: 100 TABLET | Refills: 5 | Status: SHIPPED | OUTPATIENT
Start: 2023-11-16 | End: 2024-11-15

## 2023-11-15 RX ORDER — FERROUS SULFATE 325(65) MG
325 TABLET, DELAYED RELEASE (ENTERIC COATED) ORAL DAILY
Qty: 90 TABLET | Refills: 1 | Status: SHIPPED | OUTPATIENT
Start: 2023-11-15

## 2023-11-15 RX ORDER — LANOLIN ALCOHOL/MO/W.PET/CERES
1 CREAM (GRAM) TOPICAL DAILY
Status: DISCONTINUED | OUTPATIENT
Start: 2023-11-15 | End: 2023-11-15 | Stop reason: HOSPADM

## 2023-11-15 RX ORDER — HYDROCODONE BITARTRATE AND ACETAMINOPHEN 10; 325 MG/1; MG/1
1 TABLET ORAL EVERY 6 HOURS PRN
Qty: 20 TABLET | Refills: 0 | Status: SHIPPED | OUTPATIENT
Start: 2023-11-15 | End: 2024-03-28

## 2023-11-15 RX ADMIN — CALCIUM CARBONATE 600 MG (1,500 MG)-VITAMIN D3 400 UNIT TABLET 1 TABLET: at 08:11

## 2023-11-15 RX ADMIN — FLUTICASONE PROPIONATE 100 MCG: 50 SPRAY, METERED NASAL at 08:11

## 2023-11-15 RX ADMIN — PAROXETINE HYDROCHLORIDE 40 MG: 20 TABLET, FILM COATED ORAL at 08:11

## 2023-11-15 RX ADMIN — FERROUS SULFATE TAB 325 MG (65 MG ELEMENTAL FE) 1 EACH: 325 (65 FE) TAB at 08:11

## 2023-11-15 RX ADMIN — GABAPENTIN 300 MG: 300 CAPSULE ORAL at 08:11

## 2023-11-15 RX ADMIN — IPRATROPIUM BROMIDE AND ALBUTEROL SULFATE 3 ML: 2.5; .5 SOLUTION RESPIRATORY (INHALATION) at 06:11

## 2023-11-15 RX ADMIN — PANTOPRAZOLE SODIUM 40 MG: 40 TABLET, DELAYED RELEASE ORAL at 08:11

## 2023-11-15 RX ADMIN — HYDROCODONE BITARTRATE AND ACETAMINOPHEN 1 TABLET: 10; 325 TABLET ORAL at 08:11

## 2023-11-15 RX ADMIN — SENNOSIDES AND DOCUSATE SODIUM 1 TABLET: 8.6; 5 TABLET ORAL at 08:11

## 2023-11-15 RX ADMIN — CETIRIZINE HYDROCHLORIDE 10 MG: 10 TABLET, FILM COATED ORAL at 08:11

## 2023-11-15 RX ADMIN — PHENYTOIN SODIUM 100 MG: 100 CAPSULE ORAL at 08:11

## 2023-11-15 RX ADMIN — METOPROLOL TARTRATE 25 MG: 25 TABLET, FILM COATED ORAL at 08:11

## 2023-11-15 RX ADMIN — IPRATROPIUM BROMIDE AND ALBUTEROL SULFATE 3 ML: 2.5; .5 SOLUTION RESPIRATORY (INHALATION) at 12:11

## 2023-11-15 RX ADMIN — BUSPIRONE HYDROCHLORIDE 10 MG: 10 TABLET ORAL at 08:11

## 2023-11-15 RX ADMIN — CLOPIDOGREL BISULFATE 75 MG: 75 TABLET ORAL at 08:11

## 2023-11-15 RX ADMIN — MONTELUKAST SODIUM 10 MG: 10 TABLET, COATED ORAL at 08:11

## 2023-11-15 RX ADMIN — APIXABAN 2.5 MG: 2.5 TABLET, FILM COATED ORAL at 08:11

## 2023-11-15 RX ADMIN — FUROSEMIDE 40 MG: 40 TABLET ORAL at 08:11

## 2023-11-15 NOTE — DISCHARGE INSTRUCTIONS
Discharge instructions along with medications and follow up appointments reviewed with patient and family. Verbalized understanding.

## 2023-11-15 NOTE — ASSESSMENT & PLAN NOTE
Developed bronchitis in the hospital with a cough for about a week.  Will start her on a Zithromax Dosepak.  Follow-up with the primary care doctor.  Discharged with a prescription.

## 2023-11-15 NOTE — DISCHARGE SUMMARY
"[facility] - Medical Surgical Unit  Hospital Medicine  Discharge Summary      Patient Name: Radha Martínez  MRN: [id number]  [facility]: [id number]  Patient Class: IP- Swing  Admission Date: 11/3/2023  Hospital Length of Stay: 12 days  Discharge Date and Time:  11/15/2023 10:58 AM  Attending Physician: Sander Banks DO   Discharging Provider: Nick Valera DO  Primary Care Provider: Jayy Castillo MD    Primary Care Team: Networked reference to record PCT     HPI:   Radha Martínez (Marie) is a 72 yo WF that presents to [facility] Swing Bed service in [place], [place] from [facility] in [place], [place] after elective total right knee replacement performed on 11-2-23 by Dr. Jayy Castillo.    Mrs. Martínez has radiology studies that reveal generalized moderate osteopenia, severe tricompartmental DJD of right knee with complete obliteration of medial joint space in genu varum, and progressive pain.  With her last corticosteroid injection, there was no improvement of her pain.  She had a total left knee replacement 6- that was successful in relieving her pain, and she desired to have the same procedure performed on her right knee.  Patient was treated prophylactically with IV cefazolin and vancomycin.  Mrs. Martínez requested swing bed placement for post-op PT/OT evaluation and treatment.  She has a history of COPD and uses home oxygen at 2L NC prn.    Wound care instructions include applying Cool Jet to operative extremity, hemovac to be removed on post op day #2 with a dressing change on post op day #3, repeat dressing change in 7 days, and staple removal in 2 weeks with steri strip application.  Detailed wound care instructions in miscellaneous nursing orders.    Dr. Castillo's discharge instructions included administration of Eliquis 2.5 mg po BID for 12 days then begin aspirin 325 mg po daily.    Full Code    PMH:  Paroxysmal atrial fibrillation, HTN, depression, " asthma, CHF, dyspnea on exertion, osteoporosis, oxygen dependent, severe obesity with comorbidity, seizure, HLD, COPD, and GERD    PSH:  Left total knee replacement, cardioversion, colonoscopy, eye surgery, revision of total knee replacement, and tubal ligation    Right knee replacement 3 days ago with minimal pain except when ambulating.    * No surgery found *      Hospital Course:   11--patient with they knee replacement on right side in his done well.  She is well almost 300 ft.  She is had take care of herself.  Hemoglobin on discharge was 10.1 which is slightly improved with normal electrolytes with creatinine only minimally elevated at 1.03.  Physical exam today is unremarkable.  She does have a slight cough and so will place her on Zithromax Dosepak for home.  Diagnosis for that would be a bronchitis.  Follow-up with primary care doctor in 1 week and Orthopedics tomorrow.  Ambulate with physical and occupational therapy using a walker.  No uneven surfaces for ambulation.  Resume her home medications.  Patient discharged in stable condition.     Goals of Care Treatment Preferences:  Code Status: Full Code      Consults:   Consults (From admission, onward)          Status Ordering Provider     Inpatient consult to Registered Dietitian/Nutritionist  Once        Provider:  (Not yet assigned)    Completed ANGIE GONZALEZ     IP consult to case management  Once        Provider:  (Not yet assigned)    Acknowledged ANGIE GONZALEZ            Pulmonary  Bronchitis  Developed bronchitis in the hospital with a cough for about a week.  Will start her on a Zithromax Dosepak.  Follow-up with the primary care doctor.  Discharged with a prescription.      Asthma  Will continue incentive spirometry but she has a cough which is new.  We did start her on a Zithromax Dosepak as an outpatient.  Continue her albuterol treatments.      Cardiac/Vascular  HTN (hypertension)  Hypertension currently well controlled on  Bystolic.  Heart failure well controlled without Ace inhibitor.    Renal/  Chronic kidney disease, stage 3a  Creatine stable for now. BMP reviewed- noted Estimated Creatinine Clearance: 54 mL/min (A) (based on SCr of 1.03 mg/dL (H)). according to latest data. Based on current GFR, CKD stage is stage 3 - GFR 30-59.  Monitor UOP and serial BMP and adjust therapy as needed. Renally dose meds. Avoid nephrotoxic medications and procedures.    Oncology  Iron deficiency anemia secondary to inadequate dietary iron intake  Patient with a mild anemia with hemoglobin of 10.1.  Did draw a serum iron TIBC and ferritin was not back at the time of her discharge.  Will start her on ferrous sulfate 325 once daily and she is to follow with the primary care doctor to determine if she needs to continue that medicine.      Orthopedic  * S/P TKR (total knee replacement), right  Patient has progressed with rehab in physical therapy to walking over 300 ft.  She is ambulating with a walker.  Discharged home to continue PT and OT with home health.      Other  PACO on CPAP  Continue use of CPAP at home        Final Active Diagnoses:    Diagnosis Date Noted POA    PRINCIPAL PROBLEM:  S/P TKR (total knee replacement), right [Z96.651] 09/07/2023 Not Applicable    Iron deficiency anemia secondary to inadequate dietary iron intake [D50.8] 11/15/2023 Yes    Bronchitis [J40] 09/14/2023 No    HTN (hypertension) [I10] 11/07/2022 Yes    PACO on CPAP [G47.33] 11/07/2022 Yes    Chronic kidney disease, stage 3a [N18.31] 09/01/2022 Yes    Chronic diastolic heart failure [I50.32] 09/08/2021 Yes     Chronic    Asthma [J45.909]  Yes      Problems Resolved During this Admission:       Discharged Condition: stable    Disposition: Home or Self Care    Follow Up:   Follow-up Information       Jayy Castillo MD Follow up.    Specialty: Orthopedic Surgery  Contact information:  [address]  [address]  [facility] [place]  "[id number]  [phone]                           Patient Instructions:      Ambulatory referral/consult to Home Health   Standing Status: Future   Referral Priority: Routine Referral Type: Home Health   Referral Reason: Specialty Services Required   Requested Specialty: Home Health Services   Number of Visits Requested: 1     Diet Adult Regular     Activity as tolerated       Significant Diagnostic Studies: Labs: BMP: No results for input(s): "GLU", "NA", "K", "CL", "CO2", "BUN", "CREATININE", "CALCIUM", "MG" in the last 48 hours., CMP No results for input(s): "NA", "K", "CL", "CO2", "GLU", "BUN", "CREATININE", "CALCIUM", "PROT", "ALBUMIN", "BILITOT", "ALKPHOS", "AST", "ALT", "ANIONGAP", "ESTGFRAFRICA", "EGFRNONAA" in the last 48 hours., CBC   Recent Labs   Lab 11/15/23  0820   WBC 4.75   HGB 10.1*   HCT 33.2*   *   , Lipid Panel   Lab Results   Component Value Date    CHOL 222 (H) 09/14/2023    HDL 93 (H) 09/14/2023    LDLCALC 111 09/14/2023    TRIG 89 09/14/2023    CHOLHDL 2.4 09/14/2023   , and All labs within the past 24 hours have been reviewed  Radiology: X-Ray: CXR: X-Ray Chest 1 View (CXR): No results found for this visit on 11/03/23. Knee x rays show hardware in place    Pending Diagnostic Studies:       Procedure Component Value Units Date/Time    Ferritin [[id number]] Collected: 11/15/23 0820    Order Status: Sent Lab Status: In process Updated: 11/15/23 0835    Specimen: Blood     Iron and TIBC [[id number]] Collected: 11/15/23 0820    Order Status: Sent Lab Status: In process Updated: 11/15/23 0835    Specimen: Blood            Medications:  Reconciled Home Medications:      Medication List        START taking these medications      aspirin 325 MG tablet  Take 1 tablet (325 mg total) by mouth once daily.  Start taking on: November 16, 2023     ferrous sulfate 325 (65 FE) MG EC tablet  Take 1 tablet (325 mg total) by mouth once daily.            CHANGE how you take these medications      fluticasone " propionate 50 mcg/actuation nasal spray  Commonly known as: FLONASE  USE 1 SPRAY IN EACH NOSTRIL EVERY DAY  What changed:   how to take this  when to take this     * HYDROcodone-acetaminophen  mg per tablet  Commonly known as: NORCO  Take 1 tablet by mouth every 4 (four) hours as needed for Pain.  What changed: Another medication with the same name was added. Make sure you understand how and when to take each.     * HYDROcodone-acetaminophen  mg per tablet  Commonly known as: NORCO  Take 1 tablet by mouth every 6 (six) hours as needed for Pain.  What changed: You were already taking a medication with the same name, and this prescription was added. Make sure you understand how and when to take each.           * This list has 2 medication(s) that are the same as other medications prescribed for you. Read the directions carefully, and ask your doctor or other care provider to review them with you.                CONTINUE taking these medications      albuterol-ipratropium 2.5 mg-0.5 mg/3 mL nebulizer solution  Commonly known as: DUO-NEB  Take 3 mLs by nebulization every 6 (six) hours as needed for Wheezing. Rescue     busPIRone 10 MG tablet  Commonly known as: BUSPAR  Take 1 tablet (10 mg total) by mouth 2 (two) times daily.     CALCARB 600 WITH VITAMIN D ORAL  Take by mouth.     clopidogreL 75 mg tablet  Commonly known as: PLAVIX  TAKE 1 TABLET ONE TIME DAILY     furosemide 40 MG tablet  Commonly known as: LASIX  Take 1 tablet (40 mg total) by mouth 2 (two) times daily.     gabapentin 300 MG capsule  Commonly known as: NEURONTIN  Take 1 capsule (300 mg total) by mouth 3 (three) times daily.     ipratropium 42 mcg (0.06 %) nasal spray  Commonly known as: ATROVENT  2 sprays by Each Nostril route 4 (four) times daily.     loratadine 10 mg tablet  Commonly known as: CLARITIN  TAKE 1 TABLET EVERY DAY     meclizine 12.5 mg tablet  Commonly known as: ANTIVERT  Take 1 tablet (12.5 mg total) by mouth 2 (two) times  daily as needed for Dizziness.     montelukast 10 mg tablet  Commonly known as: SINGULAIR  Take 1 tablet (10 mg total) by mouth once daily.     nebivoloL 5 MG Tab  Commonly known as: BYSTOLIC  Take 1 tablet (5 mg total) by mouth once daily.     pantoprazole 40 MG tablet  Commonly known as: PROTONIX  TAKE 1 TABLET ONE TIME DAILY     paroxetine 40 MG tablet  Commonly known as: PAXIL  Take 1 tablet (40 mg total) by mouth once daily.     phenytoin 100 MG ER capsule  Commonly known as: DILANTIN  Take 1 capsule (100 mg total) by mouth 3 (three) times daily.     pravastatin 40 MG tablet  Commonly known as: PRAVACHOL  Take 1 tablet (40 mg total) by mouth every evening.     TRELEGY ELLIPTA 200-62.5-25 mcg inhaler  Generic drug: fluticasone-umeclidin-vilanter  Inhale 1 puff into the lungs once daily.            STOP taking these medications      albuterol 90 mcg/actuation inhaler  Commonly known as: PROVENTIL/VENTOLIN HFA              Indwelling Lines/Drains at time of discharge:   Lines/Drains/Airways       None                   Time spent on the discharge of patient: 35minutes         Nick Valera DO  Department of Hospital Medicine  [facility]

## 2023-11-15 NOTE — ASSESSMENT & PLAN NOTE
Patient with a mild anemia with hemoglobin of 10.1.  Did draw a serum iron TIBC and ferritin was not back at the time of her discharge.  Will start her on ferrous sulfate 325 once daily and she is to follow with the primary care doctor to determine if she needs to continue that medicine.

## 2023-11-15 NOTE — CARE UPDATE
[facility] Surgical Unit - Swing Bed   Interdisciplinary Team Meeting    Patient: Radha Martínez   Today's Date: 11/15/2023   Estimated D/C Date: 11/15/2023        Physician: Nick Valera DO Nurse Practitioner:  VANESSA   Pharmacy:  [facility] Unit Director: Annabelle Fletcher   : Nano Velazquez Physical/Occupational Therapy: Patrick Crocker   Speech Therapy:  Regi Talbot Activity Therapy: VANESSA   Nursing: Nano Velazquez  Respiratory: Missy Lomeli Dietary: Mauricio Gallo  Other: NA     Nurse  New Symptoms/Problems: NA  Last Bowel Movement: 11/13/23   Urine: continent  Ann: No  Bowel: continent   Constipated: No  Diarrhea: No   Isolation: No  Wound Care: No  Wound Location/Tx: NA  Cognition:  WNL  Aspiration Precautions: No  Comment(s): NA    Respiratory   O2 Device: Room Air  O2 Flow: NA  SpO2: 98%  Neb Tx: No  Comment(s): VANESSA     Dietary  Nutrition: Regular  Comment(s): 60% INTAKE     Speech Therapy  Speech/Swallowing: No current speech or swallowing issues  Comment(s): VANESSA    Physical Therapy  Gait/Assistive Device: 300FT SBA FWW ELOS: Plan to DC 11/15/2023    Transfers: Independent  Bed Mobility: Independent Range of Motion/Restrictions: WBAT  Comment(s): VANESSA     Occupational Therapy  Eating/Grooming: Independent Toileting: Independent   Bathing: Independent Dressing (Upper Body): Independent   Dressing (Lower Body): Independent Comment(s): VANESSA     Pharmacy  Medication Changes (see all MD orders in chart): No  Labs Reviewed: Yes  New Lab Orders: No  Comment(s): VANESSA      Tx Plan/Recommendations reviewed with family and/or patient on (date) 11/14/23.  Additional family Conference/Training: VANESSA  D/C Plan/Recommendations: HOME WITH FAMILY AND HOME ACCENTCARE  ALEX: 11/15/027895/15/2023  Comment(s): NOMAC SIGNED AND PT REQUESTED TO GO HOME TODAY AND REMINDED OF F/U WITH ORTHO TOMORROW

## 2023-11-15 NOTE — ASSESSMENT & PLAN NOTE
Creatine stable for now. BMP reviewed- noted Estimated Creatinine Clearance: 54 mL/min (A) (based on SCr of 1.03 mg/dL (H)). according to latest data. Based on current GFR, CKD stage is stage 3 - GFR 30-59.  Monitor UOP and serial BMP and adjust therapy as needed. Renally dose meds. Avoid nephrotoxic medications and procedures.

## 2023-11-15 NOTE — PLAN OF CARE
Care plan met  Problem: Adult Inpatient Plan of Care  Goal: Plan of Care Review  Outcome: Met  Goal: Patient-Specific Goal (Individualized)  Outcome: Met  Goal: Absence of Hospital-Acquired Illness or Injury  Outcome: Met  Goal: Optimal Comfort and Wellbeing  Outcome: Met  Goal: Readiness for Transition of Care  Outcome: Met     Problem: Fall Injury Risk  Goal: Absence of Fall and Fall-Related Injury  Outcome: Met     Problem: Pain Acute  Goal: Acceptable Pain Control and Functional Ability  Outcome: Met     Problem: Infection  Goal: Absence of Infection Signs and Symptoms  Outcome: Met     Problem: Skin Injury Risk Increased  Goal: Skin Health and Integrity  Outcome: Met     Problem: Breathing Pattern Ineffective  Goal: Effective Breathing Pattern  Outcome: Met     Problem: Gas Exchange Impaired  Goal: Optimal Gas Exchange  Outcome: Met

## 2023-11-15 NOTE — NURSING
Discharge instructions including medications and follow up appointments reviewed with patient and family. Both verbalized understanding.

## 2023-11-15 NOTE — ASSESSMENT & PLAN NOTE
Patient has progressed with rehab in physical therapy to walking over 300 ft.  She is ambulating with a walker.  Discharged home to continue PT and OT with home health.

## 2023-11-15 NOTE — ASSESSMENT & PLAN NOTE
Hypertension currently well controlled on Bystolic.  Heart failure well controlled without Ace inhibitor.

## 2023-11-15 NOTE — PLAN OF CARE
Patient was 98% on room air. Lung sounds are clear. No shortness of breath noticed. Patient obtained 1500 on her IS.

## 2023-11-15 NOTE — ASSESSMENT & PLAN NOTE
Will continue incentive spirometry but she has a cough which is new.  We did start her on a Zithromax Dosepak as an outpatient.  Continue her albuterol treatments.

## 2023-11-16 ENCOUNTER — OFFICE VISIT (OUTPATIENT)
Dept: ORTHOPEDICS | Facility: CLINIC | Age: 73
End: 2023-11-16
Payer: MEDICARE

## 2023-11-16 ENCOUNTER — PATIENT OUTREACH (OUTPATIENT)
Dept: ADMINISTRATIVE | Facility: CLINIC | Age: 73
End: 2023-11-16

## 2023-11-16 VITALS
RESPIRATION RATE: 16 BRPM | HEART RATE: 67 BPM | WEIGHT: 201 LBS | OXYGEN SATURATION: 100 % | HEIGHT: 64 IN | TEMPERATURE: 98 F | BODY MASS INDEX: 34.31 KG/M2

## 2023-11-16 DIAGNOSIS — Z96.651 STATUS POST TOTAL RIGHT KNEE REPLACEMENT: Primary | ICD-10-CM

## 2023-11-16 PROBLEM — Z95.818 PRESENCE OF WATCHMAN LEFT ATRIAL APPENDAGE CLOSURE DEVICE: Status: ACTIVE | Noted: 2022-10-10

## 2023-11-16 PROBLEM — Z86.79 S/P ABLATION OPERATION FOR ARRHYTHMIA: Status: ACTIVE | Noted: 2022-08-10

## 2023-11-16 PROBLEM — Z98.890 S/P ABLATION OPERATION FOR ARRHYTHMIA: Status: ACTIVE | Noted: 2022-08-10

## 2023-11-16 PROCEDURE — 1159F MED LIST DOCD IN RCRD: CPT | Mod: CPTII,,, | Performed by: NURSE PRACTITIONER

## 2023-11-16 PROCEDURE — 99215 OFFICE O/P EST HI 40 MIN: CPT | Mod: PBBFAC | Performed by: NURSE PRACTITIONER

## 2023-11-16 PROCEDURE — 99024 POSTOP FOLLOW-UP VISIT: CPT | Mod: ,,, | Performed by: NURSE PRACTITIONER

## 2023-11-16 PROCEDURE — 1159F PR MEDICATION LIST DOCUMENTED IN MEDICAL RECORD: ICD-10-PCS | Mod: CPTII,,, | Performed by: NURSE PRACTITIONER

## 2023-11-16 PROCEDURE — 1160F PR REVIEW ALL MEDS BY PRESCRIBER/CLIN PHARMACIST DOCUMENTED: ICD-10-PCS | Mod: CPTII,,, | Performed by: NURSE PRACTITIONER

## 2023-11-16 PROCEDURE — 1160F RVW MEDS BY RX/DR IN RCRD: CPT | Mod: CPTII,,, | Performed by: NURSE PRACTITIONER

## 2023-11-16 PROCEDURE — 99024 PR POST-OP FOLLOW-UP VISIT: ICD-10-PCS | Mod: ,,, | Performed by: NURSE PRACTITIONER

## 2023-11-16 NOTE — PROGRESS NOTES
C3 nurse attempted to contact Radha Mauricio  for a TCC post hospital discharge follow up call. No answer. No voicemail available.The patient does not have a scheduled HOSFU appointment. Provider listed is Patient's orthopedic Surgeon not pcp.   No pcp is listed unavailable to route to provider at this time.

## 2023-11-16 NOTE — PATIENT INSTRUCTIONS
Safety guidelines and activity restrictions are discussed with the patient.  She verbalizes understanding.  Skin staples are removed Steri-Strips applied.  She will continue ROBINSON hose for additional 2 weeks removing 1 hour b.i.d. then reapply.  May discontinue after 2 weeks.  Continue ice elevation rest.  Continue aspirin 325 mg p.o. daily for an additional 4 weeks.  Once she completes 4 weeks she may resume her 81 mg p.o. daily.  Return orthopedic clinic in 4 weeks follow-up Dr. Castillo with x-rays of the right knee or sooner as needed.

## 2023-11-16 NOTE — PROGRESS NOTES
73-year-old female presents in wheelchair re-evaluation of her right knee.  She is known to orthopedic clinic having undergone right total knee replacement arthroplasty on 11/02/2023.  She was in swing bed setting up until 11/15/2023.  She reports normal bowel patterns.  She denies weakness or dizziness.  She denies chest discomfort shortness of breath.  She denies fever, chills or any sign or symptom of infection.  She reports she was completed her Eliquis she takes aspirin 325 mg p.o. daily.    PE:  Physical exam she is in wheelchair this time.  Bilateral ROBINSON hose noted.  Hose lowered over the right knee.  Aquacel dressing noted.  Dressing removed.  Surgical sites healing well without sign or symptom of infection.  Skin staples are noted.  There is moderate soft tissue swelling which to be expected.  There is no intra-articular effusion appreciated clinically.  Range of motion right knee lacks approximately 5° full extension with further flexion to approximately 90°.  There is excellent ligamentous balance instability noted clinically.    Impression:  2 weeks following right total knee replacement arthroplasty     Plan:  Safety guidelines and activity restrictions are discussed with the patient.  She verbalizes understanding.  Skin staples are removed Steri-Strips applied.  She will continue ROBINSON hose for additional 2 weeks removing 1 hour b.i.d. then reapply.  May discontinue after 2 weeks.  Continue ice elevation rest.  Continue aspirin 325 mg p.o. daily for an additional 4 weeks.  Once she completes 4 weeks she may resume her 81 mg p.o. daily.  Return orthopedic clinic in 4 weeks follow-up Dr. Castillo with x-rays of the right knee or sooner as needed.

## 2023-11-18 PROCEDURE — G0180 PR HOME HEALTH MD CERTIFICATION: ICD-10-PCS | Mod: ,,, | Performed by: ORTHOPAEDIC SURGERY

## 2023-11-18 PROCEDURE — G0180 MD CERTIFICATION HHA PATIENT: HCPCS | Mod: ,,, | Performed by: ORTHOPAEDIC SURGERY

## 2023-12-04 ENCOUNTER — OFFICE VISIT (OUTPATIENT)
Dept: FAMILY MEDICINE | Facility: CLINIC | Age: 73
End: 2023-12-04
Payer: MEDICARE

## 2023-12-04 VITALS
OXYGEN SATURATION: 97 % | RESPIRATION RATE: 18 BRPM | BODY MASS INDEX: 34.5 KG/M2 | TEMPERATURE: 98 F | SYSTOLIC BLOOD PRESSURE: 118 MMHG | DIASTOLIC BLOOD PRESSURE: 65 MMHG | HEIGHT: 64 IN | HEART RATE: 95 BPM

## 2023-12-04 DIAGNOSIS — E78.5 HYPERLIPIDEMIA, UNSPECIFIED HYPERLIPIDEMIA TYPE: ICD-10-CM

## 2023-12-04 DIAGNOSIS — R30.0 BURNING WITH URINATION: ICD-10-CM

## 2023-12-04 DIAGNOSIS — N30.00 ACUTE CYSTITIS WITHOUT HEMATURIA: Primary | ICD-10-CM

## 2023-12-04 LAB
BASOPHILS # BLD AUTO: 0.06 K/UL (ref 0–0.2)
BASOPHILS NFR BLD AUTO: 0.9 % (ref 0–1)
BILIRUB SERPL-MCNC: ABNORMAL MG/DL
BLOOD URINE, POC: ABNORMAL
CHOLEST SERPL-MCNC: 204 MG/DL (ref 0–200)
CHOLEST/HDLC SERPL: 2.3 {RATIO}
COLOR, POC UA: YELLOW
DIFFERENTIAL METHOD BLD: ABNORMAL
EOSINOPHIL # BLD AUTO: 0.1 K/UL (ref 0–0.5)
EOSINOPHIL NFR BLD AUTO: 1.5 % (ref 1–4)
ERYTHROCYTE [DISTWIDTH] IN BLOOD BY AUTOMATED COUNT: 13.2 % (ref 11.5–14.5)
GLUCOSE UR QL STRIP: ABNORMAL
HCT VFR BLD AUTO: 39.9 % (ref 38–47)
HDLC SERPL-MCNC: 89 MG/DL (ref 40–60)
HGB BLD-MCNC: 12.4 G/DL (ref 12–16)
IMM GRANULOCYTES # BLD AUTO: 0.01 K/UL (ref 0–0.04)
IMM GRANULOCYTES NFR BLD: 0.2 % (ref 0–0.4)
KETONES UR QL STRIP: ABNORMAL
LDLC SERPL CALC-MCNC: 92 MG/DL
LDLC/HDLC SERPL: 1 {RATIO}
LEUKOCYTE ESTERASE URINE, POC: ABNORMAL
LYMPHOCYTES # BLD AUTO: 2.39 K/UL (ref 1–4.8)
LYMPHOCYTES NFR BLD AUTO: 36.4 % (ref 27–41)
MCH RBC QN AUTO: 27.2 PG (ref 27–31)
MCHC RBC AUTO-ENTMCNC: 31.1 G/DL (ref 32–36)
MCV RBC AUTO: 87.5 FL (ref 80–96)
MONOCYTES # BLD AUTO: 0.53 K/UL (ref 0–0.8)
MONOCYTES NFR BLD AUTO: 8.1 % (ref 2–6)
MPC BLD CALC-MCNC: 10.5 FL (ref 9.4–12.4)
NEUTROPHILS # BLD AUTO: 3.48 K/UL (ref 1.8–7.7)
NEUTROPHILS NFR BLD AUTO: 52.9 % (ref 53–65)
NITRITE, POC UA: POSITIVE
NONHDLC SERPL-MCNC: 115 MG/DL
NRBC # BLD AUTO: 0 X10E3/UL
NRBC, AUTO (.00): 0 %
PH, POC UA: 7.5
PLATELET # BLD AUTO: 378 K/UL (ref 150–400)
PROTEIN, POC: ABNORMAL
RBC # BLD AUTO: 4.56 M/UL (ref 4.2–5.4)
SPECIFIC GRAVITY, POC UA: 1.01
TRIGL SERPL-MCNC: 113 MG/DL (ref 35–150)
UROBILINOGEN, POC UA: 0.2
VLDLC SERPL-MCNC: 23 MG/DL
WBC # BLD AUTO: 6.57 K/UL (ref 4.5–11)

## 2023-12-04 PROCEDURE — 99214 OFFICE O/P EST MOD 30 MIN: CPT | Mod: ,,,

## 2023-12-04 PROCEDURE — 3078F DIAST BP <80 MM HG: CPT | Mod: ,,,

## 2023-12-04 PROCEDURE — 87186 SC STD MICRODIL/AGAR DIL: CPT | Mod: ,,, | Performed by: CLINICAL MEDICAL LABORATORY

## 2023-12-04 PROCEDURE — 3288F PR FALLS RISK ASSESSMENT DOCUMENTED: ICD-10-PCS | Mod: ,,,

## 2023-12-04 PROCEDURE — 1159F PR MEDICATION LIST DOCUMENTED IN MEDICAL RECORD: ICD-10-PCS | Mod: ,,,

## 2023-12-04 PROCEDURE — 3074F PR MOST RECENT SYSTOLIC BLOOD PRESSURE < 130 MM HG: ICD-10-PCS | Mod: ,,,

## 2023-12-04 PROCEDURE — 1125F AMNT PAIN NOTED PAIN PRSNT: CPT | Mod: ,,,

## 2023-12-04 PROCEDURE — 1160F PR REVIEW ALL MEDS BY PRESCRIBER/CLIN PHARMACIST DOCUMENTED: ICD-10-PCS | Mod: ,,,

## 2023-12-04 PROCEDURE — 80061 LIPID PANEL: CPT | Mod: ,,, | Performed by: CLINICAL MEDICAL LABORATORY

## 2023-12-04 PROCEDURE — 1160F RVW MEDS BY RX/DR IN RCRD: CPT | Mod: ,,,

## 2023-12-04 PROCEDURE — 80061 LIPID PANEL: ICD-10-PCS | Mod: ,,, | Performed by: CLINICAL MEDICAL LABORATORY

## 2023-12-04 PROCEDURE — 99214 PR OFFICE/OUTPT VISIT, EST, LEVL IV, 30-39 MIN: ICD-10-PCS | Mod: ,,,

## 2023-12-04 PROCEDURE — 87077 CULTURE AEROBIC IDENTIFY: CPT | Mod: ,,, | Performed by: CLINICAL MEDICAL LABORATORY

## 2023-12-04 PROCEDURE — 3078F PR MOST RECENT DIASTOLIC BLOOD PRESSURE < 80 MM HG: ICD-10-PCS | Mod: ,,,

## 2023-12-04 PROCEDURE — 81003 POCT URINALYSIS W/O SCOPE: ICD-10-PCS | Mod: QW,,,

## 2023-12-04 PROCEDURE — 1101F PT FALLS ASSESS-DOCD LE1/YR: CPT | Mod: ,,,

## 2023-12-04 PROCEDURE — 1159F MED LIST DOCD IN RCRD: CPT | Mod: ,,,

## 2023-12-04 PROCEDURE — 3074F SYST BP LT 130 MM HG: CPT | Mod: ,,,

## 2023-12-04 PROCEDURE — 3008F PR BODY MASS INDEX (BMI) DOCUMENTED: ICD-10-PCS | Mod: ,,,

## 2023-12-04 PROCEDURE — 87086 CULTURE, URINE: ICD-10-PCS | Mod: ,,, | Performed by: CLINICAL MEDICAL LABORATORY

## 2023-12-04 PROCEDURE — 1101F PR PT FALLS ASSESS DOC 0-1 FALLS W/OUT INJ PAST YR: ICD-10-PCS | Mod: ,,,

## 2023-12-04 PROCEDURE — 3288F FALL RISK ASSESSMENT DOCD: CPT | Mod: ,,,

## 2023-12-04 PROCEDURE — 1111F PR DISCHARGE MEDS RECONCILED W/ CURRENT OUTPATIENT MED LIST: ICD-10-PCS | Mod: ,,,

## 2023-12-04 PROCEDURE — 87077 CULTURE, URINE: ICD-10-PCS | Mod: ,,, | Performed by: CLINICAL MEDICAL LABORATORY

## 2023-12-04 PROCEDURE — 1111F DSCHRG MED/CURRENT MED MERGE: CPT | Mod: ,,,

## 2023-12-04 PROCEDURE — 85025 CBC WITH DIFFERENTIAL: ICD-10-PCS | Mod: ,,, | Performed by: CLINICAL MEDICAL LABORATORY

## 2023-12-04 PROCEDURE — 3008F BODY MASS INDEX DOCD: CPT | Mod: ,,,

## 2023-12-04 PROCEDURE — 81003 URINALYSIS AUTO W/O SCOPE: CPT | Mod: QW,,,

## 2023-12-04 PROCEDURE — 85025 COMPLETE CBC W/AUTO DIFF WBC: CPT | Mod: ,,, | Performed by: CLINICAL MEDICAL LABORATORY

## 2023-12-04 PROCEDURE — 1125F PR PAIN SEVERITY QUANTIFIED, PAIN PRESENT: ICD-10-PCS | Mod: ,,,

## 2023-12-04 PROCEDURE — 87186 CULTURE, URINE: ICD-10-PCS | Mod: ,,, | Performed by: CLINICAL MEDICAL LABORATORY

## 2023-12-04 PROCEDURE — 87086 URINE CULTURE/COLONY COUNT: CPT | Mod: ,,, | Performed by: CLINICAL MEDICAL LABORATORY

## 2023-12-04 RX ORDER — SULFAMETHOXAZOLE AND TRIMETHOPRIM 800; 160 MG/1; MG/1
1 TABLET ORAL 2 TIMES DAILY
Qty: 14 TABLET | Refills: 0 | Status: SHIPPED | OUTPATIENT
Start: 2023-12-04 | End: 2023-12-21 | Stop reason: ALTCHOICE

## 2023-12-04 NOTE — PROGRESS NOTES
Subjective     Patient ID: Radha Martínez is a 73 y.o. female.    Chief Complaint: Health Maintenance (TETANUS VACCINE Never done-declined/Shingles Vaccine(1 of 2) Never done-declined/RSV Vaccine (Age 60+ and Pregnant patients)(1 - 1-dose 60+ series) Never done-decline/Mammogram due on 10/07/2020-scheduled already/Colorectal Cancer Screening due on 01/11/2022-will order today/DEXA Scan due on 03/05/2023-scheduled in January. /COVID-19 Vaccine(6 - 2023-24 season) due on 09/01/2023-declined/), Establish Care (Here today for routine check up and labs, Patient is fasting today. Patient had right total knee replacement on 11/2. HH therapy is coming to her house for physical therapy. Says her asthma is trying to act up on her but bearable at this point. Lots of labs ordered during [facility]. I added lipid but meds dont need refilled at this this. ), and Urinary Tract Infection (Frequent urgency with little output. She is burning with urination. Symptoms started about a week ago. )      Pt is her for follow up post TKR Right knee. Doing well PT at home 3 x's a week.     Urinary Tract Infection   This is a new problem. The current episode started in the past 7 days. The problem occurs every urination. The problem has been unchanged. The quality of the pain is described as burning. The pain is at a severity of 5/10. The pain is moderate. There has been no fever. She is Not sexually active. There is No history of pyelonephritis. Associated symptoms include flank pain, frequency and urgency. Pertinent negatives include no hematuria or rash. Associated symptoms comments: Pt is having burning with every urination. She has tried nothing for the symptoms. The treatment provided no relief.       Review of Systems   Constitutional:  Negative for activity change, appetite change and fatigue.   HENT:  Negative for trouble swallowing.    Eyes:  Negative for pain and visual disturbance.   Respiratory:  Negative for chest tightness and  shortness of breath.    Cardiovascular:  Negative for chest pain and palpitations.   Gastrointestinal:  Negative for change in bowel habit.   Genitourinary:  Positive for flank pain, frequency and urgency. Negative for difficulty urinating, dysuria and hematuria.   Musculoskeletal:  Negative for arthralgias and myalgias.   Integumentary:  Negative for rash and wound.   Neurological:  Negative for weakness and headaches.   Psychiatric/Behavioral:  The patient is not nervous/anxious.             Objective     Physical Exam  Vitals and nursing note reviewed.   Constitutional:       Appearance: Normal appearance. She is not ill-appearing.   HENT:      Head: Normocephalic and atraumatic.      Nose: Nose normal.      Mouth/Throat:      Mouth: Mucous membranes are moist.      Pharynx: Oropharynx is clear.   Eyes:      Extraocular Movements: Extraocular movements intact.      Conjunctiva/sclera: Conjunctivae normal.      Pupils: Pupils are equal, round, and reactive to light.   Cardiovascular:      Rate and Rhythm: Normal rate and regular rhythm.      Pulses: Normal pulses.      Heart sounds: Normal heart sounds.   Pulmonary:      Effort: Pulmonary effort is normal. No respiratory distress.      Breath sounds: Normal breath sounds.   Abdominal:      General: Bowel sounds are normal.      Tenderness: There is no abdominal tenderness.   Musculoskeletal:         General: Swelling and tenderness present. Normal range of motion.      Cervical back: Normal range of motion and neck supple. No tenderness.      Comments: Right knee tenderness and still has steri strips to right knee   Skin:     General: Skin is warm and dry.      Capillary Refill: Capillary refill takes less than 2 seconds.   Neurological:      General: No focal deficit present.      Mental Status: She is alert and oriented to person, place, and time.   Psychiatric:         Mood and Affect: Mood normal.         Behavior: Behavior normal.         Thought Content:  Thought content normal.         Judgment: Judgment normal.              Assessment and Plan     1. Acute cystitis without hematuria  Assessment & Plan:  Bactrim DS x 7 days Bid  Urine culture    Orders:  -     Urine culture  -     sulfamethoxazole-trimethoprim 800-160mg (BACTRIM DS) 800-160 mg Tab; Take 1 tablet by mouth 2 (two) times daily.  Dispense: 14 tablet; Refill: 0  -     CBC Auto Differential; Future; Expected date: 12/04/2023    2. Burning with urination  Assessment & Plan:  urinalysis    Orders:  -     POCT URINALYSIS W/O SCOPE  -     sulfamethoxazole-trimethoprim 800-160mg (BACTRIM DS) 800-160 mg Tab; Take 1 tablet by mouth 2 (two) times daily.  Dispense: 14 tablet; Refill: 0    3. Hyperlipidemia, unspecified hyperlipidemia type  Assessment & Plan:  Hyperlipidemia is controlled. Current medical regimen is effective;   Will adjust according to results and monitor.     Orders:  -     Lipid Panel; Future; Expected date: 12/04/2023               Follow up in about 3 months (around 3/4/2024).

## 2023-12-06 NOTE — PROGRESS NOTES
Still waiting of final culture report of urine  Cholesterol is ok keep eating a low fat low cholesterol diet.  CBC is good

## 2023-12-07 LAB — UA COMPLETE W REFLEX CULTURE PNL UR: ABNORMAL

## 2023-12-11 ENCOUNTER — EXTERNAL HOME HEALTH (OUTPATIENT)
Dept: HOME HEALTH SERVICES | Facility: HOSPITAL | Age: 73
End: 2023-12-11
Payer: MEDICARE

## 2023-12-11 PROBLEM — N30.00 ACUTE CYSTITIS WITHOUT HEMATURIA: Status: ACTIVE | Noted: 2023-12-11

## 2023-12-11 PROBLEM — R30.0 BURNING WITH URINATION: Status: ACTIVE | Noted: 2023-12-11

## 2023-12-12 ENCOUNTER — HOSPITAL ENCOUNTER (OUTPATIENT)
Dept: RADIOLOGY | Facility: HOSPITAL | Age: 73
Discharge: HOME | End: 2023-12-12
Payer: MEDICARE

## 2023-12-12 DIAGNOSIS — Z78.0 ASYMPTOMATIC MENOPAUSAL STATE: ICD-10-CM

## 2023-12-12 DIAGNOSIS — Z96.651 STATUS POST TOTAL RIGHT KNEE REPLACEMENT: Primary | ICD-10-CM

## 2023-12-12 PROCEDURE — 77080 DXA BONE DENSITY AXIAL: CPT | Mod: TC

## 2023-12-12 NOTE — PROGRESS NOTES
She is severely osteoporotic 3.2 she is at high risk for fracture rate. She needs to be taking calcium and Vit D 3 daily OTC if she is not.  She needs to be on a medication a special medicine that will help with this. She can take a medication daily, or we can do once weekly or if her insurance will pay for it She can do Prolia a shot every 6 mos that she can get at [facility].

## 2023-12-13 ENCOUNTER — HOSPITAL ENCOUNTER (OUTPATIENT)
Dept: RADIOLOGY | Facility: HOSPITAL | Age: 73
Discharge: HOME | End: 2023-12-13
Attending: ORTHOPAEDIC SURGERY
Payer: MEDICARE

## 2023-12-13 ENCOUNTER — OFFICE VISIT (OUTPATIENT)
Dept: ORTHOPEDICS | Facility: CLINIC | Age: 73
End: 2023-12-13
Payer: MEDICARE

## 2023-12-13 DIAGNOSIS — Z96.651 STATUS POST TOTAL RIGHT KNEE REPLACEMENT: Primary | ICD-10-CM

## 2023-12-13 DIAGNOSIS — Z96.651 STATUS POST TOTAL RIGHT KNEE REPLACEMENT: ICD-10-CM

## 2023-12-13 PROCEDURE — 1159F MED LIST DOCD IN RCRD: CPT | Mod: CPTII,,, | Performed by: ORTHOPAEDIC SURGERY

## 2023-12-13 PROCEDURE — 1159F PR MEDICATION LIST DOCUMENTED IN MEDICAL RECORD: ICD-10-PCS | Mod: CPTII,,, | Performed by: ORTHOPAEDIC SURGERY

## 2023-12-13 PROCEDURE — 73562 XR KNEE AP LAT WITH SUNRISE RIGHT: ICD-10-PCS | Mod: 26,RT,, | Performed by: ORTHOPAEDIC SURGERY

## 2023-12-13 PROCEDURE — 1160F PR REVIEW ALL MEDS BY PRESCRIBER/CLIN PHARMACIST DOCUMENTED: ICD-10-PCS | Mod: CPTII,,, | Performed by: ORTHOPAEDIC SURGERY

## 2023-12-13 PROCEDURE — 99213 OFFICE O/P EST LOW 20 MIN: CPT | Mod: PBBFAC | Performed by: ORTHOPAEDIC SURGERY

## 2023-12-13 PROCEDURE — 99024 PR POST-OP FOLLOW-UP VISIT: ICD-10-PCS | Mod: ,,, | Performed by: ORTHOPAEDIC SURGERY

## 2023-12-13 PROCEDURE — 99024 POSTOP FOLLOW-UP VISIT: CPT | Mod: ,,, | Performed by: ORTHOPAEDIC SURGERY

## 2023-12-13 PROCEDURE — 1160F RVW MEDS BY RX/DR IN RCRD: CPT | Mod: CPTII,,, | Performed by: ORTHOPAEDIC SURGERY

## 2023-12-13 PROCEDURE — 73562 X-RAY EXAM OF KNEE 3: CPT | Mod: 26,RT,, | Performed by: ORTHOPAEDIC SURGERY

## 2023-12-13 PROCEDURE — 73562 X-RAY EXAM OF KNEE 3: CPT | Mod: TC,RT
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
## 2023-12-21 ENCOUNTER — OFFICE VISIT (OUTPATIENT)
Dept: FAMILY MEDICINE | Facility: CLINIC | Age: 73
End: 2023-12-21
Payer: MEDICARE

## 2023-12-21 VITALS
DIASTOLIC BLOOD PRESSURE: 80 MMHG | HEART RATE: 79 BPM | WEIGHT: 218.38 LBS | TEMPERATURE: 98 F | BODY MASS INDEX: 37.28 KG/M2 | OXYGEN SATURATION: 96 % | SYSTOLIC BLOOD PRESSURE: 136 MMHG | RESPIRATION RATE: 19 BRPM | HEIGHT: 64 IN

## 2023-12-21 DIAGNOSIS — R42 DIZZY SPELLS: ICD-10-CM

## 2023-12-21 DIAGNOSIS — R51.9 NONINTRACTABLE HEADACHE, UNSPECIFIED CHRONICITY PATTERN, UNSPECIFIED HEADACHE TYPE: ICD-10-CM

## 2023-12-21 DIAGNOSIS — J02.9 SORE THROAT: ICD-10-CM

## 2023-12-21 DIAGNOSIS — R05.9 COUGH, UNSPECIFIED TYPE: ICD-10-CM

## 2023-12-21 DIAGNOSIS — Z12.11 SCREENING FOR MALIGNANT NEOPLASM OF COLON: ICD-10-CM

## 2023-12-21 DIAGNOSIS — R42 DIZZINESS: ICD-10-CM

## 2023-12-21 DIAGNOSIS — J06.9 UPPER RESPIRATORY TRACT INFECTION, UNSPECIFIED TYPE: Primary | ICD-10-CM

## 2023-12-21 LAB
CTP QC/QA: YES
FLUAV AG NPH QL: NEGATIVE
FLUBV AG NPH QL: NEGATIVE
S PYO RRNA THROAT QL PROBE: NEGATIVE
SARS-COV-2 AG RESP QL IA.RAPID: NEGATIVE

## 2023-12-21 PROCEDURE — 87804 INFLUENZA ASSAY W/OPTIC: CPT | Mod: QW,,,

## 2023-12-21 PROCEDURE — 87426 SARSCOV CORONAVIRUS AG IA: CPT | Mod: QW,,,

## 2023-12-21 PROCEDURE — 1125F AMNT PAIN NOTED PAIN PRSNT: CPT | Mod: ,,,

## 2023-12-21 PROCEDURE — 96372 PR INJECTION,THERAP/PROPH/DIAG2ST, IM OR SUBCUT: ICD-10-PCS | Mod: ,,,

## 2023-12-21 PROCEDURE — 3008F PR BODY MASS INDEX (BMI) DOCUMENTED: ICD-10-PCS | Mod: ,,,

## 2023-12-21 PROCEDURE — 87880 STREP A ASSAY W/OPTIC: CPT | Mod: QW,,,

## 2023-12-21 PROCEDURE — 99214 OFFICE O/P EST MOD 30 MIN: CPT | Mod: 25,,,

## 2023-12-21 PROCEDURE — 1125F PR PAIN SEVERITY QUANTIFIED, PAIN PRESENT: ICD-10-PCS | Mod: ,,,

## 2023-12-21 PROCEDURE — 87804 POCT INFLUENZA A/B: ICD-10-PCS | Mod: QW,,,

## 2023-12-21 PROCEDURE — 3079F PR MOST RECENT DIASTOLIC BLOOD PRESSURE 80-89 MM HG: ICD-10-PCS | Mod: ,,,

## 2023-12-21 PROCEDURE — 99214 PR OFFICE/OUTPT VISIT, EST, LEVL IV, 30-39 MIN: ICD-10-PCS | Mod: 25,,,

## 2023-12-21 PROCEDURE — 3008F BODY MASS INDEX DOCD: CPT | Mod: ,,,

## 2023-12-21 PROCEDURE — 1160F PR REVIEW ALL MEDS BY PRESCRIBER/CLIN PHARMACIST DOCUMENTED: ICD-10-PCS | Mod: ,,,

## 2023-12-21 PROCEDURE — 1159F MED LIST DOCD IN RCRD: CPT | Mod: ,,,

## 2023-12-21 PROCEDURE — 3288F FALL RISK ASSESSMENT DOCD: CPT | Mod: ,,,

## 2023-12-21 PROCEDURE — 96372 THER/PROPH/DIAG INJ SC/IM: CPT | Mod: ,,,

## 2023-12-21 PROCEDURE — 87880 POCT RAPID STREP A: ICD-10-PCS | Mod: QW,,,

## 2023-12-21 PROCEDURE — 1160F RVW MEDS BY RX/DR IN RCRD: CPT | Mod: ,,,

## 2023-12-21 PROCEDURE — 3079F DIAST BP 80-89 MM HG: CPT | Mod: ,,,

## 2023-12-21 PROCEDURE — 1159F PR MEDICATION LIST DOCUMENTED IN MEDICAL RECORD: ICD-10-PCS | Mod: ,,,

## 2023-12-21 PROCEDURE — 3288F PR FALLS RISK ASSESSMENT DOCUMENTED: ICD-10-PCS | Mod: ,,,

## 2023-12-21 PROCEDURE — 1101F PT FALLS ASSESS-DOCD LE1/YR: CPT | Mod: ,,,

## 2023-12-21 PROCEDURE — 3075F SYST BP GE 130 - 139MM HG: CPT | Mod: ,,,

## 2023-12-21 PROCEDURE — 3075F PR MOST RECENT SYSTOLIC BLOOD PRESS GE 130-139MM HG: ICD-10-PCS | Mod: ,,,

## 2023-12-21 PROCEDURE — 1101F PR PT FALLS ASSESS DOC 0-1 FALLS W/OUT INJ PAST YR: ICD-10-PCS | Mod: ,,,

## 2023-12-21 PROCEDURE — 87426 SARS CORONAVIRUS 2 ANTIGEN POCT: ICD-10-PCS | Mod: QW,,,

## 2023-12-21 RX ORDER — BENZONATATE 100 MG/1
200 CAPSULE ORAL 3 TIMES DAILY PRN
Qty: 30 CAPSULE | Refills: 1 | Status: SHIPPED | OUTPATIENT
Start: 2023-12-21 | End: 2023-12-31

## 2023-12-21 RX ORDER — MECLIZINE HCL 12.5 MG 12.5 MG/1
12.5 TABLET ORAL 2 TIMES DAILY PRN
Qty: 60 TABLET | Refills: 0 | Status: SHIPPED | OUTPATIENT
Start: 2023-12-21

## 2023-12-21 RX ORDER — DEXAMETHASONE SODIUM PHOSPHATE 4 MG/ML
4 INJECTION, SOLUTION INTRA-ARTICULAR; INTRALESIONAL; INTRAMUSCULAR; INTRAVENOUS; SOFT TISSUE
Status: COMPLETED | OUTPATIENT
Start: 2023-12-21 | End: 2023-12-21

## 2023-12-21 RX ORDER — CEFTRIAXONE 1 G/1
1 INJECTION, POWDER, FOR SOLUTION INTRAMUSCULAR; INTRAVENOUS
Status: COMPLETED | OUTPATIENT
Start: 2023-12-21 | End: 2023-12-21

## 2023-12-21 RX ORDER — AZITHROMYCIN 250 MG/1
TABLET, FILM COATED ORAL
Qty: 6 TABLET | Refills: 0 | Status: SHIPPED | OUTPATIENT
Start: 2023-12-21 | End: 2023-12-26

## 2023-12-21 RX ADMIN — DEXAMETHASONE SODIUM PHOSPHATE 4 MG: 4 INJECTION, SOLUTION INTRA-ARTICULAR; INTRALESIONAL; INTRAMUSCULAR; INTRAVENOUS; SOFT TISSUE at 11:12

## 2023-12-21 RX ADMIN — CEFTRIAXONE 1 G: 1 INJECTION, POWDER, FOR SOLUTION INTRAMUSCULAR; INTRAVENOUS at 11:12

## 2023-12-21 NOTE — PATIENT INSTRUCTIONS
WE will check labs on next visit.  Come fasting not eating but you may drink water or black coffee      March 22, 2024 at 1130 colonscopy with Dr. Wood

## 2023-12-21 NOTE — Clinical Note
TETANUS VACCINE pt will schedule at local pharmacy  Shingles Vaccine(1 of 2) declined RSV Vaccine (Age 60+ and Pregnant patients)(1 - 1-dose 60+ series) received 10/21/23 Mammogram 1/11/2024 Colorectal Cancer Screening will order today  COVID-19 Vaccine(6 - 2023-24 season) received  10/21/23

## 2023-12-21 NOTE — PROGRESS NOTES
Subjective     Patient ID: Radha Martínez is a 73 y.o. female.    Chief Complaint: Follow-up (DEXA scan done would like to discuss medications ), Sore Throat (X 4 days ), Headache (X 4 days ), Cough (Productive cough X 4 days ), Shortness of Breath (X 4 days ), and Health Maintenance (TETANUS VACCINE pt will schedule at local pharmacy /Shingles Vaccine(1 of 2) declined/RSV Vaccine (Age 60+ and Pregnant patients)(1 - 1-dose 60+ series) received 10/21/23/Mammogram 1/11/2024/Colorectal Cancer Screening will order today /COVID-19 Vaccine(6 - 2023-24 season) received  10/21/23 )      Started feeling bad at the first of the week. Sore throat, bad headache, some fever on and off. She has been feeling nauseated but has not vomited. She has been coughing up hard green stuff and then some white at times.    Follow-up  Associated symptoms include congestion, coughing, a fever, headaches and a sore throat. Pertinent negatives include no arthralgias, change in bowel habit, chest pain, fatigue, myalgias, rash or weakness.   Sore Throat   Associated symptoms include congestion, coughing, headaches and shortness of breath. Pertinent negatives include no ear pain or trouble swallowing.   Headache   Associated symptoms include coughing, a fever and a sore throat. Pertinent negatives include no ear pain, eye pain, sinus pressure or weakness.   Cough  Associated symptoms include a fever, headaches, postnasal drip, a sore throat and shortness of breath. Pertinent negatives include no chest pain, ear pain, myalgias or rash.   Shortness of Breath  Associated symptoms include a fever, headaches and a sore throat. Pertinent negatives include no chest pain, ear pain or rash.       Review of Systems   Constitutional:  Positive for fever. Negative for activity change, appetite change and fatigue.   HENT:  Positive for nasal congestion, postnasal drip and sore throat. Negative for ear pain, sinus pressure/congestion and trouble swallowing.     Eyes:  Negative for pain and visual disturbance.   Respiratory:  Positive for cough and shortness of breath. Negative for chest tightness.    Cardiovascular:  Negative for chest pain and palpitations.   Gastrointestinal:  Negative for change in bowel habit.   Genitourinary:  Negative for difficulty urinating and dysuria.   Musculoskeletal:  Negative for arthralgias and myalgias.   Integumentary:  Negative for rash and wound.   Neurological:  Positive for headaches. Negative for weakness.   Psychiatric/Behavioral:  Negative for behavioral problems and decreased concentration. The patient is not nervous/anxious.             Objective     Physical Exam  Vitals and nursing note reviewed.   Constitutional:       Appearance: Normal appearance. She is not ill-appearing.   HENT:      Head: Normocephalic and atraumatic.      Right Ear: Tympanic membrane, ear canal and external ear normal.      Left Ear: Tympanic membrane, ear canal and external ear normal.      Nose: Nose normal.      Mouth/Throat:      Mouth: Mucous membranes are moist.      Pharynx: Oropharynx is clear. Posterior oropharyngeal erythema present.   Eyes:      Extraocular Movements: Extraocular movements intact.      Conjunctiva/sclera: Conjunctivae normal.      Pupils: Pupils are equal, round, and reactive to light.   Cardiovascular:      Rate and Rhythm: Normal rate and regular rhythm.      Pulses: Normal pulses.      Heart sounds: Normal heart sounds.   Pulmonary:      Effort: Pulmonary effort is normal. No respiratory distress.      Breath sounds: Normal breath sounds.   Abdominal:      General: Bowel sounds are normal.      Tenderness: There is no abdominal tenderness.   Musculoskeletal:         General: Normal range of motion.      Cervical back: Normal range of motion and neck supple. No tenderness.   Lymphadenopathy:      Cervical: No cervical adenopathy.   Skin:     General: Skin is warm and dry.   Neurological:      General: No focal deficit  present.      Mental Status: She is alert and oriented to person, place, and time.   Psychiatric:         Mood and Affect: Mood normal.         Behavior: Behavior normal.         Thought Content: Thought content normal.         Judgment: Judgment normal.              Assessment and Plan     1. Upper respiratory tract infection, unspecified type  Assessment & Plan:  Rocephin 1 gm IM  Decadron 4 mg IM  Z-pack     Orders:  -     azithromycin (Z-CORTNEY) 250 MG tablet; Take 2 tablets by mouth on day 1; Take 1 tablet by mouth on days 2-5  Dispense: 6 tablet; Refill: 0  -     cefTRIAXone injection 1 g  -     dexAMETHasone injection 4 mg    2. Cough, unspecified type  Assessment & Plan:  Tessalon perles PRN    Orders:  -     SARS Coronavirus 2 Antigen, POCT  -     POCT Influenza A/B Rapid Antigen  -     POCT rapid strep A  -     benzonatate (TESSALON) 100 MG capsule; Take 2 capsules (200 mg total) by mouth 3 (three) times daily as needed for Cough.  Dispense: 30 capsule; Refill: 1    3. Nonintractable headache, unspecified chronicity pattern, unspecified headache type  Assessment & Plan:  COVID>FLU>STREP NEG    Orders:  -     SARS Coronavirus 2 Antigen, POCT  -     POCT Influenza A/B Rapid Antigen  -     POCT rapid strep A    4. Sore throat  Assessment & Plan:  COVID>FLU>STREP NEG    Orders:  -     SARS Coronavirus 2 Antigen, POCT  -     POCT Influenza A/B Rapid Antigen  -     POCT rapid strep A    5. Dizziness  Assessment & Plan:  Will continue current medication as prescribed. Symptoms under control     Orders:  -     meclizine (ANTIVERT) 12.5 mg tablet; Take 1 tablet (12.5 mg total) by mouth 2 (two) times daily as needed for Dizziness.  Dispense: 60 tablet; Refill: 0    6. Dizzy spells  Assessment & Plan:  Will continue current medication as prescribed. Symptoms under control     Orders:  -     meclizine (ANTIVERT) 12.5 mg tablet; Take 1 tablet (12.5 mg total) by mouth 2 (two) times daily as needed for Dizziness.  Dispense:  60 tablet; Refill: 0    7. Screening for malignant neoplasm of colon  Assessment & Plan:  Screening c-scope March 22, 2024 Dr. Wood    Orders:  -     Colonoscopy; Future; Expected date: 12/21/2023               Follow up in about 3 months (around 3/21/2024).

## 2024-01-09 DIAGNOSIS — Z71.89 COMPLEX CARE COORDINATION: ICD-10-CM

## 2024-01-18 DIAGNOSIS — Z12.31 ENCOUNTER FOR SCREENING MAMMOGRAM FOR MALIGNANT NEOPLASM OF BREAST: ICD-10-CM

## 2024-01-18 DIAGNOSIS — Z12.39 ENCOUNTER FOR SCREENING FOR MALIGNANT NEOPLASM OF BREAST, UNSPECIFIED SCREENING MODALITY: ICD-10-CM

## 2024-01-18 DIAGNOSIS — Z12.9 SCREENING FOR MALIGNANT NEOPLASM: Primary | ICD-10-CM

## 2024-01-22 PROBLEM — N39.0 URINARY TRACT INFECTION WITH HEMATURIA: Status: RESOLVED | Noted: 2023-10-20 | Resolved: 2024-01-22

## 2024-01-22 PROBLEM — R31.9 URINARY TRACT INFECTION WITH HEMATURIA: Status: RESOLVED | Noted: 2023-10-20 | Resolved: 2024-01-22

## 2024-01-31 ENCOUNTER — OFFICE VISIT (OUTPATIENT)
Dept: ORTHOPEDICS | Facility: CLINIC | Age: 74
End: 2024-01-31
Payer: MEDICARE

## 2024-01-31 VITALS — WEIGHT: 218 LBS | HEIGHT: 64 IN | BODY MASS INDEX: 37.22 KG/M2

## 2024-01-31 DIAGNOSIS — Z96.651 STATUS POST TOTAL RIGHT KNEE REPLACEMENT: Primary | ICD-10-CM

## 2024-01-31 PROCEDURE — 1159F MED LIST DOCD IN RCRD: CPT | Mod: CPTII,,, | Performed by: NURSE PRACTITIONER

## 2024-01-31 PROCEDURE — 99024 POSTOP FOLLOW-UP VISIT: CPT | Mod: ,,, | Performed by: NURSE PRACTITIONER

## 2024-01-31 PROCEDURE — 1160F RVW MEDS BY RX/DR IN RCRD: CPT | Mod: CPTII,,, | Performed by: NURSE PRACTITIONER

## 2024-01-31 PROCEDURE — 99214 OFFICE O/P EST MOD 30 MIN: CPT | Mod: PBBFAC | Performed by: NURSE PRACTITIONER

## 2024-01-31 NOTE — PATIENT INSTRUCTIONS
Safety guidelines and activity restrictions are discussed with the patient.  She verbalizes understanding.  We will have return to orthopedic clinic May 1, 2024 repeat x-ray of her right knee or with Dr. Castillo or sooner as needed.

## 2024-01-31 NOTE — PROGRESS NOTES
[age] year old female presents ambulatory orthopedic clinic re-evaluation right knee.  She was known to [facility] having undergone right total knee replacement arthroplasty on 11/01/2023.  She is well pleased results thus far.  She was undergoing formal physical therapy efforts.  States she was missed 2 visits due to being ill.    PE: Physical exam she is noted be ambulating with the aid of a Rollator.  No perceptible limp noted.  Physical exam right knee skin is warm, dry and intact.  Well-healed anterior midline surgical scars noted.  Range of motion of her right knee lacks approximately 3° full extension further flexion approximately 95°.  There was excellent ligamentous balance instability noted clinically.  Patella tracks in the midline.  No soft tissue swelling is noted.  No intra-articular effusion appreciated clinically.      Impression:  Status post right total knee replacement arthroplasty     Plan:  Safety guidelines and activity restrictions are discussed with the patient.  She verbalizes understanding.  We will have return to orthopedic clinic May 1, 2024 repeat x-ray of her right knee or with Dr. Castillo or sooner as needed.

## 2024-02-07 DIAGNOSIS — E78.5 HYPERLIPIDEMIA, UNSPECIFIED HYPERLIPIDEMIA TYPE: ICD-10-CM

## 2024-02-07 DIAGNOSIS — F41.9 ANXIETY: ICD-10-CM

## 2024-02-07 RX ORDER — PRAVASTATIN SODIUM 40 MG/1
40 TABLET ORAL NIGHTLY
Qty: 90 TABLET | Refills: 3 | Status: SHIPPED | OUTPATIENT
Start: 2024-02-07

## 2024-02-07 RX ORDER — BUSPIRONE HYDROCHLORIDE 10 MG/1
10 TABLET ORAL 2 TIMES DAILY
Qty: 180 TABLET | Refills: 3 | Status: SHIPPED | OUTPATIENT
Start: 2024-02-07

## 2024-02-14 DIAGNOSIS — K21.9 GASTROESOPHAGEAL REFLUX DISEASE WITHOUT ESOPHAGITIS: ICD-10-CM

## 2024-02-14 DIAGNOSIS — M54.42 CHRONIC LOW BACK PAIN WITH BILATERAL SCIATICA, UNSPECIFIED BACK PAIN LATERALITY: ICD-10-CM

## 2024-02-14 DIAGNOSIS — G89.29 CHRONIC LOW BACK PAIN WITH BILATERAL SCIATICA, UNSPECIFIED BACK PAIN LATERALITY: ICD-10-CM

## 2024-02-14 DIAGNOSIS — I10 HYPERTENSION, UNSPECIFIED TYPE: ICD-10-CM

## 2024-02-14 DIAGNOSIS — M54.41 CHRONIC LOW BACK PAIN WITH BILATERAL SCIATICA, UNSPECIFIED BACK PAIN LATERALITY: ICD-10-CM

## 2024-02-14 RX ORDER — GABAPENTIN 300 MG/1
300 CAPSULE ORAL 3 TIMES DAILY
Qty: 270 CAPSULE | Refills: 1 | Status: SHIPPED | OUTPATIENT
Start: 2024-02-14

## 2024-02-14 RX ORDER — NEBIVOLOL 5 MG/1
5 TABLET ORAL DAILY
Qty: 90 TABLET | Refills: 1 | Status: SHIPPED | OUTPATIENT
Start: 2024-02-14

## 2024-02-14 RX ORDER — PANTOPRAZOLE SODIUM 40 MG/1
40 TABLET, DELAYED RELEASE ORAL DAILY
Qty: 90 TABLET | Refills: 1 | Status: SHIPPED | OUTPATIENT
Start: 2024-02-14

## 2024-02-14 NOTE — TELEPHONE ENCOUNTER
----- Message from Martine Mendoza sent at 2/14/2024  2:24 PM CST -----  Pt gretchen called and needs refills on these prescriptions   gabapentin (NEURONTIN) 300 MG capsule  pantoprazole (PROTONIX) 40 MG tablet  nebivoloL (BYSTOLIC) 5 MG Tab

## 2024-03-14 ENCOUNTER — OFFICE VISIT (OUTPATIENT)
Dept: FAMILY MEDICINE | Facility: CLINIC | Age: 74
End: 2024-03-14
Payer: MEDICARE

## 2024-03-14 VITALS
TEMPERATURE: 98 F | OXYGEN SATURATION: 96 % | HEART RATE: 66 BPM | DIASTOLIC BLOOD PRESSURE: 74 MMHG | RESPIRATION RATE: 20 BRPM | SYSTOLIC BLOOD PRESSURE: 113 MMHG | HEIGHT: 64 IN | WEIGHT: 221.38 LBS | BODY MASS INDEX: 37.8 KG/M2

## 2024-03-14 DIAGNOSIS — R35.0 URINARY FREQUENCY: Primary | ICD-10-CM

## 2024-03-14 DIAGNOSIS — R30.0 DYSURIA: ICD-10-CM

## 2024-03-14 DIAGNOSIS — I50.32 CHRONIC DIASTOLIC HEART FAILURE: Chronic | ICD-10-CM

## 2024-03-14 DIAGNOSIS — Z78.0 ASYMPTOMATIC MENOPAUSAL STATE: ICD-10-CM

## 2024-03-14 DIAGNOSIS — I10 PRIMARY HYPERTENSION: Chronic | ICD-10-CM

## 2024-03-14 PROCEDURE — 3078F DIAST BP <80 MM HG: CPT | Mod: ,,, | Performed by: FAMILY MEDICINE

## 2024-03-14 PROCEDURE — 1101F PT FALLS ASSESS-DOCD LE1/YR: CPT | Mod: ,,, | Performed by: FAMILY MEDICINE

## 2024-03-14 PROCEDURE — 1160F RVW MEDS BY RX/DR IN RCRD: CPT | Mod: ,,, | Performed by: FAMILY MEDICINE

## 2024-03-14 PROCEDURE — 3008F BODY MASS INDEX DOCD: CPT | Mod: ,,, | Performed by: FAMILY MEDICINE

## 2024-03-14 PROCEDURE — 99213 OFFICE O/P EST LOW 20 MIN: CPT | Mod: ,,, | Performed by: FAMILY MEDICINE

## 2024-03-14 PROCEDURE — 3288F FALL RISK ASSESSMENT DOCD: CPT | Mod: ,,, | Performed by: FAMILY MEDICINE

## 2024-03-14 PROCEDURE — 3074F SYST BP LT 130 MM HG: CPT | Mod: ,,, | Performed by: FAMILY MEDICINE

## 2024-03-14 PROCEDURE — 1159F MED LIST DOCD IN RCRD: CPT | Mod: ,,, | Performed by: FAMILY MEDICINE

## 2024-03-14 NOTE — PROGRESS NOTES
Felipe Perry MD    [address] [place] [address]     PATIENT NAME: Radha Martínez  : 1950  DATE: 3/14/24  MRN: [id number]      Billing Provider: Felipe Perry MD  Level of Service: MD OFFICE/OUTPT VISIT, MAC MCGEE III, 30-44 MIN  Patient PCP Information       Provider PCP Type    [facility]            Reason for Visit / Chief Complaint: Back Pain (Reports low back pain, dysuria, urinary frequency for the past week. Thinks she has a UTI.) and Dysuria       Update PCP  Update Chief Complaint         History of Present Illness / Problem Focused Workflow     Radha Martínez presents to the clinic with Back Pain (Reports low back pain, dysuria, urinary frequency for the past week. Thinks she has a UTI.) and Dysuria     Low back pain and UTI type symptoms -     She is new to my practice. She does not have her medications and does not know her medications so we are unable to verify her medication list.     HPI    Review of Systems     Review of Systems   Constitutional:  Negative for activity change, appetite change, chills, fatigue and fever.   HENT:  Negative for nasal congestion, ear pain, hearing loss, postnasal drip and sore throat.    Respiratory:  Negative for cough, chest tightness, shortness of breath and wheezing.    Cardiovascular:  Negative for chest pain, palpitations, leg swelling and claudication.   Gastrointestinal:  Negative for abdominal pain, change in bowel habit, constipation, diarrhea, nausea and vomiting.   Genitourinary:  Positive for dysuria and frequency.   Musculoskeletal:  Negative for arthralgias, back pain, gait problem and myalgias.   Integumentary:  Negative for rash.   Neurological:  Negative for weakness and headaches.   Psychiatric/Behavioral:  Negative for suicidal ideas. The patient is not nervous/anxious.         Medical / Social / Family History     Past Medical History:   Diagnosis Date    Asthma      CHF (congestive heart failure)     COPD (chronic obstructive pulmonary disease)     WILLSON (dyspnea on exertion)     GERD (gastroesophageal reflux disease)     HTN (hypertension)     Hyperlipidemia     Osteoporosis     Oxygen dependent     02 2L    Seizure disorder        Past Surgical History:   Procedure Laterality Date    ARTHROPLASTY, KNEE, TOTAL, USING COMPUTER-ASSISTED NAVIGATION Right 11/2/2023    Procedure: ARTHROPLASTY, KNEE, TOTAL, USING COMPUTER-ASSISTED NAVIGATION;  Surgeon: Jayy Castillo MD;  Location: [facility] ORTHO OR;  Service: Orthopedics;  Laterality: Right;    CARDIOVERSION N/A 04/08/2022    Procedure: Cardioversion;  Surgeon: Adal Chung MD;  Location: [facility] CATH LAB;  Service: Cardiology;  Laterality: N/A;    COLONOSCOPY      EYE SURGERY      REVISION OF TOTAL KNEE REPLACEMENT       TUBAL LIGATION         Social History  Ms.  reports that she has never smoked. She has never been exposed to tobacco smoke. She has never used smokeless tobacco. She reports that she does not drink alcohol and does not use drugs.    Family History  Ms.'s family history includes Cancer in her father; Diabetes in her brother, mother, and sister; Heart disease in her mother and sister; Hypertension in her mother and sister.    Medications and Allergies     Medications  Outpatient Medications Marked as Taking for the 3/14/24 encounter (Office Visit) with Felipe Perry MD   Medication Sig Dispense Refill    albuterol-ipratropium (DUO-NEB) 2.5 mg-0.5 mg/3 mL nebulizer solution Take 3 mLs by nebulization every 6 (six) hours as needed for Wheezing. Rescue 100 mL 5    aspirin 325 MG tablet Take 1 tablet (325 mg total) by mouth once daily. 100 tablet 5    busPIRone (BUSPAR) 10 MG tablet TAKE 1 TABLET TWICE DAILY 180 tablet 3    calcium carbonate/vitamin D3 (CALCARB 600 WITH VITAMIN D ORAL) Take by mouth.      clopidogreL (PLAVIX) 75 mg tablet TAKE 1 TABLET ONE TIME DAILY 90 tablet 0    ferrous  sulfate 325 (65 FE) MG EC tablet Take 1 tablet (325 mg total) by mouth once daily. 90 tablet 1    fluticasone propionate (FLONASE) 50 mcg/actuation nasal spray USE 1 SPRAY IN EACH NOSTRIL EVERY DAY (Patient taking differently: 1 spray Daily.) 32 g 2    fluticasone-umeclidin-vilanter (TRELEGY ELLIPTA) 200-62.5-25 mcg inhaler Inhale 1 puff into the lungs once daily. 60 each 11    furosemide (LASIX) 40 MG tablet Take 1 tablet (40 mg total) by mouth 2 (two) times daily. 180 tablet 1    gabapentin (NEURONTIN) 300 MG capsule Take 1 capsule (300 mg total) by mouth 3 (three) times daily. 270 capsule 1    HYDROcodone-acetaminophen (NORCO)  mg per tablet Take 1 tablet by mouth every 6 (six) hours as needed for Pain. 20 tablet 0    ipratropium (ATROVENT) 42 mcg (0.06 %) nasal spray 2 sprays by Each Nostril route 4 (four) times daily. 15 mL 0    loratadine (CLARITIN) 10 mg tablet TAKE 1 TABLET EVERY DAY 90 tablet 1    meclizine (ANTIVERT) 12.5 mg tablet Take 1 tablet (12.5 mg total) by mouth 2 (two) times daily as needed for Dizziness. 60 tablet 0    montelukast (SINGULAIR) 10 mg tablet Take 1 tablet (10 mg total) by mouth once daily. 90 tablet 1    nebivoloL (BYSTOLIC) 5 MG Tab Take 1 tablet (5 mg total) by mouth once daily. 90 tablet 1    pantoprazole (PROTONIX) 40 MG tablet Take 1 tablet (40 mg total) by mouth once daily. 90 tablet 1    paroxetine (PAXIL) 40 MG tablet Take 1 tablet (40 mg total) by mouth once daily. 90 tablet 1    phenytoin (DILANTIN) 100 MG ER capsule Take 1 capsule (100 mg total) by mouth 3 (three) times daily. 270 capsule 1    pravastatin (PRAVACHOL) 40 MG tablet TAKE 1 TABLET EVERY EVENING 90 tablet 3       Allergies  Review of patient's allergies indicates:  No Known Allergies    Physical Examination     Vitals:    03/14/24 0923   BP: 113/74   Pulse: 66   Resp: 20   Temp: 98 °F (36.7 °C)     Physical Exam  Vitals and nursing note reviewed.   Constitutional:       General: She is not in acute  distress.     Appearance: Normal appearance. She is not ill-appearing.   Eyes:      Extraocular Movements: Extraocular movements intact.      Pupils: Pupils are equal, round, and reactive to light.   Cardiovascular:      Rate and Rhythm: Normal rate and regular rhythm.      Heart sounds: Normal heart sounds.   Pulmonary:      Effort: Pulmonary effort is normal.      Breath sounds: Normal breath sounds.   Abdominal:      General: Bowel sounds are normal.      Palpations: Abdomen is soft.   Musculoskeletal:         General: Normal range of motion.   Skin:     Findings: No rash.   Neurological:      General: No focal deficit present.      Mental Status: She is alert and oriented to person, place, and time. Mental status is at baseline.   Psychiatric:         Mood and Affect: Mood normal.         Behavior: Behavior normal.            Assessment and Plan (including Health Maintenance)      Problem List  Smart SocialWire  Document Outside HM   :    Plan:     We will do a visit for establishing care in about a month if she wants to use me as her provider. She will need to bring her medications to that visit.     Health Maintenance Due   Topic Date Due    TETANUS VACCINE  Never done    Shingles Vaccine (1 of 2) Never done    Mammogram  10/07/2020    Colorectal Cancer Screening  01/11/2022    COVID-19 Vaccine (6 - 2023-24 season) 09/01/2023       Problem List Items Addressed This Visit          Cardiac/Vascular    Chronic diastolic heart failure (Chronic)    Primary hypertension (Chronic)    Overview      - Goal blood pressure for most patients is less than 130/85. Both numbers are important. If you have questions about your specific goal blood pressure, please ask at your clinic visits.   - Check blood pressure outside of clinic, record the numbers, and bring the log to all your office visits.   - If you have any chest pain, SOB, or other concerning symptoms, report these immediately, or go to the nearest ER.    - Recommend  cardiovascular exercise, at least 3 times per week, for at least 15 minutes.   - Eat a heart healthy diet.               Renal/    Asymptomatic menopausal state     Other Visit Diagnoses       Urinary frequency    -  Primary    Relevant Orders    Urinalysis, Reflex to Urine Culture    Dysuria        Relevant Orders    Urinalysis, Reflex to Urine Culture            Health Maintenance Topics with due status: Not Due       Topic Last Completion Date    Lipid Panel 12/04/2023    DEXA Scan 12/12/2023    High Dose Statin 03/14/2024       Procedures     Future Appointments   Date Time Provider Department Center   3/22/2024 11:30 AM [facility] GI ROOM 02 [facility]   3/28/2024  8:15 AM Felipe Perry MD [facility] [place]   5/1/2024 10:30 AM Jayy Castillo MD [facility]TH[facility]   8/14/2024  9:00 AM AWV NURSE, [facility] FAMILY MEDICINE [facility] [place]        Follow up if symptoms worsen or fail to improve.     Signature:  Felipe Perry MD  [address] [place] [address]  Phone #: [phone]  Fax #: [phone]    Date of encounter: 3/14/24    There are no Patient Instructions on file for this visit.

## 2024-03-16 PROBLEM — N18.31 CHRONIC KIDNEY DISEASE, STAGE 3A: Chronic | Status: ACTIVE | Noted: 2022-09-01

## 2024-03-16 PROBLEM — I10 PRIMARY HYPERTENSION: Chronic | Status: ACTIVE | Noted: 2022-11-07

## 2024-03-22 ENCOUNTER — HOSPITAL ENCOUNTER (OUTPATIENT)
Dept: GASTROENTEROLOGY | Facility: HOSPITAL | Age: 74
Discharge: HOME | End: 2024-03-22
Payer: MEDICARE

## 2024-03-22 DIAGNOSIS — Z12.11 SCREENING FOR MALIGNANT NEOPLASM OF COLON: ICD-10-CM

## 2024-03-28 ENCOUNTER — OFFICE VISIT (OUTPATIENT)
Dept: FAMILY MEDICINE | Facility: CLINIC | Age: 74
End: 2024-03-28
Payer: MEDICARE

## 2024-03-28 VITALS
HEART RATE: 65 BPM | BODY MASS INDEX: 38.41 KG/M2 | OXYGEN SATURATION: 96 % | SYSTOLIC BLOOD PRESSURE: 114 MMHG | TEMPERATURE: 98 F | HEIGHT: 64 IN | WEIGHT: 225 LBS | DIASTOLIC BLOOD PRESSURE: 74 MMHG

## 2024-03-28 DIAGNOSIS — I50.32 CHRONIC DIASTOLIC HEART FAILURE: Chronic | ICD-10-CM

## 2024-03-28 DIAGNOSIS — F41.1 GENERALIZED ANXIETY DISORDER: Chronic | ICD-10-CM

## 2024-03-28 DIAGNOSIS — Z86.79 S/P ABLATION OPERATION FOR ARRHYTHMIA: Chronic | ICD-10-CM

## 2024-03-28 DIAGNOSIS — J44.1 CHRONIC OBSTRUCTIVE PULMONARY DISEASE WITH (ACUTE) EXACERBATION: Chronic | ICD-10-CM

## 2024-03-28 DIAGNOSIS — G47.33 OSA ON CPAP: Chronic | ICD-10-CM

## 2024-03-28 DIAGNOSIS — Z95.818 PRESENCE OF WATCHMAN LEFT ATRIAL APPENDAGE CLOSURE DEVICE: Chronic | ICD-10-CM

## 2024-03-28 DIAGNOSIS — F33.9 DEPRESSION, RECURRENT: Chronic | ICD-10-CM

## 2024-03-28 DIAGNOSIS — K21.9 GASTROESOPHAGEAL REFLUX DISEASE WITHOUT ESOPHAGITIS: Chronic | ICD-10-CM

## 2024-03-28 DIAGNOSIS — E78.2 MIXED HYPERLIPIDEMIA: Chronic | ICD-10-CM

## 2024-03-28 DIAGNOSIS — E66.01 SEVERE OBESITY (BMI 35.0-39.9) WITH COMORBIDITY: Chronic | ICD-10-CM

## 2024-03-28 DIAGNOSIS — J45.40 MODERATE PERSISTENT ASTHMA WITHOUT COMPLICATION: Chronic | ICD-10-CM

## 2024-03-28 DIAGNOSIS — I48.0 PAROXYSMAL ATRIAL FIBRILLATION: Chronic | ICD-10-CM

## 2024-03-28 DIAGNOSIS — Z12.31 ENCOUNTER FOR SCREENING MAMMOGRAM FOR MALIGNANT NEOPLASM OF BREAST: Chronic | ICD-10-CM

## 2024-03-28 DIAGNOSIS — R30.0 DYSURIA: ICD-10-CM

## 2024-03-28 DIAGNOSIS — G44.229 CHRONIC TENSION-TYPE HEADACHE, NOT INTRACTABLE: Chronic | ICD-10-CM

## 2024-03-28 DIAGNOSIS — J30.1 NON-SEASONAL ALLERGIC RHINITIS DUE TO POLLEN: Chronic | ICD-10-CM

## 2024-03-28 DIAGNOSIS — Z78.0 ASYMPTOMATIC MENOPAUSAL STATE: Chronic | ICD-10-CM

## 2024-03-28 DIAGNOSIS — G89.29 CHRONIC BILATERAL LOW BACK PAIN WITH BILATERAL SCIATICA: Chronic | ICD-10-CM

## 2024-03-28 DIAGNOSIS — M81.0 AGE-RELATED OSTEOPOROSIS WITHOUT CURRENT PATHOLOGICAL FRACTURE: Chronic | ICD-10-CM

## 2024-03-28 DIAGNOSIS — M54.41 CHRONIC BILATERAL LOW BACK PAIN WITH BILATERAL SCIATICA: Chronic | ICD-10-CM

## 2024-03-28 DIAGNOSIS — M17.11 PRIMARY OSTEOARTHRITIS OF RIGHT KNEE: Chronic | ICD-10-CM

## 2024-03-28 DIAGNOSIS — S24.109S INJURY OF THORACIC SPINAL CORD, SEQUELA: Chronic | ICD-10-CM

## 2024-03-28 DIAGNOSIS — Z96.651 STATUS POST TOTAL RIGHT KNEE REPLACEMENT: Chronic | ICD-10-CM

## 2024-03-28 DIAGNOSIS — I10 PRIMARY HYPERTENSION: Primary | Chronic | ICD-10-CM

## 2024-03-28 DIAGNOSIS — S22.23XD CLOSED STERNAL MANUBRIAL DISSOCIATION FRACTURE WITH ROUTINE HEALING: Chronic | ICD-10-CM

## 2024-03-28 DIAGNOSIS — N18.31 CHRONIC KIDNEY DISEASE, STAGE 3A: Chronic | ICD-10-CM

## 2024-03-28 DIAGNOSIS — J45.50 SEVERE PERSISTENT ASTHMA WITHOUT COMPLICATION: Chronic | ICD-10-CM

## 2024-03-28 DIAGNOSIS — I20.9 ANGINA PECTORIS: Chronic | ICD-10-CM

## 2024-03-28 DIAGNOSIS — S22.031D: Chronic | ICD-10-CM

## 2024-03-28 DIAGNOSIS — R56.9 CONVULSIONS, UNSPECIFIED CONVULSION TYPE: Chronic | ICD-10-CM

## 2024-03-28 DIAGNOSIS — Z87.81 HISTORY OF FRACTURE OF RIB: Chronic | ICD-10-CM

## 2024-03-28 DIAGNOSIS — M54.42 CHRONIC BILATERAL LOW BACK PAIN WITH BILATERAL SCIATICA: Chronic | ICD-10-CM

## 2024-03-28 DIAGNOSIS — D50.8 IRON DEFICIENCY ANEMIA SECONDARY TO INADEQUATE DIETARY IRON INTAKE: Chronic | ICD-10-CM

## 2024-03-28 DIAGNOSIS — Z98.890 S/P ABLATION OPERATION FOR ARRHYTHMIA: Chronic | ICD-10-CM

## 2024-03-28 PROBLEM — R51.9 NONINTRACTABLE HEADACHE: Chronic | Status: ACTIVE | Noted: 2023-12-21

## 2024-03-28 PROBLEM — I48.11 LONGSTANDING PERSISTENT ATRIAL FIBRILLATION: Chronic | Status: ACTIVE | Noted: 2023-10-20

## 2024-03-28 PROBLEM — Z12.11 SCREENING FOR MALIGNANT NEOPLASM OF COLON: Status: RESOLVED | Noted: 2023-08-14 | Resolved: 2024-03-28

## 2024-03-28 PROBLEM — R06.00 DYSPNEA: Status: RESOLVED | Noted: 2022-04-07 | Resolved: 2024-03-28

## 2024-03-28 PROBLEM — R42 DIZZINESS: Status: RESOLVED | Noted: 2023-12-21 | Resolved: 2024-03-28

## 2024-03-28 PROBLEM — J06.9 UPPER RESPIRATORY TRACT INFECTION: Status: RESOLVED | Noted: 2023-12-21 | Resolved: 2024-03-28

## 2024-03-28 PROBLEM — J40 BRONCHITIS: Status: RESOLVED | Noted: 2023-09-14 | Resolved: 2024-03-28

## 2024-03-28 PROBLEM — I48.11 LONGSTANDING PERSISTENT ATRIAL FIBRILLATION: Chronic | Status: RESOLVED | Noted: 2023-10-20 | Resolved: 2024-03-28

## 2024-03-28 PROBLEM — F32.A DEPRESSION: Status: RESOLVED | Noted: 2022-09-01 | Resolved: 2024-03-28

## 2024-03-28 PROBLEM — Z01.818 PRE-OPERATIVE CLEARANCE: Status: RESOLVED | Noted: 2023-10-20 | Resolved: 2024-03-28

## 2024-03-28 PROBLEM — I50.9 CONGESTIVE HEART FAILURE: Status: RESOLVED | Noted: 2023-09-14 | Resolved: 2024-03-28

## 2024-03-28 PROBLEM — N30.00 ACUTE CYSTITIS WITHOUT HEMATURIA: Status: RESOLVED | Noted: 2023-12-11 | Resolved: 2024-03-28

## 2024-03-28 PROBLEM — R05.9 COUGH: Status: RESOLVED | Noted: 2023-08-14 | Resolved: 2024-03-28

## 2024-03-28 PROBLEM — J02.9 SORE THROAT: Status: RESOLVED | Noted: 2023-12-21 | Resolved: 2024-03-28

## 2024-03-28 LAB
BILIRUB UR QL STRIP: NEGATIVE
CLARITY UR: CLEAR
COLOR UR: COLORLESS
GLUCOSE UR STRIP-MCNC: NORMAL MG/DL
KETONES UR STRIP-SCNC: NEGATIVE MG/DL
LEUKOCYTE ESTERASE UR QL STRIP: NEGATIVE
NITRITE UR QL STRIP: NEGATIVE
PH UR STRIP: 7 PH UNITS
PROT UR QL STRIP: NEGATIVE
RBC # UR STRIP: NEGATIVE /UL
SP GR UR STRIP: 1.01
UROBILINOGEN UR STRIP-ACNC: NORMAL MG/DL

## 2024-03-28 PROCEDURE — 1125F AMNT PAIN NOTED PAIN PRSNT: CPT | Mod: ,,, | Performed by: FAMILY MEDICINE

## 2024-03-28 PROCEDURE — 3078F DIAST BP <80 MM HG: CPT | Mod: ,,, | Performed by: FAMILY MEDICINE

## 2024-03-28 PROCEDURE — 1159F MED LIST DOCD IN RCRD: CPT | Mod: ,,, | Performed by: FAMILY MEDICINE

## 2024-03-28 PROCEDURE — 96372 THER/PROPH/DIAG INJ SC/IM: CPT | Mod: 59,,, | Performed by: FAMILY MEDICINE

## 2024-03-28 PROCEDURE — 81003 URINALYSIS AUTO W/O SCOPE: CPT | Mod: QW,,, | Performed by: CLINICAL MEDICAL LABORATORY

## 2024-03-28 PROCEDURE — 99215 OFFICE O/P EST HI 40 MIN: CPT | Mod: 25,,, | Performed by: FAMILY MEDICINE

## 2024-03-28 PROCEDURE — 1101F PT FALLS ASSESS-DOCD LE1/YR: CPT | Mod: ,,, | Performed by: FAMILY MEDICINE

## 2024-03-28 PROCEDURE — 3288F FALL RISK ASSESSMENT DOCD: CPT | Mod: ,,, | Performed by: FAMILY MEDICINE

## 2024-03-28 PROCEDURE — 94640 AIRWAY INHALATION TREATMENT: CPT | Mod: ,,, | Performed by: FAMILY MEDICINE

## 2024-03-28 PROCEDURE — 3008F BODY MASS INDEX DOCD: CPT | Mod: ,,, | Performed by: FAMILY MEDICINE

## 2024-03-28 PROCEDURE — 3074F SYST BP LT 130 MM HG: CPT | Mod: ,,, | Performed by: FAMILY MEDICINE

## 2024-03-28 PROCEDURE — 1160F RVW MEDS BY RX/DR IN RCRD: CPT | Mod: ,,, | Performed by: FAMILY MEDICINE

## 2024-03-28 RX ORDER — IPRATROPIUM BROMIDE AND ALBUTEROL SULFATE 2.5; .5 MG/3ML; MG/3ML
3 SOLUTION RESPIRATORY (INHALATION)
Status: COMPLETED | OUTPATIENT
Start: 2024-03-28 | End: 2024-03-28

## 2024-03-28 RX ORDER — PREDNISONE 10 MG/1
TABLET ORAL
Qty: 63 TABLET | Refills: 0 | Status: SHIPPED | OUTPATIENT
Start: 2024-03-28 | End: 2024-04-15

## 2024-03-28 RX ORDER — AZITHROMYCIN 250 MG/1
TABLET, FILM COATED ORAL
Qty: 6 TABLET | Refills: 0 | Status: SHIPPED | OUTPATIENT
Start: 2024-03-28 | End: 2024-04-02

## 2024-03-28 RX ORDER — DEXAMETHASONE SODIUM PHOSPHATE 4 MG/ML
6 INJECTION, SOLUTION INTRA-ARTICULAR; INTRALESIONAL; INTRAMUSCULAR; INTRAVENOUS; SOFT TISSUE
Status: COMPLETED | OUTPATIENT
Start: 2024-03-28 | End: 2024-03-28

## 2024-03-28 RX ADMIN — DEXAMETHASONE SODIUM PHOSPHATE 6 MG: 4 INJECTION, SOLUTION INTRA-ARTICULAR; INTRALESIONAL; INTRAMUSCULAR; INTRAVENOUS; SOFT TISSUE at 09:03

## 2024-03-28 RX ADMIN — IPRATROPIUM BROMIDE AND ALBUTEROL SULFATE 3 ML: 2.5; .5 SOLUTION RESPIRATORY (INHALATION) at 09:03

## 2024-03-28 NOTE — PROGRESS NOTES
Felipe Perry MD    [address] [place] [address]     PATIENT NAME: Radha Martínez  : 1950  DATE: 3/28/24  MRN: [id number]      Billing Provider: Felipe Perry MD  Level of Service: [place] OFFICE/OUTPT VISIT, EST, LEVL V, 40-54 MIN  Patient PCP Information       Provider PCP Type    [facility]            Reason for Visit / Chief Complaint: Follow-up (2 weeks follow up after being seen for urinary problems. She states she continues to have a burning sensation when urinating. ), Shortness of Breath (States she has SOB for a while but now it has gotten worse with movement. She was told she had acute asthma. ), Dizziness, Headache, and Health Maintenance (Patient states she will call to reschedule her colonoscopy appointment. She will like to be referred to [facility] for mammogram. )       Update PCP  Update Chief Complaint         History of Present Illness / Problem Focused Workflow     Radha Martínez presents to the clinic with Follow-up (2 weeks follow up after being seen for urinary problems. She states she continues to have a burning sensation when urinating. ), Shortness of Breath (States she has SOB for a while but now it has gotten worse with movement. She was told she had acute asthma. ), Dizziness, Headache, and Health Maintenance (Patient states she will call to reschedule her colonoscopy appointment. She will like to be referred to [facility] for mammogram. )     Burning with urination - this seems to be recurrent    Asthma and COPD - about a week ago she started having increased SOB, wheeze, cough. She has been using her home supplemental Oxygen more over the past week. She feels like she gets benefit from the home oxygen once she has settled down and started resting. When this flare started she started using her duonebs 4 times per times per day. She also uses Trelogy everyday. Also gives herself a shot every 2 weeks, this is prescribed  by Dr. Flowers at [facility], she does not know the name of this injection. Usually when she has these flares she gets a steroid injection at there PCP office. She has no problems with steroids. She believes she had fever earlier this week with this illness, reports that she has had chills.     She plans to have a hernia surgery at [facility].     We do not have records from all her previous providers. She has a complex medial history,, but specifically has a lot of respiratory problems     Dizziness -     Headache -     HPI    Review of Systems     Review of Systems   Constitutional:  Positive for fatigue. Negative for activity change, appetite change, chills and fever.   HENT:  Negative for nasal congestion, ear pain, hearing loss, postnasal drip and sore throat.    Respiratory:  Positive for cough, shortness of breath and wheezing. Negative for chest tightness.    Cardiovascular:  Negative for chest pain, palpitations, leg swelling and claudication.   Gastrointestinal:  Negative for abdominal pain, change in bowel habit, constipation, diarrhea, nausea and vomiting.   Genitourinary:  Positive for dysuria and frequency.   Musculoskeletal:  Negative for arthralgias, back pain, gait problem and myalgias.   Integumentary:  Negative for rash.   Neurological:  Positive for dizziness and headaches. Negative for weakness.   Psychiatric/Behavioral:  Negative for suicidal ideas. The patient is not nervous/anxious.         Medical / Social / Family History     Past Medical History:   Diagnosis Date    Asthma     CHF (congestive heart failure)     COPD (chronic obstructive pulmonary disease)     WILLSON (dyspnea on exertion)     GERD (gastroesophageal reflux disease)     HTN (hypertension)     Hyperlipidemia     Osteoporosis     Oxygen dependent     02 2L    Seizure disorder        Past Surgical History:   Procedure Laterality Date    ARTHROPLASTY, KNEE, TOTAL, USING COMPUTER-ASSISTED NAVIGATION Right 11/2/2023     Procedure: ARTHROPLASTY, KNEE, TOTAL, USING COMPUTER-ASSISTED NAVIGATION;  Surgeon: Jayy Castillo MD;  Location: [facility] ORTHO OR;  Service: Orthopedics;  Laterality: Right;    CARDIOVERSION N/A 04/08/2022    Procedure: Cardioversion;  Surgeon: Adal Chung MD;  Location: [facility] CATH LAB;  Service: Cardiology;  Laterality: N/A;    COLONOSCOPY      EYE SURGERY      REVISION OF TOTAL KNEE REPLACEMENT       TUBAL LIGATION         Social History  Ms.  reports that she has never smoked. She has never been exposed to tobacco smoke. She has never used smokeless tobacco. She reports that she does not drink alcohol and does not use drugs.    Family History  Ms.'s family history includes Cancer in her father; Diabetes in her brother, mother, and sister; Heart disease in her mother and sister; Hypertension in her mother and sister.    Medications and Allergies     Medications  Outpatient Medications Marked as Taking for the 3/28/24 encounter (Office Visit) with Felipe Perry MD   Medication Sig Dispense Refill    albuterol-ipratropium (DUO-NEB) 2.5 mg-0.5 mg/3 mL nebulizer solution Take 3 mLs by nebulization every 6 (six) hours as needed for Wheezing. Rescue 100 mL 5    aspirin 325 MG tablet Take 1 tablet (325 mg total) by mouth once daily. 100 tablet 5    busPIRone (BUSPAR) 10 MG tablet TAKE 1 TABLET TWICE DAILY 180 tablet 3    calcium carbonate/vitamin D3 (CALCARB 600 WITH VITAMIN D ORAL) Take by mouth.      ferrous sulfate 325 (65 FE) MG EC tablet Take 1 tablet (325 mg total) by mouth once daily. 90 tablet 1    fluticasone propionate (FLONASE) 50 mcg/actuation nasal spray USE 1 SPRAY IN EACH NOSTRIL EVERY DAY (Patient taking differently: 1 spray Daily.) 32 g 2    fluticasone-umeclidin-vilanter (TRELEGY ELLIPTA) 200-62.5-25 mcg inhaler Inhale 1 puff into the lungs once daily. 60 each 11    furosemide (LASIX) 40 MG tablet Take 1 tablet (40 mg total) by mouth 2 (two) times daily. 180 tablet 1     gabapentin (NEURONTIN) 300 MG capsule Take 1 capsule (300 mg total) by mouth 3 (three) times daily. 270 capsule 1    ipratropium (ATROVENT) 42 mcg (0.06 %) nasal spray 2 sprays by Each Nostril route 4 (four) times daily. 15 mL 0    loratadine (CLARITIN) 10 mg tablet TAKE 1 TABLET EVERY DAY 90 tablet 1    meclizine (ANTIVERT) 12.5 mg tablet Take 1 tablet (12.5 mg total) by mouth 2 (two) times daily as needed for Dizziness. 60 tablet 0    montelukast (SINGULAIR) 10 mg tablet Take 1 tablet (10 mg total) by mouth once daily. 90 tablet 1    nebivoloL (BYSTOLIC) 5 MG Tab Take 1 tablet (5 mg total) by mouth once daily. 90 tablet 1    pantoprazole (PROTONIX) 40 MG tablet Take 1 tablet (40 mg total) by mouth once daily. 90 tablet 1    paroxetine (PAXIL) 40 MG tablet Take 1 tablet (40 mg total) by mouth once daily. 90 tablet 1    phenytoin (DILANTIN) 100 MG ER capsule Take 1 capsule (100 mg total) by mouth 3 (three) times daily. 270 capsule 1    pravastatin (PRAVACHOL) 40 MG tablet TAKE 1 TABLET EVERY EVENING 90 tablet 3     Current Facility-Administered Medications for the 3/28/24 encounter (Office Visit) with Felipe Perry MD   Medication Dose Route Frequency Provider Last Rate Last Admin    [COMPLETED] albuterol-ipratropium 2.5 mg-0.5 mg/3 mL nebulizer solution 3 mL  3 mL Nebulization 1 time in Clinic/HOD Felipe Perry MD   3 mL at 03/28/24 0920    [COMPLETED] dexAMETHasone injection 6 mg  6 mg Intramuscular 1 time in Clinic/HOD Felipe Perry MD   6 mg at 03/28/24 0925       Allergies  Review of patient's allergies indicates:  No Known Allergies    Physical Examination     Vitals:    03/28/24 0810   BP: 114/74   Pulse: 65   Temp: 98 °F (36.7 °C)     Physical Exam  Vitals and nursing note reviewed.   Constitutional:       General: She is not in acute distress.     Appearance: Normal appearance. She is not ill-appearing.   Eyes:      Extraocular Movements: Extraocular movements intact.      Pupils:  Pupils are equal, round, and reactive to light.   Cardiovascular:      Rate and Rhythm: Normal rate and regular rhythm.      Heart sounds: Normal heart sounds.   Pulmonary:      Effort: Pulmonary effort is normal.      Breath sounds: Decreased air movement and transmitted upper airway sounds present. Wheezing and rhonchi present.   Abdominal:      General: Bowel sounds are normal.      Palpations: Abdomen is soft.   Musculoskeletal:         General: Normal range of motion.   Skin:     Findings: No rash.   Neurological:      General: No focal deficit present.      Mental Status: She is alert and oriented to person, place, and time. Mental status is at baseline.   Psychiatric:         Mood and Affect: Mood normal.         Behavior: Behavior normal.            Assessment and Plan (including Health Maintenance)      Problem List  Smart Sets  Document Outside HM   :    Plan:     Duoneb in clinic, IM steroids, home with a steroid taper over the next 18 days. Ideally we calm the flare down and get her back to her baseline     Still trying to obtain records from previous providers     One will notice all the Dx listed in the problem list for this visit. I went through each Dx to make sure it accurately reflected the chronic health problems as best as possible. These chronic health problems are quite extensive. Most of these problems are fairly stable, the main Dx actively causing her significant problems is her pulmonary problems    Health Maintenance Due   Topic Date Due    TETANUS VACCINE  Never done    Shingles Vaccine (1 of 2) Never done    Mammogram  10/07/2020    Colorectal Cancer Screening  01/11/2022    COVID-19 Vaccine (6 - 2023-24 season) 09/01/2023       Problem List Items Addressed This Visit          Neuro    Spinal cord injury, thoracic region (Chronic)    Closed stable burst fracture of third thoracic vertebra with routine healing (Chronic)    Chronic tension-type headache, not intractable (Chronic)     Convulsions (Chronic)       Psychiatric    Generalized anxiety disorder (Chronic)    Depression, recurrent (Chronic)       ENT    Non-seasonal allergic rhinitis due to pollen (Chronic)       Pulmonary    Moderate persistent asthma without complication (Chronic)    Relevant Medications    albuterol-ipratropium 2.5 mg-0.5 mg/3 mL nebulizer solution 3 mL (Completed)    predniSONE (DELTASONE) 10 MG tablet    dexAMETHasone injection 6 mg (Completed)    azithromycin (Z-CORTNEY) 250 MG tablet    Chronic obstructive pulmonary disease with (acute) exacerbation (Chronic)    RESOLVED: Severe persistent asthma without complication       Cardiac/Vascular    Chronic diastolic heart failure (Chronic)    Mixed hyperlipidemia (Chronic)    Angina pectoris (Chronic)    Primary hypertension - Primary (Chronic)    Overview      - Goal blood pressure for most patients is less than 130/85. Both numbers are important. If you have questions about your specific goal blood pressure, please ask at your clinic visits.   - Check blood pressure outside of clinic, record the numbers, and bring the log to all your office visits.   - If you have any chest pain, SOB, or other concerning symptoms, report these immediately, or go to the nearest ER.    - Recommend cardiovascular exercise, at least 3 times per week, for at least 15 minutes.   - Eat a heart healthy diet.            Presence of Watchman left atrial appendage closure device (Chronic)    S/P ablation operation for arrhythmia (Chronic)    Paroxysmal atrial fibrillation (Chronic)       Renal/    Chronic kidney disease, stage 3a (Chronic)    Asymptomatic menopausal state (Chronic)       Oncology    Iron deficiency anemia secondary to inadequate dietary iron intake (Chronic)       Endocrine    Age-related osteoporosis without current pathological fracture (Chronic)    Severe obesity (BMI 35.0-39.9) with comorbidity (Chronic)       GI    Gastroesophageal reflux disease without esophagitis (Chronic)        Orthopedic    Primary osteoarthritis of right knee (Chronic)    Status post total right knee replacement (Chronic)    Chronic bilateral low back pain with bilateral sciatica (Chronic)    Closed sternal manubrial dissociation fracture with routine healing (Chronic)    History of fracture of rib (Chronic)       Other    PACO on CPAP (Chronic)     Other Visit Diagnoses       Encounter for screening mammogram for malignant neoplasm of breast  (Chronic)       Relevant Orders    Mammo Digital Screening Bilat    Dysuria        Relevant Orders    Urinalysis, Reflex to Urine Culture            Health Maintenance Topics with due status: Not Due       Topic Last Completion Date    Lipid Panel 12/04/2023    DEXA Scan 12/12/2023    High Dose Statin 03/28/2024       Procedures     Future Appointments   Date Time Provider Department Center   5/1/2024 10:30 AM Jayy Castillo MD [facility]   6/28/2024  9:45 AM Felipe Perry MD [facility] [place]   8/14/2024  9:00 AM AWV NURSE, [facility]ILY MEDICINE [facility] [place]        Follow up in about 3 months (around 6/28/2024), or if symptoms worsen or fail to improve, for chronic health problems, hypertension.     Signature:  Felipe Perry MD  [address] [place] [address]  Phone #: [phone]  Fax #: [phone]    Date of encounter: 3/28/24    There are no Patient Instructions on file for this visit.

## 2024-04-30 DIAGNOSIS — Z96.651 HISTORY OF TOTAL KNEE ARTHROPLASTY, RIGHT: Primary | ICD-10-CM

## 2024-05-01 ENCOUNTER — HOSPITAL ENCOUNTER (OUTPATIENT)
Dept: RADIOLOGY | Facility: HOSPITAL | Age: 74
Discharge: HOME | End: 2024-05-01
Attending: ORTHOPAEDIC SURGERY
Payer: MEDICARE

## 2024-05-01 ENCOUNTER — OFFICE VISIT (OUTPATIENT)
Dept: ORTHOPEDICS | Facility: CLINIC | Age: 74
End: 2024-05-01
Payer: MEDICARE

## 2024-05-01 DIAGNOSIS — Z96.651 HISTORY OF TOTAL KNEE ARTHROPLASTY, RIGHT: Primary | ICD-10-CM

## 2024-05-01 DIAGNOSIS — M65.30 TRIGGER FINGER, UNSPECIFIED FINGER, UNSPECIFIED LATERALITY: ICD-10-CM

## 2024-05-01 DIAGNOSIS — Z96.651 HISTORY OF TOTAL KNEE ARTHROPLASTY, RIGHT: ICD-10-CM

## 2024-05-01 PROCEDURE — 73562 X-RAY EXAM OF KNEE 3: CPT | Mod: TC,RT

## 2024-05-01 PROCEDURE — 99214 OFFICE O/P EST MOD 30 MIN: CPT | Mod: S$PBB,25,, | Performed by: ORTHOPAEDIC SURGERY

## 2024-05-01 PROCEDURE — 20550 NJX 1 TENDON SHEATH/LIGAMENT: CPT | Mod: S$PBB,LT,, | Performed by: ORTHOPAEDIC SURGERY

## 2024-05-01 PROCEDURE — 73562 X-RAY EXAM OF KNEE 3: CPT | Mod: 26,RT,, | Performed by: ORTHOPAEDIC SURGERY

## 2024-05-01 PROCEDURE — 99213 OFFICE O/P EST LOW 20 MIN: CPT | Mod: PBBFAC,25 | Performed by: ORTHOPAEDIC SURGERY

## 2024-05-01 PROCEDURE — 20550 NJX 1 TENDON SHEATH/LIGAMENT: CPT | Mod: PBBFAC,LT | Performed by: ORTHOPAEDIC SURGERY

## 2024-05-01 PROCEDURE — 1160F RVW MEDS BY RX/DR IN RCRD: CPT | Mod: CPTII,,, | Performed by: ORTHOPAEDIC SURGERY

## 2024-05-01 PROCEDURE — 1159F MED LIST DOCD IN RCRD: CPT | Mod: CPTII,,, | Performed by: ORTHOPAEDIC SURGERY

## 2024-05-01 PROCEDURE — 99999PBSHW PR PBB SHADOW TECHNICAL ONLY FILED TO HB: Mod: PBBFAC,,,

## 2024-05-01 RX ORDER — TRIAMCINOLONE ACETONIDE 40 MG/ML
40 INJECTION, SUSPENSION INTRA-ARTICULAR; INTRAMUSCULAR ONCE
Status: COMPLETED | OUTPATIENT
Start: 2024-05-01 | End: 2024-05-01

## 2024-05-01 RX ORDER — BUPIVACAINE HYDROCHLORIDE 2.5 MG/ML
1 INJECTION, SOLUTION INFILTRATION; PERINEURAL
Status: COMPLETED | OUTPATIENT
Start: 2024-05-01 | End: 2024-05-01

## 2024-05-01 RX ADMIN — TRIAMCINOLONE ACETONIDE 40 MG: 40 INJECTION, SUSPENSION INTRA-ARTICULAR; INTRAMUSCULAR at 11:05

## 2024-05-01 RX ADMIN — BUPIVACAINE HYDROCHLORIDE 2.5 MG: 2.5 INJECTION, SOLUTION INFILTRATION; PERINEURAL at 11:05

## 2024-05-01 NOTE — PROGRESS NOTES
CLINIC NOTE       Chief Complaint   Patient presents with    Right Knee - Follow-up        Radha Martínez is a 74 y.o. female seen today for recheck of her right knee and evaluate painful triggering of the left ring finger.  She previously underwent A1 pulley release of the right long finger in the past.  She was follow-up for right total knee replacement arthroplasty performed 6 months ago on 11/02/2023.  She is done well with right knee since that time.  She ambulates independently around the house.  She uses a wheeled walker when outside the home for balance issues.    Past Medical History:   Diagnosis Date    Asthma     CHF (congestive heart failure)     COPD (chronic obstructive pulmonary disease)     WILLSON (dyspnea on exertion)     GERD (gastroesophageal reflux disease)     HTN (hypertension)     Hyperlipidemia     Osteoporosis     Oxygen dependent     02 2L    Seizure disorder      Family History   Problem Relation Name Age of Onset    Diabetes Mother      Heart disease Mother      Hypertension Mother      Cancer Father      Diabetes Sister      Heart disease Sister      Hypertension Sister      Diabetes Brother       Current Outpatient Medications on File Prior to Visit   Medication Sig Dispense Refill    albuterol-ipratropium (DUO-NEB) 2.5 mg-0.5 mg/3 mL nebulizer solution Take 3 mLs by nebulization every 6 (six) hours as needed for Wheezing. Rescue 100 mL 5    aspirin 325 MG tablet Take 1 tablet (325 mg total) by mouth once daily. 100 tablet 5    busPIRone (BUSPAR) 10 MG tablet TAKE 1 TABLET TWICE DAILY 180 tablet 3    calcium carbonate/vitamin D3 (CALCARB 600 WITH VITAMIN D ORAL) Take by mouth.      ferrous sulfate 325 (65 FE) MG EC tablet Take 1 tablet (325 mg total) by mouth once daily. 90 tablet 1    fluticasone propionate (FLONASE) 50 mcg/actuation nasal spray USE 1 SPRAY IN EACH NOSTRIL EVERY DAY (Patient taking differently: 1 spray Daily.) 32 g 2    fluticasone-umeclidin-vilanter (TRELEGY  ELLIPTA) 200-62.5-25 mcg inhaler Inhale 1 puff into the lungs once daily. 60 each 11    furosemide (LASIX) 40 MG tablet Take 1 tablet (40 mg total) by mouth 2 (two) times daily. 180 tablet 1    gabapentin (NEURONTIN) 300 MG capsule Take 1 capsule (300 mg total) by mouth 3 (three) times daily. 270 capsule 1    ipratropium (ATROVENT) 42 mcg (0.06 %) nasal spray 2 sprays by Each Nostril route 4 (four) times daily. 15 mL 0    loratadine (CLARITIN) 10 mg tablet TAKE 1 TABLET EVERY DAY 90 tablet 1    meclizine (ANTIVERT) 12.5 mg tablet Take 1 tablet (12.5 mg total) by mouth 2 (two) times daily as needed for Dizziness. 60 tablet 0    montelukast (SINGULAIR) 10 mg tablet Take 1 tablet (10 mg total) by mouth once daily. 90 tablet 1    nebivoloL (BYSTOLIC) 5 MG Tab Take 1 tablet (5 mg total) by mouth once daily. 90 tablet 1    pantoprazole (PROTONIX) 40 MG tablet Take 1 tablet (40 mg total) by mouth once daily. 90 tablet 1    paroxetine (PAXIL) 40 MG tablet Take 1 tablet (40 mg total) by mouth once daily. 90 tablet 1    phenytoin (DILANTIN) 100 MG ER capsule Take 1 capsule (100 mg total) by mouth 3 (three) times daily. 270 capsule 1    pravastatin (PRAVACHOL) 40 MG tablet TAKE 1 TABLET EVERY EVENING 90 tablet 3     No current facility-administered medications on file prior to visit.       ROS     There were no vitals filed for this visit.    Past Surgical History:   Procedure Laterality Date    ARTHROPLASTY, KNEE, TOTAL, USING COMPUTER-ASSISTED NAVIGATION Right 11/2/2023    Procedure: ARTHROPLASTY, KNEE, TOTAL, USING COMPUTER-ASSISTED NAVIGATION;  Surgeon: Jayy Castillo MD;  Location: [facility] ORTHO OR;  Service: Orthopedics;  Laterality: Right;    CARDIOVERSION N/A 04/08/2022    Procedure: Cardioversion;  Surgeon: Adal Chung MD;  Location: [facility] CATH LAB;  Service: Cardiology;  Laterality: N/A;    COLONOSCOPY      EYE SURGERY      REVISION OF TOTAL KNEE REPLACEMENT       TUBAL LIGATION          Review of  patient's allergies indicates:  No Known Allergies     Ortho Exam :  Right knee shows well-healed midline anterior incision.  Knee motion is 0 to 125° of flexion.  Knee ligaments stable clinically.  Patella tracks well in the midline.  Exam left hand is normal contour.  There is tenderness palpation along the A1 pulley region to the ring finger.  She is able to demonstrate triggering of the ring finger with active IP joint flexion.    Radiographic Examination:  Right knee 05/01/2024    Technique:  Three views AP lateral and patellar  Findings:  Bones well mineralized.  Metallic arthroplasty components are in expected position alignment for TKR.  Patella is seated well in midline sulcus on sunrise view.    Impression:   See Above    Assessment and Plan  Patient Active Problem List    Diagnosis Date Noted    Depression, recurrent 03/28/2024    Paroxysmal atrial fibrillation 03/28/2024    Convulsions 03/28/2024    Chronic obstructive pulmonary disease with (acute) exacerbation 03/28/2024    Chronic tension-type headache, not intractable 12/21/2023    Iron deficiency anemia secondary to inadequate dietary iron intake 11/15/2023    Non-seasonal allergic rhinitis due to pollen 09/14/2023    Chronic bilateral low back pain with bilateral sciatica 09/14/2023    Generalized anxiety disorder 09/14/2023    Primary osteoarthritis of right knee 09/07/2023    Status post total right knee replacement 09/07/2023    Asymptomatic menopausal state 08/14/2023    PACO on CPAP 11/07/2022    Primary hypertension 11/07/2022    Presence of Watchman left atrial appendage closure device 10/10/2022    Severe obesity (BMI 35.0-39.9) with comorbidity 09/01/2022    Angina pectoris 09/01/2022    Chronic kidney disease, stage 3a 09/01/2022    S/P ablation operation for arrhythmia 08/10/2022    Mixed hyperlipidemia 04/07/2022    Chronic diastolic heart failure 09/08/2021    Moderate persistent asthma without complication     Gastroesophageal reflux  disease without esophagitis     Age-related osteoporosis without current pathological fracture     Spinal cord injury, thoracic region 02/10/2016    Closed sternal manubrial dissociation fracture with routine healing 02/02/2016    History of fracture of rib 02/02/2016    Closed stable burst fracture of third thoracic vertebra with routine healing 02/02/2016    Impression:  1. Status post right TKR doing well 2. Nodular tenosynovitis left ring finger (trigger finger)  Plan:  1. Left hand was prepped with Betadine and flexor tendon sheath to the left ring finger injected with triamcinolone and Marcaine 1 cc each.  2.  Recheck right knee 6 months repeat x-ray or sooner for interval problems.  We discussed the option for A1 pulley release of the left ring finger if symptoms persist.  She will call for scheduling of the event.    Jayy Castillo M.D.

## 2024-06-28 ENCOUNTER — OFFICE VISIT (OUTPATIENT)
Dept: FAMILY MEDICINE | Facility: CLINIC | Age: 74
End: 2024-06-28
Payer: MEDICARE

## 2024-06-28 VITALS
WEIGHT: 233.19 LBS | BODY MASS INDEX: 39.81 KG/M2 | OXYGEN SATURATION: 96 % | RESPIRATION RATE: 16 BRPM | HEART RATE: 78 BPM | DIASTOLIC BLOOD PRESSURE: 66 MMHG | SYSTOLIC BLOOD PRESSURE: 107 MMHG | TEMPERATURE: 98 F | HEIGHT: 64 IN

## 2024-06-28 DIAGNOSIS — G47.33 OSA ON CPAP: Chronic | ICD-10-CM

## 2024-06-28 DIAGNOSIS — D50.8 IRON DEFICIENCY ANEMIA SECONDARY TO INADEQUATE DIETARY IRON INTAKE: Chronic | ICD-10-CM

## 2024-06-28 DIAGNOSIS — I10 PRIMARY HYPERTENSION: Primary | Chronic | ICD-10-CM

## 2024-06-28 DIAGNOSIS — I50.32 CHRONIC DIASTOLIC HEART FAILURE: Chronic | ICD-10-CM

## 2024-06-28 DIAGNOSIS — R56.9 CONVULSIONS, UNSPECIFIED CONVULSION TYPE: Chronic | ICD-10-CM

## 2024-06-28 DIAGNOSIS — Z79.899 ENCOUNTER FOR LONG-TERM (CURRENT) USE OF HIGH-RISK MEDICATION: ICD-10-CM

## 2024-06-28 DIAGNOSIS — E78.2 MIXED HYPERLIPIDEMIA: Chronic | ICD-10-CM

## 2024-06-28 DIAGNOSIS — R05.3 CHRONIC COUGH: Chronic | ICD-10-CM

## 2024-06-28 PROBLEM — E66.813 CLASS 3 SEVERE OBESITY DUE TO EXCESS CALORIES WITH SERIOUS COMORBIDITY AND BODY MASS INDEX (BMI) OF 40.0 TO 44.9 IN ADULT: Chronic | Status: ACTIVE | Noted: 2022-09-01

## 2024-06-28 LAB
ANION GAP SERPL CALCULATED.3IONS-SCNC: 12 MMOL/L (ref 7–16)
BASOPHILS # BLD AUTO: 0.03 K/UL (ref 0–0.2)
BASOPHILS NFR BLD AUTO: 0.6 % (ref 0–1)
BILIRUB UR QL STRIP: NEGATIVE
BUN SERPL-MCNC: 11 MG/DL (ref 7–18)
BUN/CREAT SERPL: 12 (ref 6–20)
CALCIUM SERPL-MCNC: 8.8 MG/DL (ref 8.5–10.1)
CHLORIDE SERPL-SCNC: 106 MMOL/L (ref 98–107)
CHOLEST SERPL-MCNC: 224 MG/DL (ref 0–200)
CHOLEST/HDLC SERPL: 2.4 {RATIO}
CLARITY UR: CLEAR
CO2 SERPL-SCNC: 28 MMOL/L (ref 21–32)
COLOR UR: COLORLESS
CREAT SERPL-MCNC: 0.94 MG/DL (ref 0.55–1.02)
CREAT UR-MCNC: <13 MG/DL (ref 28–219)
DIFFERENTIAL METHOD BLD: ABNORMAL
EGFR (NO RACE VARIABLE) (RUSH/TITUS): 64 ML/MIN/1.73M2
EOSINOPHIL # BLD AUTO: 0.28 K/UL (ref 0–0.5)
EOSINOPHIL NFR BLD AUTO: 5.4 % (ref 1–4)
ERYTHROCYTE [DISTWIDTH] IN BLOOD BY AUTOMATED COUNT: 14.1 % (ref 11.5–14.5)
GLUCOSE SERPL-MCNC: 89 MG/DL (ref 74–106)
GLUCOSE UR STRIP-MCNC: NORMAL MG/DL
HCT VFR BLD AUTO: 40.8 % (ref 38–47)
HDLC SERPL-MCNC: 94 MG/DL (ref 40–60)
HGB BLD-MCNC: 12.7 G/DL (ref 12–16)
IMM GRANULOCYTES # BLD AUTO: 0.01 K/UL (ref 0–0.04)
IMM GRANULOCYTES NFR BLD: 0.2 % (ref 0–0.4)
IRON SATN MFR SERPL: 23 % (ref 14–50)
IRON SERPL-MCNC: 68 ΜG/DL (ref 50–170)
KETONES UR STRIP-SCNC: NEGATIVE MG/DL
LDLC SERPL CALC-MCNC: 114 MG/DL
LDLC/HDLC SERPL: 1.2 {RATIO}
LEUKOCYTE ESTERASE UR QL STRIP: NEGATIVE
LYMPHOCYTES # BLD AUTO: 1.96 K/UL (ref 1–4.8)
LYMPHOCYTES NFR BLD AUTO: 37.8 % (ref 27–41)
MCH RBC QN AUTO: 30.8 PG (ref 27–31)
MCHC RBC AUTO-ENTMCNC: 31.1 G/DL (ref 32–36)
MCV RBC AUTO: 98.8 FL (ref 80–96)
MICROALBUMIN UR-MCNC: <0.5 MG/DL (ref 0–2.8)
MICROALBUMIN/CREAT RATIO PNL UR: ABNORMAL
MONOCYTES # BLD AUTO: 0.51 K/UL (ref 0–0.8)
MONOCYTES NFR BLD AUTO: 9.8 % (ref 2–6)
MPC BLD CALC-MCNC: 9.8 FL (ref 9.4–12.4)
NEUTROPHILS # BLD AUTO: 2.4 K/UL (ref 1.8–7.7)
NEUTROPHILS NFR BLD AUTO: 46.2 % (ref 53–65)
NITRITE UR QL STRIP: NEGATIVE
NONHDLC SERPL-MCNC: 130 MG/DL
NRBC # BLD AUTO: 0 X10E3/UL
NRBC, AUTO (.00): 0 %
PH UR STRIP: 7 PH UNITS
PHENYTOIN SERPL-MCNC: 9.5 ΜG/ML (ref 10–20)
PLATELET # BLD AUTO: 296 K/UL (ref 150–400)
POTASSIUM SERPL-SCNC: 4.7 MMOL/L (ref 3.5–5.1)
PROT UR QL STRIP: NEGATIVE
RBC # BLD AUTO: 4.13 M/UL (ref 4.2–5.4)
RBC # UR STRIP: NEGATIVE /UL
SODIUM SERPL-SCNC: 141 MMOL/L (ref 136–145)
SP GR UR STRIP: 1
TIBC SERPL-MCNC: 293 ΜG/DL (ref 250–450)
TRIGL SERPL-MCNC: 80 MG/DL (ref 35–150)
UROBILINOGEN UR STRIP-ACNC: NORMAL MG/DL
VLDLC SERPL-MCNC: 16 MG/DL
WBC # BLD AUTO: 5.19 K/UL (ref 4.5–11)

## 2024-06-28 PROCEDURE — 82043 UR ALBUMIN QUANTITATIVE: CPT | Mod: ,,, | Performed by: CLINICAL MEDICAL LABORATORY

## 2024-06-28 PROCEDURE — 83550 IRON BINDING TEST: CPT | Mod: ,,, | Performed by: CLINICAL MEDICAL LABORATORY

## 2024-06-28 PROCEDURE — 80185 ASSAY OF PHENYTOIN TOTAL: CPT | Mod: ,,, | Performed by: CLINICAL MEDICAL LABORATORY

## 2024-06-28 PROCEDURE — 85025 COMPLETE CBC W/AUTO DIFF WBC: CPT | Mod: ,,, | Performed by: CLINICAL MEDICAL LABORATORY

## 2024-06-28 PROCEDURE — 83540 ASSAY OF IRON: CPT | Mod: ,,, | Performed by: CLINICAL MEDICAL LABORATORY

## 2024-06-28 PROCEDURE — 82570 ASSAY OF URINE CREATININE: CPT | Mod: ,,, | Performed by: CLINICAL MEDICAL LABORATORY

## 2024-06-28 PROCEDURE — 81003 URINALYSIS AUTO W/O SCOPE: CPT | Mod: QW,,, | Performed by: CLINICAL MEDICAL LABORATORY

## 2024-06-28 PROCEDURE — 80048 BASIC METABOLIC PNL TOTAL CA: CPT | Mod: ,,, | Performed by: CLINICAL MEDICAL LABORATORY

## 2024-06-28 PROCEDURE — 80061 LIPID PANEL: CPT | Mod: ,,, | Performed by: CLINICAL MEDICAL LABORATORY

## 2024-06-28 RX ORDER — BENZONATATE 100 MG/1
100 CAPSULE ORAL 3 TIMES DAILY PRN
Qty: 60 CAPSULE | Refills: 2 | Status: SHIPPED | OUTPATIENT
Start: 2024-06-28

## 2024-06-28 RX ORDER — SEMAGLUTIDE 0.5 MG/.5ML
0.5 INJECTION, SOLUTION SUBCUTANEOUS WEEKLY
Qty: 2 ML | Refills: 5 | Status: SHIPPED | OUTPATIENT
Start: 2024-06-28

## 2024-06-28 NOTE — LETTER
AUTHORIZATION FOR RELEASE OF   CONFIDENTIAL INFORMATION    Dear Dr. Flowers,    We are seeing Radha Martínez, date of birth 1950, in the clinic at [facility]. Felipe Perry MD is the patient's PCP. Radha Martínez has an outstanding lab/procedure at the time we reviewed her chart. In order to help keep her health information updated, she has authorized us to request the following medical record(s):        (  )  MAMMOGRAM                                      (  )  COLONOSCOPY      (  )  PAP SMEAR                                          (  x)  OUTSIDE LAB RESULTS     (  )  DEXA SCAN                                          (  )  EYE EXAM            (  )  FOOT EXAM                                          (x  )  ENTIRE RECORD     (  )  OUTSIDE IMMUNIZATIONS                 (  )  _______________         Please fax records to [facility], Webb, Christopher N, MD, [phone]     If you have any questions, please contact Christina at [phone].           Patient Name: Radha Martínez  : 1950  Patient Phone #: [phone]

## 2024-06-28 NOTE — PROGRESS NOTES
Felipe Perry MD    [address] [place] [address]     PATIENT NAME: Radha Martínez  : 1950  DATE: 24  MRN: [id number]      Billing Provider: Felipe Perry MD  Level of Service: [place] OFFICE/OUTPT VISIT, EST, LEVL IV, 30-39 MIN  Patient PCP Information       Provider PCP Type    Felipe Perry MD General            Reason for Visit / Chief Complaint: Hypertension (3 month follow up)       Update PCP  Update Chief Complaint         History of Present Illness / Problem Focused Workflow     Radha Martínez presents to the clinic with Hypertension (3 month follow up)     She has gained over 30 pounds in the past few months, asks about something for weight loss     Phenytoin levels and iron levels need to be checked         HPI    Review of Systems     Review of Systems   Constitutional:  Negative for activity change, appetite change, chills, fatigue and fever.   HENT:  Negative for nasal congestion, ear pain, hearing loss, postnasal drip and sore throat.    Respiratory:  Positive for cough, shortness of breath and wheezing. Negative for chest tightness.    Cardiovascular:  Negative for chest pain, palpitations, leg swelling and claudication.   Gastrointestinal:  Negative for abdominal pain, change in bowel habit, constipation, diarrhea, nausea and vomiting.   Genitourinary:  Negative for dysuria.   Musculoskeletal:  Negative for arthralgias, back pain, gait problem and myalgias.   Integumentary:  Negative for rash.   Neurological:  Negative for weakness and headaches.   Psychiatric/Behavioral:  Negative for suicidal ideas. The patient is not nervous/anxious.         Medical / Social / Family History     Past Medical History:   Diagnosis Date    Asthma     CHF (congestive heart failure)     COPD (chronic obstructive pulmonary disease)     WILLSON (dyspnea on exertion)     GERD (gastroesophageal reflux disease)     HTN (hypertension)     Hyperlipidemia      Osteoporosis     Oxygen dependent     02 2L    Seizure disorder        Past Surgical History:   Procedure Laterality Date    ARTHROPLASTY, KNEE, TOTAL, USING COMPUTER-ASSISTED NAVIGATION Right 11/2/2023    Procedure: ARTHROPLASTY, KNEE, TOTAL, USING COMPUTER-ASSISTED NAVIGATION;  Surgeon: Jayy Castillo MD;  Location: [facility] ORTHO OR;  Service: Orthopedics;  Laterality: Right;    CARDIOVERSION N/A 04/08/2022    Procedure: Cardioversion;  Surgeon: Adal Chung MD;  Location: [facility] CATH LAB;  Service: Cardiology;  Laterality: N/A;    COLONOSCOPY      EYE SURGERY      REVISION OF TOTAL KNEE REPLACEMENT       TUBAL LIGATION         Social History  Ms.  reports that she has never smoked. She has never been exposed to tobacco smoke. She has never used smokeless tobacco. She reports that she does not drink alcohol and does not use drugs.    Family History  Ms.'s family history includes Cancer in her father; Diabetes in her brother, mother, and sister; Heart disease in her mother and sister; Hypertension in her mother and sister.    Medications and Allergies     Medications  Outpatient Medications Marked as Taking for the 6/28/24 encounter (Office Visit) with Felipe Perry MD   Medication Sig Dispense Refill    aspirin 325 MG tablet Take 1 tablet (325 mg total) by mouth once daily. 100 tablet 5    busPIRone (BUSPAR) 10 MG tablet TAKE 1 TABLET TWICE DAILY 180 tablet 3    calcium carbonate/vitamin D3 (CALCARB 600 WITH VITAMIN D ORAL) Take by mouth.      ferrous sulfate 325 (65 FE) MG EC tablet Take 1 tablet (325 mg total) by mouth once daily. 90 tablet 1    fluticasone-umeclidin-vilanter (TRELEGY ELLIPTA) 200-62.5-25 mcg inhaler Inhale 1 puff into the lungs once daily. 60 each 11    furosemide (LASIX) 40 MG tablet Take 1 tablet (40 mg total) by mouth 2 (two) times daily. 180 tablet 1    gabapentin (NEURONTIN) 300 MG capsule Take 1 capsule (300 mg total) by mouth 3 (three) times daily. 270 capsule  1    ipratropium (ATROVENT) 42 mcg (0.06 %) nasal spray 2 sprays by Each Nostril route 4 (four) times daily. 15 mL 0    loratadine (CLARITIN) 10 mg tablet TAKE 1 TABLET EVERY DAY 90 tablet 1    meclizine (ANTIVERT) 12.5 mg tablet Take 1 tablet (12.5 mg total) by mouth 2 (two) times daily as needed for Dizziness. 60 tablet 0    montelukast (SINGULAIR) 10 mg tablet Take 1 tablet (10 mg total) by mouth once daily. 90 tablet 1    nebivoloL (BYSTOLIC) 5 MG Tab Take 1 tablet (5 mg total) by mouth once daily. 90 tablet 1    pantoprazole (PROTONIX) 40 MG tablet Take 1 tablet (40 mg total) by mouth once daily. 90 tablet 1    paroxetine (PAXIL) 40 MG tablet Take 1 tablet (40 mg total) by mouth once daily. 90 tablet 1    phenytoin (DILANTIN) 100 MG ER capsule Take 1 capsule (100 mg total) by mouth 3 (three) times daily. 270 capsule 1    pravastatin (PRAVACHOL) 40 MG tablet TAKE 1 TABLET EVERY EVENING 90 tablet 3       Allergies  Review of patient's allergies indicates:  No Known Allergies    Physical Examination     Vitals:    06/28/24 0941   BP: 107/66   Pulse: 78   Resp: 16   Temp: 97.8 °F (36.6 °C)     Physical Exam  Constitutional:       Appearance: Normal appearance.   HENT:      Head: Normocephalic and atraumatic.   Eyes:      Extraocular Movements: Extraocular movements intact.   Cardiovascular:      Rate and Rhythm: Normal rate and regular rhythm.   Pulmonary:      Effort: Pulmonary effort is normal.      Breath sounds: Normal breath sounds.   Skin:     General: Skin is warm.   Neurological:      Mental Status: She is alert and oriented to person, place, and time. Mental status is at baseline.            Assessment and Plan (including Health Maintenance)      Problem List  Smart Sets  Document Outside HM   :    Plan:     Started on Wegovy for obesity. 30 pound weight gain recently.     Tessalon for chronic cough     Phenytoin levels ordered     Health Maintenance Due   Topic Date Due    Annual UACr  Never done     TETANUS VACCINE  Never done    Shingles Vaccine (1 of 2) Never done    Mammogram  10/07/2020    Colorectal Cancer Screening  01/11/2022    COVID-19 Vaccine (6 - 2023-24 season) 09/01/2023       Problem List Items Addressed This Visit          Neuro    Convulsions (Chronic)    Relevant Orders    Phenytoin Level, Total       Cardiac/Vascular    Chronic diastolic heart failure (Chronic)    Mixed hyperlipidemia (Chronic)    Relevant Orders    Lipid Panel    Primary hypertension - Primary (Chronic)    Overview      - Goal blood pressure for most patients is less than 130/85. Both numbers are important. If you have questions about your specific goal blood pressure, please ask at your clinic visits.   - Check blood pressure outside of clinic, record the numbers, and bring the log to all your office visits.   - If you have any chest pain, SOB, or other concerning symptoms, report these immediately, or go to the nearest ER.    - Recommend cardiovascular exercise, at least 3 times per week, for at least 15 minutes.   - Eat a heart healthy diet.            Relevant Orders    Basic Metabolic Panel    CBC Auto Differential    Lipid Panel    Microalbumin/Creatinine Ratio, Urine    Urinalysis, Reflex to Urine Culture       Oncology    Iron deficiency anemia secondary to inadequate dietary iron intake (Chronic)    Relevant Orders    Iron and TIBC       Other    PACO on CPAP (Chronic)     Other Visit Diagnoses       Encounter for long-term (current) use of high-risk medication        Relevant Orders    Phenytoin Level, Total    Chronic cough  (Chronic)       Relevant Medications    benzonatate (TESSALON) 100 MG capsule            Health Maintenance Topics with due status: Not Due       Topic Last Completion Date    Lipid Panel 12/04/2023    DEXA Scan 12/12/2023    High Dose Statin 06/28/2024       Procedures     Future Appointments   Date Time Provider Department Center   8/9/2024  9:20 AM [facility] [place]    8/14/2024  9:00 AM [facility] NURSE, [facility] [place]   9/27/2024  9:45 [facility] [place]   11/4/2024  9:50 AM Jayy Castillo MD [facility]        Follow up in about 3 months (around 9/28/2024) for chronic health problems.     Signature:  Felipe Perry MD  [address] [place] [address]  Phone #: [phone]  Fax #: [phone]    Date of encounter: 6/28/24    There are no Patient Instructions on file for this visit.

## 2024-08-02 DIAGNOSIS — K21.9 GASTROESOPHAGEAL REFLUX DISEASE WITHOUT ESOPHAGITIS: ICD-10-CM

## 2024-08-02 DIAGNOSIS — I50.32 CHRONIC DIASTOLIC HEART FAILURE: ICD-10-CM

## 2024-08-02 RX ORDER — FUROSEMIDE 40 MG/1
40 TABLET ORAL 2 TIMES DAILY
Qty: 180 TABLET | Refills: 1 | Status: SHIPPED | OUTPATIENT
Start: 2024-08-02

## 2024-08-02 RX ORDER — PANTOPRAZOLE SODIUM 40 MG/1
40 TABLET, DELAYED RELEASE ORAL DAILY
Qty: 90 TABLET | Refills: 1 | Status: SHIPPED | OUTPATIENT
Start: 2024-08-02

## 2024-08-09 DIAGNOSIS — Z71.89 COMPLEX CARE COORDINATION: ICD-10-CM

## 2024-08-09 LAB — BCS RECOMMENDATION EXT: NORMAL

## 2024-08-12 ENCOUNTER — PATIENT OUTREACH (OUTPATIENT)
Facility: HOSPITAL | Age: 74
End: 2024-08-12
Payer: MEDICARE

## 2024-08-12 NOTE — PROGRESS NOTES
Population Health Chart Review & Patient Outreach Details    Updates Requested / Reviewed:  [x]  Care Team Updated    Health Maintenance Topics Addressed and Outreach Outcomes / Actions Taken:  Breast Cancer Screening [x]  Updated with August 2024 Mammogram ([facility] History Updated.

## 2024-08-13 ENCOUNTER — PATIENT MESSAGE (OUTPATIENT)
Dept: FAMILY MEDICINE | Facility: CLINIC | Age: 74
End: 2024-08-13
Payer: MEDICARE

## 2024-08-26 ENCOUNTER — OFFICE VISIT (OUTPATIENT)
Dept: FAMILY MEDICINE | Facility: CLINIC | Age: 74
End: 2024-08-26
Payer: MEDICARE

## 2024-08-26 ENCOUNTER — HOSPITAL ENCOUNTER (OUTPATIENT)
Dept: RADIOLOGY | Facility: HOSPITAL | Age: 74
Discharge: HOME | End: 2024-08-26
Attending: FAMILY MEDICINE
Payer: MEDICARE

## 2024-08-26 VITALS
HEIGHT: 64 IN | DIASTOLIC BLOOD PRESSURE: 57 MMHG | BODY MASS INDEX: 40.03 KG/M2 | SYSTOLIC BLOOD PRESSURE: 118 MMHG | RESPIRATION RATE: 16 BRPM | TEMPERATURE: 98 F | OXYGEN SATURATION: 97 % | HEART RATE: 72 BPM

## 2024-08-26 DIAGNOSIS — M25.571 ACUTE RIGHT ANKLE PAIN: ICD-10-CM

## 2024-08-26 DIAGNOSIS — M25.571 ACUTE RIGHT ANKLE PAIN: Primary | ICD-10-CM

## 2024-08-26 PROCEDURE — 3074F SYST BP LT 130 MM HG: CPT | Mod: ,,, | Performed by: FAMILY MEDICINE

## 2024-08-26 PROCEDURE — 3078F DIAST BP <80 MM HG: CPT | Mod: ,,, | Performed by: FAMILY MEDICINE

## 2024-08-26 PROCEDURE — 99213 OFFICE O/P EST LOW 20 MIN: CPT | Mod: ,,, | Performed by: FAMILY MEDICINE

## 2024-08-26 PROCEDURE — 1159F MED LIST DOCD IN RCRD: CPT | Mod: ,,, | Performed by: FAMILY MEDICINE

## 2024-08-26 PROCEDURE — 3288F FALL RISK ASSESSMENT DOCD: CPT | Mod: ,,, | Performed by: FAMILY MEDICINE

## 2024-08-26 PROCEDURE — 1100F PTFALLS ASSESS-DOCD GE2>/YR: CPT | Mod: ,,, | Performed by: FAMILY MEDICINE

## 2024-08-26 PROCEDURE — 3008F BODY MASS INDEX DOCD: CPT | Mod: ,,, | Performed by: FAMILY MEDICINE

## 2024-08-26 PROCEDURE — 73610 X-RAY EXAM OF ANKLE: CPT | Mod: TC,PN,RT

## 2024-08-26 PROCEDURE — 1125F AMNT PAIN NOTED PAIN PRSNT: CPT | Mod: ,,, | Performed by: FAMILY MEDICINE

## 2024-08-26 PROCEDURE — 1160F RVW MEDS BY RX/DR IN RCRD: CPT | Mod: ,,, | Performed by: FAMILY MEDICINE

## 2024-08-26 NOTE — PROGRESS NOTES
Felipe ePrry MD    [address] [place] [address]     PATIENT NAME: Radha Martínez  : 1950  DATE: 24  MRN: [id number]      Billing Provider: Felipe Perry MD  Level of Service: [place] OFFICE/OUTPT VISIT, EST, LEVL III, 20-29 MIN  Patient PCP Information       Provider PCP Type    Felipe Perry MD General            Reason for Visit / Chief Complaint: Fall (24 she fell outside. She is c/o bilateral knee pain. Right knee pain and swelling. Right foot pain and swelling. She fell in and ant bed and her son could not get her up so he had to call for help. She had ant bites all over her lower abdominal area, her right arm. She is unable to put weight on her right knee or right foot.)       Update PCP  Update Chief Complaint         History of Present Illness / Problem Focused Workflow     Radha Martínez presents to the clinic with Fall (24 she fell outside. She is c/o bilateral knee pain. Right knee pain and swelling. Right foot pain and swelling. She fell in and ant bed and her son could not get her up so he had to call for help. She had ant bites all over her lower abdominal area, her right arm. She is unable to put weight on her right knee or right foot.)     HPI    Review of Systems     Review of Systems   Constitutional:  Negative for activity change, appetite change, chills, fatigue and fever.   HENT:  Negative for nasal congestion, ear pain, hearing loss, postnasal drip and sore throat.    Respiratory:  Negative for cough, chest tightness, shortness of breath and wheezing.    Cardiovascular:  Negative for chest pain, palpitations, leg swelling and claudication.   Gastrointestinal:  Negative for abdominal pain, change in bowel habit, constipation, diarrhea, nausea and vomiting.   Genitourinary:  Negative for dysuria.   Musculoskeletal:  Positive for joint swelling. Negative for arthralgias, back pain, gait problem and myalgias.    Integumentary:  Negative for rash.   Neurological:  Negative for weakness and headaches.   Psychiatric/Behavioral:  Negative for suicidal ideas. The patient is not nervous/anxious.         Medical / Social / Family History     Past Medical History:   Diagnosis Date    Asthma     Breast cancer screening by mammogram 08/09/2024    [facility]    CHF (congestive heart failure)     COPD (chronic obstructive pulmonary disease)     WILLSON (dyspnea on exertion)     GERD (gastroesophageal reflux disease)     HTN (hypertension)     Hyperlipidemia     Osteoporosis     Oxygen dependent     02 2L    Seizure disorder        Past Surgical History:   Procedure Laterality Date    ARTHROPLASTY, KNEE, TOTAL, USING COMPUTER-ASSISTED NAVIGATION Right 11/2/2023    Procedure: ARTHROPLASTY, KNEE, TOTAL, USING COMPUTER-ASSISTED NAVIGATION;  Surgeon: Jayy Castillo MD;  Location: [facility] ORTHO OR;  Service: Orthopedics;  Laterality: Right;    CARDIOVERSION N/A 04/08/2022    Procedure: Cardioversion;  Surgeon: Adal Chung MD;  Location: [facility] CATH LAB;  Service: Cardiology;  Laterality: N/A;    COLONOSCOPY      EYE SURGERY      REVISION OF TOTAL KNEE REPLACEMENT       TUBAL LIGATION         Social History  Ms.  reports that she has never smoked. She has never been exposed to tobacco smoke. She has never used smokeless tobacco. She reports that she does not drink alcohol and does not use drugs.    Family History  Ms.'s family history includes Cancer in her father; Diabetes in her brother, mother, and sister; Heart disease in her mother and sister; Hypertension in her mother and sister.    Medications and Allergies     Medications  Outpatient Medications Marked as Taking for the 8/26/24 encounter (Office Visit) with Felipe Perry MD   Medication Sig Dispense Refill    albuterol-ipratropium (DUO-NEB) 2.5 mg-0.5 mg/3 mL nebulizer solution Take 3 mLs by nebulization every 6 (six) hours as needed for Wheezing. Rescue  100 mL 5    aspirin 325 MG tablet Take 1 tablet (325 mg total) by mouth once daily. 100 tablet 5    busPIRone (BUSPAR) 10 MG tablet TAKE 1 TABLET TWICE DAILY 180 tablet 3    calcium carbonate/vitamin D3 (CALCARB 600 WITH VITAMIN D ORAL) Take by mouth.      fluticasone propionate (FLONASE) 50 mcg/actuation nasal spray USE 1 SPRAY IN EACH NOSTRIL EVERY DAY (Patient taking differently: 1 spray Daily.) 32 g 2    fluticasone-umeclidin-vilanter (TRELEGY ELLIPTA) 200-62.5-25 mcg inhaler Inhale 1 puff into the lungs once daily. 60 each 11    furosemide (LASIX) 40 MG tablet Take 1 tablet (40 mg total) by mouth 2 (two) times daily. For SWELLING 180 tablet 1    gabapentin (NEURONTIN) 300 MG capsule Take 1 capsule (300 mg total) by mouth 3 (three) times daily. 270 capsule 1    loratadine (CLARITIN) 10 mg tablet TAKE 1 TABLET EVERY DAY 90 tablet 1    montelukast (SINGULAIR) 10 mg tablet Take 1 tablet (10 mg total) by mouth once daily. 90 tablet 1    nebivoloL (BYSTOLIC) 5 MG Tab Take 1 tablet (5 mg total) by mouth once daily. 90 tablet 1    pantoprazole (PROTONIX) 40 MG tablet Take 1 tablet (40 mg total) by mouth once daily. For STOMACH 90 tablet 1    paroxetine (PAXIL) 40 MG tablet Take 1 tablet (40 mg total) by mouth once daily. 90 tablet 1    phenytoin (DILANTIN) 100 MG ER capsule Take 1 capsule (100 mg total) by mouth 3 (three) times daily. 270 capsule 1    pravastatin (PRAVACHOL) 40 MG tablet TAKE 1 TABLET EVERY EVENING 90 tablet 3       Allergies  Review of patient's allergies indicates:  No Known Allergies    Physical Examination     Vitals:    08/26/24 1600   BP: (!) 118/57   Pulse: 72   Resp: 16   Temp: 97.6 °F (36.4 °C)     Physical Exam  Constitutional:       Appearance: Normal appearance.   HENT:      Head: Normocephalic and atraumatic.   Eyes:      Extraocular Movements: Extraocular movements intact.   Cardiovascular:      Rate and Rhythm: Normal rate and regular rhythm.   Pulmonary:      Effort: Pulmonary effort is  normal.      Breath sounds: Normal breath sounds.   Skin:     General: Skin is warm.   Neurological:      Mental Status: She is alert and oriented to person, place, and time. Mental status is at baseline.            Assessment and Plan (including Health Maintenance)      Problem List  Smart Sets  Document Outside HM   :    Plan:     Mild swelling in the right ankle.     Xray, no obvious findings    Health Maintenance Due   Topic Date Due    TETANUS VACCINE  Never done    Shingles Vaccine (1 of 2) Never done    Colorectal Cancer Screening  01/11/2022    Influenza Vaccine (1) 09/01/2024    COVID-19 Vaccine (6 - 2023-24 season) 09/01/2024       Problem List Items Addressed This Visit    None  Visit Diagnoses       Acute right ankle pain    -  Primary    Relevant Orders    X-Ray Ankle Complete 3 View Right (Completed)            Health Maintenance Topics with due status: Not Due       Topic Last Completion Date    DEXA Scan 12/12/2023    Annual UACr 06/28/2024    Lipid Panel 06/28/2024    Mammogram 08/09/2024    High Dose Statin 08/26/2024       Procedures     Future Appointments   Date Time Provider Department Center   9/27/2024  9:45 AM Felipe Perry MD [facility] [place]   11/4/2024  9:40 AM Jayy Castillo MD [facility]        Follow up if symptoms worsen or fail to improve.     Signature:  Felipe Perry MD  [address] [place] [address]  Phone #: [phone]  Fax #: [phone]    Date of encounter: 8/26/24    There are no Patient Instructions on file for this visit.

## 2024-09-27 ENCOUNTER — OFFICE VISIT (OUTPATIENT)
Dept: FAMILY MEDICINE | Facility: CLINIC | Age: 74
End: 2024-09-27
Payer: MEDICARE

## 2024-09-27 VITALS
WEIGHT: 237.13 LBS | OXYGEN SATURATION: 96 % | BODY MASS INDEX: 40.48 KG/M2 | SYSTOLIC BLOOD PRESSURE: 124 MMHG | HEIGHT: 64 IN | HEART RATE: 70 BPM | DIASTOLIC BLOOD PRESSURE: 79 MMHG | RESPIRATION RATE: 18 BRPM | TEMPERATURE: 98 F

## 2024-09-27 DIAGNOSIS — G89.29 CHRONIC LOW BACK PAIN WITH BILATERAL SCIATICA, UNSPECIFIED BACK PAIN LATERALITY: ICD-10-CM

## 2024-09-27 DIAGNOSIS — T78.40XS ALLERGY, SEQUELA: ICD-10-CM

## 2024-09-27 DIAGNOSIS — M54.42 CHRONIC LOW BACK PAIN WITH BILATERAL SCIATICA, UNSPECIFIED BACK PAIN LATERALITY: ICD-10-CM

## 2024-09-27 DIAGNOSIS — M25.572 ACUTE LEFT ANKLE PAIN: Primary | ICD-10-CM

## 2024-09-27 DIAGNOSIS — I10 HYPERTENSION, UNSPECIFIED TYPE: ICD-10-CM

## 2024-09-27 DIAGNOSIS — F32.A DEPRESSION, UNSPECIFIED DEPRESSION TYPE: ICD-10-CM

## 2024-09-27 DIAGNOSIS — R56.9 SEIZURE: ICD-10-CM

## 2024-09-27 DIAGNOSIS — M54.41 CHRONIC LOW BACK PAIN WITH BILATERAL SCIATICA, UNSPECIFIED BACK PAIN LATERALITY: ICD-10-CM

## 2024-09-27 LAB — PHENYTOIN SERPL-MCNC: 11.3 ΜG/ML (ref 10–20)

## 2024-09-27 PROCEDURE — 80185 ASSAY OF PHENYTOIN TOTAL: CPT | Mod: ,,, | Performed by: CLINICAL MEDICAL LABORATORY

## 2024-09-27 RX ORDER — PAROXETINE HYDROCHLORIDE 40 MG/1
40 TABLET, FILM COATED ORAL DAILY
Qty: 90 TABLET | Refills: 1 | Status: SHIPPED | OUTPATIENT
Start: 2024-09-27

## 2024-09-27 RX ORDER — PHENYTOIN SODIUM 100 MG/1
100 CAPSULE, EXTENDED RELEASE ORAL 3 TIMES DAILY
Qty: 270 CAPSULE | Refills: 1 | Status: SHIPPED | OUTPATIENT
Start: 2024-09-27

## 2024-09-27 RX ORDER — NEBIVOLOL 5 MG/1
5 TABLET ORAL DAILY
Qty: 90 TABLET | Refills: 1 | Status: SHIPPED | OUTPATIENT
Start: 2024-09-27

## 2024-09-27 RX ORDER — MONTELUKAST SODIUM 10 MG/1
10 TABLET ORAL DAILY
Qty: 90 TABLET | Refills: 1 | Status: SHIPPED | OUTPATIENT
Start: 2024-09-27

## 2024-09-27 RX ORDER — FLUTICASONE PROPIONATE 50 MCG
1 SPRAY, SUSPENSION (ML) NASAL DAILY
Qty: 48 G | Refills: 1 | Status: SHIPPED | OUTPATIENT
Start: 2024-09-27

## 2024-09-27 RX ORDER — DICLOFENAC SODIUM 10 MG/G
GEL TOPICAL
Qty: 200 G | Refills: 5 | Status: SHIPPED | OUTPATIENT
Start: 2024-09-27

## 2024-09-27 RX ORDER — GABAPENTIN 300 MG/1
300 CAPSULE ORAL 3 TIMES DAILY
Qty: 270 CAPSULE | Refills: 1 | Status: SHIPPED | OUTPATIENT
Start: 2024-09-27

## 2024-11-26 DIAGNOSIS — Z96.651 HISTORY OF TOTAL KNEE ARTHROPLASTY, RIGHT: Primary | ICD-10-CM

## 2024-11-27 ENCOUNTER — OFFICE VISIT (OUTPATIENT)
Dept: ORTHOPEDICS | Facility: CLINIC | Age: 74
End: 2024-11-27
Payer: MEDICARE

## 2024-11-27 ENCOUNTER — HOSPITAL ENCOUNTER (OUTPATIENT)
Dept: RADIOLOGY | Facility: HOSPITAL | Age: 74
Discharge: HOME | End: 2024-11-27
Attending: ORTHOPAEDIC SURGERY
Payer: MEDICARE

## 2024-11-27 DIAGNOSIS — Z96.651 HISTORY OF TOTAL RIGHT KNEE REPLACEMENT (TKR): Primary | ICD-10-CM

## 2024-11-27 DIAGNOSIS — Z96.651 HISTORY OF TOTAL KNEE ARTHROPLASTY, RIGHT: ICD-10-CM

## 2024-11-27 DIAGNOSIS — M65.341 TRIGGER RING FINGER OF RIGHT HAND: ICD-10-CM

## 2024-11-27 PROCEDURE — 99999 PR PBB SHADOW E&M-EST. PATIENT-LVL III: CPT | Mod: PBBFAC,,, | Performed by: ORTHOPAEDIC SURGERY

## 2024-11-27 PROCEDURE — 1159F MED LIST DOCD IN RCRD: CPT | Mod: CPTII,,, | Performed by: ORTHOPAEDIC SURGERY

## 2024-11-27 PROCEDURE — 99214 OFFICE O/P EST MOD 30 MIN: CPT | Mod: S$PBB,,, | Performed by: ORTHOPAEDIC SURGERY

## 2024-11-27 PROCEDURE — 73562 X-RAY EXAM OF KNEE 3: CPT | Mod: TC,RT

## 2024-11-27 PROCEDURE — 99213 OFFICE O/P EST LOW 20 MIN: CPT | Mod: PBBFAC,25 | Performed by: ORTHOPAEDIC SURGERY

## 2024-11-27 PROCEDURE — 1160F RVW MEDS BY RX/DR IN RCRD: CPT | Mod: CPTII,,, | Performed by: ORTHOPAEDIC SURGERY

## 2024-11-27 NOTE — PROGRESS NOTES
CLINIC NOTE       Chief Complaint   Patient presents with    Right Knee - Follow-up        Radha Martínez is a 74 y.o. female seen today for recheck of her right knee.  She underwent right TKR initially 13 months ago on 11/02/2023.  She was done well in the intervening time.  Additionally today she was been experiencing triggering and locking of the left ring finger.  He was symptoms there began insidiously with no history of trauma.  They have progressed to locking requiring manual reduction.      Past Medical History:   Diagnosis Date    Asthma     Breast cancer screening by mammogram 08/09/2024    [facility]    CHF (congestive heart failure)     COPD (chronic obstructive pulmonary disease)     WILLSON (dyspnea on exertion)     GERD (gastroesophageal reflux disease)     HTN (hypertension)     Hyperlipidemia     Osteoporosis     Oxygen dependent     02 2L    Seizure disorder      Family History   Problem Relation Name Age of Onset    Diabetes Mother      Heart disease Mother      Hypertension Mother      Cancer Father      Diabetes Sister      Heart disease Sister      Hypertension Sister      Diabetes Brother       Current Outpatient Medications on File Prior to Visit   Medication Sig Dispense Refill    albuterol-ipratropium (DUO-NEB) 2.5 mg-0.5 mg/3 mL nebulizer solution Take 3 mLs by nebulization every 6 (six) hours as needed for Wheezing. Rescue 100 mL 5    aspirin 325 MG tablet Take 1 tablet (325 mg total) by mouth once daily. 100 tablet 5    busPIRone (BUSPAR) 10 MG tablet TAKE 1 TABLET TWICE DAILY 180 tablet 3    calcium carbonate/vitamin D3 (CALCARB 600 WITH VITAMIN D ORAL) Take by mouth.      diclofenac sodium (VOLTAREN) 1 % Gel Apply a thin layer of gel to the left foot and ankle, twice daily as needed for PAIN and INFLAMMATION 200 g 5    fluticasone propionate (FLONASE) 50 mcg/actuation nasal spray 1 spray (50 mcg total) by Each Nostril route once daily. 48 g 1    fluticasone-umeclidin-vilanter  (TRELEGY ELLIPTA) 200-62.5-25 mcg inhaler Inhale 1 puff into the lungs once daily. 60 each 11    furosemide (LASIX) 40 MG tablet Take 1 tablet (40 mg total) by mouth 2 (two) times daily. For SWELLING 180 tablet 1    gabapentin (NEURONTIN) 300 MG capsule Take 1 capsule (300 mg total) by mouth 3 (three) times daily. For NERVE PAIN 270 capsule 1    loratadine (CLARITIN) 10 mg tablet TAKE 1 TABLET EVERY DAY 90 tablet 1    montelukast (SINGULAIR) 10 mg tablet Take 1 tablet (10 mg total) by mouth once daily. For ALLERGIES 90 tablet 1    nebivoloL (BYSTOLIC) 5 MG Tab Take 1 tablet (5 mg total) by mouth once daily. For HEART 90 tablet 1    pantoprazole (PROTONIX) 40 MG tablet Take 1 tablet (40 mg total) by mouth once daily. For STOMACH 90 tablet 1    paroxetine (PAXIL) 40 MG tablet Take 1 tablet (40 mg total) by mouth once daily. For MOOD 90 tablet 1    phenytoin (DILANTIN) 100 MG ER capsule Take 1 capsule (100 mg total) by mouth 3 (three) times daily. For SEIZURES 270 capsule 1    pravastatin (PRAVACHOL) 40 MG tablet TAKE 1 TABLET EVERY EVENING 90 tablet 3     No current facility-administered medications on file prior to visit.       ROS     There were no vitals filed for this visit.    Past Surgical History:   Procedure Laterality Date    ARTHROPLASTY, KNEE, TOTAL, USING COMPUTER-ASSISTED NAVIGATION Right 11/2/2023    Procedure: ARTHROPLASTY, KNEE, TOTAL, USING COMPUTER-ASSISTED NAVIGATION;  Surgeon: Jayy Castillo MD;  Location: [facility] ORTHO OR;  Service: Orthopedics;  Laterality: Right;    CARDIOVERSION N/A 04/08/2022    Procedure: Cardioversion;  Surgeon: Adal Chung MD;  Location: [facility] CATH LAB;  Service: Cardiology;  Laterality: N/A;    COLONOSCOPY      EYE SURGERY      REVISION OF TOTAL KNEE REPLACEMENT       TUBAL LIGATION          Review of patient's allergies indicates:  No Known Allergies     Ortho Exam right knee midline anterior incisions well healed without signs of infection.  No  effusion.  Knee motion is he was 0-115 degrees of flexion.  Patella tracks well in the midline.  He was good medial and lateral ligaments balance and stability.  Exam left hand is normal contour.  There is a palpable nodule along the palmar aspect of the ring finger MCP joint region.  Triggering and locking was demonstrated with active IP joint flexion of the ring finger requiring manual reduction.    Radiographic Examination:  Right knee 11/27/2024    Technique:  Three views AP lateral and patellar    Findings:  Bones well mineralized.  Metallic Arthrex components are in expected position alignment for TKR.  Patella seen midline sulcus on sunrise view.    Impression:   See Above    Assessment and Plan  Patient Active Problem List    Diagnosis Date Noted    Trigger finger 05/01/2024    Depression, recurrent 03/28/2024    Paroxysmal atrial fibrillation 03/28/2024    Convulsions 03/28/2024    Chronic obstructive pulmonary disease with (acute) exacerbation 03/28/2024    Chronic tension-type headache, not intractable 12/21/2023    Iron deficiency anemia secondary to inadequate dietary iron intake 11/15/2023    Non-seasonal allergic rhinitis due to pollen 09/14/2023    Chronic bilateral low back pain with bilateral sciatica 09/14/2023    Generalized anxiety disorder 09/14/2023    Primary osteoarthritis of right knee 09/07/2023    History of total knee arthroplasty, right 09/07/2023    Asymptomatic menopausal state 08/14/2023    PACO on CPAP 11/07/2022    Primary hypertension 11/07/2022    Presence of Watchman left atrial appendage closure device 10/10/2022    Class 3 severe obesity due to excess calories with serious comorbidity and body mass index (BMI) of 40.0 to 44.9 in adult 09/01/2022    Angina pectoris 09/01/2022    Chronic kidney disease, stage 3a 09/01/2022    S/P ablation operation for arrhythmia 08/10/2022    Mixed hyperlipidemia 04/07/2022    Chronic diastolic heart failure 09/08/2021    Moderate persistent  asthma without complication     Gastroesophageal reflux disease without esophagitis     Age-related osteoporosis without current pathological fracture     Spinal cord injury, thoracic region 02/10/2016    Closed sternal manubrial dissociation fracture with routine healing 02/02/2016    History of fracture of rib 02/02/2016    Closed stable burst fracture of third thoracic vertebra with routine healing 02/02/2016    Impression:  1. Status post right TKR doing well 2. Nodular tenosynovitis (trigger finger)-left ring finger   Plan:  Discussed the option for A1 pulley release of the left ring finger.  We discussed general versus khushbu block anesthesia.  She was interested in surgical intervention but we would like to wait till after the holidays.  She will call for scheduling when she was ready to proceed.  Discussed outpatient she was surgery.      Jayy Castillo M.D.

## 2024-12-20 ENCOUNTER — OFFICE VISIT (OUTPATIENT)
Dept: FAMILY MEDICINE | Facility: CLINIC | Age: 74
End: 2024-12-20
Payer: MEDICARE

## 2024-12-20 VITALS
OXYGEN SATURATION: 96 % | HEIGHT: 64 IN | WEIGHT: 241.19 LBS | SYSTOLIC BLOOD PRESSURE: 134 MMHG | HEART RATE: 97 BPM | TEMPERATURE: 98 F | DIASTOLIC BLOOD PRESSURE: 81 MMHG | RESPIRATION RATE: 16 BRPM | BODY MASS INDEX: 41.18 KG/M2

## 2024-12-20 DIAGNOSIS — I10 PRIMARY HYPERTENSION: Primary | ICD-10-CM

## 2024-12-20 DIAGNOSIS — E55.9 VITAMIN D DEFICIENCY: ICD-10-CM

## 2024-12-20 DIAGNOSIS — N18.31 CHRONIC KIDNEY DISEASE, STAGE 3A: Chronic | ICD-10-CM

## 2024-12-20 DIAGNOSIS — I48.0 PAROXYSMAL ATRIAL FIBRILLATION: Chronic | ICD-10-CM

## 2024-12-20 DIAGNOSIS — I50.32 CHRONIC DIASTOLIC HEART FAILURE: Chronic | ICD-10-CM

## 2024-12-20 DIAGNOSIS — K21.9 GASTROESOPHAGEAL REFLUX DISEASE WITHOUT ESOPHAGITIS: Chronic | ICD-10-CM

## 2024-12-20 DIAGNOSIS — M65.341 TRIGGER RING FINGER OF RIGHT HAND: Chronic | ICD-10-CM

## 2024-12-20 DIAGNOSIS — D50.8 IRON DEFICIENCY ANEMIA SECONDARY TO INADEQUATE DIETARY IRON INTAKE: Chronic | ICD-10-CM

## 2024-12-20 DIAGNOSIS — R56.9 CONVULSIONS, UNSPECIFIED CONVULSION TYPE: Chronic | ICD-10-CM

## 2024-12-20 DIAGNOSIS — E78.2 MIXED HYPERLIPIDEMIA: Chronic | ICD-10-CM

## 2024-12-20 DIAGNOSIS — E53.8 B12 DEFICIENCY: ICD-10-CM

## 2024-12-20 DIAGNOSIS — J44.1 CHRONIC OBSTRUCTIVE PULMONARY DISEASE WITH (ACUTE) EXACERBATION: Chronic | ICD-10-CM

## 2024-12-20 PROBLEM — M65.30 TRIGGER FINGER: Chronic | Status: ACTIVE | Noted: 2024-05-01

## 2024-12-20 LAB
25(OH)D3 SERPL-MCNC: 59.3 NG/ML (ref 30–80)
ALBUMIN SERPL BCP-MCNC: 3.8 G/DL (ref 3.4–4.8)
ALBUMIN/GLOB SERPL: 1 {RATIO}
ALP SERPL-CCNC: 215 U/L (ref 40–150)
ALT SERPL W P-5'-P-CCNC: 32 U/L
ANION GAP SERPL CALCULATED.3IONS-SCNC: 13 MMOL/L (ref 7–16)
AST SERPL W P-5'-P-CCNC: 43 U/L (ref 5–34)
BACTERIA #/AREA URNS HPF: ABNORMAL /HPF
BASOPHILS # BLD AUTO: 0.02 K/UL (ref 0–0.2)
BASOPHILS NFR BLD AUTO: 0.5 % (ref 0–1)
BILIRUB SERPL-MCNC: 0.3 MG/DL
BILIRUB UR QL STRIP: NEGATIVE
BUN SERPL-MCNC: 13 MG/DL (ref 10–20)
BUN/CREAT SERPL: 15 (ref 6–20)
CALCIUM SERPL-MCNC: 8.6 MG/DL (ref 8.4–10.2)
CHLORIDE SERPL-SCNC: 103 MMOL/L (ref 98–107)
CHOLEST SERPL-MCNC: 230 MG/DL
CHOLEST/HDLC SERPL: 2.7 {RATIO}
CLARITY UR: CLEAR
CO2 SERPL-SCNC: 28 MMOL/L (ref 23–31)
COLOR UR: COLORLESS
CREAT SERPL-MCNC: 0.85 MG/DL (ref 0.55–1.02)
CREAT UR-MCNC: 19 MG/DL (ref 15–325)
DIFFERENTIAL METHOD BLD: ABNORMAL
EGFR (NO RACE VARIABLE) (RUSH/TITUS): 72 ML/MIN/1.73M2
EOSINOPHIL # BLD AUTO: 0.01 K/UL (ref 0–0.5)
EOSINOPHIL NFR BLD AUTO: 0.2 % (ref 1–4)
ERYTHROCYTE [DISTWIDTH] IN BLOOD BY AUTOMATED COUNT: 13.4 % (ref 11.5–14.5)
GLOBULIN SER-MCNC: 3.7 G/DL (ref 2–4)
GLUCOSE SERPL-MCNC: 104 MG/DL (ref 82–115)
GLUCOSE UR STRIP-MCNC: NORMAL MG/DL
HCT VFR BLD AUTO: 41.6 % (ref 38–47)
HDLC SERPL-MCNC: 84 MG/DL (ref 35–60)
HGB BLD-MCNC: 13.1 G/DL (ref 12–16)
HYALINE CASTS #/AREA URNS LPF: ABNORMAL /LPF
IMM GRANULOCYTES # BLD AUTO: 0.01 K/UL (ref 0–0.04)
IMM GRANULOCYTES NFR BLD: 0.2 % (ref 0–0.4)
IRON SATN MFR SERPL: 22 % (ref 20–50)
IRON SERPL-MCNC: 61 UG/DL (ref 50–170)
KETONES UR STRIP-SCNC: NEGATIVE MG/DL
LDLC SERPL CALC-MCNC: 128 MG/DL
LDLC/HDLC SERPL: 1.5 {RATIO}
LEUKOCYTE ESTERASE UR QL STRIP: ABNORMAL
LYMPHOCYTES # BLD AUTO: 1.68 K/UL (ref 1–4.8)
LYMPHOCYTES NFR BLD AUTO: 38.2 % (ref 27–41)
MCH RBC QN AUTO: 30.7 PG (ref 27–31)
MCHC RBC AUTO-ENTMCNC: 31.5 G/DL (ref 32–36)
MCV RBC AUTO: 97.4 FL (ref 80–96)
MICROALBUMIN UR-MCNC: <0.5 MG/DL
MICROALBUMIN/CREAT RATIO PNL UR: NORMAL
MONOCYTES # BLD AUTO: 0.45 K/UL (ref 0–0.8)
MONOCYTES NFR BLD AUTO: 10.2 % (ref 2–6)
MPC BLD CALC-MCNC: 9.9 FL (ref 9.4–12.4)
MUCOUS, UA: ABNORMAL /LPF
NEUTROPHILS # BLD AUTO: 2.23 K/UL (ref 1.8–7.7)
NEUTROPHILS NFR BLD AUTO: 50.7 % (ref 53–65)
NITRITE UR QL STRIP: NEGATIVE
NONHDLC SERPL-MCNC: 146 MG/DL
NRBC # BLD AUTO: 0 X10E3/UL
NRBC, AUTO (.00): 0 %
NT-PROBNP SERPL-MCNC: 441 PG/ML (ref 1–125)
PH UR STRIP: 7.5 PH UNITS
PLATELET # BLD AUTO: 277 K/UL (ref 150–400)
POTASSIUM SERPL-SCNC: 4.5 MMOL/L (ref 3.5–5.1)
PROT SERPL-MCNC: 7.5 G/DL (ref 5.8–7.6)
PROT UR QL STRIP: NEGATIVE
RBC # BLD AUTO: 4.27 M/UL (ref 4.2–5.4)
RBC # UR STRIP: NEGATIVE /UL
RBC #/AREA URNS HPF: <1 /HPF
SODIUM SERPL-SCNC: 139 MMOL/L (ref 136–145)
SP GR UR STRIP: 1.01
SQUAMOUS #/AREA URNS LPF: ABNORMAL /HPF
TIBC SERPL-MCNC: 212 UG/DL (ref 70–310)
TIBC SERPL-MCNC: 273 UG/DL (ref 250–450)
TRANSFERRIN SERPL-MCNC: 246 MG/DL (ref 173–360)
TRIGL SERPL-MCNC: 90 MG/DL (ref 37–140)
TSH SERPL DL<=0.005 MIU/L-ACNC: 2.35 UIU/ML (ref 0.35–4.94)
UROBILINOGEN UR STRIP-ACNC: NORMAL MG/DL
VIT B12 SERPL-MCNC: 163 PG/ML (ref 213–816)
VLDLC SERPL-MCNC: 18 MG/DL
WBC # BLD AUTO: 4.4 K/UL (ref 4.5–11)
WBC #/AREA URNS HPF: 1 /HPF

## 2024-12-20 RX ORDER — PREDNISONE 20 MG/1
20 TABLET ORAL 2 TIMES DAILY
Qty: 14 TABLET | Refills: 0 | Status: SHIPPED | OUTPATIENT
Start: 2024-12-20

## 2024-12-20 RX ORDER — FUROSEMIDE 40 MG/1
40 TABLET ORAL 2 TIMES DAILY
Qty: 180 TABLET | Refills: 1 | Status: SHIPPED | OUTPATIENT
Start: 2024-12-20

## 2024-12-20 RX ORDER — DEXAMETHASONE SODIUM PHOSPHATE 4 MG/ML
6 INJECTION, SOLUTION INTRA-ARTICULAR; INTRALESIONAL; INTRAMUSCULAR; INTRAVENOUS; SOFT TISSUE
Status: COMPLETED | OUTPATIENT
Start: 2024-12-20 | End: 2024-12-20

## 2024-12-20 RX ORDER — PRAVASTATIN SODIUM 40 MG/1
40 TABLET ORAL NIGHTLY
Qty: 90 TABLET | Refills: 1 | Status: SHIPPED | OUTPATIENT
Start: 2024-12-20

## 2024-12-20 RX ORDER — AZITHROMYCIN 250 MG/1
TABLET, FILM COATED ORAL
Qty: 6 TABLET | Refills: 0 | Status: SHIPPED | OUTPATIENT
Start: 2024-12-20 | End: 2024-12-25

## 2024-12-20 RX ORDER — PANTOPRAZOLE SODIUM 40 MG/1
40 TABLET, DELAYED RELEASE ORAL DAILY
Qty: 90 TABLET | Refills: 1 | Status: SHIPPED | OUTPATIENT
Start: 2024-12-20

## 2024-12-20 RX ORDER — NEBIVOLOL 5 MG/1
5 TABLET ORAL DAILY
Qty: 90 TABLET | Refills: 1 | Status: SHIPPED | OUTPATIENT
Start: 2024-12-20

## 2024-12-20 RX ADMIN — DEXAMETHASONE SODIUM PHOSPHATE 6 MG: 4 INJECTION, SOLUTION INTRA-ARTICULAR; INTRALESIONAL; INTRAMUSCULAR; INTRAVENOUS; SOFT TISSUE at 10:12

## 2024-12-20 NOTE — PROGRESS NOTES
Felipe Perry MD    [address] [place] [address]     PATIENT NAME: Radha Martínez  : 1950  DATE: 24  MRN: [id number]      Billing Provider: Felipe Perry MD  Level of Service: [place] OFFICE/OUTPT VISIT, EST, LEVL IV, 30-39 MIN  Patient PCP Information       Provider PCP Type    Felipe Perry MD General            Reason for Visit / Chief Complaint: Hypertension, Hyperlipidemia (6 month follow up), and Cough (X 1 week coughing up thick, white mucus)       Update PCP  Update Chief Complaint         History of Present Illness / Problem Focused Workflow     Radha Martínez presents to the clinic with Hypertension, Hyperlipidemia (6 month follow up), and Cough (X 1 week coughing up thick, white mucus)     HPI    Review of Systems     Review of Systems   Constitutional:  Negative for activity change, appetite change, chills, fatigue and fever.   HENT:  Positive for nasal congestion and sinus pressure/congestion. Negative for ear pain, hearing loss, postnasal drip and sore throat.    Respiratory:  Positive for cough and wheezing. Negative for chest tightness and shortness of breath.    Cardiovascular:  Negative for chest pain, palpitations, leg swelling and claudication.   Gastrointestinal:  Negative for abdominal pain, change in bowel habit, constipation, diarrhea, nausea and vomiting.   Genitourinary:  Negative for dysuria.   Musculoskeletal:  Negative for arthralgias, back pain, gait problem and myalgias.   Integumentary:  Negative for rash.   Neurological:  Negative for weakness and headaches.   Psychiatric/Behavioral:  Negative for suicidal ideas. The patient is not nervous/anxious.         Medical / Social / Family History     Past Medical History:   Diagnosis Date    Asthma     Breast cancer screening by mammogram 2024    [facility]    CHF (congestive heart failure)     COPD (chronic obstructive pulmonary disease)     WILLSON (dyspnea  on exertion)     GERD (gastroesophageal reflux disease)     HTN (hypertension)     Hyperlipidemia     Osteoporosis     Oxygen dependent     02 2L    Seizure disorder        Past Surgical History:   Procedure Laterality Date    ARTHROPLASTY, KNEE, TOTAL, USING COMPUTER-ASSISTED NAVIGATION Right 11/2/2023    Procedure: ARTHROPLASTY, KNEE, TOTAL, USING COMPUTER-ASSISTED NAVIGATION;  Surgeon: Jayy Castillo MD;  Location: [facility] ORTHO OR;  Service: Orthopedics;  Laterality: Right;    CARDIOVERSION N/A 04/08/2022    Procedure: Cardioversion;  Surgeon: Adal Chung MD;  Location: [facility] CATH LAB;  Service: Cardiology;  Laterality: N/A;    COLONOSCOPY      EYE SURGERY      REVISION OF TOTAL KNEE REPLACEMENT       TUBAL LIGATION         Social History  Ms.  reports that she has never smoked. She has never been exposed to tobacco smoke. She has never used smokeless tobacco. She reports that she does not drink alcohol and does not use drugs.    Family History  Ms.'s family history includes Cancer in her father; Diabetes in her brother, mother, and sister; Heart disease in her mother and sister; Hypertension in her mother and sister.    Medications and Allergies     Medications  Outpatient Medications Marked as Taking for the 12/20/24 encounter (Office Visit) with Felipe Perry MD   Medication Sig Dispense Refill    busPIRone (BUSPAR) 10 MG tablet TAKE 1 TABLET TWICE DAILY 180 tablet 3    calcium carbonate/vitamin D3 (CALCARB 600 WITH VITAMIN D ORAL) Take by mouth.      diclofenac sodium (VOLTAREN) 1 % Gel Apply a thin layer of gel to the left foot and ankle, twice daily as needed for PAIN and INFLAMMATION 200 g 5    fluticasone propionate (FLONASE) 50 mcg/actuation nasal spray 1 spray (50 mcg total) by Each Nostril route once daily. 48 g 1    fluticasone-umeclidin-vilanter (TRELEGY ELLIPTA) 200-62.5-25 mcg inhaler Inhale 1 puff into the lungs once daily. 60 each 11    gabapentin (NEURONTIN) 300 MG  capsule Take 1 capsule (300 mg total) by mouth 3 (three) times daily. For NERVE PAIN 270 capsule 1    loratadine (CLARITIN) 10 mg tablet TAKE 1 TABLET EVERY DAY 90 tablet 1    montelukast (SINGULAIR) 10 mg tablet Take 1 tablet (10 mg total) by mouth once daily. For ALLERGIES 90 tablet 1    paroxetine (PAXIL) 40 MG tablet Take 1 tablet (40 mg total) by mouth once daily. For MOOD 90 tablet 1    phenytoin (DILANTIN) 100 MG ER capsule Take 1 capsule (100 mg total) by mouth 3 (three) times daily. For SEIZURES 270 capsule 1    [DISCONTINUED] furosemide (LASIX) 40 MG tablet Take 1 tablet (40 mg total) by mouth 2 (two) times daily. For SWELLING 180 tablet 1    [DISCONTINUED] nebivoloL (BYSTOLIC) 5 MG Tab Take 1 tablet (5 mg total) by mouth once daily. For HEART 90 tablet 1    [DISCONTINUED] pantoprazole (PROTONIX) 40 MG tablet Take 1 tablet (40 mg total) by mouth once daily. For STOMACH 90 tablet 1    [DISCONTINUED] pravastatin (PRAVACHOL) 40 MG tablet TAKE 1 TABLET EVERY EVENING 90 tablet 3     Current Facility-Administered Medications for the 12/20/24 encounter (Office Visit) with Felipe Perry MD   Medication Dose Route Frequency Provider Last Rate Last Admin    [COMPLETED] dexAMETHasone injection 6 mg  6 mg Intramuscular 1 time in Clinic/HOD Felipe Perry MD   6 mg at 12/20/24 1007       Allergies  Review of patient's allergies indicates:  No Known Allergies    Physical Examination     Vitals:    12/20/24 0934   BP: 134/81   Pulse: 97   Resp: 16   Temp: 97.6 °F (36.4 °C)     Physical Exam  Vitals and nursing note reviewed.   Constitutional:       General: She is not in acute distress.     Appearance: Normal appearance. She is not ill-appearing.   HENT:      Head: Normocephalic and atraumatic.      Right Ear: Tympanic membrane, ear canal and external ear normal.      Left Ear: Tympanic membrane, ear canal and external ear normal.      Nose: Congestion and rhinorrhea present.      Right Turbinates: Swollen.       Left Turbinates: Swollen.      Mouth/Throat:      Pharynx: Posterior oropharyngeal erythema present. No pharyngeal swelling or oropharyngeal exudate.   Eyes:      Extraocular Movements: Extraocular movements intact.      Pupils: Pupils are equal, round, and reactive to light.   Cardiovascular:      Rate and Rhythm: Normal rate and regular rhythm.      Heart sounds: Normal heart sounds.   Pulmonary:      Effort: Pulmonary effort is normal.      Breath sounds: Examination of the right-upper field reveals wheezing. Examination of the left-upper field reveals wheezing. Examination of the right-middle field reveals wheezing. Wheezing present.   Abdominal:      General: Bowel sounds are normal.      Palpations: Abdomen is soft.   Musculoskeletal:         General: Normal range of motion.   Lymphadenopathy:      Cervical: No cervical adenopathy.   Skin:     General: Skin is warm.      Findings: No rash.   Neurological:      General: No focal deficit present.      Mental Status: She is alert and oriented to person, place, and time. Mental status is at baseline.   Psychiatric:         Mood and Affect: Mood normal.         Behavior: Behavior normal.            Assessment and Plan (including Health Maintenance)      Problem List  Smart Sets  Document Outside HM   :    Plan:     Treated for COPD exacerbation     Labs today, medication refills     Health Maintenance Due   Topic Date Due    TETANUS VACCINE  Never done    Aspirin/Antiplatelet Therapy  Never done    Shingles Vaccine (1 of 2) Never done    Colorectal Cancer Screening  01/11/2022    COVID-19 Vaccine (6 - 2024-25 season) 09/01/2024       Problem List Items Addressed This Visit          Neuro    Convulsions (Chronic)    Relevant Orders    Phenytoin Level, Free       Pulmonary    Chronic obstructive pulmonary disease with (acute) exacerbation (Chronic)    Relevant Medications    dexAMETHasone injection 6 mg (Completed)    azithromycin (Z-CORTNEY) 250 MG tablet     predniSONE (DELTASONE) 20 MG tablet       Cardiac/Vascular    Chronic diastolic heart failure (Chronic)    Relevant Medications    furosemide (LASIX) 40 MG tablet    Other Relevant Orders    NT-Pro Natriuretic Peptide    Mixed hyperlipidemia (Chronic)    Relevant Medications    pravastatin (PRAVACHOL) 40 MG tablet    Other Relevant Orders    Lipid Panel    Primary hypertension - Primary (Chronic)    Overview      - Goal blood pressure for most patients is less than 130/85. Both numbers are important. If you have questions about your specific goal blood pressure, please ask at your clinic visits.   - Check blood pressure outside of clinic, record the numbers, and bring the log to all your office visits.   - If you have any chest pain, SOB, or other concerning symptoms, report these immediately, or go to the nearest ER.    - Recommend cardiovascular exercise, at least 3 times per week, for at least 15 minutes.   - Eat a heart healthy diet.            Relevant Medications    nebivoloL (BYSTOLIC) 5 MG Tab    Other Relevant Orders    CBC Auto Differential    Lipid Panel    Microalbumin/Creatinine Ratio, Urine    Urinalysis, Reflex to Urine Culture    Comprehensive Metabolic Panel    TSH    Paroxysmal atrial fibrillation (Chronic)       Renal/    Chronic kidney disease, stage 3a (Chronic)       Oncology    Iron deficiency anemia secondary to inadequate dietary iron intake (Chronic)    Relevant Orders    Iron and TIBC       GI    Gastroesophageal reflux disease without esophagitis (Chronic)    Relevant Medications    pantoprazole (PROTONIX) 40 MG tablet       Orthopedic    Trigger ring finger of right hand (Chronic)     Other Visit Diagnoses       B12 deficiency        Relevant Orders    Vitamin B12    Vitamin D deficiency        Relevant Orders    Vitamin D            Health Maintenance Topics with due status: Not Due       Topic Last Completion Date    DEXA Scan 12/12/2023    Annual UACr 06/28/2024    Lipid Panel  06/28/2024    Mammogram 08/09/2024    High Dose Statin 12/20/2024       Procedures     Future Appointments   Date Time Provider Department Center   3/28/2025  1:00 PM AWV NURSE, [facility] [place]   12/1/2025  9:00 AM Jayy Castillo MD [facility]        Follow up in about 6 months (around 6/20/2025) for chronic health problems, hypertension.     Signature:  Felipe Perry MD  [address] [place] [address]  Phone #: [phone]  Fax #: [phone]    Date of encounter: 12/20/24    There are no Patient Instructions on file for this visit.

## 2024-12-21 LAB — UA COMPLETE W REFLEX CULTURE PNL UR: ABNORMAL

## 2024-12-23 DIAGNOSIS — N30.00 ACUTE CYSTITIS WITHOUT HEMATURIA: Primary | ICD-10-CM

## 2024-12-23 DIAGNOSIS — E53.8 B12 DEFICIENCY: ICD-10-CM

## 2024-12-23 RX ORDER — NITROFURANTOIN 25; 75 MG/1; MG/1
100 CAPSULE ORAL 2 TIMES DAILY
Qty: 20 CAPSULE | Refills: 0 | Status: SHIPPED | OUTPATIENT
Start: 2024-12-23

## 2024-12-23 RX ORDER — CYANOCOBALAMIN 1000 UG/ML
INJECTION, SOLUTION INTRAMUSCULAR; SUBCUTANEOUS
Qty: 10 ML | Refills: 2 | Status: SHIPPED | OUTPATIENT
Start: 2024-12-23

## 2024-12-23 NOTE — TELEPHONE ENCOUNTER
----- Message from Felipe Perry MD sent at 12/23/2024 11:10 AM CST -----  Start nitrofurantoin 100mg BID for 7 days for a UTI     B12 is quite low, Start B12 injections, 1ml weekly for 8 weeks, THEN one mL every other week for 8 doses, THEN one mL monthly to complete a full year of treatment     Cholesterol is higher than ideal, similar to previous labs

## 2024-12-30 ENCOUNTER — HOSPITAL ENCOUNTER (OUTPATIENT)
Dept: RADIOLOGY | Facility: HOSPITAL | Age: 74
Discharge: HOME | End: 2024-12-30
Payer: MEDICARE

## 2024-12-30 ENCOUNTER — OFFICE VISIT (OUTPATIENT)
Dept: FAMILY MEDICINE | Facility: CLINIC | Age: 74
End: 2024-12-30
Payer: MEDICARE

## 2024-12-30 VITALS
HEART RATE: 60 BPM | TEMPERATURE: 98 F | SYSTOLIC BLOOD PRESSURE: 100 MMHG | BODY MASS INDEX: 40.46 KG/M2 | DIASTOLIC BLOOD PRESSURE: 67 MMHG | WEIGHT: 237 LBS | RESPIRATION RATE: 18 BRPM | HEIGHT: 64 IN | OXYGEN SATURATION: 95 %

## 2024-12-30 DIAGNOSIS — R06.2 WHEEZING: Primary | ICD-10-CM

## 2024-12-30 DIAGNOSIS — R06.2 WHEEZING: ICD-10-CM

## 2024-12-30 PROCEDURE — 3008F BODY MASS INDEX DOCD: CPT | Mod: ,,,

## 2024-12-30 PROCEDURE — 1159F MED LIST DOCD IN RCRD: CPT | Mod: ,,,

## 2024-12-30 PROCEDURE — 3074F SYST BP LT 130 MM HG: CPT | Mod: ,,,

## 2024-12-30 PROCEDURE — 99213 OFFICE O/P EST LOW 20 MIN: CPT | Mod: 25,,,

## 2024-12-30 PROCEDURE — 1101F PT FALLS ASSESS-DOCD LE1/YR: CPT | Mod: ,,,

## 2024-12-30 PROCEDURE — 71046 X-RAY EXAM CHEST 2 VIEWS: CPT | Mod: TC,PN

## 2024-12-30 PROCEDURE — 3078F DIAST BP <80 MM HG: CPT | Mod: ,,,

## 2024-12-30 PROCEDURE — 1160F RVW MEDS BY RX/DR IN RCRD: CPT | Mod: ,,,

## 2024-12-30 PROCEDURE — 94640 AIRWAY INHALATION TREATMENT: CPT | Mod: ,,,

## 2024-12-30 PROCEDURE — 1126F AMNT PAIN NOTED NONE PRSNT: CPT | Mod: ,,,

## 2024-12-30 PROCEDURE — 3288F FALL RISK ASSESSMENT DOCD: CPT | Mod: ,,,

## 2024-12-30 PROCEDURE — 71046 X-RAY EXAM CHEST 2 VIEWS: CPT | Mod: 26,,, | Performed by: RADIOLOGY

## 2024-12-30 RX ORDER — IPRATROPIUM BROMIDE AND ALBUTEROL SULFATE 2.5; .5 MG/3ML; MG/3ML
3 SOLUTION RESPIRATORY (INHALATION) EVERY 6 HOURS PRN
Qty: 75 ML | Refills: 0 | Status: SHIPPED | OUTPATIENT
Start: 2024-12-30 | End: 2025-12-30

## 2024-12-30 RX ORDER — BENZONATATE 200 MG/1
200 CAPSULE ORAL 3 TIMES DAILY PRN
Qty: 30 CAPSULE | Refills: 0 | Status: SHIPPED | OUTPATIENT
Start: 2024-12-30 | End: 2025-01-09

## 2024-12-30 RX ORDER — METHYLPREDNISOLONE 4 MG/1
TABLET ORAL
Qty: 21 EACH | Refills: 0 | Status: SHIPPED | OUTPATIENT
Start: 2024-12-30

## 2024-12-30 RX ORDER — IPRATROPIUM BROMIDE AND ALBUTEROL SULFATE 2.5; .5 MG/3ML; MG/3ML
3 SOLUTION RESPIRATORY (INHALATION)
Status: COMPLETED | OUTPATIENT
Start: 2024-12-30 | End: 2024-12-30

## 2024-12-30 RX ADMIN — IPRATROPIUM BROMIDE AND ALBUTEROL SULFATE 3 ML: 2.5; .5 SOLUTION RESPIRATORY (INHALATION) at 03:12

## 2024-12-30 NOTE — PROGRESS NOTES
Subjective     Patient ID: Radha Martínez is a 74 y.o. female.    Chief Complaint: Sinus Problem (Patient reports chest congestion, shortness of breath, body aches, headache, sore throat, productive cough with brown sputum for several weeks. She saw Dr. Perry on 12/20 for similar symptoms. She was given a Decadron injection, zpack, and prednisone. She states she doesn't feel any better. )    Sinus Problem  Associated symptoms include shortness of breath.     Review of Systems   Respiratory:  Positive for shortness of breath and wheezing.    Musculoskeletal:  Positive for arthralgias.          Objective     Physical Exam  HENT:      Right Ear: Tympanic membrane, ear canal and external ear normal.      Left Ear: Tympanic membrane, ear canal and external ear normal.      Nose: Nose normal.      Mouth/Throat:      Mouth: Mucous membranes are moist.      Pharynx: Oropharynx is clear.   Eyes:      Pupils: Pupils are equal, round, and reactive to light.   Cardiovascular:      Rate and Rhythm: Normal rate and regular rhythm.      Pulses: Normal pulses.      Heart sounds: Normal heart sounds.   Pulmonary:      Effort: Pulmonary effort is normal.      Breath sounds: Wheezing present.   Musculoskeletal:      Cervical back: Normal range of motion.   Skin:     General: Skin is warm.      Capillary Refill: Capillary refill takes less than 2 seconds.   Neurological:      General: No focal deficit present.      Mental Status: She is alert.   Psychiatric:         Mood and Affect: Mood normal.            Assessment and Plan     1. Wheezing  -     X-Ray Chest PA And Lateral; Future; Expected date: 12/30/2024  -     albuterol-ipratropium 2.5 mg-0.5 mg/3 mL nebulizer solution 3 mL    Other orders  -     albuterol-ipratropium (DUO-NEB) 2.5 mg-0.5 mg/3 mL nebulizer solution; Take 3 mLs by nebulization every 6 (six) hours as needed for Wheezing. Rescue  Dispense: 75 mL; Refill: 0  -     methylPREDNISolone (MEDROL DOSEPACK) 4 mg tablet; use  as directed  Dispense: 21 each; Refill: 0  -     benzonatate (TESSALON) 200 MG capsule; Take 1 capsule (200 mg total) by mouth 3 (three) times daily as needed for Cough.  Dispense: 30 capsule; Refill: 0        No distress noted. Will follow up once radiologist interprets x ray. Has close contact with COVID.          No follow-ups on file.

## 2025-01-30 ENCOUNTER — TELEPHONE (OUTPATIENT)
Dept: FAMILY MEDICINE | Facility: CLINIC | Age: 75
End: 2025-01-30
Payer: MEDICARE

## 2025-01-30 NOTE — TELEPHONE ENCOUNTER
Called pt to let her know we sent her Lasix to [facility] on 12/20/2025. She states she will call [facility] to see what is the hold up on her medications being mailed to her.

## 2025-01-30 NOTE — TELEPHONE ENCOUNTER
----- Message from Maribeth sent at 1/30/2025 10:58 AM CST -----  Patient called 01/30 for a refill on lasix sent to [facility] - [place], [place] - [address] [facility]

## 2025-02-11 DIAGNOSIS — I50.32 CHRONIC DIASTOLIC HEART FAILURE: Chronic | ICD-10-CM

## 2025-02-11 RX ORDER — FUROSEMIDE 40 MG/1
40 TABLET ORAL 2 TIMES DAILY
Qty: 180 TABLET | Refills: 1 | Status: SHIPPED | OUTPATIENT
Start: 2025-02-11 | End: 2025-02-14 | Stop reason: SDUPTHER

## 2025-02-11 NOTE — TELEPHONE ENCOUNTER
----- Message from Alejandra sent at 2/11/2025  8:31 AM CST -----  ZEE SEALS [[id number]] is asking for a refill of her furosemide (LASIX) to be sent to [facility] in [place]

## 2025-02-14 DIAGNOSIS — I50.32 CHRONIC DIASTOLIC HEART FAILURE: Chronic | ICD-10-CM

## 2025-02-14 DIAGNOSIS — F41.9 ANXIETY: ICD-10-CM

## 2025-02-14 DIAGNOSIS — E78.2 MIXED HYPERLIPIDEMIA: Chronic | ICD-10-CM

## 2025-02-14 DIAGNOSIS — I10 PRIMARY HYPERTENSION: Chronic | ICD-10-CM

## 2025-02-14 DIAGNOSIS — J45.40 MODERATE PERSISTENT ASTHMA WITHOUT COMPLICATION: ICD-10-CM

## 2025-02-14 DIAGNOSIS — M54.41 CHRONIC LOW BACK PAIN WITH BILATERAL SCIATICA, UNSPECIFIED BACK PAIN LATERALITY: ICD-10-CM

## 2025-02-14 DIAGNOSIS — F32.A DEPRESSION, UNSPECIFIED DEPRESSION TYPE: ICD-10-CM

## 2025-02-14 DIAGNOSIS — K21.9 GASTROESOPHAGEAL REFLUX DISEASE WITHOUT ESOPHAGITIS: Chronic | ICD-10-CM

## 2025-02-14 DIAGNOSIS — M54.42 CHRONIC LOW BACK PAIN WITH BILATERAL SCIATICA, UNSPECIFIED BACK PAIN LATERALITY: ICD-10-CM

## 2025-02-14 DIAGNOSIS — G89.29 CHRONIC LOW BACK PAIN WITH BILATERAL SCIATICA, UNSPECIFIED BACK PAIN LATERALITY: ICD-10-CM

## 2025-02-14 DIAGNOSIS — R56.9 SEIZURE: ICD-10-CM

## 2025-02-14 DIAGNOSIS — T78.40XS ALLERGY, SEQUELA: ICD-10-CM

## 2025-02-14 RX ORDER — LORATADINE 10 MG/1
10 TABLET ORAL DAILY
Qty: 90 TABLET | Refills: 1 | Status: SHIPPED | OUTPATIENT
Start: 2025-02-14

## 2025-02-14 RX ORDER — PRAVASTATIN SODIUM 40 MG/1
40 TABLET ORAL NIGHTLY
Qty: 90 TABLET | Refills: 1 | Status: SHIPPED | OUTPATIENT
Start: 2025-02-14

## 2025-02-14 RX ORDER — FUROSEMIDE 40 MG/1
40 TABLET ORAL 2 TIMES DAILY
Qty: 180 TABLET | Refills: 1 | Status: SHIPPED | OUTPATIENT
Start: 2025-02-14

## 2025-02-14 RX ORDER — FLUTICASONE FUROATE, UMECLIDINIUM BROMIDE AND VILANTEROL TRIFENATATE 200; 62.5; 25 UG/1; UG/1; UG/1
1 POWDER RESPIRATORY (INHALATION) DAILY
Qty: 180 EACH | Refills: 1 | Status: SHIPPED | OUTPATIENT
Start: 2025-02-14

## 2025-02-14 RX ORDER — PAROXETINE HYDROCHLORIDE 40 MG/1
40 TABLET, FILM COATED ORAL DAILY
Qty: 90 TABLET | Refills: 1 | Status: SHIPPED | OUTPATIENT
Start: 2025-02-14

## 2025-02-14 RX ORDER — MONTELUKAST SODIUM 10 MG/1
10 TABLET ORAL DAILY
Qty: 90 TABLET | Refills: 1 | Status: SHIPPED | OUTPATIENT
Start: 2025-02-14

## 2025-02-14 RX ORDER — IPRATROPIUM BROMIDE AND ALBUTEROL SULFATE 2.5; .5 MG/3ML; MG/3ML
3 SOLUTION RESPIRATORY (INHALATION) EVERY 6 HOURS PRN
Qty: 75 ML | Refills: 1 | Status: SHIPPED | OUTPATIENT
Start: 2025-02-14 | End: 2026-02-14

## 2025-02-14 RX ORDER — BUSPIRONE HYDROCHLORIDE 10 MG/1
10 TABLET ORAL 2 TIMES DAILY
Qty: 180 TABLET | Refills: 1 | Status: SHIPPED | OUTPATIENT
Start: 2025-02-14

## 2025-02-14 RX ORDER — GABAPENTIN 300 MG/1
300 CAPSULE ORAL 3 TIMES DAILY
Qty: 270 CAPSULE | Refills: 1 | Status: SHIPPED | OUTPATIENT
Start: 2025-02-14

## 2025-02-14 RX ORDER — NEBIVOLOL 5 MG/1
5 TABLET ORAL DAILY
Qty: 90 TABLET | Refills: 1 | Status: SHIPPED | OUTPATIENT
Start: 2025-02-14

## 2025-02-14 RX ORDER — PHENYTOIN SODIUM 100 MG/1
100 CAPSULE, EXTENDED RELEASE ORAL 3 TIMES DAILY
Qty: 270 CAPSULE | Refills: 0 | Status: SHIPPED | OUTPATIENT
Start: 2025-02-14

## 2025-02-14 RX ORDER — PANTOPRAZOLE SODIUM 40 MG/1
40 TABLET, DELAYED RELEASE ORAL DAILY
Qty: 90 TABLET | Refills: 1 | Status: SHIPPED | OUTPATIENT
Start: 2025-02-14

## 2025-02-26 ENCOUNTER — TELEPHONE (OUTPATIENT)
Dept: ORTHOPEDICS | Facility: CLINIC | Age: 75
End: 2025-02-26
Payer: MEDICARE

## 2025-02-26 NOTE — TELEPHONE ENCOUNTER
----- Message from Dionne sent at 2/26/2025 10:50 AM CST -----  Regarding: Set up Procedure  Who Called: Radha Garza wants to set up a procedure for her trigger fingerPreferred Method of Contact: Phone CallPatient's Preferred Phone Number on File: [phone]

## 2025-02-27 NOTE — TELEPHONE ENCOUNTER
Tried calling patient about setting up surgery. No answer but was able to leave a voicemail for patient to return call.

## 2025-03-20 ENCOUNTER — TELEPHONE (OUTPATIENT)
Dept: ORTHOPEDICS | Facility: CLINIC | Age: 75
End: 2025-03-20
Payer: MEDICARE

## 2025-04-07 ENCOUNTER — OFFICE VISIT (OUTPATIENT)
Dept: FAMILY MEDICINE | Facility: CLINIC | Age: 75
End: 2025-04-07
Payer: MEDICARE

## 2025-04-07 VITALS
OXYGEN SATURATION: 93 % | SYSTOLIC BLOOD PRESSURE: 120 MMHG | HEART RATE: 85 BPM | HEIGHT: 64 IN | RESPIRATION RATE: 16 BRPM | TEMPERATURE: 98 F | WEIGHT: 245.81 LBS | DIASTOLIC BLOOD PRESSURE: 75 MMHG | BODY MASS INDEX: 41.97 KG/M2

## 2025-04-07 DIAGNOSIS — E78.2 MIXED HYPERLIPIDEMIA: Chronic | ICD-10-CM

## 2025-04-07 DIAGNOSIS — I48.0 PAROXYSMAL ATRIAL FIBRILLATION: Chronic | ICD-10-CM

## 2025-04-07 DIAGNOSIS — J44.1 CHRONIC OBSTRUCTIVE PULMONARY DISEASE WITH (ACUTE) EXACERBATION: Chronic | ICD-10-CM

## 2025-04-07 DIAGNOSIS — J45.40 MODERATE PERSISTENT ASTHMA WITHOUT COMPLICATION: Primary | Chronic | ICD-10-CM

## 2025-04-07 DIAGNOSIS — I50.22 CHRONIC SYSTOLIC HEART FAILURE: Chronic | ICD-10-CM

## 2025-04-07 DIAGNOSIS — I10 PRIMARY HYPERTENSION: Chronic | ICD-10-CM

## 2025-04-07 RX ORDER — DEXAMETHASONE SODIUM PHOSPHATE 4 MG/ML
4 INJECTION, SOLUTION INTRA-ARTICULAR; INTRALESIONAL; INTRAMUSCULAR; INTRAVENOUS; SOFT TISSUE
Status: COMPLETED | OUTPATIENT
Start: 2025-04-07 | End: 2025-04-07

## 2025-04-07 RX ORDER — LEVOFLOXACIN 500 MG/1
500 TABLET, FILM COATED ORAL DAILY
Qty: 7 TABLET | Refills: 0 | Status: SHIPPED | OUTPATIENT
Start: 2025-04-07

## 2025-04-07 RX ORDER — FUROSEMIDE 10 MG/ML
20 INJECTION INTRAMUSCULAR; INTRAVENOUS
Status: COMPLETED | OUTPATIENT
Start: 2025-04-07 | End: 2025-04-07

## 2025-04-07 RX ORDER — METHYLPREDNISOLONE ACETATE 40 MG/ML
40 INJECTION, SUSPENSION INTRA-ARTICULAR; INTRALESIONAL; INTRAMUSCULAR; SOFT TISSUE
Status: COMPLETED | OUTPATIENT
Start: 2025-04-07 | End: 2025-04-07

## 2025-04-07 RX ORDER — PREDNISONE 20 MG/1
TABLET ORAL
Qty: 15 TABLET | Refills: 0 | Status: SHIPPED | OUTPATIENT
Start: 2025-04-07 | End: 2025-04-17

## 2025-04-07 RX ADMIN — DEXAMETHASONE SODIUM PHOSPHATE 4 MG: 4 INJECTION, SOLUTION INTRA-ARTICULAR; INTRALESIONAL; INTRAMUSCULAR; INTRAVENOUS; SOFT TISSUE at 11:04

## 2025-04-07 RX ADMIN — FUROSEMIDE 20 MG: 10 INJECTION INTRAMUSCULAR; INTRAVENOUS at 11:04

## 2025-04-07 RX ADMIN — METHYLPREDNISOLONE ACETATE 40 MG: 40 INJECTION, SUSPENSION INTRA-ARTICULAR; INTRALESIONAL; INTRAMUSCULAR; SOFT TISSUE at 11:04

## 2025-04-07 NOTE — PROGRESS NOTES
Felipe Perry MD    [address] [place] [address]     PATIENT NAME: Radha Martínez  : 1950  DATE: 25  MRN: [id number]      Billing Provider: Felipe Perry MD  Level of Service: AR OFFICE/OUTPT VISIT, EST, LEVL IV, 30-39 MIN  Patient PCP Information       Provider PCP Type    Felipe Perry MD General                   Update PCP  Update Chief Complaint         History of Present Illness / Problem Focused Workflow     Radha Martínez presents to the clinic with   Chief Complaint   Patient presents with    Cough     X 2 weeks wheezing    Shortness of Breath     X 2 weeks    Edema     Lower legs and abdomen      HPI    Review of Systems     Review of Systems   Constitutional:  Negative for activity change, appetite change, chills, fatigue and fever.   HENT:  Positive for nasal congestion and sore throat. Negative for ear pain, hearing loss and postnasal drip.    Respiratory:  Positive for cough, shortness of breath and wheezing. Negative for chest tightness.    Cardiovascular:  Positive for leg swelling. Negative for chest pain, palpitations and claudication.   Gastrointestinal:  Negative for abdominal pain, change in bowel habit, constipation, diarrhea, nausea and vomiting.   Genitourinary:  Negative for dysuria.   Musculoskeletal:  Negative for arthralgias, back pain, gait problem and myalgias.   Integumentary:  Negative for rash.   Neurological:  Negative for weakness and headaches.   Psychiatric/Behavioral:  Negative for suicidal ideas. The patient is not nervous/anxious.         Medical / Social / Family History     Past Medical History:   Diagnosis Date    Asthma     Breast cancer screening by mammogram 2024    [facility]    CHF (congestive heart failure)     COPD (chronic obstructive pulmonary disease)     WILLSON (dyspnea on exertion)     GERD (gastroesophageal reflux disease)     HTN (hypertension)     Hyperlipidemia      Osteoporosis     Oxygen dependent     02 2L    Seizure disorder        Past Surgical History:   Procedure Laterality Date    ARTHROPLASTY, KNEE, TOTAL, USING COMPUTER-ASSISTED NAVIGATION Right 11/2/2023    Procedure: ARTHROPLASTY, KNEE, TOTAL, USING COMPUTER-ASSISTED NAVIGATION;  Surgeon: Jayy Castillo MD;  [facility];  Service: Orthopedics;  Laterality: Right;    CARDIOVERSION N/A 04/08/2022    Procedure: Cardioversion;  Surgeon: Adal Chung MD;  Location: [facility] CATH LAB;  Service: Cardiology;  Laterality: N/A;    COLONOSCOPY      EYE SURGERY      REVISION OF TOTAL KNEE REPLACEMENT       TUBAL LIGATION         Social History  Ms.  reports that she has never smoked. She has never been exposed to tobacco smoke. She has never used smokeless tobacco. She reports that she does not drink alcohol and does not use drugs.    Family History  Ms.'s family history includes Cancer in her father; Diabetes in her brother, mother, and sister; Heart disease in her mother and sister; Hypertension in her mother and sister.    Medications and Allergies     Medications  Outpatient Medications Marked as Taking for the 4/7/25 encounter (Office Visit) with Felipe Perry MD   Medication Sig Dispense Refill    albuterol-ipratropium (DUO-NEB) 2.5 mg-0.5 mg/3 mL nebulizer solution Take 3 mLs by nebulization every 6 (six) hours as needed for Wheezing. Rescue 75 mL 1    busPIRone (BUSPAR) 10 MG tablet Take 1 tablet (10 mg total) by mouth 2 (two) times daily. For ANXIETY 180 tablet 1    calcium carbonate/vitamin D3 (CALCARB 600 WITH VITAMIN D ORAL) Take by mouth.      cyanocobalamin 1,000 mcg/mL injection Inject 1ml IM weekly for 8 weeks, THEN inject one mL IM every other week for 8 doses, THEN inject one mL IM monthly to complete a full year of treatment 10 mL 2    diclofenac sodium (VOLTAREN) 1 % Gel Apply a thin layer of gel to the left foot and ankle, twice daily as needed for PAIN and INFLAMMATION  200 g 5    fluticasone propionate (FLONASE) 50 mcg/actuation nasal spray 1 spray (50 mcg total) by Each Nostril route once daily. 48 g 1    fluticasone-umeclidin-vilanter (TRELEGY ELLIPTA) 200-62.5-25 mcg inhaler Inhale 1 puff into the lungs once daily. For LUNGS 180 each 1    furosemide (LASIX) 40 MG tablet Take 1 tablet (40 mg total) by mouth 2 (two) times daily. For SWELLING 180 tablet 1    gabapentin (NEURONTIN) 300 MG capsule Take 1 capsule (300 mg total) by mouth 3 (three) times daily. For NERVE PAIN 270 capsule 1    loratadine (CLARITIN) 10 mg tablet Take 1 tablet (10 mg total) by mouth once daily. For ALLERGIES 90 tablet 1    montelukast (SINGULAIR) 10 mg tablet Take 1 tablet (10 mg total) by mouth once daily. For ALLERGIES 90 tablet 1    nebivoloL (BYSTOLIC) 5 MG Tab Take 1 tablet (5 mg total) by mouth once daily. For HEART 90 tablet 1    pantoprazole (PROTONIX) 40 MG tablet Take 1 tablet (40 mg total) by mouth once daily. For STOMACH 90 tablet 1    paroxetine (PAXIL) 40 MG tablet Take 1 tablet (40 mg total) by mouth once daily. For MOOD 90 tablet 1    phenytoin (DILANTIN) 100 MG ER capsule Take 1 capsule (100 mg total) by mouth 3 (three) times daily. For SEIZURES 270 capsule 0    pravastatin (PRAVACHOL) 40 MG tablet Take 1 tablet (40 mg total) by mouth every evening. For CHOLESTEROL 90 tablet 1     Current Facility-Administered Medications for the 4/7/25 encounter (Office Visit) with Felipe Perry MD   Medication Dose Route Frequency Provider Last Rate Last Admin    [COMPLETED] dexAMETHasone injection 4 mg  4 mg Intramuscular 1 time in Clinic/[facility] Felipe Perry MD   4 mg at 04/07/25 1120    [COMPLETED] furosemide injection 20 mg  20 mg Intramuscular 1 time in Clinic/[facility] Felipe Perry MD   20 mg at 04/07/25 1119    [COMPLETED] methylPREDNISolone acetate injection 40 mg  40 mg Intramuscular 1 time in Clinic/[facility] Felipe Perry MD   40 mg at 04/07/25 1120       Allergies  Review of  patient's allergies indicates:  No Known Allergies    Physical Examination     Vitals:    25 1030   BP: 120/75   Pulse: 85   Resp: 16   Temp: 98.4 °F (36.9 °C)     Physical Exam  Constitutional:       Appearance: Normal appearance.   HENT:      Head: Normocephalic and atraumatic.   Eyes:      Extraocular Movements: Extraocular movements intact.   Cardiovascular:      Rate and Rhythm: Normal rate and regular rhythm.   Pulmonary:      Effort: Pulmonary effort is normal.      Breath sounds: Examination of the right-middle field reveals rales. Wheezing and rales present.   Musculoskeletal:      Right lower le+ Edema present.      Left lower le+ Edema present.   Skin:     General: Skin is warm.   Neurological:      Mental Status: She is alert and oriented to person, place, and time. Mental status is at baseline.            Assessment and Plan (including Health Maintenance)      Problem List  Smart Sets  Document Outside HM   :    Plan:     Treated for asthma exacerbation, and CHF    Health Maintenance Due   Topic Date Due    TETANUS VACCINE  Never done    Shingles Vaccine (1 of 2) Never done    Colorectal Cancer Screening  2022    COVID-19 Vaccine ( season) 2024       Problem List Items Addressed This Visit          Pulmonary    Moderate persistent asthma without complication - Primary (Chronic)    Relevant Medications    dexAMETHasone injection 4 mg (Completed)    methylPREDNISolone acetate injection 40 mg (Completed)    predniSONE (DELTASONE) 20 MG tablet    Chronic obstructive pulmonary disease with (acute) exacerbation (Chronic)    Relevant Medications    levoFLOXacin (LEVAQUIN) 500 MG tablet       Cardiac/Vascular    Chronic systolic heart failure (Chronic)    Relevant Medications    furosemide injection 20 mg (Completed)    Mixed hyperlipidemia (Chronic)    Primary hypertension (Chronic)    Overview    - Goal blood pressure for most patients is less than 130/85. Both numbers are  important. If you have questions about your specific goal blood pressure, please ask at your clinic visits.   - Check blood pressure outside of clinic, record the numbers, and bring the log to all your office visits.   - If you have any chest pain, SOB, or other concerning symptoms, report these immediately, or go to the nearest ER.    - Recommend cardiovascular exercise, at least 3 times per week, for at least 15 minutes.   - Eat a heart healthy diet.            Paroxysmal atrial fibrillation (Chronic)       Health Maintenance Topics with due status: Not Due       Topic Last Completion Date    DEXA Scan 12/12/2023    Mammogram 08/09/2024    Annual UACr 12/20/2024    Lipid Panel 12/20/2024    High Dose Statin 04/07/2025       Procedures     Future Appointments   Date Time Provider Department Center   7/14/2025  3:00 PM AWV NURSE, [facility] FAMILY MEDICINE [facility]SA[facility] [place]   12/1/2025  9:00 AM Jayy Castillo MD [facility] ORTHO [facility]        Follow up if symptoms worsen or fail to improve.     Signature:  Felipe Perry MD  [address] [place] [address]  Phone #: [phone]  Fax #: [phone]    Date of encounter: 4/7/25    There are no Patient Instructions on file for this visit.

## 2025-05-09 DIAGNOSIS — M79.642 LEFT HAND PAIN: Primary | ICD-10-CM

## 2025-05-12 ENCOUNTER — HOSPITAL ENCOUNTER (OUTPATIENT)
Dept: RADIOLOGY | Facility: HOSPITAL | Age: 75
Discharge: HOME | End: 2025-05-12
Attending: ORTHOPAEDIC SURGERY
Payer: MEDICARE

## 2025-05-12 ENCOUNTER — OFFICE VISIT (OUTPATIENT)
Dept: ORTHOPEDICS | Facility: CLINIC | Age: 75
End: 2025-05-12
Payer: MEDICARE

## 2025-05-12 DIAGNOSIS — M65.341 TRIGGER RING FINGER OF RIGHT HAND: Primary | ICD-10-CM

## 2025-05-12 DIAGNOSIS — M65.342 TRIGGER RING FINGER OF LEFT HAND: ICD-10-CM

## 2025-05-12 DIAGNOSIS — Z01.818 ENCOUNTER FOR OTHER PREPROCEDURAL EXAMINATION: ICD-10-CM

## 2025-05-12 DIAGNOSIS — M79.642 LEFT HAND PAIN: ICD-10-CM

## 2025-05-12 PROCEDURE — 73130 X-RAY EXAM OF HAND: CPT | Mod: 26,LT,, | Performed by: ORTHOPAEDIC SURGERY

## 2025-05-12 PROCEDURE — 1160F RVW MEDS BY RX/DR IN RCRD: CPT | Mod: CPTII,,, | Performed by: ORTHOPAEDIC SURGERY

## 2025-05-12 PROCEDURE — 99214 OFFICE O/P EST MOD 30 MIN: CPT | Mod: PBBFAC,25 | Performed by: ORTHOPAEDIC SURGERY

## 2025-05-12 PROCEDURE — 73130 X-RAY EXAM OF HAND: CPT | Mod: TC,LT

## 2025-05-12 PROCEDURE — 99999 PR PBB SHADOW E&M-EST. PATIENT-LVL IV: CPT | Mod: PBBFAC,,, | Performed by: ORTHOPAEDIC SURGERY

## 2025-05-12 PROCEDURE — 1159F MED LIST DOCD IN RCRD: CPT | Mod: CPTII,,, | Performed by: ORTHOPAEDIC SURGERY

## 2025-05-12 PROCEDURE — 99204 OFFICE O/P NEW MOD 45 MIN: CPT | Mod: S$PBB,,, | Performed by: ORTHOPAEDIC SURGERY

## 2025-05-13 ENCOUNTER — TELEPHONE (OUTPATIENT)
Dept: FAMILY MEDICINE | Facility: CLINIC | Age: 75
End: 2025-05-13
Payer: MEDICARE

## 2025-05-13 NOTE — TELEPHONE ENCOUNTER
----- Message from Alejandra sent at 5/13/2025 10:46 AM CDT -----  [facility]EE [facility]ALS [[id number]] is asking for a refill of her furosemide (LASIX) 40 MG and pantoprazole (PROTONIX) 40 MG

## 2025-05-14 PROBLEM — M65.30 TRIGGER FINGER OF LEFT HAND: Status: ACTIVE | Noted: 2025-05-14

## 2025-05-15 NOTE — PROGRESS NOTES
CLINIC NOTE       Chief Complaint   Patient presents with    Left Hand - Pain        Radha Martínez is a 75 y.o. female seen today for evaluation of painful triggering and locking of the left ring finger.  He has symptoms began insidiously.  No  history of trauma.  Seen for triggering and locking finger time requiring manual reduction.      Past Medical History:   Diagnosis Date    Asthma     Breast cancer screening by mammogram 08/09/2024    [facility]    CHF (congestive heart failure)     COPD (chronic obstructive pulmonary disease)     WILLSON (dyspnea on exertion)     GERD (gastroesophageal reflux disease)     HTN (hypertension)     Hyperlipidemia     Osteoporosis     Oxygen dependent     02 2L    Seizure disorder      Family History   Problem Relation Name Age of Onset    Diabetes Mother      Heart disease Mother      Hypertension Mother      Cancer Father      Diabetes Sister      Heart disease Sister      Hypertension Sister      Diabetes Brother       Medications Ordered Prior to Encounter[1]    ROS     There were no vitals filed for this visit.    Past Surgical History:   Procedure Laterality Date    ARTHROPLASTY, KNEE, TOTAL, USING COMPUTER-ASSISTED NAVIGATION Right 11/2/2023    Procedure: ARTHROPLASTY, KNEE, TOTAL, USING COMPUTER-ASSISTED NAVIGATION;  Surgeon: Jayy Castillo MD;  [facility] ORTHO OR;  Service: Orthopedics;  Laterality: Right;    CARDIOVERSION N/A 04/08/2022    Procedure: Cardioversion;  Surgeon: Adal Chung MD;  Location: [facility] CATH LAB;  Service: Cardiology;  Laterality: N/A;    COLONOSCOPY      EYE SURGERY      REVISION OF TOTAL KNEE REPLACEMENT       TUBAL LIGATION          Review of patient's allergies indicates:  No Known Allergies     Ortho Exam     Radiographic Examination:  Left hand 05/12/2025    Technique:  Three views AP lateral oblique    Findings: There is mild generalized osteopenia.  There is severe DJD of the 1st CMC joint that has well as  moderately severe DJD of the inner phalangeal joints. Greatest with the PIP joint of the ring finger with mild angular deformity.    Impression:   See Above    Assessment and Plan  Patient Active Problem List    Diagnosis Date Noted    Trigger ring finger of right hand 05/01/2024    Depression, recurrent 03/28/2024    Paroxysmal atrial fibrillation 03/28/2024    Convulsions 03/28/2024    Chronic obstructive pulmonary disease with (acute) exacerbation 03/28/2024    Chronic tension-type headache, not intractable 12/21/2023    Iron deficiency anemia secondary to inadequate dietary iron intake 11/15/2023    Non-seasonal allergic rhinitis due to pollen 09/14/2023    Chronic bilateral low back pain with bilateral sciatica 09/14/2023    Generalized anxiety disorder 09/14/2023    Primary osteoarthritis of right knee 09/07/2023    History of total right knee replacement (TKR) 09/07/2023    Asymptomatic menopausal state 08/14/2023    PACO on CPAP 11/07/2022    Primary hypertension 11/07/2022    Presence of Watchman left atrial appendage closure device 10/10/2022    Class 3 severe obesity due to excess calories with serious comorbidity and body mass index (BMI) of 40.0 to 44.9 in adult 09/01/2022    Angina pectoris 09/01/2022    Chronic kidney disease, stage 3a 09/01/2022    S/P ablation operation for arrhythmia 08/10/2022    Mixed hyperlipidemia 04/07/2022    Chronic systolic heart failure 09/08/2021    Moderate persistent asthma without complication     Gastroesophageal reflux disease without esophagitis     Age-related osteoporosis without current pathological fracture     Spinal cord injury, thoracic region 02/10/2016    Closed sternal manubrial dissociation fracture with routine healing 02/02/2016    History of fracture of rib 02/02/2016    Closed stable burst fracture of third thoracic vertebra with routine healing 02/02/2016    Impression:  Nodular tenosynovitis (trigger finger)-left ring finger   Plan:   A1 pulley  release left ring finger outpatient general versus beer block anesthesia.  Potential benefits and risks of surgery outlined to include but not limited to bleeding infection, damage to blood vessels and nerves, need for further surgery, other risks and complications including even death the patient wished to proceed.       Jayy Castillo M.D.                   [1]   Current Outpatient Medications on File Prior to Visit   Medication Sig Dispense Refill    albuterol-ipratropium (DUO-NEB) 2.5 mg-0.5 mg/3 mL nebulizer solution Take 3 mLs by nebulization every 6 (six) hours as needed for Wheezing. Rescue 75 mL 1    aspirin 325 MG tablet Take 1 tablet (325 mg total) by mouth once daily. 100 tablet 5    busPIRone (BUSPAR) 10 MG tablet Take 1 tablet (10 mg total) by mouth 2 (two) times daily. For ANXIETY 180 tablet 1    calcium carbonate/vitamin D3 (CALCARB 600 WITH VITAMIN D ORAL) Take by mouth.      cyanocobalamin 1,000 mcg/mL injection Inject 1ml IM weekly for 8 weeks, THEN inject one mL IM every other week for 8 doses, THEN inject one mL IM monthly to complete a full year of treatment 10 mL 2    diclofenac sodium (VOLTAREN) 1 % Gel Apply a thin layer of gel to the left foot and ankle, twice daily as needed for PAIN and INFLAMMATION 200 g 5    fluticasone propionate (FLONASE) 50 mcg/actuation nasal spray 1 spray (50 mcg total) by Each Nostril route once daily. 48 g 1    fluticasone-umeclidin-vilanter (TRELEGY ELLIPTA) 200-62.5-25 mcg inhaler Inhale 1 puff into the lungs once daily. For LUNGS 180 each 1    furosemide (LASIX) 40 MG tablet Take 1 tablet (40 mg total) by mouth 2 (two) times daily. For SWELLING 180 tablet 1    gabapentin (NEURONTIN) 300 MG capsule Take 1 capsule (300 mg total) by mouth 3 (three) times daily. For NERVE PAIN 270 capsule 1    levoFLOXacin (LEVAQUIN) 500 MG tablet Take 1 tablet (500 mg total) by mouth once daily. For LUNGS 7 tablet 0    loratadine (CLARITIN) 10 mg tablet Take 1 tablet (10 mg  total) by mouth once daily. For ALLERGIES 90 tablet 1    montelukast (SINGULAIR) 10 mg tablet Take 1 tablet (10 mg total) by mouth once daily. For ALLERGIES 90 tablet 1    nebivoloL (BYSTOLIC) 5 MG Tab Take 1 tablet (5 mg total) by mouth once daily. For HEART 90 tablet 1    pantoprazole (PROTONIX) 40 MG tablet Take 1 tablet (40 mg total) by mouth once daily. For STOMACH 90 tablet 1    paroxetine (PAXIL) 40 MG tablet Take 1 tablet (40 mg total) by mouth once daily. For MOOD 90 tablet 1    phenytoin (DILANTIN) 100 MG ER capsule Take 1 capsule (100 mg total) by mouth 3 (three) times daily. For SEIZURES 270 capsule 0    pravastatin (PRAVACHOL) 40 MG tablet Take 1 tablet (40 mg total) by mouth every evening. For CHOLESTEROL 90 tablet 1     No current facility-administered medications on file prior to visit.

## 2025-05-20 DIAGNOSIS — T78.40XS ALLERGY, SEQUELA: ICD-10-CM

## 2025-05-20 RX ORDER — MONTELUKAST SODIUM 10 MG/1
10 TABLET ORAL DAILY
Qty: 90 TABLET | Refills: 1 | Status: SHIPPED | OUTPATIENT
Start: 2025-05-20

## 2025-06-02 ENCOUNTER — ANESTHESIA EVENT (OUTPATIENT)
Dept: SURGERY | Facility: HOSPITAL | Age: 75
End: 2025-06-02
Payer: MEDICARE

## 2025-06-03 ENCOUNTER — ANESTHESIA (OUTPATIENT)
Dept: SURGERY | Facility: HOSPITAL | Age: 75
End: 2025-06-03
Payer: MEDICARE

## 2025-06-03 ENCOUNTER — HOSPITAL ENCOUNTER (OUTPATIENT)
Facility: HOSPITAL | Age: 75
Discharge: HOME | End: 2025-06-03
Attending: ORTHOPAEDIC SURGERY | Admitting: ORTHOPAEDIC SURGERY
Payer: MEDICARE

## 2025-06-03 VITALS
WEIGHT: 240 LBS | HEART RATE: 69 BPM | DIASTOLIC BLOOD PRESSURE: 65 MMHG | SYSTOLIC BLOOD PRESSURE: 110 MMHG | RESPIRATION RATE: 14 BRPM | HEIGHT: 64 IN | TEMPERATURE: 99 F | BODY MASS INDEX: 40.97 KG/M2 | OXYGEN SATURATION: 96 %

## 2025-06-03 DIAGNOSIS — M65.30 TRIGGER FINGER OF LEFT HAND: ICD-10-CM

## 2025-06-03 DIAGNOSIS — M65.341 TRIGGER RING FINGER OF RIGHT HAND: Primary | ICD-10-CM

## 2025-06-03 PROCEDURE — 88304 TISSUE EXAM BY PATHOLOGIST: CPT | Mod: 26,,, | Performed by: PATHOLOGY

## 2025-06-03 PROCEDURE — 36000707: Performed by: ORTHOPAEDIC SURGERY

## 2025-06-03 PROCEDURE — 25000003 PHARM REV CODE 250: Performed by: NURSE ANESTHETIST, CERTIFIED REGISTERED

## 2025-06-03 PROCEDURE — 26123 RELEASE PALM CONTRACTURE: CPT | Mod: F3,,, | Performed by: ORTHOPAEDIC SURGERY

## 2025-06-03 PROCEDURE — 27000510 HC BLANKET BAIR HUGGER ANY SIZE: Performed by: NURSE ANESTHETIST, CERTIFIED REGISTERED

## 2025-06-03 PROCEDURE — 63600175 PHARM REV CODE 636 W HCPCS: Performed by: NURSE ANESTHETIST, CERTIFIED REGISTERED

## 2025-06-03 PROCEDURE — 88304 TISSUE EXAM BY PATHOLOGIST: CPT | Mod: TC,SUR | Performed by: ORTHOPAEDIC SURGERY

## 2025-06-03 PROCEDURE — 63600175 PHARM REV CODE 636 W HCPCS: Performed by: ANESTHESIOLOGY

## 2025-06-03 PROCEDURE — 25000003 PHARM REV CODE 250: Performed by: ORTHOPAEDIC SURGERY

## 2025-06-03 PROCEDURE — 27000716 HC OXISENSOR PROBE, ANY SIZE: Performed by: NURSE ANESTHETIST, CERTIFIED REGISTERED

## 2025-06-03 PROCEDURE — 71000033 HC RECOVERY, INTIAL HOUR: Performed by: ORTHOPAEDIC SURGERY

## 2025-06-03 PROCEDURE — 71000015 HC POSTOP RECOV 1ST HR: Performed by: ORTHOPAEDIC SURGERY

## 2025-06-03 PROCEDURE — 27000177 HC AIRWAY, LARYNGEAL MASK: Performed by: NURSE ANESTHETIST, CERTIFIED REGISTERED

## 2025-06-03 PROCEDURE — 37000009 HC ANESTHESIA EA ADD 15 MINS: Performed by: ORTHOPAEDIC SURGERY

## 2025-06-03 PROCEDURE — 26356 REPAIR FINGER/HAND TENDON: CPT | Mod: 51,F3,, | Performed by: ORTHOPAEDIC SURGERY

## 2025-06-03 PROCEDURE — 36000706: Performed by: ORTHOPAEDIC SURGERY

## 2025-06-03 PROCEDURE — 37000008 HC ANESTHESIA 1ST 15 MINUTES: Performed by: ORTHOPAEDIC SURGERY

## 2025-06-03 RX ORDER — FENTANYL CITRATE 50 UG/ML
INJECTION, SOLUTION INTRAMUSCULAR; INTRAVENOUS
Status: DISCONTINUED | OUTPATIENT
Start: 2025-06-03 | End: 2025-06-03

## 2025-06-03 RX ORDER — HYDROCODONE BITARTRATE AND ACETAMINOPHEN 5; 325 MG/1; MG/1
1 TABLET ORAL EVERY 4 HOURS PRN
Status: DISCONTINUED | OUTPATIENT
Start: 2025-06-03 | End: 2025-06-03 | Stop reason: HOSPADM

## 2025-06-03 RX ORDER — IPRATROPIUM BROMIDE AND ALBUTEROL SULFATE 2.5; .5 MG/3ML; MG/3ML
3 SOLUTION RESPIRATORY (INHALATION) ONCE AS NEEDED
Status: DISCONTINUED | OUTPATIENT
Start: 2025-06-03 | End: 2025-06-03 | Stop reason: HOSPADM

## 2025-06-03 RX ORDER — HYDROCODONE BITARTRATE AND ACETAMINOPHEN 10; 325 MG/1; MG/1
1 TABLET ORAL EVERY 4 HOURS PRN
Status: DISCONTINUED | OUTPATIENT
Start: 2025-06-03 | End: 2025-06-03 | Stop reason: HOSPADM

## 2025-06-03 RX ORDER — EPHEDRINE SULFATE 50 MG/ML
INJECTION, SOLUTION INTRAVENOUS
Status: DISCONTINUED | OUTPATIENT
Start: 2025-06-03 | End: 2025-06-03

## 2025-06-03 RX ORDER — MIDAZOLAM HYDROCHLORIDE 1 MG/ML
INJECTION INTRAMUSCULAR; INTRAVENOUS
Status: DISCONTINUED | OUTPATIENT
Start: 2025-06-03 | End: 2025-06-03

## 2025-06-03 RX ORDER — LIDOCAINE HYDROCHLORIDE 20 MG/ML
INJECTION, SOLUTION EPIDURAL; INFILTRATION; INTRACAUDAL; PERINEURAL
Status: DISCONTINUED | OUTPATIENT
Start: 2025-06-03 | End: 2025-06-03

## 2025-06-03 RX ORDER — BUPIVACAINE HYDROCHLORIDE AND EPINEPHRINE 2.5; 5 MG/ML; UG/ML
INJECTION, SOLUTION EPIDURAL; INFILTRATION; INTRACAUDAL; PERINEURAL
Status: DISCONTINUED | OUTPATIENT
Start: 2025-06-03 | End: 2025-06-03 | Stop reason: HOSPADM

## 2025-06-03 RX ORDER — CEFAZOLIN SODIUM 1 G/3ML
INJECTION, POWDER, FOR SOLUTION INTRAMUSCULAR; INTRAVENOUS
Status: DISCONTINUED | OUTPATIENT
Start: 2025-06-03 | End: 2025-06-03

## 2025-06-03 RX ORDER — HYDROMORPHONE HYDROCHLORIDE 2 MG/ML
0.5 INJECTION, SOLUTION INTRAMUSCULAR; INTRAVENOUS; SUBCUTANEOUS EVERY 5 MIN PRN
Status: DISCONTINUED | OUTPATIENT
Start: 2025-06-03 | End: 2025-06-03 | Stop reason: HOSPADM

## 2025-06-03 RX ORDER — CEFAZOLIN 2 G/1
2 INJECTION, POWDER, FOR SOLUTION INTRAMUSCULAR; INTRAVENOUS
Status: DISCONTINUED | OUTPATIENT
Start: 2025-06-03 | End: 2025-06-03 | Stop reason: HOSPADM

## 2025-06-03 RX ORDER — ACETAMINOPHEN 500 MG
1000 TABLET ORAL EVERY 6 HOURS PRN
Status: DISCONTINUED | OUTPATIENT
Start: 2025-06-03 | End: 2025-06-03 | Stop reason: HOSPADM

## 2025-06-03 RX ORDER — ONDANSETRON 4 MG/1
8 TABLET, ORALLY DISINTEGRATING ORAL EVERY 8 HOURS PRN
Status: DISCONTINUED | OUTPATIENT
Start: 2025-06-03 | End: 2025-06-03 | Stop reason: HOSPADM

## 2025-06-03 RX ORDER — GLUCAGON 1 MG
1 KIT INJECTION
Status: DISCONTINUED | OUTPATIENT
Start: 2025-06-03 | End: 2025-06-03 | Stop reason: HOSPADM

## 2025-06-03 RX ORDER — ACETAMINOPHEN 10 MG/ML
1000 INJECTION, SOLUTION INTRAVENOUS ONCE
Status: COMPLETED | OUTPATIENT
Start: 2025-06-03 | End: 2025-06-03

## 2025-06-03 RX ORDER — PROMETHAZINE HYDROCHLORIDE 25 MG/1
25 TABLET ORAL EVERY 6 HOURS PRN
Status: DISCONTINUED | OUTPATIENT
Start: 2025-06-03 | End: 2025-06-03 | Stop reason: HOSPADM

## 2025-06-03 RX ORDER — MORPHINE SULFATE 10 MG/ML
4 INJECTION INTRAMUSCULAR; INTRAVENOUS; SUBCUTANEOUS EVERY 5 MIN PRN
Status: DISCONTINUED | OUTPATIENT
Start: 2025-06-03 | End: 2025-06-03 | Stop reason: HOSPADM

## 2025-06-03 RX ORDER — ONDANSETRON HYDROCHLORIDE 2 MG/ML
INJECTION, SOLUTION INTRAVENOUS
Status: DISCONTINUED | OUTPATIENT
Start: 2025-06-03 | End: 2025-06-03

## 2025-06-03 RX ORDER — DIPHENHYDRAMINE HYDROCHLORIDE 50 MG/ML
25 INJECTION, SOLUTION INTRAMUSCULAR; INTRAVENOUS EVERY 6 HOURS PRN
Status: DISCONTINUED | OUTPATIENT
Start: 2025-06-03 | End: 2025-06-03 | Stop reason: HOSPADM

## 2025-06-03 RX ORDER — HYDROCODONE BITARTRATE AND ACETAMINOPHEN 5; 325 MG/1; MG/1
1 TABLET ORAL EVERY 6 HOURS PRN
Qty: 28 TABLET | Refills: 0 | Status: SHIPPED | OUTPATIENT
Start: 2025-06-03 | End: 2025-06-10

## 2025-06-03 RX ORDER — ONDANSETRON HYDROCHLORIDE 2 MG/ML
4 INJECTION, SOLUTION INTRAVENOUS DAILY PRN
Status: DISCONTINUED | OUTPATIENT
Start: 2025-06-03 | End: 2025-06-03 | Stop reason: HOSPADM

## 2025-06-03 RX ORDER — SODIUM CHLORIDE 9 MG/ML
INJECTION, SOLUTION INTRAVENOUS CONTINUOUS
Status: DISCONTINUED | OUTPATIENT
Start: 2025-06-03 | End: 2025-06-03 | Stop reason: HOSPADM

## 2025-06-03 RX ORDER — PROPOFOL 10 MG/ML
VIAL (ML) INTRAVENOUS
Status: DISCONTINUED | OUTPATIENT
Start: 2025-06-03 | End: 2025-06-03

## 2025-06-03 RX ADMIN — ONDANSETRON 8 MG: 2 INJECTION INTRAMUSCULAR; INTRAVENOUS at 08:06

## 2025-06-03 RX ADMIN — SODIUM CHLORIDE: 9 INJECTION, SOLUTION INTRAVENOUS at 07:06

## 2025-06-03 RX ADMIN — PROPOFOL 150 MG: 10 INJECTION, EMULSION INTRAVENOUS at 08:06

## 2025-06-03 RX ADMIN — EPHEDRINE SULFATE 25 MG: 50 INJECTION INTRAVENOUS at 08:06

## 2025-06-03 RX ADMIN — FENTANYL CITRATE 100 MCG: 50 INJECTION, SOLUTION INTRAMUSCULAR; INTRAVENOUS at 08:06

## 2025-06-03 RX ADMIN — SODIUM CHLORIDE: 9 INJECTION, SOLUTION INTRAVENOUS at 08:06

## 2025-06-03 RX ADMIN — MIDAZOLAM HYDROCHLORIDE 2 MG: 1 INJECTION, SOLUTION INTRAMUSCULAR; INTRAVENOUS at 08:06

## 2025-06-03 RX ADMIN — LIDOCAINE HYDROCHLORIDE 100 MG: 20 INJECTION, SOLUTION INTRAVENOUS at 08:06

## 2025-06-03 RX ADMIN — ACETAMINOPHEN 1000 MG: 10 INJECTION INTRAVENOUS at 09:06

## 2025-06-03 RX ADMIN — CEFAZOLIN 2 G: 1 INJECTION, POWDER, FOR SOLUTION INTRAMUSCULAR; INTRAVENOUS; PARENTERAL at 08:06

## 2025-06-04 LAB
ESTROGEN SERPL-MCNC: NORMAL PG/ML
INSULIN SERPL-ACNC: NORMAL U[IU]/ML
LAB AP GROSS DESCRIPTION: NORMAL
LAB AP LABORATORY NOTES: NORMAL
T3RU NFR SERPL: NORMAL %

## 2025-06-13 ENCOUNTER — OFFICE VISIT (OUTPATIENT)
Dept: ORTHOPEDICS | Facility: CLINIC | Age: 75
End: 2025-06-13
Payer: MEDICARE

## 2025-06-13 VITALS — WEIGHT: 240.06 LBS | BODY MASS INDEX: 40.98 KG/M2 | HEIGHT: 64 IN

## 2025-06-13 DIAGNOSIS — Z98.890 STATUS POST TRIGGER FINGER RELEASE: Primary | ICD-10-CM

## 2025-06-13 PROCEDURE — 99213 OFFICE O/P EST LOW 20 MIN: CPT | Mod: PBBFAC

## 2025-06-13 PROCEDURE — 99999 PR PBB SHADOW E&M-EST. PATIENT-LVL III: CPT | Mod: PBBFAC,,,

## 2025-06-13 NOTE — PROGRESS NOTES
Release, Trigger Finger - Left 6/3/2025    Radha Martínez is a 75 y.o. female seen today for post-op visit.  Patient is 10 days status post left ring finger trigger finger release by Dr. Castillo.  Patient reports some soreness after cutting up pickles few days ago.  No other complaints today.      PAST MEDICAL HISTORY:   Past Medical History:   Diagnosis Date    Asthma     Breast cancer screening by mammogram 08/09/2024    [facility]    CHF (congestive heart failure)     COPD (chronic obstructive pulmonary disease)     WILLSON (dyspnea on exertion)     GERD (gastroesophageal reflux disease)     HTN (hypertension)     Hyperlipidemia     Osteoporosis     Oxygen dependent     02 2L    Seizure disorder         PAST SURGICAL HISTORY:   Past Surgical History:   Procedure Laterality Date    ARTHROPLASTY, KNEE, TOTAL, USING COMPUTER-ASSISTED NAVIGATION Right 11/2/2023    Procedure: ARTHROPLASTY, KNEE, TOTAL, USING COMPUTER-ASSISTED NAVIGATION;  Surgeon: Jayy Castillo MD;  Location: [facility] OR;  Service: Orthopedics;  Laterality: Right;    CARDIOVERSION N/A 04/08/2022    Procedure: Cardioversion;  Surgeon: Adal Chung MD;  Location: [facility] CATH LAB;  Service: Cardiology;  Laterality: N/A;    COLONOSCOPY      EYE SURGERY      REVISION OF TOTAL KNEE REPLACEMENT       TRIGGER FINGER RELEASE Left 6/3/2025    Procedure: RELEASE, TRIGGER FINGER;  Surgeon: aJyy Castillo MD;  Location: [facility] OR;  Service: Orthopedics;  Laterality: Left;  Left ring finger    TUBAL LIGATION          MEDICATIONS: Current Medications[1]       PHYSICAL EXAM:      GENERAL: Well-developed, well-nourished female . The patient is alert, oriented and cooperative.    EXTREMITIES:   Left hand minimal swelling and erythema around incision site, some tenderness to palpation, good range of motion, neurovascularly intact    WOUND: Incisions are clean,dry,intact without signs of infection and healing  well      RADIOGRAPHIC FINDINGS:   No results found.     Patient Active Problem List    Diagnosis Date Noted    Trigger finger of left hand 05/14/2025    Depression, recurrent 03/28/2024    Paroxysmal atrial fibrillation 03/28/2024    Convulsions 03/28/2024    Chronic obstructive pulmonary disease with (acute) exacerbation 03/28/2024    Chronic tension-type headache, not intractable 12/21/2023    Iron deficiency anemia secondary to inadequate dietary iron intake 11/15/2023    Non-seasonal allergic rhinitis due to pollen 09/14/2023    Chronic bilateral low back pain with bilateral sciatica 09/14/2023    Generalized anxiety disorder 09/14/2023    Primary osteoarthritis of right knee 09/07/2023    History of total right knee replacement (TKR) 09/07/2023    Asymptomatic menopausal state 08/14/2023    PACO on CPAP 11/07/2022    Primary hypertension 11/07/2022    Presence of Watchman left atrial appendage closure device 10/10/2022    Class 3 severe obesity due to excess calories with serious comorbidity and body mass index (BMI) of 40.0 to 44.9 in adult 09/01/2022    Angina pectoris 09/01/2022    Chronic kidney disease, stage 3a 09/01/2022    S/P ablation operation for arrhythmia 08/10/2022    Mixed hyperlipidemia 04/07/2022    Chronic systolic heart failure 09/08/2021    Moderate persistent asthma without complication     Gastroesophageal reflux disease without esophagitis     Age-related osteoporosis without current pathological fracture     Spinal cord injury, thoracic region 02/10/2016    Closed sternal manubrial dissociation fracture with routine healing 02/02/2016    History of fracture of rib 02/02/2016    Closed stable burst fracture of third thoracic vertebra with routine healing 02/02/2016     IMPRESSION AND PLAN: Patient is status-post Release, Trigger Finger - Left doing well.  All incisional sutures/staples removed today and replaced with Steri-Strips, discussed that these can get wet in the shower in 2-3  days, let them fall off on their own.  Continue to limit activities with no heavy lifting on the left hand.  Follow up Dr. Castillo 4 weeks.    No follow-ups on file.       Jennifer Mustafa PA-C      (Subject to voice recognition error, transcription service not allowed)         [1]   Current Outpatient Medications:     albuterol-ipratropium (DUO-NEB) 2.5 mg-0.5 mg/3 mL nebulizer solution, Take 3 mLs by nebulization every 6 (six) hours as needed for Wheezing. Rescue, Disp: 75 mL, Rfl: 1    aspirin 325 MG tablet, Take 1 tablet (325 mg total) by mouth once daily., Disp: 100 tablet, Rfl: 5    busPIRone (BUSPAR) 10 MG tablet, Take 1 tablet (10 mg total) by mouth 2 (two) times daily. For ANXIETY, Disp: 180 tablet, Rfl: 1    calcium carbonate/vitamin D3 (CALCARB 600 WITH VITAMIN D ORAL), Take by mouth., Disp: , Rfl:     cyanocobalamin 1,000 mcg/mL injection, Inject 1ml IM weekly for 8 weeks, THEN inject one mL IM every other week for 8 doses, THEN inject one mL IM monthly to complete a full year of treatment, Disp: 10 mL, Rfl: 2    diclofenac sodium (VOLTAREN) 1 % Gel, Apply a thin layer of gel to the left foot and ankle, twice daily as needed for PAIN and INFLAMMATION, Disp: 200 g, Rfl: 5    fluticasone propionate (FLONASE) 50 mcg/actuation nasal spray, 1 spray (50 mcg total) by Each Nostril route once daily., Disp: 48 g, Rfl: 1    fluticasone-umeclidin-vilanter (TRELEGY ELLIPTA) 200-62.5-25 mcg inhaler, Inhale 1 puff into the lungs once daily. For LUNGS, Disp: 180 each, Rfl: 1    furosemide (LASIX) 40 MG tablet, Take 1 tablet (40 mg total) by mouth 2 (two) times daily. For SWELLING, Disp: 180 tablet, Rfl: 1    gabapentin (NEURONTIN) 300 MG capsule, Take 1 capsule (300 mg total) by mouth 3 (three) times daily. For NERVE PAIN, Disp: 270 capsule, Rfl: 1    levoFLOXacin (LEVAQUIN) 500 MG tablet, Take 1 tablet (500 mg total) by mouth once daily. For LUNGS, Disp: 7 tablet, Rfl: 0    loratadine (CLARITIN) 10 mg tablet, Take 1  tablet (10 mg total) by mouth once daily. For ALLERGIES, Disp: 90 tablet, Rfl: 1    montelukast (SINGULAIR) 10 mg tablet, Take 1 tablet (10 mg total) by mouth once daily. For ALLERGIES, Disp: 90 tablet, Rfl: 1    nebivoloL (BYSTOLIC) 5 MG Tab, Take 1 tablet (5 mg total) by mouth once daily. For HEART, Disp: 90 tablet, Rfl: 1    pantoprazole (PROTONIX) 40 MG tablet, Take 1 tablet (40 mg total) by mouth once daily. For STOMACH, Disp: 90 tablet, Rfl: 1    paroxetine (PAXIL) 40 MG tablet, Take 1 tablet (40 mg total) by mouth once daily. For MOOD, Disp: 90 tablet, Rfl: 1    phenytoin (DILANTIN) 100 MG ER capsule, Take 1 capsule (100 mg total) by mouth 3 (three) times daily. For SEIZURES, Disp: 270 capsule, Rfl: 0    pravastatin (PRAVACHOL) 40 MG tablet, Take 1 tablet (40 mg total) by mouth every evening. For CHOLESTEROL, Disp: 90 tablet, Rfl: 1

## 2025-06-16 DIAGNOSIS — I50.32 CHRONIC DIASTOLIC HEART FAILURE: Chronic | ICD-10-CM

## 2025-06-16 RX ORDER — FUROSEMIDE 40 MG/1
40 TABLET ORAL 2 TIMES DAILY
Qty: 180 TABLET | Refills: 1 | Status: SHIPPED | OUTPATIENT
Start: 2025-06-16

## 2025-06-16 NOTE — TELEPHONE ENCOUNTER
----- Message from Le sent at 6/16/2025  8:20 AM CDT -----  Pt called requesting a refill for furosemide (LASIX) 40 MG be sent to [facility] in [place], [place] She will be going out of town and is completely out of this medication. Thank you.

## 2025-06-27 ENCOUNTER — OFFICE VISIT (OUTPATIENT)
Dept: FAMILY MEDICINE | Facility: CLINIC | Age: 75
End: 2025-06-27
Payer: MEDICARE

## 2025-06-27 VITALS
HEART RATE: 79 BPM | SYSTOLIC BLOOD PRESSURE: 130 MMHG | OXYGEN SATURATION: 94 % | BODY MASS INDEX: 41.32 KG/M2 | DIASTOLIC BLOOD PRESSURE: 76 MMHG | RESPIRATION RATE: 18 BRPM | TEMPERATURE: 98 F | HEIGHT: 64 IN | WEIGHT: 242 LBS

## 2025-06-27 DIAGNOSIS — E66.01 SEVERE OBESITY (BMI 35.0-39.9) WITH COMORBIDITY: Chronic | ICD-10-CM

## 2025-06-27 DIAGNOSIS — N18.31 CHRONIC KIDNEY DISEASE, STAGE 3A: Chronic | ICD-10-CM

## 2025-06-27 DIAGNOSIS — R56.9 CONVULSIONS, UNSPECIFIED CONVULSION TYPE: Chronic | ICD-10-CM

## 2025-06-27 DIAGNOSIS — B37.0 ORAL CANDIDIASIS: Primary | ICD-10-CM

## 2025-06-27 PROCEDURE — 99213 OFFICE O/P EST LOW 20 MIN: CPT | Mod: ,,, | Performed by: FAMILY MEDICINE

## 2025-06-27 PROCEDURE — 3075F SYST BP GE 130 - 139MM HG: CPT | Mod: ,,, | Performed by: FAMILY MEDICINE

## 2025-06-27 PROCEDURE — 3008F BODY MASS INDEX DOCD: CPT | Mod: ,,, | Performed by: FAMILY MEDICINE

## 2025-06-27 PROCEDURE — 3288F FALL RISK ASSESSMENT DOCD: CPT | Mod: ,,, | Performed by: FAMILY MEDICINE

## 2025-06-27 PROCEDURE — 3078F DIAST BP <80 MM HG: CPT | Mod: ,,, | Performed by: FAMILY MEDICINE

## 2025-06-27 PROCEDURE — 1160F RVW MEDS BY RX/DR IN RCRD: CPT | Mod: ,,, | Performed by: FAMILY MEDICINE

## 2025-06-27 PROCEDURE — 1159F MED LIST DOCD IN RCRD: CPT | Mod: ,,, | Performed by: FAMILY MEDICINE

## 2025-06-27 PROCEDURE — 1101F PT FALLS ASSESS-DOCD LE1/YR: CPT | Mod: ,,, | Performed by: FAMILY MEDICINE

## 2025-06-27 NOTE — PROGRESS NOTES
Felipe Perry MD    [address] [place] [address]     PATIENT NAME: Radha Martínez  : 1950  DATE: 25  MRN: [id number]      Billing Provider: Felipe Perry MD  Level of Service: [place] OFFICE/OUTPT VISIT, EST, LEVL III, 20-29 MIN  Patient PCP Information       Provider PCP Type    Felipe Perry MD General                   Update PCP  Update Chief Complaint         History of Present Illness / Problem Focused Workflow     Radha Martínez presents to the clinic with   Chief Complaint   Patient presents with    Sore Throat     Patient states she is here today for a cough and a sore throat that she has had for over a week now. Patient states she has been out of town and went to a clinic in [place] and was swabbed for covid and flu and it was negative.     Cough      HPI    Review of Systems     Review of Systems   Constitutional:  Negative for activity change, appetite change, chills, fatigue and fever.   HENT:  Positive for sore throat. Negative for nasal congestion, ear pain, hearing loss and postnasal drip.    Respiratory:  Negative for cough, chest tightness, shortness of breath and wheezing.    Cardiovascular:  Negative for chest pain, palpitations, leg swelling and claudication.   Gastrointestinal:  Negative for abdominal pain, change in bowel habit, constipation, diarrhea, nausea and vomiting.   Genitourinary:  Negative for dysuria.   Musculoskeletal:  Negative for arthralgias, back pain, gait problem and myalgias.   Integumentary:  Negative for rash.   Neurological:  Negative for weakness and headaches.   Psychiatric/Behavioral:  Negative for suicidal ideas. The patient is not nervous/anxious.         Medical / Social / Family History     Past Medical History:   Diagnosis Date    Asthma     Breast cancer screening by mammogram 2024    [facility]    CHF (congestive heart failure)     COPD (chronic obstructive pulmonary disease)      WILLSON (dyspnea on exertion)     GERD (gastroesophageal reflux disease)     HTN (hypertension)     Hyperlipidemia     Osteoporosis     Oxygen dependent     02 2L    Seizure disorder        Past Surgical History:   Procedure Laterality Date    ARTHROPLASTY, KNEE, TOTAL, USING COMPUTER-ASSISTED NAVIGATION Right 11/2/2023    Procedure: ARTHROPLASTY, KNEE, TOTAL, USING COMPUTER-ASSISTED NAVIGATION;  Surgeon: Jayy Castillo MD;  Location: [facility] ORTHO OR;  Service: Orthopedics;  Laterality: Right;    CARDIOVERSION N/A 04/08/2022    Procedure: Cardioversion;  Surgeon: Adal Chung MD;  Location: [facility] CATH LAB;  Service: Cardiology;  Laterality: N/A;    COLONOSCOPY      EYE SURGERY      REVISION OF TOTAL KNEE REPLACEMENT       TRIGGER FINGER RELEASE Left 6/3/2025    Procedure: RELEASE, TRIGGER FINGER;  Surgeon: Jayy Castillo MD;  Location: [facility] ORTHO OR;  Service: Orthopedics;  Laterality: Left;  Left ring finger    TUBAL LIGATION         Social History  Ms.  reports that she has never smoked. She has never been exposed to tobacco smoke. She has never used smokeless tobacco. She reports that she does not drink alcohol and does not use drugs.    Family History  Ms.'s family history includes Cancer in her father; Diabetes in her brother, mother, and sister; Heart disease in her mother and sister; Hypertension in her mother and sister.    Medications and Allergies     Medications  Outpatient Medications Marked as Taking for the 6/27/25 encounter (Office Visit) with Felipe Perry MD   Medication Sig Dispense Refill    busPIRone (BUSPAR) 10 MG tablet Take 1 tablet (10 mg total) by mouth 2 (two) times daily. For ANXIETY 180 tablet 1    calcium carbonate/vitamin D3 (CALCARB 600 WITH VITAMIN D ORAL) Take by mouth.      furosemide (LASIX) 40 MG tablet Take 1 tablet (40 mg total) by mouth 2 (two) times daily. For SWELLING 180 tablet 1    gabapentin (NEURONTIN) 300 MG capsule Take 1 capsule  (300 mg total) by mouth 3 (three) times daily. For NERVE PAIN 270 capsule 1    montelukast (SINGULAIR) 10 mg tablet Take 1 tablet (10 mg total) by mouth once daily. For ALLERGIES 90 tablet 1    nebivoloL (BYSTOLIC) 5 MG Tab Take 1 tablet (5 mg total) by mouth once daily. For HEART 90 tablet 1    pantoprazole (PROTONIX) 40 MG tablet Take 1 tablet (40 mg total) by mouth once daily. For STOMACH 90 tablet 1    paroxetine (PAXIL) 40 MG tablet Take 1 tablet (40 mg total) by mouth once daily. For MOOD 90 tablet 1    phenytoin (DILANTIN) 100 MG ER capsule Take 1 capsule (100 mg total) by mouth 3 (three) times daily. For SEIZURES 270 capsule 0    pravastatin (PRAVACHOL) 40 MG tablet Take 1 tablet (40 mg total) by mouth every evening. For CHOLESTEROL 90 tablet 1       Allergies  Review of patient's allergies indicates:  No Known Allergies    Physical Examination     Vitals:    06/27/25 0959   BP: 130/76   Pulse: 79   Resp: 18   Temp: 97.6 °F (36.4 °C)     Physical Exam  Constitutional:       Appearance: Normal appearance.   HENT:      Head: Normocephalic and atraumatic.      Mouth/Throat:        Comments: White spots on tongue and top of the mouth   Eyes:      Extraocular Movements: Extraocular movements intact.   Cardiovascular:      Rate and Rhythm: Normal rate and regular rhythm.   Pulmonary:      Effort: Pulmonary effort is normal.      Breath sounds: Normal breath sounds.   Skin:     General: Skin is warm.   Neurological:      Mental Status: She is alert and oriented to person, place, and time. Mental status is at baseline.            Assessment and Plan (including Health Maintenance)      Problem List  Smart Sets  Document Outside HM   :    Plan:     Diflucan for yeast    I have reviewed the chronic health problems listed below. They are stable at this time and no changes are being made, other than what is listed above      Health Maintenance Due   Topic Date Due    TETANUS VACCINE  Never done    Shingles Vaccine (1 of  2) Never done    Colorectal Cancer Screening  01/11/2022    COVID-19 Vaccine (6 - 2024-25 season) 09/01/2024    Mammogram  08/09/2025       Problem List Items Addressed This Visit          Neuro    Convulsions (Chronic)       Renal/    Chronic kidney disease, stage 3a (Chronic)     Other Visit Diagnoses         Oral candidiasis    -  Primary      Severe obesity (BMI 35.0-39.9) with comorbidity  (Chronic)               Health Maintenance Topics with due status: Not Due       Topic Last Completion Date    DEXA Scan 12/12/2023    Annual UACr 12/20/2024    Lipid Panel 12/20/2024    High Dose Statin 06/27/2025       Procedures     Future Appointments   Date Time Provider Department Center   7/14/2025 10:00 AM Jayy Castillo MD [facility]TH[facility]   7/14/2025  3:00 PM AWV NURSE, [facility]ILY [facility]   12/1/2025  9:00 AM Jayy Castillo MD [facility]        Follow up if symptoms worsen or fail to improve.     Signature:  Felipe Perry MD  [address] [place] [address]  Phone #: [phone]  Fax #: [phone]    Date of encounter: 6/27/25    There are no Patient Instructions on file for this visit.

## 2025-06-28 ENCOUNTER — TELEPHONE (OUTPATIENT)
Dept: FAMILY MEDICINE | Facility: CLINIC | Age: 75
End: 2025-06-28
Payer: MEDICARE

## 2025-06-28 NOTE — TELEPHONE ENCOUNTER
Called stating medication for her mouth for Thrush was suppose to be sent to her pharmacy yesterday. I have spoke with Dr Perry he said to call in diflucan 100 mg 1 tablet daily for 5 days. I have spoke with [facility]. Pt has been notified of prescription being called to her pharmacy.

## 2025-07-14 ENCOUNTER — OFFICE VISIT (OUTPATIENT)
Dept: ORTHOPEDICS | Facility: CLINIC | Age: 75
End: 2025-07-14
Payer: MEDICARE

## 2025-07-14 VITALS
HEART RATE: 90 BPM | SYSTOLIC BLOOD PRESSURE: 144 MMHG | OXYGEN SATURATION: 96 % | WEIGHT: 242 LBS | HEIGHT: 64 IN | BODY MASS INDEX: 41.32 KG/M2 | DIASTOLIC BLOOD PRESSURE: 85 MMHG

## 2025-07-14 DIAGNOSIS — Z09 POSTOP CHECK: Primary | ICD-10-CM

## 2025-07-14 PROCEDURE — 99214 OFFICE O/P EST MOD 30 MIN: CPT | Mod: PBBFAC | Performed by: ORTHOPAEDIC SURGERY

## 2025-07-14 PROCEDURE — 3077F SYST BP >= 140 MM HG: CPT | Mod: CPTII,,, | Performed by: ORTHOPAEDIC SURGERY

## 2025-07-14 PROCEDURE — 1101F PT FALLS ASSESS-DOCD LE1/YR: CPT | Mod: CPTII,,, | Performed by: ORTHOPAEDIC SURGERY

## 2025-07-14 PROCEDURE — 99999 PR PBB SHADOW E&M-EST. PATIENT-LVL IV: CPT | Mod: PBBFAC,,, | Performed by: ORTHOPAEDIC SURGERY

## 2025-07-14 PROCEDURE — 1160F RVW MEDS BY RX/DR IN RCRD: CPT | Mod: CPTII,,, | Performed by: ORTHOPAEDIC SURGERY

## 2025-07-14 PROCEDURE — 1159F MED LIST DOCD IN RCRD: CPT | Mod: CPTII,,, | Performed by: ORTHOPAEDIC SURGERY

## 2025-07-14 PROCEDURE — 3288F FALL RISK ASSESSMENT DOCD: CPT | Mod: CPTII,,, | Performed by: ORTHOPAEDIC SURGERY

## 2025-07-14 PROCEDURE — 3079F DIAST BP 80-89 MM HG: CPT | Mod: CPTII,,, | Performed by: ORTHOPAEDIC SURGERY

## 2025-07-14 PROCEDURE — 99024 POSTOP FOLLOW-UP VISIT: CPT | Mod: ,,, | Performed by: ORTHOPAEDIC SURGERY

## 2025-07-14 NOTE — PROGRESS NOTES
CLINIC NOTE       Chief Complaint   Patient presents with    Left Hand - Follow-up        Radha Martínez is a 75 y.o. female seen today for recheck of her left hand.  She is now 5 weeks following A1 pulley release left ring finger for nodular tenosynovitis.  She is doing well at this time.  He has not had any additional catching or locking episodes.  Palmar incisions healing well without signs of infection.  She has regained full flexion and extension of the MP and IP joints.      Past Medical History:   Diagnosis Date    Asthma     Breast cancer screening by mammogram 08/09/2024    [facility]    CHF (congestive heart failure)     COPD (chronic obstructive pulmonary disease)     WILLSON (dyspnea on exertion)     GERD (gastroesophageal reflux disease)     HTN (hypertension)     Hyperlipidemia     Osteoporosis     Oxygen dependent     02 2L    Seizure disorder      Family History   Problem Relation Name Age of Onset    Diabetes Mother      Heart disease Mother      Hypertension Mother      Cancer Father      Diabetes Sister      Heart disease Sister      Hypertension Sister      Diabetes Brother       Medications Ordered Prior to Encounter[1]    ROS     Vitals:    07/14/25 1001   BP: (!) 144/85   Pulse: 90       Past Surgical History:   Procedure Laterality Date    ARTHROPLASTY, KNEE, TOTAL, USING COMPUTER-ASSISTED NAVIGATION Right 11/2/2023    Procedure: ARTHROPLASTY, KNEE, TOTAL, USING COMPUTER-ASSISTED NAVIGATION;  Surgeon: Jayy Castillo MD;  Location: [facility] OR;  Service: Orthopedics;  Laterality: Right;    CARDIOVERSION N/A 04/08/2022    Procedure: Cardioversion;  Surgeon: Adal Chung MD;  Location: [facility] CATH LAB;  Service: Cardiology;  Laterality: N/A;    COLONOSCOPY      EYE SURGERY      REVISION OF TOTAL KNEE REPLACEMENT       TRIGGER FINGER RELEASE Left 6/3/2025    Procedure: RELEASE, TRIGGER FINGER;  Surgeon: Jayy Castillo MD;  Location: [facility] OR;   Service: Orthopedics;  Laterality: Left;  Left ring finger    TUBAL LIGATION          Review of patient's allergies indicates:  No Known Allergies     Ortho Exam     Radiographic Examination:    Technique:    Findings:    Impression:   See Above    Assessment and Plan  Patient Active Problem List    Diagnosis Date Noted    Trigger finger of left hand 05/14/2025    Depression, recurrent 03/28/2024    Paroxysmal atrial fibrillation 03/28/2024    Convulsions 03/28/2024    Chronic obstructive pulmonary disease with (acute) exacerbation 03/28/2024    Chronic tension-type headache, not intractable 12/21/2023    Iron deficiency anemia secondary to inadequate dietary iron intake 11/15/2023    Non-seasonal allergic rhinitis due to pollen 09/14/2023    Chronic bilateral low back pain with bilateral sciatica 09/14/2023    Generalized anxiety disorder 09/14/2023    Primary osteoarthritis of right knee 09/07/2023    History of total right knee replacement (TKR) 09/07/2023    Asymptomatic menopausal state 08/14/2023    PACO on CPAP 11/07/2022    Primary hypertension 11/07/2022    Presence of Watchman left atrial appendage closure device 10/10/2022    Class 3 severe obesity due to excess calories with serious comorbidity and body mass index (BMI) of 40.0 to 44.9 in adult 09/01/2022    Angina pectoris 09/01/2022    Chronic kidney disease, stage 3a 09/01/2022    S/P ablation operation for arrhythmia 08/10/2022    Mixed hyperlipidemia 04/07/2022    Chronic systolic heart failure 09/08/2021    Moderate persistent asthma without complication     Gastroesophageal reflux disease without esophagitis     Age-related osteoporosis without current pathological fracture     Spinal cord injury, thoracic region 02/10/2016    Closed sternal manubrial dissociation fracture with routine healing 02/02/2016    History of fracture of rib 02/02/2016    Closed stable burst fracture of third thoracic vertebra with routine healing 02/02/2016    Impression:  Status post A1 pulley release left ring finger doing well   Plan: Slow progression of activities as tolerated.  Recheck PRN.  We discussed vitamin E cream massage for the incisional area.      Jayy Castillo M.D.                   [1]   Current Outpatient Medications on File Prior to Visit   Medication Sig Dispense Refill    albuterol-ipratropium (DUO-NEB) 2.5 mg-0.5 mg/3 mL nebulizer solution Take 3 mLs by nebulization every 6 (six) hours as needed for Wheezing. Rescue 75 mL 1    busPIRone (BUSPAR) 10 MG tablet Take 1 tablet (10 mg total) by mouth 2 (two) times daily. For ANXIETY 180 tablet 1    calcium carbonate/vitamin D3 (CALCARB 600 WITH VITAMIN D ORAL) Take by mouth.      cyanocobalamin 1,000 mcg/mL injection Inject 1ml IM weekly for 8 weeks, THEN inject one mL IM every other week for 8 doses, THEN inject one mL IM monthly to complete a full year of treatment 10 mL 2    diclofenac sodium (VOLTAREN) 1 % Gel Apply a thin layer of gel to the left foot and ankle, twice daily as needed for PAIN and INFLAMMATION 200 g 5    fluticasone propionate (FLONASE) 50 mcg/actuation nasal spray 1 spray (50 mcg total) by Each Nostril route once daily. 48 g 1    fluticasone-umeclidin-vilanter (TRELEGY ELLIPTA) 200-62.5-25 mcg inhaler Inhale 1 puff into the lungs once daily. For LUNGS 180 each 1    furosemide (LASIX) 40 MG tablet Take 1 tablet (40 mg total) by mouth 2 (two) times daily. For SWELLING 180 tablet 1    gabapentin (NEURONTIN) 300 MG capsule Take 1 capsule (300 mg total) by mouth 3 (three) times daily. For NERVE PAIN 270 capsule 1    levoFLOXacin (LEVAQUIN) 500 MG tablet Take 1 tablet (500 mg total) by mouth once daily. For LUNGS 7 tablet 0    loratadine (CLARITIN) 10 mg tablet Take 1 tablet (10 mg total) by mouth once daily. For ALLERGIES 90 tablet 1    montelukast (SINGULAIR) 10 mg tablet Take 1 tablet (10 mg total) by mouth once daily. For ALLERGIES 90 tablet 1    nebivoloL (BYSTOLIC) 5 MG Tab Take 1 tablet (5 mg  total) by mouth once daily. For HEART 90 tablet 1    pantoprazole (PROTONIX) 40 MG tablet Take 1 tablet (40 mg total) by mouth once daily. For STOMACH 90 tablet 1    paroxetine (PAXIL) 40 MG tablet Take 1 tablet (40 mg total) by mouth once daily. For MOOD 90 tablet 1    phenytoin (DILANTIN) 100 MG ER capsule Take 1 capsule (100 mg total) by mouth 3 (three) times daily. For SEIZURES 270 capsule 0    pravastatin (PRAVACHOL) 40 MG tablet Take 1 tablet (40 mg total) by mouth every evening. For CHOLESTEROL 90 tablet 1    aspirin 325 MG tablet Take 1 tablet (325 mg total) by mouth once daily. 100 tablet 5     No current facility-administered medications on file prior to visit.

## (undated) DEVICE — GLOVE SENSICARE PI SURG 7

## (undated) DEVICE — GLOVE SENSICARE PI SURG 7.5

## (undated) DEVICE — SUT VICRYL CTD 1 27IN CP

## (undated) DEVICE — PADDING WYTEX UNDRCST 2INX4YD

## (undated) DEVICE — BATTERY INSTRUMENT

## (undated) DEVICE — SYR 10CC LUER LOCK

## (undated) DEVICE — SPACESUIT TOGA T5 ZIPPER PEEL

## (undated) DEVICE — SET IV EXTENSION 42IN W/2 PORT CLEARLINK

## (undated) DEVICE — COMPR KNEE STRAIGHT STAY 20IN

## (undated) DEVICE — APPLICATOR CHLORAPREP ORN 26ML

## (undated) DEVICE — SUT ETHILON 5-0 P3 18IN BLK

## (undated) DEVICE — GLOVE 8 PROTEXIS PI BLUE

## (undated) DEVICE — NDL HYPODERMIC SAFETY 25G 1IN

## (undated) DEVICE — TUBING CAPNOLINE PLUS SMART 02 CONNECTOR(ORDER 65110)

## (undated) DEVICE — KIT EVACUATOR 3 SPR  DRN 400CC

## (undated) DEVICE — GLOVE SENSICARE PI SURG 6.5

## (undated) DEVICE — TOURNIQUET SB QC SP 34X4IN

## (undated) DEVICE — SET IV PRIMARY ALARIS (PRIMARY)

## (undated) DEVICE — SUT VICRYL 2-0 36 CT-1

## (undated) DEVICE — CARD UNIV KNEE NAVGTN SW-SCL L

## (undated) DEVICE — GLOVE BIOGEL SKINSENSE PI 7.5

## (undated) DEVICE — GLOVE PROTEXIS PI SYN SURG 6.0

## (undated) DEVICE — SOL NACL IRR 1000ML BTL

## (undated) DEVICE — GLOVE SENSICARE PI GRN 8

## (undated) DEVICE — BANDAGE MATRIX HK LOOP 2IN 5YD

## (undated) DEVICE — REAMER PATELLA 42MM DISP

## (undated) DEVICE — SOL IRRI STRL WATER 1000ML

## (undated) DEVICE — TOURNIQUET SB QC DP 18X4IN

## (undated) DEVICE — PAD CURAD NONADH 3X4IN

## (undated) DEVICE — GOWN POLY REINF BRTH SLV XL

## (undated) DEVICE — DRAPE INCISE IOBAN 2 13X13IN

## (undated) DEVICE — Device

## (undated) DEVICE — GUIDEPIN 3.20MM 4 KANT SQUARE
Type: IMPLANTABLE DEVICE | Site: KNEE | Status: NON-FUNCTIONAL
Removed: 2023-11-02

## (undated) DEVICE — SUT 5/0 18IN PDS II CLR MO

## (undated) DEVICE — HANDLE YANKAUER SUCTION K80 LATEX FREE

## (undated) DEVICE — SLING ARM LARGE FOAM STRAP

## (undated) DEVICE — GAUZE SPONGE 4X4 12PLY

## (undated) DEVICE — PAD CAST SPECIALIST STRL 3

## (undated) DEVICE — GLOVE SENSICARE PI GRN 7

## (undated) DEVICE — TUBING SUCTION 5MM STERILE 3/16IN X 12FT

## (undated) DEVICE — SENSOR PULSE OX ADULT

## (undated) DEVICE — GLOVE 6.0 PROTEXIS PI BLUE

## (undated) DEVICE — KIT TOTAL KNEE RUSH

## (undated) DEVICE — BLADE PERFORMANCE SAG 21X90MM

## (undated) DEVICE — DRESSING AQUACEL FOAM RECT 6X6

## (undated) DEVICE — GLOVE 7.5 PROTEXIS PI BLUE

## (undated) DEVICE — GLOVE SENSICARE PI GRN 6.5

## (undated) DEVICE — BANDAGE SURE-WRAP 6INX5YD

## (undated) DEVICE — CUFF BP DISPOSABLE SOFT ADULT LONG

## (undated) DEVICE — SOCKINETTE DOUBLE PLY 4X48IN

## (undated) DEVICE — STAPLER SKIN WIDE

## (undated) DEVICE — GLOVE 6.5 PROTEXIS PI BLUE

## (undated) DEVICE — GUIDESCREW 6 KANT HEXAGONAL 3.20MM
Type: IMPLANTABLE DEVICE | Site: KNEE | Status: NON-FUNCTIONAL
Removed: 2023-11-02

## (undated) DEVICE — ELECTRODE ADULT DEFIBRILLATION QUIK COMBO ECG PK